# Patient Record
Sex: FEMALE | Race: WHITE | NOT HISPANIC OR LATINO | Employment: OTHER | URBAN - METROPOLITAN AREA
[De-identification: names, ages, dates, MRNs, and addresses within clinical notes are randomized per-mention and may not be internally consistent; named-entity substitution may affect disease eponyms.]

---

## 2017-09-02 ENCOUNTER — APPOINTMENT (EMERGENCY)
Dept: NUCLEAR MEDICINE | Facility: HOSPITAL | Age: 27
End: 2017-09-02
Payer: COMMERCIAL

## 2017-09-02 ENCOUNTER — APPOINTMENT (EMERGENCY)
Dept: RADIOLOGY | Facility: HOSPITAL | Age: 27
End: 2017-09-02
Payer: COMMERCIAL

## 2017-09-02 ENCOUNTER — HOSPITAL ENCOUNTER (EMERGENCY)
Facility: HOSPITAL | Age: 27
Discharge: LEFT BEFORE DISCHARGE | End: 2017-09-02
Attending: EMERGENCY MEDICINE
Payer: COMMERCIAL

## 2017-09-02 ENCOUNTER — APPOINTMENT (EMERGENCY)
Dept: CT IMAGING | Facility: HOSPITAL | Age: 27
End: 2017-09-02
Payer: COMMERCIAL

## 2017-09-02 VITALS
TEMPERATURE: 98.2 F | SYSTOLIC BLOOD PRESSURE: 136 MMHG | HEIGHT: 64 IN | BODY MASS INDEX: 50.02 KG/M2 | RESPIRATION RATE: 16 BRPM | HEART RATE: 87 BPM | WEIGHT: 293 LBS | OXYGEN SATURATION: 100 % | DIASTOLIC BLOOD PRESSURE: 71 MMHG

## 2017-09-02 DIAGNOSIS — N19 RENAL FAILURE: Primary | ICD-10-CM

## 2017-09-02 LAB
ALBUMIN SERPL BCP-MCNC: 3.4 G/DL (ref 3.5–5)
ALP SERPL-CCNC: 100 U/L (ref 46–116)
ALT SERPL W P-5'-P-CCNC: 28 U/L (ref 12–78)
ANION GAP BLD CALC-SCNC: 17 MMOL/L (ref 4–13)
ANION GAP SERPL CALCULATED.3IONS-SCNC: 12 MMOL/L (ref 4–13)
AST SERPL W P-5'-P-CCNC: 17 U/L (ref 5–45)
BASOPHILS # BLD AUTO: 0.07 THOUSANDS/ΜL (ref 0–0.1)
BASOPHILS NFR BLD AUTO: 1 % (ref 0–1)
BILIRUB SERPL-MCNC: 0.3 MG/DL (ref 0.2–1)
BUN BLD-MCNC: 34 MG/DL (ref 5–25)
BUN SERPL-MCNC: 33 MG/DL (ref 5–25)
CA-I BLD-SCNC: 1.08 MMOL/L (ref 1.12–1.32)
CALCIUM SERPL-MCNC: 9.4 MG/DL (ref 8.3–10.1)
CHLORIDE BLD-SCNC: 105 MMOL/L (ref 100–108)
CHLORIDE SERPL-SCNC: 102 MMOL/L (ref 100–108)
CO2 SERPL-SCNC: 23 MMOL/L (ref 21–32)
CREAT BLD-MCNC: 1.8 MG/DL (ref 0.6–1.3)
CREAT SERPL-MCNC: 1.9 MG/DL (ref 0.6–1.3)
DEPRECATED D DIMER PPP: 652 NG/ML (FEU) (ref 0–424)
EOSINOPHIL # BLD AUTO: 0.06 THOUSAND/ΜL (ref 0–0.61)
EOSINOPHIL NFR BLD AUTO: 1 % (ref 0–6)
ERYTHROCYTE [DISTWIDTH] IN BLOOD BY AUTOMATED COUNT: 15.5 % (ref 11.6–15.1)
GFR SERPL CREATININE-BSD FRML MDRD: 35 ML/MIN/1.73SQ M
GFR SERPL CREATININE-BSD FRML MDRD: 37 ML/MIN/1.73SQ M
GLUCOSE SERPL-MCNC: 108 MG/DL (ref 65–140)
GLUCOSE SERPL-MCNC: 94 MG/DL (ref 65–140)
HCT VFR BLD AUTO: 37.2 % (ref 34.8–46.1)
HCT VFR BLD CALC: 37 % (ref 34.8–46.1)
HGB BLD-MCNC: 11.7 G/DL (ref 11.5–15.4)
HGB BLDA-MCNC: 12.6 G/DL (ref 11.5–15.4)
LYMPHOCYTES # BLD AUTO: 2.53 THOUSANDS/ΜL (ref 0.6–4.47)
LYMPHOCYTES NFR BLD AUTO: 22 % (ref 14–44)
MCH RBC QN AUTO: 25.5 PG (ref 26.8–34.3)
MCHC RBC AUTO-ENTMCNC: 31.5 G/DL (ref 31.4–37.4)
MCV RBC AUTO: 81 FL (ref 82–98)
MONOCYTES # BLD AUTO: 0.7 THOUSAND/ΜL (ref 0.17–1.22)
MONOCYTES NFR BLD AUTO: 6 % (ref 4–12)
NEUTROPHILS # BLD AUTO: 8.29 THOUSANDS/ΜL (ref 1.85–7.62)
NEUTS SEG NFR BLD AUTO: 70 % (ref 43–75)
PCO2 BLD: 23 MMOL/L (ref 21–32)
PLATELET # BLD AUTO: 456 THOUSANDS/UL (ref 149–390)
PMV BLD AUTO: 9.9 FL (ref 8.9–12.7)
POTASSIUM BLD-SCNC: 4.2 MMOL/L (ref 3.5–5.3)
POTASSIUM SERPL-SCNC: 3.8 MMOL/L (ref 3.5–5.3)
PROT SERPL-MCNC: 8.8 G/DL (ref 6.4–8.2)
RBC # BLD AUTO: 4.58 MILLION/UL (ref 3.81–5.12)
SODIUM BLD-SCNC: 141 MMOL/L (ref 136–145)
SODIUM SERPL-SCNC: 137 MMOL/L (ref 136–145)
SPECIMEN SOURCE: ABNORMAL
WBC # BLD AUTO: 11.65 THOUSAND/UL (ref 4.31–10.16)

## 2017-09-02 PROCEDURE — A9558 XE133 XENON 10MCI: HCPCS

## 2017-09-02 PROCEDURE — 85014 HEMATOCRIT: CPT

## 2017-09-02 PROCEDURE — 99284 EMERGENCY DEPT VISIT MOD MDM: CPT

## 2017-09-02 PROCEDURE — 96361 HYDRATE IV INFUSION ADD-ON: CPT

## 2017-09-02 PROCEDURE — A9540 TC99M MAA: HCPCS

## 2017-09-02 PROCEDURE — 71020 HB CHEST X-RAY 2VW FRONTAL&LATL: CPT

## 2017-09-02 PROCEDURE — 78582 LUNG VENTILAT&PERFUS IMAGING: CPT

## 2017-09-02 PROCEDURE — 85379 FIBRIN DEGRADATION QUANT: CPT | Performed by: EMERGENCY MEDICINE

## 2017-09-02 PROCEDURE — 80047 BASIC METABLC PNL IONIZED CA: CPT

## 2017-09-02 PROCEDURE — 80053 COMPREHEN METABOLIC PANEL: CPT | Performed by: EMERGENCY MEDICINE

## 2017-09-02 PROCEDURE — 36415 COLL VENOUS BLD VENIPUNCTURE: CPT | Performed by: EMERGENCY MEDICINE

## 2017-09-02 PROCEDURE — 96360 HYDRATION IV INFUSION INIT: CPT

## 2017-09-02 PROCEDURE — 93005 ELECTROCARDIOGRAM TRACING: CPT | Performed by: EMERGENCY MEDICINE

## 2017-09-02 PROCEDURE — 85025 COMPLETE CBC W/AUTO DIFF WBC: CPT | Performed by: EMERGENCY MEDICINE

## 2017-09-02 RX ORDER — ACETAMINOPHEN 325 MG/1
650 TABLET ORAL EVERY 6 HOURS PRN
COMMUNITY
End: 2018-08-07

## 2017-09-02 RX ORDER — BUDESONIDE AND FORMOTEROL FUMARATE DIHYDRATE 160; 4.5 UG/1; UG/1
2 AEROSOL RESPIRATORY (INHALATION) 2 TIMES DAILY
COMMUNITY
End: 2018-08-07

## 2017-09-02 RX ORDER — TOPIRAMATE 50 MG/1
25 TABLET, FILM COATED ORAL 3 TIMES DAILY
COMMUNITY
End: 2018-08-07

## 2017-09-02 RX ORDER — AMLODIPINE BESYLATE 5 MG/1
5 TABLET ORAL DAILY
COMMUNITY
End: 2018-08-07

## 2017-09-02 RX ORDER — ALPRAZOLAM 1 MG/1
TABLET ORAL
COMMUNITY
End: 2018-08-07

## 2017-09-02 RX ORDER — BENAZEPRIL/HYDROCHLOROTHIAZIDE 20 MG-25MG
1 TABLET ORAL DAILY
COMMUNITY
End: 2018-08-07

## 2017-09-02 RX ORDER — DOXYCYCLINE HYCLATE 100 MG/1
100 CAPSULE ORAL DAILY
COMMUNITY
End: 2018-08-10 | Stop reason: HOSPADM

## 2017-09-02 RX ADMIN — SODIUM CHLORIDE 1000 ML: 0.9 INJECTION, SOLUTION INTRAVENOUS at 08:35

## 2017-09-02 RX ADMIN — SODIUM CHLORIDE 1000 ML: 0.9 INJECTION, SOLUTION INTRAVENOUS at 09:50

## 2017-09-03 LAB
ATRIAL RATE: 105 BPM
P AXIS: 29 DEGREES
PR INTERVAL: 144 MS
QRS AXIS: -6 DEGREES
QRSD INTERVAL: 94 MS
QT INTERVAL: 338 MS
QTC INTERVAL: 438 MS
T WAVE AXIS: 23 DEGREES
VENTRICULAR RATE: 101 BPM

## 2018-06-26 ENCOUNTER — HOSPITAL ENCOUNTER (EMERGENCY)
Facility: HOSPITAL | Age: 28
Discharge: HOME | End: 2018-06-26
Attending: EMERGENCY MEDICINE | Admitting: EMERGENCY MEDICINE
Payer: COMMERCIAL

## 2018-06-26 VITALS
BODY MASS INDEX: 59.91 KG/M2 | SYSTOLIC BLOOD PRESSURE: 150 MMHG | DIASTOLIC BLOOD PRESSURE: 85 MMHG | WEIGHT: 293 LBS | TEMPERATURE: 98.3 F | OXYGEN SATURATION: 100 % | HEART RATE: 104 BPM | RESPIRATION RATE: 24 BRPM

## 2018-06-26 DIAGNOSIS — F32.A DEPRESSION: ICD-10-CM

## 2018-06-26 DIAGNOSIS — F51.5 NIGHTMARES: Primary | ICD-10-CM

## 2018-06-26 LAB
ANION GAP BLD CALC-SCNC: 18 MMOL/L (ref 4–13)
BUN BLD-MCNC: 27 MG/DL (ref 5–25)
CA-I BLD-SCNC: 1.23 MMOL/L (ref 1.12–1.32)
CHLORIDE BLD-SCNC: 103 MMOL/L (ref 100–108)
CREAT BLD-MCNC: 1.7 MG/DL (ref 0.6–1.3)
ETHANOL EXG-MCNC: 0 MG/DL
ETHANOL EXG-MCNC: 0 MG/DL
GFR SERPL CREATININE-BSD FRML MDRD: 40 ML/MIN/1.73SQ M
GLUCOSE SERPL-MCNC: 101 MG/DL (ref 65–140)
HCT VFR BLD CALC: 37 % (ref 34.8–46.1)
HGB BLDA-MCNC: 12.6 G/DL (ref 11.5–15.4)
PCO2 BLD: 25 MMOL/L (ref 21–32)
POTASSIUM BLD-SCNC: 3.8 MMOL/L (ref 3.5–5.3)
SODIUM BLD-SCNC: 142 MMOL/L (ref 136–145)
SPECIMEN SOURCE: ABNORMAL

## 2018-06-26 PROCEDURE — 85014 HEMATOCRIT: CPT

## 2018-06-26 PROCEDURE — 99283 EMERGENCY DEPT VISIT LOW MDM: CPT

## 2018-06-26 PROCEDURE — 80047 BASIC METABLC PNL IONIZED CA: CPT

## 2018-06-26 PROCEDURE — 82075 ASSAY OF BREATH ETHANOL: CPT | Performed by: EMERGENCY MEDICINE

## 2018-06-26 NOTE — ED NOTES
Pt unable to provide urine sample at this time as she states she did not drink all day       Heather Suárez RN  06/26/18 1727

## 2018-06-26 NOTE — ED NOTES
CW did intake and SA  Pt denies SI and HI  Pt admits to occasional AH and VH when "really angry or stressed "  Pt admits to being angry at her computer earlier this am   Pt stated that she has not slept past few days since taking a new weight loss drug prescribed by her PCP 4 days ago  Pt admits to paranoia and stated "that people don's like me and cannot figure me out "  Pt stated that she has agorophobia and only left her house two times this past year  Pt stated that she does not like being yelled at by her parents  Pt stated that she feels dehydrated and that she lost 15 pounds in last two weeks

## 2018-06-26 NOTE — ED NOTES
Pt lying on the bed, parents at bedside, tv off, lights on, door open     Alysia Height  06/26/18 9966

## 2018-06-26 NOTE — ED PROVIDER NOTES
History  Chief Complaint   Patient presents with    Psychiatric Evaluation     c/o visual hallucinations (demonic man), uncontrolled crying, haven't slept in 4 days, manic, paranoid, fearful, delusional, "dreaming of death and suicide" since having medications "changed around about 1 week ago"  Pt denies SI/HI  26-year-old female presents for evaluation with chief complaint of visual hallucinations, depression, emotional lability and disturbing dreams about death and suicide  Patient states she had a recent medication change about 1 week ago and believes this may be related  History provided by:  Patient and medical records   used: No    Psychiatric Evaluation   Presenting symptoms: depression, hallucinations and suicidal thoughts    Presenting symptoms: no self-mutilation, no suicidal threats and no suicide attempt    Degree of incapacity (severity): Moderate  Onset quality:  Gradual  Duration:  1 week  Timing:  Constant  Progression:  Worsening  Chronicity:  New  Context: recent medication change    Relieved by:  Nothing  Worsened by:  Nothing  Ineffective treatments:  None tried  Associated symptoms: anxiety and insomnia    Associated symptoms: no chest pain        Prior to Admission Medications   Prescriptions Last Dose Informant Patient Reported? Taking?    ALPRAZolam (XANAX) 1 mg tablet   Yes No   Sig: Take by mouth daily at bedtime as needed for anxiety   ARIPiprazole (ABILIFY) 10 mg tablet   Yes No   Sig: Take 10 mg by mouth daily     Pseudoeph-Doxylamine-DM-APAP (NYQUIL PO)   Yes No   Sig: Take 1 capsule by mouth   acetaminophen (TYLENOL) 325 mg tablet   Yes No   Sig: Take 650 mg by mouth every 6 (six) hours as needed for mild pain   amLODIPine (NORVASC) 5 mg tablet   Yes No   Sig: Take 5 mg by mouth daily   benazepril-hydrochlorthiazide (LOTENSIN HCT) 20-25 MG per tablet   Yes No   Sig: Take 1 tablet by mouth daily   buPROPion (WELLBUTRIN XL) 150 mg 24 hr tablet   Yes No Sig: Take 150 mg by mouth daily   budesonide-formoterol (SYMBICORT) 160-4 5 mcg/act inhaler   Yes No   Sig: Inhale 2 puffs 2 (two) times a day   doxycycline hyclate (VIBRAMYCIN) 100 mg capsule   Yes No   Sig: Take 100 mg by mouth every 12 (twelve) hours   topiramate (TOPAMAX) 50 MG tablet   Yes No   Sig: Take 25 mg by mouth 3 (three) times a day      Facility-Administered Medications: None       Past Medical History:   Diagnosis Date    Acne     Anxiety     Depression     Hypertension     Obesity     Psychiatric disorder     depression    Psychosis        Past Surgical History:   Procedure Laterality Date    CYSTOSCOPY      WISDOM TOOTH EXTRACTION         History reviewed  No pertinent family history  I have reviewed and agree with the history as documented  Social History   Substance Use Topics    Smoking status: Never Smoker    Smokeless tobacco: Never Used    Alcohol use No        Review of Systems   Constitutional: Negative for chills, diaphoresis and fever  Respiratory: Negative for shortness of breath  Cardiovascular: Negative for chest pain and palpitations  Gastrointestinal: Negative for diarrhea, nausea and vomiting  Genitourinary: Negative for dysuria and frequency  Skin: Negative for rash  Psychiatric/Behavioral: Positive for hallucinations and suicidal ideas  Negative for self-injury  The patient is nervous/anxious and has insomnia  All other systems reviewed and are negative  Physical Exam  Physical Exam   Constitutional: She is oriented to person, place, and time  She appears well-developed and well-nourished  She appears distressed  HENT:   Head: Normocephalic and atraumatic  Eyes: EOM are normal  Pupils are equal, round, and reactive to light  Neck: Normal range of motion  No JVD present  Cardiovascular: Regular rhythm, normal heart sounds and intact distal pulses  Tachycardia present  Exam reveals no gallop and no friction rub      No murmur heard   Pulmonary/Chest: Effort normal and breath sounds normal  No respiratory distress  She has no wheezes  She has no rales  She exhibits no tenderness  Abdominal:   Morbidly obese     Musculoskeletal: Normal range of motion  She exhibits no tenderness  Neurological: She is alert and oriented to person, place, and time  Skin: Skin is warm and dry  White makeup on right cheek     Psychiatric: She has a normal mood and affect  Her behavior is normal  Judgment and thought content normal    Nursing note and vitals reviewed        Vital Signs  ED Triage Vitals [06/26/18 1450]   Temperature Pulse Respirations Blood Pressure SpO2   98 3 °F (36 8 °C) 104 (!) 24 150/85 100 %      Temp Source Heart Rate Source Patient Position - Orthostatic VS BP Location FiO2 (%)   Oral Monitor Sitting Left arm --      Pain Score       5           Vitals:    06/26/18 1450   BP: 150/85   Pulse: 104   Patient Position - Orthostatic VS: Sitting       Visual Acuity      ED Medications  Medications - No data to display    Diagnostic Studies  Results Reviewed     Procedure Component Value Units Date/Time    POCT Chem 8+ [[id number]]  (Abnormal) Collected:  06/26/18 1700    Lab Status:  Final result Updated:  06/26/18 1707     SODIUM, I-STAT 142 mmol/l      Potassium, i-STAT 3 8 mmol/L      Chloride, istat 103 mmol/L      CO2, i-STAT 25 mmol/L      Anion Gap, Istat 18 (H) mmol/L      Calcium, Ionized i-STAT 1 23 mmol/L      BUN, I-STAT 27 (H) mg/dl      Creatinine, i-STAT 1 7 (H) mg/dl      eGFR 40 ml/min/1 73sq m      Glucose, i-STAT 101 mg/dl      Hct, i-STAT 37 %      Hgb, i-STAT 12 6 g/dl      Specimen Type VENOUS    POCT alcohol breath test [[id number]]  (Normal) Resulted:  06/26/18 1606    Lab Status:  Final result Updated:  06/26/18 1606     EXTBreath Alcohol 0 000    POCT alcohol breath test [[id number]]  (Normal) Resulted:  06/26/18 1606    Lab Status:  Final result Updated:  06/26/18 1606     EXTBreath Alcohol 0 000    Rapid drug screen, urine [[id number]]     Lab Status:  No result Specimen:  Urine     POCT pregnancy, urine [[id number]]     Lab Status:  No result Specimen:  Urine                  No orders to display              Procedures  Procedures       Phone Contacts  ED Phone Contact    ED Course                               MDM  Number of Diagnoses or Management Options  Depression: new and requires workup  Nightmares: new and requires workup  Diagnosis management comments:  25 y o  female with depression, hallucinations, disturbing dreams  Will have crisis evaluate  Potential 201/302 admission vs  outpatient options  Amount and/or Complexity of Data Reviewed  Clinical lab tests: ordered and reviewed    Risk of Complications, Morbidity, and/or Mortality  Diagnostic procedures: high  Management options: high  General comments: Patient's renal function is at baseline and does not indicate significant dehydration  Patient is not anemic  Patient Progress  Patient progress: stable    CritCare Time    Disposition  Final diagnoses:   Nightmares   Depression     Time reflects when diagnosis was documented in both MDM as applicable and the Disposition within this note     Time User Action Codes Description Comment    6/26/2018  7:15 PM Antonio Ryan Add [F51 5] Nightmares     6/26/2018  7:15 PM Antonio Ryan Add [F32 9] Depression       ED Disposition     ED Disposition Condition Comment    Discharge  [address] [facility] discharge to home/self care  Condition at discharge: Good        MD Documentation      Most Recent Value   Sending [address]      Follow-up Information    None         Patient's Medications   Discharge Prescriptions    No medications on file     No discharge procedures on file      ED Provider  Electronically Signed by           Sharda Garcia MD  06/26/18 8740

## 2018-06-26 NOTE — DISCHARGE INSTRUCTIONS
Anxiety   WHAT YOU SHOULD KNOW:   Anxiety is a condition that causes you to feel excessive worry, uneasiness, or fear  Family or work stress, smoking, caffeine, and alcohol can increase your risk for anxiety  Certain medicines or health conditions can also increase your risk  Anxiety may begin gradually, and can become a long-term condition if it is not managed or treated  AFTER YOU LEAVE:   Medicines:   · Medicines  can help you feel more calm and relaxed, and decrease your symptoms  · Take your medicine as directed  Contact your healthcare provider if you think your medicine is not helping or if you have side effects  Tell him if you are allergic to any medicine  Keep a list of the medicines, vitamins, and herbs you take  Include the amounts, and when and why you take them  Bring the list or the pill bottles to follow-up visits  Carry your medicine list with you in case of an emergency  Follow up with your healthcare provider within 2 weeks or as directed:  Write down your questions so you remember to ask them during your visits  Manage anxiety:   · Go to counseling as directed  Cognitive behavioral therapy can help you understand and change how you react to events that trigger your symptoms  · Find ways to manage your symptoms  Activities such as exercise, meditation, or listening to music can help you relax  · Practice deep breathing  Breathing can change how your body reacts to stress  Focus on taking slow, deep breaths several times a day, or during an anxiety attack  Breathe in through your nose, and out through your mouth  · Avoid caffeine  Caffeine can make your symptoms worse  Avoid foods or drinks that are meant to increase your energy level  · Limit or avoid alcohol  Ask your healthcare provider if alcohol is safe for you  You may not be able to drink alcohol if you take certain anxiety or depression medicines  Limit alcohol to 1 drink per day if you are a woman   Limit alcohol to 2 drinks per day if you are a man  A drink of alcohol is 12 ounces of beer, 5 ounces of wine, or 1½ ounces of liquor  Contact your healthcare provider if:   · Your symptoms get worse or do not get better with treatment  · You think your medicine may be causing side effects  · Your anxiety keeps you from doing your regular daily activities  · You have new symptoms since your last visit  · You have questions or concerns about your condition or care  Seek care immediately or call 911 if:   · You have chest pain, tightness, or heaviness that may spread to your shoulders, arms, jaw, neck, or back  · You feel like hurting yourself or someone else  · You feel dizzy, lightheaded, or faint  © 2014 [address] is for End User's use only and may not be sold, redistributed or otherwise used for commercial purposes  All illustrations and images included in CareNotes® are the copyrighted property of [employer]  or Reza Ferreira  The above information is an  only  It is not intended as medical advice for individual conditions or treatments  Talk to your doctor, nurse or pharmacist before following any medical regimen to see if it is safe and effective for you  Depression, Ambulatory Care   GENERAL INFORMATION:   Depression  is a medical condition that causes feelings of sadness or hopelessness that do not go away  Depression may cause you to lose interest in things you used to enjoy  These feelings may interfere with your daily life    Common symptoms include the following:   · Appetite changes, or weight gain or loss    · Trouble going to sleep or staying asleep, or sleeping too much    · Fatigue or lack of energy    · Feeling restless, irritable, or withdrawn    · Feeling worthless, hopeless, discouraged, or very guilty    · Trouble concentrating, remembering things, doing daily tasks, or making decisions    · Thoughts about hurting or killing yourself  Seek immediate care for the following symptoms:   · You think about harming yourself or someone else  Treatment for depression  may include medicine to improve or balance your mood  Therapy may also be used to treat your depression  A therapist will help you learn to cope with your thoughts and feelings  Therapy can be done alone or in a group  It may also be done with family members or a significant other  Manage depression:   · Get regular physical activity  Try to exercise for 30 minutes, 3 to 5 days a week  Work with your healthcare provider to develop an exercise plan that you enjoy  · Get enough sleep  Create a routine to help you relax before bed  Try to go to bed and wake up at the same time every day  Sleep is important for emotional health  · Eat a variety of healthy foods  Healthy foods include fruits, vegetables, whole-grain breads, low-fat dairy products, beans, lean meats, and fish  A healthy meal plan is low in fat, salt, and added sugar  · Avoid or limit alcohol  Ask your healthcare provider how much alcohol is safe for you to drink  A drink of alcohol is 12 ounces of beer, 5 ounces of wine, or 1½ ounces of liquor  Follow up with your healthcare provider as directed: You will need to return so your healthcare provider can monitor your progress  Write down your questions so you remember to ask them during your visits  CARE AGREEMENT:   You have the right to help plan your care  Learn about your health condition and how it may be treated  Discuss treatment options with your caregivers to decide what care you want to receive  You always have the right to refuse treatment  The above information is an  only  It is not intended as medical advice for individual conditions or treatments  Talk to your doctor, nurse or pharmacist before following any medical regimen to see if it is safe and effective for you    © 2014 [address] is for End User's use only and may not be sold, redistributed or otherwise used for commercial purposes  All illustrations and images included in CareNotes® are the copyrighted property of Lamont POLLOCK  or [facility]

## 2018-06-26 NOTE — ED NOTES
Pt's family requesting update  Dr Anthony Bourgeois notified       Charlie Balderrama, RN  06/26/18 1279

## 2018-06-26 NOTE — ED NOTES
Pt talking to crisis, was unable to urinate  Pt is frustrated bc she thinks she is dehydrated but the dr has determined she is not dehydrated  tv off, lights on  Door open  Pt's parents are at bedside        [facility]  06/26/18 4772

## 2018-08-07 ENCOUNTER — APPOINTMENT (EMERGENCY)
Dept: CT IMAGING | Facility: HOSPITAL | Age: 28
End: 2018-08-07
Payer: COMMERCIAL

## 2018-08-07 ENCOUNTER — HOSPITAL ENCOUNTER (EMERGENCY)
Facility: HOSPITAL | Age: 28
End: 2018-08-07
Attending: EMERGENCY MEDICINE
Payer: COMMERCIAL

## 2018-08-07 ENCOUNTER — APPOINTMENT (INPATIENT)
Dept: NEUROLOGY | Facility: AMBULATORY SURGERY CENTER | Age: 28
DRG: 101 | End: 2018-08-07
Payer: COMMERCIAL

## 2018-08-07 ENCOUNTER — HOSPITAL ENCOUNTER (INPATIENT)
Facility: HOSPITAL | Age: 28
LOS: 3 days | Discharge: HOME | DRG: 101 | End: 2018-08-10
Attending: INTERNAL MEDICINE | Admitting: INTERNAL MEDICINE
Payer: COMMERCIAL

## 2018-08-07 VITALS
RESPIRATION RATE: 18 BRPM | HEIGHT: 66 IN | BODY MASS INDEX: 47.09 KG/M2 | SYSTOLIC BLOOD PRESSURE: 132 MMHG | OXYGEN SATURATION: 96 % | TEMPERATURE: 99.8 F | DIASTOLIC BLOOD PRESSURE: 65 MMHG | WEIGHT: 293 LBS | HEART RATE: 92 BPM

## 2018-08-07 DIAGNOSIS — R56.9 OBSERVED SEIZURE-LIKE ACTIVITY (HCC): Primary | ICD-10-CM

## 2018-08-07 DIAGNOSIS — D64.9 ANEMIA: ICD-10-CM

## 2018-08-07 DIAGNOSIS — R44.3 HALLUCINATIONS: ICD-10-CM

## 2018-08-07 DIAGNOSIS — R56.9 SEIZURE-LIKE ACTIVITY (HCC): Primary | ICD-10-CM

## 2018-08-07 DIAGNOSIS — F32.A DEPRESSION: ICD-10-CM

## 2018-08-07 DIAGNOSIS — R44.1 VISUAL HALLUCINATIONS: ICD-10-CM

## 2018-08-07 DIAGNOSIS — F20.9 SCHIZOPHRENIA (HCC): ICD-10-CM

## 2018-08-07 PROBLEM — R79.89 ELEVATED SERUM CREATININE: Status: ACTIVE | Noted: 2018-08-07

## 2018-08-07 PROBLEM — E87.6 HYPOKALEMIA: Status: ACTIVE | Noted: 2018-08-07

## 2018-08-07 PROBLEM — D72.829 LEUKOCYTOSIS: Status: ACTIVE | Noted: 2018-08-07

## 2018-08-07 LAB
ALBUMIN SERPL BCP-MCNC: 3.3 G/DL (ref 3.5–5)
ALP SERPL-CCNC: 85 U/L (ref 46–116)
ALT SERPL W P-5'-P-CCNC: 30 U/L (ref 12–78)
AMPHETAMINES SERPL QL SCN: NEGATIVE
ANION GAP SERPL CALCULATED.3IONS-SCNC: 12 MMOL/L (ref 4–13)
APAP SERPL-MCNC: <2 UG/ML (ref 10–30)
AST SERPL W P-5'-P-CCNC: 22 U/L (ref 5–45)
B-HCG SERPL-ACNC: <2 MIU/ML
BARBITURATES UR QL: NEGATIVE
BASOPHILS # BLD AUTO: 0.04 THOUSANDS/ΜL (ref 0–0.1)
BASOPHILS NFR BLD AUTO: 0 % (ref 0–1)
BENZODIAZ UR QL: NEGATIVE
BILIRUB SERPL-MCNC: 0.5 MG/DL (ref 0.2–1)
BUN SERPL-MCNC: 15 MG/DL (ref 5–25)
CALCIUM SERPL-MCNC: 9.3 MG/DL (ref 8.3–10.1)
CHLORIDE SERPL-SCNC: 105 MMOL/L (ref 100–108)
CK MB SERPL-MCNC: 1.3 % (ref 0–2.5)
CK MB SERPL-MCNC: 2.2 NG/ML (ref 0–5)
CK SERPL-CCNC: 169 U/L (ref 26–192)
CO2 SERPL-SCNC: 22 MMOL/L (ref 21–32)
COCAINE UR QL: NEGATIVE
CREAT SERPL-MCNC: 1.36 MG/DL (ref 0.6–1.3)
EOSINOPHIL # BLD AUTO: 0.05 THOUSAND/ΜL (ref 0–0.61)
EOSINOPHIL NFR BLD AUTO: 0 % (ref 0–6)
ERYTHROCYTE [DISTWIDTH] IN BLOOD BY AUTOMATED COUNT: 17 % (ref 11.6–15.1)
ETHANOL SERPL-MCNC: <3 MG/DL (ref 0–3)
GFR SERPL CREATININE-BSD FRML MDRD: 52 ML/MIN/1.73SQ M
GLUCOSE SERPL-MCNC: 113 MG/DL (ref 65–140)
GLUCOSE SERPL-MCNC: 99 MG/DL (ref 65–140)
HCT VFR BLD AUTO: 36.3 % (ref 34.8–46.1)
HGB BLD-MCNC: 10.8 G/DL (ref 11.5–15.4)
IMM GRANULOCYTES # BLD AUTO: 0.05 THOUSAND/UL (ref 0–0.2)
IMM GRANULOCYTES NFR BLD AUTO: 0 % (ref 0–2)
LYMPHOCYTES # BLD AUTO: 1.62 THOUSANDS/ΜL (ref 0.6–4.47)
LYMPHOCYTES NFR BLD AUTO: 13 % (ref 14–44)
MCH RBC QN AUTO: 24.1 PG (ref 26.8–34.3)
MCHC RBC AUTO-ENTMCNC: 29.8 G/DL (ref 31.4–37.4)
MCV RBC AUTO: 81 FL (ref 82–98)
METHADONE UR QL: NEGATIVE
MONOCYTES # BLD AUTO: 1.04 THOUSAND/ΜL (ref 0.17–1.22)
MONOCYTES NFR BLD AUTO: 8 % (ref 4–12)
NEUTROPHILS # BLD AUTO: 9.8 THOUSANDS/ΜL (ref 1.85–7.62)
NEUTS SEG NFR BLD AUTO: 79 % (ref 43–75)
NRBC BLD AUTO-RTO: 0 /100 WBCS
OPIATES UR QL SCN: NEGATIVE
PCP UR QL: NEGATIVE
PLATELET # BLD AUTO: 388 THOUSANDS/UL (ref 149–390)
PMV BLD AUTO: 10.4 FL (ref 8.9–12.7)
POTASSIUM SERPL-SCNC: 3.3 MMOL/L (ref 3.5–5.3)
PROT SERPL-MCNC: 8.4 G/DL (ref 6.4–8.2)
RBC # BLD AUTO: 4.49 MILLION/UL (ref 3.81–5.12)
SALICYLATES SERPL-MCNC: <3 MG/DL (ref 3–20)
SODIUM SERPL-SCNC: 139 MMOL/L (ref 136–145)
THC UR QL: NEGATIVE
WBC # BLD AUTO: 12.6 THOUSAND/UL (ref 4.31–10.16)

## 2018-08-07 PROCEDURE — 80320 DRUG SCREEN QUANTALCOHOLS: CPT | Performed by: EMERGENCY MEDICINE

## 2018-08-07 PROCEDURE — 95951 HB EEG MONITORING/VIDEORECORD: CPT

## 2018-08-07 PROCEDURE — 80329 ANALGESICS NON-OPIOID 1 OR 2: CPT | Performed by: EMERGENCY MEDICINE

## 2018-08-07 PROCEDURE — 80053 COMPREHEN METABOLIC PANEL: CPT | Performed by: EMERGENCY MEDICINE

## 2018-08-07 PROCEDURE — 80307 DRUG TEST PRSMV CHEM ANLYZR: CPT | Performed by: EMERGENCY MEDICINE

## 2018-08-07 PROCEDURE — 95951 PR EEG MONITORING/VIDEORECORD: CPT | Performed by: PSYCHIATRY & NEUROLOGY

## 2018-08-07 PROCEDURE — 85025 COMPLETE CBC W/AUTO DIFF WBC: CPT | Performed by: EMERGENCY MEDICINE

## 2018-08-07 PROCEDURE — 84702 CHORIONIC GONADOTROPIN TEST: CPT | Performed by: EMERGENCY MEDICINE

## 2018-08-07 PROCEDURE — 82948 REAGENT STRIP/BLOOD GLUCOSE: CPT

## 2018-08-07 PROCEDURE — 96376 TX/PRO/DX INJ SAME DRUG ADON: CPT

## 2018-08-07 PROCEDURE — 36415 COLL VENOUS BLD VENIPUNCTURE: CPT | Performed by: EMERGENCY MEDICINE

## 2018-08-07 PROCEDURE — 99222 1ST HOSP IP/OBS MODERATE 55: CPT | Performed by: PHYSICIAN ASSISTANT

## 2018-08-07 PROCEDURE — 99285 EMERGENCY DEPT VISIT HI MDM: CPT

## 2018-08-07 PROCEDURE — 96374 THER/PROPH/DIAG INJ IV PUSH: CPT

## 2018-08-07 PROCEDURE — 82553 CREATINE MB FRACTION: CPT | Performed by: EMERGENCY MEDICINE

## 2018-08-07 PROCEDURE — 70450 CT HEAD/BRAIN W/O DYE: CPT

## 2018-08-07 PROCEDURE — 82550 ASSAY OF CK (CPK): CPT | Performed by: EMERGENCY MEDICINE

## 2018-08-07 RX ORDER — DOXEPIN HYDROCHLORIDE 25 MG/1
25 CAPSULE ORAL
Status: ON HOLD | COMMUNITY
End: 2018-08-07 | Stop reason: ALTCHOICE

## 2018-08-07 RX ORDER — ALPRAZOLAM 2 MG/1
TABLET ORAL 3 TIMES DAILY PRN
Status: ON HOLD | COMMUNITY
End: 2019-02-01 | Stop reason: ALTCHOICE

## 2018-08-07 RX ORDER — LORAZEPAM 2 MG/ML
2 INJECTION INTRAMUSCULAR ONCE
Status: COMPLETED | OUTPATIENT
Start: 2018-08-07 | End: 2018-08-07

## 2018-08-07 RX ORDER — PHENTERMINE HYDROCHLORIDE 37.5 MG/1
37.5 TABLET ORAL DAILY
Status: DISCONTINUED | OUTPATIENT
Start: 2018-08-08 | End: 2018-08-07

## 2018-08-07 RX ORDER — ACETAMINOPHEN 325 MG/1
650 TABLET ORAL ONCE
Status: DISCONTINUED | OUTPATIENT
Start: 2018-08-07 | End: 2018-08-07 | Stop reason: HOSPADM

## 2018-08-07 RX ORDER — ESCITALOPRAM OXALATE 20 MG/1
20 TABLET ORAL DAILY
Status: ON HOLD | COMMUNITY
End: 2018-08-07 | Stop reason: ALTCHOICE

## 2018-08-07 RX ORDER — TOPIRAMATE 100 MG/1
100 TABLET, FILM COATED ORAL 2 TIMES DAILY
Status: ON HOLD | COMMUNITY
End: 2018-08-10

## 2018-08-07 RX ORDER — DOXYCYCLINE HYCLATE 100 MG/1
100 CAPSULE ORAL DAILY
Status: DISCONTINUED | OUTPATIENT
Start: 2018-08-08 | End: 2018-08-10

## 2018-08-07 RX ORDER — TOPIRAMATE 100 MG/1
100 TABLET, FILM COATED ORAL 2 TIMES DAILY
Status: DISCONTINUED | OUTPATIENT
Start: 2018-08-07 | End: 2018-08-10 | Stop reason: HOSPADM

## 2018-08-07 RX ORDER — NYSTATIN 100000 [USP'U]/G
POWDER TOPICAL 2 TIMES DAILY
Status: DISCONTINUED | OUTPATIENT
Start: 2018-08-07 | End: 2018-08-10 | Stop reason: HOSPADM

## 2018-08-07 RX ORDER — ALPRAZOLAM 1 MG/1
TABLET ORAL 4 TIMES DAILY PRN
Status: ON HOLD | COMMUNITY
End: 2018-08-07 | Stop reason: ALTCHOICE

## 2018-08-07 RX ORDER — HEPARIN SODIUM 5000 [USP'U]/ML
5000 INJECTION, SOLUTION INTRAVENOUS; SUBCUTANEOUS EVERY 8 HOURS SCHEDULED
Status: DISCONTINUED | OUTPATIENT
Start: 2018-08-07 | End: 2018-08-10 | Stop reason: HOSPADM

## 2018-08-07 RX ORDER — PHENTERMINE HYDROCHLORIDE 37.5 MG/1
37.5 TABLET ORAL
COMMUNITY
End: 2018-08-10 | Stop reason: HOSPADM

## 2018-08-07 RX ORDER — LORAZEPAM 2 MG/ML
1 INJECTION INTRAMUSCULAR ONCE
Status: COMPLETED | OUTPATIENT
Start: 2018-08-07 | End: 2018-08-07

## 2018-08-07 RX ORDER — ALPRAZOLAM 0.5 MG/1
2 TABLET ORAL 3 TIMES DAILY PRN
Status: DISCONTINUED | OUTPATIENT
Start: 2018-08-07 | End: 2018-08-10 | Stop reason: HOSPADM

## 2018-08-07 RX ORDER — LORAZEPAM 1 MG/1
1 TABLET ORAL ONCE
Status: COMPLETED | OUTPATIENT
Start: 2018-08-07 | End: 2018-08-07

## 2018-08-07 RX ORDER — POTASSIUM CHLORIDE 20 MEQ/1
40 TABLET, EXTENDED RELEASE ORAL ONCE
Status: COMPLETED | OUTPATIENT
Start: 2018-08-07 | End: 2018-08-07

## 2018-08-07 RX ORDER — LORAZEPAM 2 MG/ML
INJECTION INTRAMUSCULAR
Status: COMPLETED
Start: 2018-08-07 | End: 2018-08-07

## 2018-08-07 RX ADMIN — HEPARIN SODIUM 5000 UNITS: 5000 INJECTION, SOLUTION INTRAVENOUS; SUBCUTANEOUS at 22:51

## 2018-08-07 RX ADMIN — NYSTATIN: 100000 POWDER TOPICAL at 22:51

## 2018-08-07 RX ADMIN — LORAZEPAM 2 MG: 2 INJECTION INTRAMUSCULAR at 15:08

## 2018-08-07 RX ADMIN — LORAZEPAM 1 MG: 2 INJECTION INTRAMUSCULAR; INTRAVENOUS at 16:35

## 2018-08-07 RX ADMIN — POTASSIUM CHLORIDE 40 MEQ: 1500 TABLET, EXTENDED RELEASE ORAL at 14:13

## 2018-08-07 RX ADMIN — LORAZEPAM 2 MG: 2 INJECTION INTRAMUSCULAR; INTRAVENOUS at 15:08

## 2018-08-07 RX ADMIN — TOPIRAMATE 100 MG: 100 TABLET, FILM COATED ORAL at 22:51

## 2018-08-07 RX ADMIN — LORAZEPAM 1 MG: 1 TABLET ORAL at 11:22

## 2018-08-07 NOTE — ED NOTES
Patient's Mom comes to the door stating that patient is having an allergic reaction after taking potassium pill  Doctor now in room       Misha Ware  08/07/18 9144

## 2018-08-07 NOTE — EMTALA/ACUTE CARE TRANSFER
[address] [address] [address]  Dept: [phone]      EMTALA TRANSFER CONSENT    NAME Elvin Jefferson                                         1990                              MRN [id number]    I have been informed of my rights regarding examination, treatment, and transfer   by Dr Christy Arce: Specialized equipment and/or services available at the receiving facility (Include comment)________________________    Risks: Potential for delay in receiving treatment      Transfer Request   I acknowledge that my medical condition has been evaluated and explained to me by the emergency department physician or other qualified medical person and/or my attending physician who has recommended and offered to me further medical examination and treatment  I understand the Hospital's obligation with respect to the treatment and stabilization of my emergency medical condition  I nevertheless request to be transferred  I release the Hospital, the doctor, and any other persons caring for me from all responsibility or liability for any injury or ill effects that may result from my transfer and agree to accept all responsibility for the consequences of my choice to transfer, rather than receive stabilizing treatment at the Hospital  I understand that because the transfer is my request, my insurance may not provide reimbursement for the services  The Hospital will assist and direct me and my family in how to make arrangements for transfer, but the hospital is not liable for any fees charged by the transport service  In spite of this understanding, I refuse to consent to further medical examination and treatment which has been offered to me, and request transfer to  [facility]y [facility]ley Name, [address] [facility]   I authorize the performance of emergency medical procedures and treatments upon me in both transit and upon arrival at the receiving facility  Additionally, I authorize the release of any and all medical records to the receiving facility and request they be transported with me, if possible  I authorize the performance of emergency medical procedures and treatments upon me in both transit and upon arrival at the receiving facility  Additionally, I authorize the release of any and all medical records to the receiving facility and request they be transported with me, if possible  I understand that the safest mode of transportation during a medical emergency is an ambulance and that the Hospital advocates the use of this mode of transport  Risks of traveling to the receiving facility by car, including absence of medical control, life sustaining equipment, such as oxygen, and medical personnel has been explained to me and I fully understand them  (DAKOTA CORRECT BOX BELOW)  [  ]  I consent to the stated transfer and to be transported by ambulance/helicopter  [  ]  I consent to the stated transfer, but refuse transportation by ambulance and accept full responsibility for my transportation by car  I understand the risks of non-ambulance transfers and I exonerate the Hospital and its staff from any deterioration in my condition that results from this refusal     X___________________________________________    DATE  18  TIME________  Signature of patient or legally responsible individual signing on patient behalf           RELATIONSHIP TO PATIENT_________________________          Provider Certification    NAME Sagar Bang                                         1990                              MRN [id number]    A medical screening exam was performed on the above named patient  Based on the examination:    Condition Necessitating Transfer The primary encounter diagnosis was Observed seizure-like activity ([facility], [place] [address] )  A diagnosis of Hallucinations was also pertinent to this visit      Patient Condition: The patient has been stabilized such that within reasonable medical probability, no material deterioration of the patient condition or the condition of the unborn child(lilia) is likely to result from the transfer    Reason for Transfer: Level of Care needed not available at this facility    Transfer Requirements: Facility [facility]   · Space available and qualified personnel available for treatment as acknowledged by    · Agreed to accept transfer and to provide appropriate medical treatment as acknowledged by       Dr Mai Gandhi  · Appropriate medical records of the examination and treatment of the patient are provided at the time of transfer   [address] [facility], [address] _______  · Transfer will be performed by qualified personnel from    and appropriate transfer equipment as required, including the use of necessary and appropriate life support measures  Provider Certification: I have examined the patient and explained the following risks and benefits of being transferred/refusing transfer to the patient/family:  General risk, such as traffic hazards, adverse weather conditions, rough terrain or turbulence, possible failure of equipment (including vehicle or aircraft), or consequences of actions of persons outside the control of the transport personnel      Based on these reasonable risks and benefits to the patient and/or the unborn child(lilia), and based upon the information available at the time of the patients examination, I certify that the medical benefits reasonably to be expected from the provision of appropriate medical treatments at another medical facility outweigh the increasing risks, if any, to the individuals medical condition, and in the case of labor to the unborn child, from effecting the transfer      X____________________________________________ DATE 08/07/18        TIME_______      ORIGINAL - SEND TO MEDICAL RECORDS   COPY - SEND WITH PATIENT DURING TRANSFER

## 2018-08-07 NOTE — ED PROVIDER NOTES
History  Chief Complaint   Patient presents with    Psychiatric Evaluation     Pt  brought in by EMS, found on street walking, pt  vague and poor historian, staring at RN while asking questions, pt  urinated and defecated self  Per EMS pt  having auditory and visual hallucinations  Pt  paranoid about water per EMS     39-year-old female presents to the emergency department via EMS  She was picked up by them while walking in the street as per triage note  After introducing myself to the patient and asking her what brings her in and how we can help her she relates "it's hard to explain "  She relates "I am scared "  She tells me "I need a phone to access "  She is unable to explain who she wanted to call  She tells me she was talking to try & find a phone and that no phones in her home were charged  She does mention that it is hard to find pay phones today  She tells me "I'm functioning " She tells me "I know the date "  She looks around the room frequently and answers affirmatively to hearing voices  She does answer when asked what the voices sound like or what they tell her  She denies wanting to harm herself or others  She tells me that she has been inside her home the past 3 months  It is not clear to me if this is by choice  She tells me that she sees Dr Sunita Joshi psychiatrist   She is unable to tell me when she last saw him & whether this was less than or greater than 3m ago  She is unable to tell me if she has been taking medication recently  She denies having any pain or difficulty breathing  She does tell me that she needs water and food  She tells me that she has not had this in her home  I spoke with the patient's mother via phone who relates "it has been over a week  We tried to get her help about a month ago  She explains that she took her daughter to [facility] where blood work was done    She notes that admission to a distant hospital was discussed and the patient was very fearful of going the distance and ultimately discharged  Her mother notes that she does not take her medication right  She questions whether her daughter takes too much of this  She does not know what the patient has been taking over the past few days  This morning the patient seemed her usual self  Mother thought she might have heard knocking on the door & asked the patient if anyone had not  The patient denied this  Soon afterwards the patient walked out  Mother relates that she followed the patient for a couple of blocks and contacted EMS as this was unusual for her  She normally stays inside the home  Her mother notes that the patient thinks noone likes her  People have been encouraging her to get out  Patient has canceled her last few doctor's appointments preferring to stay in her home  Over the past 1 week the patient has had increased auditory and visual hallucinations  This includes having been seeing and hearing water dripping and complaining of things coming out of the sink  Similar episodes have occurred in the past though typically resolve within a few days  These have not been improving over the past week  The patient has not expressed SI or HI  Her father expresses concern that she "abuses her anxiety medicine "  He notes that she gets 105 of these per month and takes them within the 3 days  He notes that she has seemed worse since the doctor gave her diet pills approximately 2 to 3 weeks ago  Parents note that she has been eating and drinking and that she will probably say that she has not been  The patient seen in the ED in the end of June with increased anxiety/paranoia  She was given outpatient resources  Prior to Admission Medications   Prescriptions Last Dose Informant Patient Reported? Taking?    ALPRAZolam (XANAX) 2 MG tablet   Yes Yes   Sig: Take by mouth 3 (three) times a day as needed for anxiety   doxycycline hyclate (VIBRAMYCIN) 100 mg capsule   Yes Yes Sig: Take 100 mg by mouth daily     phentermine (ADIPEX-P) 37 5 MG tablet   Yes Yes   Sig: Take 37 5 mg by mouth every morning before breakfast   topiramate (TOPAMAX) 100 mg tablet   Yes Yes   Sig: Take 100 mg by mouth 2 (two) times a day      Facility-Administered Medications: None       Past Medical History:   Diagnosis Date    Acne     Anxiety     Depression     Hypertension     Obesity     Psychiatric disorder     depression, bipolar, schizophrenia    Psychosis     Schizophrenia ([facility] [address] )        Past Surgical History:   Procedure Laterality Date    CYSTOSCOPY      WISDOM TOOTH EXTRACTION         History reviewed  No pertinent family history  I have reviewed and agree with the history as documented  Social History   Substance Use Topics    Smoking status: Never Smoker    Smokeless tobacco: Never Used    Alcohol use No        Review of Systems   Constitutional: Negative for fever  Respiratory: Negative for shortness of breath  Cardiovascular: Negative for chest pain  Gastrointestinal: Negative for abdominal pain  Musculoskeletal: Negative for back pain  Neurological: Negative for headaches  All other systems reviewed and are negative  Physical Exam  Physical Exam   Constitutional: She is oriented to person, place, and time  She appears well-developed and well-nourished  Patient anxious appearing-frequently looking about the room  HENT:   Head: Normocephalic  Lips dry,  Remainder of oral mucosa moist   Eyes: Conjunctivae and EOM are normal    Cardiovascular: Normal rate and regular rhythm  Pulmonary/Chest: Effort normal and breath sounds normal    Musculoskeletal: Normal range of motion  Edema: mild b/l    Calves erythematous - R deeper in coloring than L without increased warmth or tenderness  Neurological: She is alert and oriented to person, place, and time  She has normal strength  No cranial nerve deficit  Coordination normal    Skin: Skin is warm and dry  Psychiatric: Her mood appears anxious  Thought content is paranoid  Pt  Slow to answer questions & does not answer all verbally  Her speech is clear when she does speak   Nursing note and vitals reviewed  Vital Signs  ED Triage Vitals [08/07/18 1011]   Temperature Pulse Respirations Blood Pressure SpO2   99 8 °F (37 7 °C) (!) 107 18 153/74 98 %      Temp Source Heart Rate Source Patient Position - Orthostatic VS BP Location FiO2 (%)   Oral Monitor Sitting Right arm --      Pain Score       No Pain           Vitals:    08/07/18 1606 08/07/18 1656 08/07/18 1914 08/07/18 1933   BP: 151/91   132/65   Pulse: 97 89 94 92   Patient Position - Orthostatic VS: Sitting   Lying       Visual Acuity  Visual Acuity      Most Recent Value   L Pupil Size (mm)  2   R Pupil Size (mm)  2          ED Medications  Medications   LORazepam (ATIVAN) tablet 1 mg (1 mg Oral Given 8/7/18 1122)   potassium chloride (K-DUR,KLOR-CON) CR tablet 40 mEq (40 mEq Oral Given 8/7/18 1413)   LORazepam (ATIVAN) 2 mg/mL injection 2 mg (2 mg Intravenous Given 8/7/18 1508)   LORazepam (ATIVAN) 2 mg/mL injection 1 mg (1 mg Intravenous Given 8/7/18 1635)       Diagnostic Studies  Results Reviewed     Procedure Component Value Units Date/Time    Rapid drug screen, urine [[id number]]  (Normal) Collected:  08/07/18 1911    Lab Status:  Final result Specimen:  Urine from Urine, Catheter Updated:  08/07/18 1957     Amph/Meth UR Negative     Barbiturate Ur Negative     Benzodiazepine Urine Negative     Cocaine Urine Negative     Methadone Urine Negative     Opiate Urine Negative     PCP Ur Negative     THC Urine Negative    Narrative:         FOR MEDICAL PURPOSES ONLY  IF CONFIRMATION NEEDED PLEASE CONTACT THE LAB WITHIN 5 DAYS      Drug Screen Cutoff Levels:  AMPHETAMINE/METHAMPHETAMINES  1000 ng/mL  BARBITURATES     200 ng/mL  BENZODIAZEPINES     200 ng/mL  COCAINE      300 ng/mL  METHADONE      300 ng/mL  OPIATES      300 ng/mL  PHENCYCLIDINE     25 ng/mL  THC       50 ng/mL    hCG, quantitative [[id number]]  (Normal) Collected:  08/07/18 1122    Lab Status:  Final result Specimen:  Blood from Arm, Right Updated:  08/07/18 1729     HCG, Quant <2 mIU/mL     Narrative:          Expected Ranges:     Approximate               Approximate HCG  Gestation age          Concentration ( mIU/mL)  _____________          ______________________   Ruthann Camejo                      HCG values  0 2-1                       5-50  1-2                           2-3                         100-5000  3-4                         500-62219  4-5                         1000-92021  5-6                         [id number]  6-8                         05630-367105  8-12                        [id number]    Fingerstick Glucose (POCT) [[id number]]  (Normal) Collected:  08/07/18 1509    Lab Status:  Final result Updated:  08/07/18 1510     POC Glucose 113 mg/dl     CKMB [[id number]]  (Normal) Collected:  08/07/18 1122    Lab Status:  Final result Specimen:  Blood from Arm, Right Updated:  08/07/18 1250     CK-MB Index 1 3 %      CK-MB FRACTION 2 2 ng/mL     CK (with reflex to MB) [[id number]]  (Normal) Collected:  08/07/18 1122    Lab Status:  Final result Specimen:  Blood from Arm, Right Updated:  08/07/18 1249     Total  U/L     Ethanol [[id number]]  (Normal) Collected:  08/07/18 1122    Lab Status:  Final result Specimen:  Blood from Arm, Right Updated:  08/07/18 1150     Ethanol Lvl <3 mg/dL     Comprehensive metabolic panel [[id number]]  (Abnormal) Collected:  08/07/18 1122    Lab Status:  Final result Specimen:  Blood from Arm, Right Updated:  08/07/18 1149     Sodium 139 mmol/L      Potassium 3 3 (L) mmol/L      Chloride 105 mmol/L      CO2 22 mmol/L      Anion Gap 12 mmol/L      BUN 15 mg/dL      Creatinine 1 36 (H) mg/dL      Glucose 99 mg/dL      Calcium 9 3 mg/dL      AST 22 U/L      ALT 30 U/L      Alkaline Phosphatase 85 U/L      Total Protein 8 4 (H) g/dL      Albumin 3 3 (L) g/dL Total Bilirubin 0 50 mg/dL      eGFR 52 ml/min/1 73sq m     Narrative:         National Kidney Disease Education Program recommendations are as follows:  GFR calculation is accurate only with a steady state creatinine  Chronic Kidney disease less than 60 ml/min/1 73 sq  meters  Kidney failure less than 15 ml/min/1 73 sq  meters  Salicylate level [[id number]]  (Abnormal) Collected:  08/07/18 1122    Lab Status:  Final result Specimen:  Blood from Arm, Right Updated:  45/53/54 0492     Salicylate Lvl <3 (L) mg/dL     Acetaminophen level [[id number]]  (Abnormal) Collected:  08/07/18 1122    Lab Status:  Final result Specimen:  Blood from Arm, Right Updated:  08/07/18 1146     Acetaminophen Level <2 (L) ug/mL     CBC and differential [[id number]]  (Abnormal) Collected:  08/07/18 1122    Lab Status:  Final result Specimen:  Blood from Arm, Right Updated:  08/07/18 1137     WBC 12 60 (H) Thousand/uL      RBC 4 49 Million/uL      Hemoglobin 10 8 (L) g/dL      Hematocrit 36 3 %      MCV 81 (L) fL      MCH 24 1 (L) pg      MCHC 29 8 (L) g/dL      RDW 17 0 (H) %      MPV 10 4 fL      Platelets 410 Thousands/uL      nRBC 0 /100 WBCs      Neutrophils Relative 79 (H) %      Immat GRANS % 0 %      Lymphocytes Relative 13 (L) %      Monocytes Relative 8 %      Eosinophils Relative 0 %      Basophils Relative 0 %      Neutrophils Absolute 9 80 (H) Thousands/µL      Immature Grans Absolute 0 05 Thousand/uL      Lymphocytes Absolute 1 62 Thousands/µL      Monocytes Absolute 1 04 Thousand/µL      Eosinophils Absolute 0 05 Thousand/µL      Basophils Absolute 0 04 Thousands/µL                  CT head without contrast   Final Result by Sp Rice MD (08/07 1650)      No acute intracranial abnormality                    Workstation performed: [id number]                    Procedures  Procedures       Phone Contacts  ED Phone Contact    ED Course     ED Course as of Aug 07 2348   Tue Aug 07, 2018   1036   I spoke with the patient, her mother and father by phone  Medical evaluation to follow  Based on history provided by parents as well as my interaction with the patient I do believe that she will require inpatient psychiatric care  Performing some basic studies to assess for superceding medical condition for which medical admission might be appropriate prior to psychiatric admission  Parents  will be coming to the ED within the next couple of hours  They are willing to petition a 302 if needed  Crisis staff aware  1215   CBC, CMP & coma panel have returned  Patient does have an abnormal creatinine of 1 36  This is lower than that from 11 months ago  CPK ordered in consideration of rhabdomyolysis  Potentially this does however represent patient's baseline  She is consuming fluids here  1510   Informed by patient's nurse that she is no longer responding verbally  Mother at the bedside  She relates that the patient had been talking with her until approximately 15 minutes ago -shortly after receiving potassium pill  Patient intermittently scratching herself  Upon my entrance patient is laying supine with sideward (Right) gaze  She does look further to the L when I approach her though does not speak to me  She ultimately looks towards me and does sit forward with encouragement  She then gazes at the TV  Upon my turning this off with resultant discontinuation of music coming from the device she looked around at multiple areas seemingly responding to internal stimuli  She dis assist in pulling her sock up when encouraged to though does not go for a walk as suggested  She does not respond verbally to me or to her mother  Comfortable appearing respirations  Glucose wnl  Mother notes that patient had similar behavior once previously at home  Mother notes that she had stopped responding and mother had "thought she was dead" prompting EMS call    Patient subsequently was hospitalized for mental health conditions after transport to the hospital   Will reassess  Do suspect this is related to her psychiatric disease  1523 Upon returning to room I found her intermittently scratching herself (torso)  Eyes closed, respirations appeared comfortable though these were noisy  Tongue between nonclenched teeth w/  excess secretions in mouth  Saliva noted having dripped down from L corner of mouth  Eyes closed  Upon my opening they were noted to be deviated to the L upper field  After having these open for a bit they shifted to midline  Not following commands  Opens eyes wider to painful stimuli though does not withdraw/ move extremities  Negative Babinski  DDx: seizure, pseudoseizure, manifestation of psychiatric illness  She is being moved to  26   For further medical evaluation and CT scan has been ordered  Anticipate medical observation  1539   IV has been placed  Patient resting still on the stretcher  Occasionally moving arms and scratching varied parts of body  No rash is present  Patient keeps eyes closed  She had does have right upper gaze deviation which was overcome with head movement  Pupils 3 mm and briskly reactive to light  Patient closed eyes forcefully as we were attempting to view pupils  After turning of head & checking pupils IV was placed in the R arm  Patient did turn her head towards the arm and began fluttering her eyelids  She did this for a few seconds on and off      1550   Called to room for patient flailing arms  Facial twitching appreciated & patient experienced pallor  Non-rebreather applied and verbal order given for lorazepam 2 mg  Activity had stopped by the time this was given (less than a minute later)  Oxygen saturation was 98%   with non-rebreather applied  We then switched her over to a nasal cannula with small amount of supplemental O2 although saturation was staying in the mid 90s on room air  No incontinence  For CT shortly    Anticipate transfer to [facility]/ [facility]     [phone] Informed patient flailing arms each time CT scan table moves  Patient not redirectable  Moving extremities on my entrance - scratching/ rubbing different areas  Keeps eyes closed  Eyes do slowly rove back and forth upon my opening of lids  Vitals unchanged  Additional 1 mg of lorazepam given  Study performed w/ pt  Still  1717 Pt  Resting still seated upright  She rouses to her name and mild touch -opening her eyes  She moves her arms, stretches & yawns  She is able to tell me that she is in the hospital   She follows commands to move extremities the- close and open her hands  She demonstrated understanding of the need to perform straight catheterization  [id number]   I have spoken with Dr Daryl Napoles  Patient appropriate for EMU  She does believe that some beds are available  Will initiate transfer through PACs  Will + lorazepam should pt  have return of symptoms  Just prior to this patient was able to tell me that she remembers coming to the hospital with her mother because "we kept seeing things and hearing things  "  she tells me that this is happening here too  She then denies hearing anything or seeing anything extra  She denies pain including w/ palpation of the calves  She verbalized that these are usually red  1836  Parents at bedside and updated on plan  All Qs answered  5160   Patient alert and quite conversant at this time  She relates that she does have a bit of a headache  She expresses concern over being left alone in the hospital   Reassured her that she would not be left alone in a hospital completely by herself and she expressed concern for  Await transfer time  She does have a headache at this time acetaminophen is being ordered                                MDM  The patient presented with a condition in which there was a high probability of imminent or life-threatening deterioration, and critical care services (excluding separately billable procedures) totalled 30-74 minutes (30)  Disposition  Final diagnoses:   Observed seizure-like activity ([facility] [address] )   Hallucinations     Time reflects when diagnosis was documented in both MDM as applicable and the Disposition within this note     Time User Action Codes Description Comment    8/7/2018  6:10 PM Melda Postal A Add [R56 9] Observed seizure-like activity ([facility] [address] )     8/7/2018  6:11 PM Melda Postal A Add [R44 3] Hallucinations       ED Disposition     ED Disposition Condition Comment    Transfer to Another [address] should be transferred out to [facility]        MD Documentation      Most Recent Value   Patient Condition  The patient has been stabilized such that within reasonable medical probability, no material deterioration of the patient condition or the condition of the unborn child(lilia) is likely to result from the transfer   Reason for Transfer  Level of Care needed not available at this facility   Benefits of Transfer  Specialized equipment and/or services available at the receiving facility (Include comment)________________________   Risks of Transfer  Potential for delay in receiving treatment   Accepting Physician  Dr Liz Patino Name, Nemesio Xavier   Sending MD Dr Rosalie Conroy   Provider Certification  General risk, such as traffic hazards, adverse weather conditions, rough terrain or turbulence, possible failure of equipment (including vehicle or aircraft), or consequences of actions of persons outside the control of the transport personnel      RN Documentation      [facility] [address] [facility] Name, [address]   [facility]      Follow-up Information    None         Discharge Medication List as of 8/7/2018  9:23 PM      CONTINUE these medications which have NOT CHANGED    Details   !! ALPRAZolam (XANAX) 2 MG tablet Take by mouth 3 (three) times a day as needed for anxiety, Historical Med      doxycycline hyclate (VIBRAMYCIN) 100 mg capsule Take 100 mg by mouth daily  , Historical Med      phentermine (ADIPEX-P) 37 5 MG tablet Take 37 5 mg by mouth every morning before breakfast, Historical Med      topiramate (TOPAMAX) 100 mg tablet Take 100 mg by mouth 2 (two) times a day, Historical Med      !! ALPRAZolam (XANAX) 1 mg tablet Take by mouth 4 (four) times a day as needed for anxiety, Historical Med      doxepin (SINEquan) 25 mg capsule Take 25 mg by mouth daily at bedtime, Historical Med      escitalopram (LEXAPRO) 20 mg tablet Take 20 mg by mouth daily, Historical Med       !! - Potential duplicate medications found  Please discuss with provider  No discharge procedures on file      ED Provider  Electronically Signed by           Kina Dodson MD  08/08/18 8158

## 2018-08-07 NOTE — ED NOTES
Pt transferred to regular exam room #26 via ER stretcher with no incident  Pt responds to painful stimuli  Pt's VSS  Bed is in low and locked position with rails up x2  Call bell within reach and mother at bedside  Will continue to monitor for pt safety and any status changes        Marcos Simon  08/07/18 0065

## 2018-08-07 NOTE — ED NOTES
Pt moved to room 26  Pt responding to painful stimuli  Pt not following comands         Marquis Victoria RN  97/91/69 1145

## 2018-08-07 NOTE — ED NOTES
Pt resting in bed with no signs of distress  Bed is in low an locked position with rails up x2 and call bell within reach       Lawerance Few  08/07/18 2914

## 2018-08-07 NOTE — ED NOTES
Pt placed on cardiac monitor and continuous pulse oximeter  Pt resting in bed with no signs of distress at the moment  Will continue to monitor for pt safety and any status updates  1:1 continued via visual/constant observation       Viri Hough  08/07/18 1538

## 2018-08-07 NOTE — ED NOTES
Pt was taken to bathroom where she was cleaned  Clothing was soiled and placed in belongings bag  Pt gown given due to paper scrubs not fitting properly  Pt is now in room without incident        Daphnie Mccormick  08/07/18 1017

## 2018-08-07 NOTE — ED NOTES
Patient was brought to the ED by EMS after running out of her house today  Patient states she felt weird waking up this morning because she didn't see anyone  Per patient she had to get help so she ran out the house  Patient states that she has been sleeping less  She has depression, SI thoughts with no plan, and OCD  Patient sees Dr Bobbi Blanchard every 3 months and does not see a therapist anymore  Patient has Agoraphobia and sometimes cancels her appointment  Patient also reports having SI and HI  Patient wants help and and is willing to sign herself in  Patient was offered the 201 by CM and signed it       Crisis will need to continue bed search

## 2018-08-08 ENCOUNTER — APPOINTMENT (INPATIENT)
Dept: NEUROLOGY | Facility: AMBULATORY SURGERY CENTER | Age: 28
DRG: 101 | End: 2018-08-08
Payer: COMMERCIAL

## 2018-08-08 PROBLEM — F20.9 SCHIZOPHRENIA (HCC): Status: RESOLVED | Noted: 2018-08-07 | Resolved: 2018-08-08

## 2018-08-08 PROBLEM — F41.9 ANXIETY DISORDER: Status: ACTIVE | Noted: 2018-08-08

## 2018-08-08 LAB
ANION GAP SERPL CALCULATED.3IONS-SCNC: 8 MMOL/L (ref 4–13)
BASOPHILS # BLD AUTO: 0.04 THOUSANDS/ΜL (ref 0–0.1)
BASOPHILS NFR BLD AUTO: 0 % (ref 0–1)
BUN SERPL-MCNC: 13 MG/DL (ref 5–25)
CALCIUM SERPL-MCNC: 8.8 MG/DL (ref 8.3–10.1)
CHLORIDE SERPL-SCNC: 107 MMOL/L (ref 100–108)
CO2 SERPL-SCNC: 23 MMOL/L (ref 21–32)
CREAT SERPL-MCNC: 1.2 MG/DL (ref 0.6–1.3)
EOSINOPHIL # BLD AUTO: 0.1 THOUSAND/ΜL (ref 0–0.61)
EOSINOPHIL NFR BLD AUTO: 1 % (ref 0–6)
ERYTHROCYTE [DISTWIDTH] IN BLOOD BY AUTOMATED COUNT: 17.2 % (ref 11.6–15.1)
FERRITIN SERPL-MCNC: 15 NG/ML (ref 8–388)
GFR SERPL CREATININE-BSD FRML MDRD: 61 ML/MIN/1.73SQ M
GLUCOSE SERPL-MCNC: 88 MG/DL (ref 65–140)
HCT VFR BLD AUTO: 34.6 % (ref 34.8–46.1)
HGB BLD-MCNC: 10.6 G/DL (ref 11.5–15.4)
IMM GRANULOCYTES # BLD AUTO: 0.03 THOUSAND/UL (ref 0–0.2)
IMM GRANULOCYTES NFR BLD AUTO: 0 % (ref 0–2)
IRON SATN MFR SERPL: 10 %
IRON SERPL-MCNC: 43 UG/DL (ref 50–170)
LYMPHOCYTES # BLD AUTO: 2.14 THOUSANDS/ΜL (ref 0.6–4.47)
LYMPHOCYTES NFR BLD AUTO: 21 % (ref 14–44)
MCH RBC QN AUTO: 24.4 PG (ref 26.8–34.3)
MCHC RBC AUTO-ENTMCNC: 30.6 G/DL (ref 31.4–37.4)
MCV RBC AUTO: 80 FL (ref 82–98)
MONOCYTES # BLD AUTO: 0.58 THOUSAND/ΜL (ref 0.17–1.22)
MONOCYTES NFR BLD AUTO: 6 % (ref 4–12)
NEUTROPHILS # BLD AUTO: 7.21 THOUSANDS/ΜL (ref 1.85–7.62)
NEUTS SEG NFR BLD AUTO: 72 % (ref 43–75)
NRBC BLD AUTO-RTO: 0 /100 WBCS
PLATELET # BLD AUTO: 383 THOUSANDS/UL (ref 149–390)
PMV BLD AUTO: 10.8 FL (ref 8.9–12.7)
POTASSIUM SERPL-SCNC: 4 MMOL/L (ref 3.5–5.3)
RBC # BLD AUTO: 4.34 MILLION/UL (ref 3.81–5.12)
SODIUM SERPL-SCNC: 138 MMOL/L (ref 136–145)
TIBC SERPL-MCNC: 451 UG/DL (ref 250–450)
TSH SERPL DL<=0.05 MIU/L-ACNC: 1.46 UIU/ML (ref 0.36–3.74)
WBC # BLD AUTO: 10.1 THOUSAND/UL (ref 4.31–10.16)

## 2018-08-08 PROCEDURE — 95951 PR EEG MONITORING/VIDEORECORD: CPT | Performed by: PSYCHIATRY & NEUROLOGY

## 2018-08-08 PROCEDURE — 95951 HB EEG MONITORING/VIDEORECORD: CPT

## 2018-08-08 PROCEDURE — 83540 ASSAY OF IRON: CPT | Performed by: PHYSICIAN ASSISTANT

## 2018-08-08 PROCEDURE — 83550 IRON BINDING TEST: CPT | Performed by: PHYSICIAN ASSISTANT

## 2018-08-08 PROCEDURE — 99232 SBSQ HOSP IP/OBS MODERATE 35: CPT | Performed by: PHYSICIAN ASSISTANT

## 2018-08-08 PROCEDURE — 80048 BASIC METABOLIC PNL TOTAL CA: CPT | Performed by: PHYSICIAN ASSISTANT

## 2018-08-08 PROCEDURE — 80201 ASSAY OF TOPIRAMATE: CPT | Performed by: PSYCHIATRY & NEUROLOGY

## 2018-08-08 PROCEDURE — 85025 COMPLETE CBC W/AUTO DIFF WBC: CPT | Performed by: PHYSICIAN ASSISTANT

## 2018-08-08 PROCEDURE — 85025 COMPLETE CBC W/AUTO DIFF WBC: CPT | Performed by: INTERNAL MEDICINE

## 2018-08-08 PROCEDURE — 99222 1ST HOSP IP/OBS MODERATE 55: CPT | Performed by: PSYCHIATRY & NEUROLOGY

## 2018-08-08 PROCEDURE — 84443 ASSAY THYROID STIM HORMONE: CPT | Performed by: PHYSICIAN ASSISTANT

## 2018-08-08 PROCEDURE — 82728 ASSAY OF FERRITIN: CPT | Performed by: PHYSICIAN ASSISTANT

## 2018-08-08 PROCEDURE — 99223 1ST HOSP IP/OBS HIGH 75: CPT | Performed by: PSYCHIATRY & NEUROLOGY

## 2018-08-08 RX ORDER — FLUOXETINE 10 MG/1
10 CAPSULE ORAL DAILY
Status: DISCONTINUED | OUTPATIENT
Start: 2018-08-09 | End: 2018-08-10 | Stop reason: HOSPADM

## 2018-08-08 RX ORDER — ESCITALOPRAM OXALATE 20 MG/1
20 TABLET ORAL DAILY
Status: DISCONTINUED | OUTPATIENT
Start: 2018-08-08 | End: 2018-08-08

## 2018-08-08 RX ORDER — DOXEPIN HYDROCHLORIDE 25 MG/1
25 CAPSULE ORAL
Status: DISCONTINUED | OUTPATIENT
Start: 2018-08-08 | End: 2018-08-10 | Stop reason: HOSPADM

## 2018-08-08 RX ADMIN — NYSTATIN 1 APPLICATION: 100000 POWDER TOPICAL at 08:20

## 2018-08-08 RX ADMIN — NYSTATIN 1 APPLICATION: 100000 POWDER TOPICAL at 17:54

## 2018-08-08 RX ADMIN — SODIUM CHLORIDE 400 MG: 9 INJECTION, SOLUTION INTRAVENOUS at 02:08

## 2018-08-08 RX ADMIN — TOPIRAMATE 100 MG: 100 TABLET, FILM COATED ORAL at 17:53

## 2018-08-08 RX ADMIN — TOPIRAMATE 100 MG: 100 TABLET, FILM COATED ORAL at 08:35

## 2018-08-08 RX ADMIN — DOXYCYCLINE 100 MG: 100 CAPSULE ORAL at 08:20

## 2018-08-08 RX ADMIN — HEPARIN SODIUM 5000 UNITS: 5000 INJECTION, SOLUTION INTRAVENOUS; SUBCUTANEOUS at 17:53

## 2018-08-08 RX ADMIN — SODIUM CHLORIDE 200 MG: 9 INJECTION, SOLUTION INTRAVENOUS at 22:54

## 2018-08-08 RX ADMIN — SODIUM CHLORIDE 200 MG: 9 INJECTION, SOLUTION INTRAVENOUS at 10:44

## 2018-08-08 RX ADMIN — HEPARIN SODIUM 5000 UNITS: 5000 INJECTION, SOLUTION INTRAVENOUS; SUBCUTANEOUS at 06:27

## 2018-08-08 RX ADMIN — DOXEPIN HYDROCHLORIDE 25 MG: 25 CAPSULE ORAL at 21:45

## 2018-08-08 RX ADMIN — HEPARIN SODIUM 5000 UNITS: 5000 INJECTION, SOLUTION INTRAVENOUS; SUBCUTANEOUS at 21:45

## 2018-08-08 NOTE — CONSULTS
Consultation - Susu Brown [address] [place] [address] 25 y o  female MRN: [id number]  Unit/Bed#: [facility] 716-01 Encounter: [id number]      Chief Complaint:  I want to get better    History of Present Illness   Physician Requesting Consult: Martin Henry MD  Reason for Consult / Principal Problem:  Visual hallucination,  schizophrenia, depression    Bobbi Ramirez is a 25 y o  female presents  with seizures  Patient have a history of major depression with psychotic symptoms, she had also have a poor compliance with medications, she abused her Xanax and phentermine the medication that she use for weight lost   She states that she takes her prescription in a week, she does not sleep for several days, does not eat and after that she crashed  She did have a hallucination for years, but does not want to take any antipsychotic medication because she had twiches  She accept  that she abused her prescription medications  She also does not go for therapy  She takes antidepressant for short time and she stopping her own  She had been in multiple antidepressant  At this moment she denies any suicidal thoughts plans or intent she is not psychotic  Her hallucinations are  Chronic and are not command type  Psychiatric Review Of Systems:  sleep: yes  appetite changes: no  weight changes: no  energy/anergy: no  interest/pleasure/anhedonia: no  somatic symptoms: no  anxiety/panic: no  casandra: no  guilty/hopeless: no  self injurious behavior/risky behavior: no    Historical Information   Past Psychiatric History:   She denies any inpatient psych admission  Currently in treatment with Dr Leah Pro for many years    Past Suicide attempts:  None  Past Violent behavior:  None  Past Psychiatric medication trial:  Lexapro, Prozac, Topamax, Xanax, Wellbutrin,  Latuda, Celexa, Pristiq, and others antidepressants    Substance Abuse History:  She denies any drugs use but she abused her prescribed medications, she used to use alcohol in the past    I have assessed this patient for substance use within the past 12 months     History of IP/OP rehabilitation program:  None  Smoking history:  None  Family Psychiatric History:   None     Social History  Education: high school diploma/GED  Learning Disabilities: None  Marital history: single  Living arrangement, social support: She live with her parents  Occupational History: on permanent disability  Functioning Relationships: good support system    Other Pertinent History: None    Traumatic History:   Abuse: None  Other Traumatic Events: None    Past Medical History:   Diagnosis Date    Acne     Agoraphobia     Anxiety     Depression     Hypertension     Obesity     Psychiatric disorder     depression, bipolar, schizophrenia    Psychosis     Schizophrenia ([facility] [address] )        Medical Review Of Systems:  Review of Systems - Negative except seizures, anxiety, severe obesity, all other systems reviewed were negative    Meds/Allergies   current meds:   Current Facility-Administered Medications   Medication Dose Route Frequency    ALPRAZolam (XANAX) tablet 2 mg  2 mg Oral TID PRN    doxepin (SINEquan) capsule 25 mg  25 mg Oral HS    doxycycline hyclate (VIBRAMYCIN) capsule 100 mg  100 mg Oral Daily    escitalopram (LEXAPRO) tablet 20 mg  20 mg Oral Daily    heparin (porcine) subcutaneous injection 5,000 Units  5,000 Units Subcutaneous Q8H [address]    lacosamide (VIMPAT) 200 mg in sodium chloride 0 9 % 100 mL IVPB  200 mg Intravenous Q12H    nystatin (MYCOSTATIN) powder   Topical BID    topiramate (TOPAMAX) tablet 100 mg  100 mg Oral BID     No Known Allergies    Objective   Vital signs in last 24 hours:  Temp:  [98 2 °F (36 8 °C)-99 °F (37 2 °C)] 98 8 °F (37 1 °C)  HR:  [83-97] 84  Resp:  [18-22] 20  BP: (132-151)/(63-92) 137/63      Intake/Output Summary (Last 24 hours) at 08/08/18 1155  Last data filed at 08/08/18 0501   Gross per 24 hour   Intake              220 ml   Output                0 ml   Net 220 ml       Mental Status Evaluation:  Appearance:  disheveled, older than stated age and overweight   Behavior:  evasive   Speech:  soft   Mood:  anxious   Affect:  mood-congruent   Language: naming objects and repeating phrases   Thought Process:  goal directed   Associations: intact associations   Thought Content:  normal   Perceptual Disturbances: She is here people talking for many years, she denies any commands   Risk Potential: She denies any suicidal or homicidal plan or intent   Sensorium:  person, place and time/date   Memory:  recent and remote memory grossly intact   Cognition:  grossly intact   Consciousness:  alert and awake    Attention: attention span and concentration were age appropriate   Intellect: within normal limits   Fund of Knowledge: awareness of current events: Fair, past history: Fair and vocabulary: Fair   Insight:  limited   Judgment: limited   Muscle Strength and Tone: Within normal limits   Gait/Station: Unable to assess patient is in the monitored   Motor Activity: no abnormal movements     Lab Results:   Lab Results   Component Value Date    WBC 10 10 08/08/2018    HGB 10 6 (L) 08/08/2018    HCT 34 6 (L) 08/08/2018    MCV 80 (L) 08/08/2018     08/08/2018     Lab Results   Component Value Date     08/08/2018    K 4 0 08/08/2018     08/08/2018    CO2 23 08/08/2018    ANIONGAP 8 08/08/2018    BUN 13 08/08/2018    CREATININE 1 20 08/08/2018    GLUCOSE 88 08/08/2018    CALCIUM 8 8 08/08/2018    AST 22 08/07/2018    ALT 30 08/07/2018    ALKPHOS 85 08/07/2018    PROT 8 4 (H) 08/07/2018    BILITOT 0 50 08/07/2018    EGFR 61 08/08/2018          Code Status: )Level 1 - Full Code    Assessment/Plan     Assessment:  Bobbi Ramirez is a 25 y o  female who was admitted to the hospital the seizures  She has history of major depressive disorder with psychotic features but she had poor compliance with her medications and she abuse her Xanax and phentermine    She denies any street drugs, she denies any alcohol at this time  She has good support system from the parents  She denies any suicidal thoughts plans or intent, she is not paranoid, she has auditory hallucinations people commenting things for many years, she does not want antipsychotic  Diagnosis:  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  Panic disorder without agoraphobia  Plan:   Continue medical management  Prozac 10 mg p o  Daily  Continue Topamax as ordered  Xanax 2 mg p o  3 times a day p r n  for anxiety  Patient agreed that her mother will control her medication at home, to prevent the patient abuse the medication  Discussed with primary team  At this moment patient does not need to go to inpatient psychiatric unit  She will follow-up with Dr Phu Solorzano in outpatient upon discharge  The patient and family agree with treatment plan  Risks, benefits and possible side effects of Medications:   Risks, benefits, and possible side effects of medications explained to patient and patient verbalizes understanding             Lattie Hatchet, MD

## 2018-08-08 NOTE — ASSESSMENT & PLAN NOTE
· Pt reportedly had episode of witnessed seizure like activity with blank stare and subsequent bladder incontinence at [facility] ED, transferred to [facility] for vEEG monitoring  · CTH nil acute  · EMU monitoring  · Neurology consult

## 2018-08-08 NOTE — H&P
H&P- Hung Méndez 1990, 25 y o  female MRN: [id number]    Unit/Bed#: [facility] 716-01 Encounter: [id number]    Primary Care Provider: Eelna Parikh MD   Date and time admitted to hospital: 8/7/2018  9:25 PM        Observed seizure-like activity ([address] )   Assessment & Plan    · Pt reportedly had episode of witnessed seizure like activity with blank stare and subsequent bladder incontinence at [facility] ED, transferred to [facility] for vEEG monitoring  · CTH nil acute  · EMU monitoring  · Neurology consult        Hallucinations   Assessment & Plan    · Questionable medication compliance; per family pt does not take meds every day and possibly abuses Xanax  · Has not had regular OP follow up with her psychiatrist   · Pt agreeable to inpatient psych admission  · Pt c/o increased auditory/visual hallucinations over the past week  · Denies SI/HI  · Consult psychiatry        Schizophrenia [facility])   Assessment & Plan    · See plan above  · Will need formal med rec in AM        Elevated serum creatinine   Assessment & Plan    · Cr 1 36 on admission however unclear of baseline, prior admissions show Cr elevated to 1 9 with lowest 1 7  · F/u AM BMP  · Avoid nephrotoxins         Hypokalemia   Assessment & Plan    · Repleted 40 po at [facility], f/u AM BMP        Leukocytosis   Assessment & Plan    · WBC 12  · Afebrile, appears nontoxic  · Trend WBC and temps, f/u AM CBCD                VTE Prophylaxis: Heparin  / sequential compression device   Code Status: Full Code  POLST: POLST form is not discussed and not completed at this time  Discussion with family: none    Anticipated Length of Stay:  Patient will be admitted on an Inpatient basis with an anticipated length of stay of  > 2 midnights  Justification for Hospital Stay: seizure like activity requiring EMU monitoring    Total Time for Visit, including Counseling / Coordination of Care: 30 minutes    Greater than 50% of this total time spent on direct patient counseling and coordination of care  Chief Complaint:   Auditory/visual hallucinations, seizure like activity     History of Present Illness:    Hui Renee is a 25 y o  female with PMHx schizophrenia, anxiety, depression, morbid obesity, HTN, who presents with observed seizure like activity in [facility] ED  Per chart review patient had episode of staring and subsequent bladder incontinence while in the ED  Pt originally presented with her mother due to worsening visual/auditory hallucinations  Per patient's mother, pt was hearing water dripping and seeing things coming out of the sink  Pt has hx of similar hallucinations in the past however they usually resolve after approx 1 week  Pt reportedly stays at home however today she walked out of her house which is unusual for her, so EMS was called  Patient's father also reports pt has poor medication compliance and thinks she might abuse her prescribed Xanax  Patient was not able to explain why she was in the [facility] ED upon presentation however did state she was agreeable to receive psychiatric help  She denied SI/HI at that time  On my exam pt is minimally responsive, however opens eyes to painful stimuli and selectively follows some commands  Review of Systems:    Review of Systems   Unable to perform ROS: Mental status change       Past Medical and Surgical History:     Past Medical History:   Diagnosis Date    Acne     Anxiety     Depression     Hypertension     Obesity     Psychiatric disorder     depression, bipolar, schizophrenia    Psychosis     Schizophrenia ([facility] [address] )        Past Surgical History:   Procedure Laterality Date    CYSTOSCOPY      WISDOM TOOTH EXTRACTION         Meds/Allergies:    Prior to Admission medications    Medication Sig Start Date End Date Taking?  Authorizing Provider   ALPRAZolam Silver Dome) 2 MG tablet Take by mouth 3 (three) times a day as needed for anxiety   Yes Historical Provider, MD   topiramate (TOPAMAX) 100 mg tablet Take 100 mg by mouth 2 (two) times a day   Yes Historical Provider, MD   doxycycline hyclate (VIBRAMYCIN) 100 mg capsule Take 100 mg by mouth daily      Historical Provider, MD   phentermine (ADIPEX-P) 37 5 MG tablet Take 37 5 mg by mouth every morning before breakfast    Historical Provider, MD   acetaminophen (TYLENOL) 325 mg tablet Take 650 mg by mouth every 6 (six) hours as needed for mild pain  8/7/18  Historical Provider, MD Deb Thomas) 1 mg tablet Take by mouth daily at bedtime as needed for anxiety  8/7/18  Historical Provider, MD   ALPPANKAJZodeepa Nichols Thomas) 1 mg tablet Take by mouth 4 (four) times a day as needed for anxiety  8/7/18  Historical Provider, MD   amLODIPine (NORVASC) 5 mg tablet Take 5 mg by mouth daily  8/7/18  Historical Provider, MD   ARIPiprazole (ABILIFY) 10 mg tablet Take 10 mg by mouth daily    8/7/18  Historical Provider, MD   benazepril-hydrochlorthiazide (LOTENSIN HCT) 20-25 MG per tablet Take 1 tablet by mouth daily  8/7/18  Historical Provider, MD   budesonide-formoterol (SYMBICORT) 160-4 5 mcg/act inhaler Inhale 2 puffs 2 (two) times a day  8/7/18  Historical Provider, MD   buPROPion (WELLBUTRIN XL) 150 mg 24 hr tablet Take 150 mg by mouth daily  8/7/18  Historical Provider, MD   doxepin (SINEquan) 25 mg capsule Take 25 mg by mouth daily at bedtime  8/7/18  Historical Provider, MD   escitalopram (LEXAPRO) 20 mg tablet Take 20 mg by mouth daily  8/7/18  Historical Provider, MD   Pseudoeph-Doxylamine-DM-APAP (NYQUIL PO) Take 1 capsule by mouth  8/7/18  Historical Provider, MD   topiramate (TOPAMAX) 50 MG tablet Take 25 mg by mouth 3 (three) times a day  8/7/18  Historical Provider, MD     I have been unable to obtain / verify an up to date medication list despite all reasonable attempts      Allergies: No Known Allergies    Social History:     Marital Status: Single   Occupation: unemployed  Patient Pre-hospital Living Situation: lives with family at home  Patient Pre-hospital Level of Mobility: no restrictions/assistive devices  Patient Pre-hospital Diet Restrictions: none  Substance Use History:   History   Alcohol Use No     History   Smoking Status    Never Smoker   Smokeless Tobacco    Never Used     History   Drug Use No       Family History:    History reviewed  No pertinent family history  Physical Exam:     Vitals:   Blood Pressure: 151/89 (08/07/18 2321)  Pulse: 85 (08/07/18 2321)  Temperature: 98 9 °F (37 2 °C) (08/07/18 2321)  Temp Source: Oral (08/07/18 2321)  Respirations: 20 (08/07/18 2321)  Height: 5' 4" (162 6 cm) (08/07/18 2123)  Weight - Scale: (!) 153 kg (337 lb 4 9 oz) (08/07/18 2123)  SpO2: 94 % (08/07/18 2321)    Physical Exam   Constitutional:   Obese, in NAD, appears to be resting comfortably in bed   HENT:   Head: Normocephalic and atraumatic  Mouth/Throat: Oropharynx is clear and moist    Eyes: Pupils are equal, round, and reactive to light  No scleral icterus  Neck: Neck supple  Cardiovascular: Normal rate, regular rhythm, normal heart sounds and intact distal pulses  B/L nonpitting LE edema up to level of mid calf   No calf ttp   Pulmonary/Chest: Effort normal and breath sounds normal  No respiratory distress  She has no wheezes  She has no rales  She exhibits no tenderness  Abdominal: Soft  Bowel sounds are normal  She exhibits no distension  There is no tenderness  There is no guarding  Neurological:   Unable to assess; pt nonverbal, follows some commands including squeezing fingers, wiggling toes however only sometimes opens eyes when prompted    Skin: Skin is warm  B/l LE rash/redness to level of knees, R>L    Small R forearm bruise         Additional Data:     Lab Results: I have personally reviewed pertinent reports          Results from last 7 days  Lab Units 08/07/18  1122   WBC Thousand/uL 12 60*   HEMOGLOBIN g/dL 10 8*   HEMATOCRIT % 36 3   PLATELETS Thousands/uL 388   NEUTROS PCT % 79*   LYMPHS PCT % 13*   MONOS PCT % 8   EOS PCT % 0 Results from last 7 days  Lab Units 08/07/18  1122   SODIUM mmol/L 139   POTASSIUM mmol/L 3 3*   CHLORIDE mmol/L 105   CO2 mmol/L 22   BUN mg/dL 15   CREATININE mg/dL 1 36*   CALCIUM mg/dL 9 3   TOTAL PROTEIN g/dL 8 4*   BILIRUBIN TOTAL mg/dL 0 50   ALK PHOS U/L 85   ALT U/L 30   AST U/L 22   GLUCOSE RANDOM mg/dL 99           Results from last 7 days  Lab Units 08/07/18  1509   POC GLUCOSE mg/dl 113           Imaging: I have personally reviewed pertinent reports  No orders to display       EKG, Pathology, and Other Studies Reviewed on Admission:   · EKG: none available    [facility] Yes     ** Please Note: This note has been constructed using a voice recognition system   **

## 2018-08-08 NOTE — CASE MANAGEMENT
Initial Clinical Review    Admission: Date/Time/Statement: 8/7/18 @ 2125     Orders Placed This Encounter   Procedures    Inpatient Admission     Standing Status:   Standing     Number of Occurrences:   1     Order Specific Question:   Admitting Physician     Answer:   Bharath Lopez     Order Specific Question:   Level of Care     Answer:   Med Surg [16]     Order Specific Question:   Estimated length of stay     Answer:   More than 2 Midnights     Order Specific Question:   Certification     Answer:   I certify that inpatient services are medically necessary for this patient for a duration of greater than two midnights  See H&P and MD Progress Notes for additional information about the patient's course of treatment  We have received your form with the following information:  Acute Inpatient Admission  Name:  Brice Lam   Member ID:  [id number]   Date Created:  8/8/2018 2:05:52 PM   Pending Auth Number:  [id number]         History of Illness:      Yg Stephens is a 25 y o  female with PMHx schizophrenia, anxiety, depression, morbid obesity, HTN, who presents with observed seizure like activity in [facility] ED  Per chart review patient had episode of staring and subsequent bladder incontinence while in the ED  Pt originally presented with her mother due to worsening visual/auditory hallucinations  Per patient's mother, pt was hearing water dripping and seeing things coming out of the sink  Pt has hx of similar hallucinations in the past however they usually resolve after approx 1 week  Pt reportedly stays at home however today she walked out of her house which is unusual for her, so EMS was called  Patient's father also reports pt has poor medication compliance and thinks she might abuse her prescribed Xanax   Patient was not able to explain why she was in the [facility] ED upon presentation however did state she was agreeable to receive psychiatric help    On my exam pt is minimally responsive, however opens eyes to painful stimuli and selectively follows some commands    Temperature Pulse Respirations Blood Pressure SpO2   08/07/18 2123 08/07/18 2123 08/07/18 2123 08/07/18 2123 08/07/18 2123   98 2 °F (36 8 °C) 90 18 145/73 98 %      No Pain       08/07/18 (!) 153 kg (337 lb 4 9 oz)     Date and Time Eye Opening Best Verbal Response Best Motor Response Dana Coma Scale Score   08/08/18 0800 4 5 6 15   08/08/18 0044 3 4 6 13   08/07/18 2136 4 5 6 15   08/07/18 1915 4 5 6 15   08/07/18 1815 4 5 6 15   08/07/18 1715 4 5 6 15   08/07/18 1615 4 5 6 15   08/07/18 1530 2 2 5 9           Abnormal Labs/Diagnostic Test Results:    B/L nonpitting LE edema up to level of mid calf    Pt nonverbal, follows some commands including squeezing fingers, wiggling toes however only sometimes opens eyes when prompted    B/l LE rash/redness to level of knees, R>L     Lab Units 08/07/18  1122   WBC Thousand/uL 12 60*   HEMOGLOBIN g/dL 10 8*   NEUTROS PCT % 79*   LYMPHS PCT % 13*     POTASSIUM mmol/L 3 3*     CREATININE mg/dL 1 36           Past Medical History:    Acne    Agoraphobia    Anxiety    Depression    Hypertension    Obesity    Psychiatric disorder    Psychosis    Schizophrenia ([facility] [address] )       Admitting Diagnosis: Altered mental status [R41 82]    Age/Sex: 25 y o  female    Assessment/Plan:    Observed seizure-like activity ([facility] [address] )   Assessment & Plan     · Pt reportedly had episode of witnessed seizure like activity with blank stare and subsequent bladder incontinence at [facility] ED, transferred to [facility] for vEEG monitoring  · CTH nil acute  · EMU monitoring  · Neurology consult          Hallucinations   Assessment & Plan     · Questionable medication compliance; per family pt does not take meds every day and possibly abuses Xanax  · Has not had regular OP follow up with her psychiatrist   · Pt agreeable to inpatient psych admission  · Pt c/o increased auditory/visual hallucinations over the past week  ? Denies SI/HI  · Consult psychiatry          Schizophrenia [facility])   Assessment & Plan     · See plan above  · Will need formal med rec in AM          Elevated serum creatinine   Assessment & Plan     · Cr 1 36 on admission however unclear of baseline, prior admissions show Cr elevated to 1 9 with lowest 1 7  · F/u AM BMP  · Avoid nephrotoxins           Hypokalemia   Assessment & Plan     · Repleted 40 po at [facility], f/u AM BMP          Leukocytosis   Assessment & Plan     · WBC 12  · Afebrile, appears nontoxic  · Trend WBC and temps, f/u AM CBCD       CONSULT NEUROLOGY    3 25year old female with PMH of schizoid personality, anxiety, depression admitted with abnormal behaviors and found to have multiple episodes of decreased responsiveness at [address] [place] [address]  She also had episodes associated with right gaze deviation and then left gaze deviation  She was transferred to [place] [address] for vEEG monitoring and found to have electrographic seizure originating from R temporal region     - Exam appears improved today but very difficult to obtain reliable history from patient  - Continue to monitor on vEEG until seizure-free for 24 hours  - Continue on Vimpat 200 mg Q12H and Topamax 100 mg BID  - Seizure precautions  - Will check MRI brain with and without contrast when able  - Consider LP if MRI brain shows focal abnormality but feel CNS infection less likely given no leukocytosis, afebrile  - [place] form to be filed  - Monitor exam and notify with changes          0042: EMU NURSING   Made aware by EMU tech that pt was having subclinical seizure  At 00:42, patient had an electrographic seizure that lasted approximately 2 minutes  Ictal testing completed, pt able to recall the word "Red Dog"  Confused but a little sleepy  Order for Vimpat given and nurse communication to hit event button and say that "Vimpat completed" when the infusion is done        EPILEPTOLOGIST  At 00:42, patient had an electrographic seizure that lasted approximately 2 minutes---started in the right temporal area and became diffuse  No movements noted other than subtle movement of lips  Clinical seizure testing not performed      Chart briefly reviewed  Patient is on topiramate 100 mg BID, presumably not for seizures since patient has no known seizure history  Since patient had seizures despite the topiramate and topiramate level is unknown (ergo, potential for toxicity if dose is increased is also unknown), patient was given loading dose of lacosamide 400 mg IV, and started on lacosamide 200 mg q 12  Topiramate level ordered to see if topiramate dose can be increased to provide additional seizure coverage if seizures continue to occur despite current regimen      Will continue videoEEG monitoring        Admission Orders:    [facility]    NEURO CHECKS  AS ABOVE       Scheduled Meds:     ALPRAZolam 2 mg Oral TID PRN   doxycycline hyclate 100 mg Oral Daily   heparin (porcine) 5,000 Units Subcutaneous Q8H [facility]   lacosamide (VIMPAT) IVPB 200 mg Intravenous Q12H   nystatin  Topical BID   topiramate 100 mg Oral BID

## 2018-08-08 NOTE — CONSULTS
Consultation - Neurology   [facility] 25 y o  female MRN: [id number]  [facility] Encounter: [id number]      Assessment/Plan   Assessment/Plan:  3 25year old female with PMH of schizoid personality, anxiety, depression admitted with abnormal behaviors and found to have multiple episodes of decreased responsiveness at [address] [place] [address]  She also had episodes associated with right gaze deviation and then left gaze deviation  She was transferred to [place] [address]  for vEEG monitoring and found to have electrographic seizure originating from R temporal region     - Exam appears improved today but very difficult to obtain reliable history from patient  - Continue to monitor on vEEG until seizure-free for 24 hours  - Continue on Vimpat 200 mg Q12H and Topamax 100 mg BID  - Seizure precautions  - Will check MRI brain with and without contrast when able  - Consider LP if MRI brain shows focal abnormality but feel CNS infection less likely given no leukocytosis, afebrile  - [place] form to be filed  - Monitor exam and notify with changes  2  Elevated serum creatinine   - Improved  History of Present Illness     Reason for Consult / Principal Problem: Seizure-like activity  Hx and PE limited by: Patient poor historian  HPI: [facility] is a 25 y o  right handed female with PMH depression, anxiety, HTN, agoraphobia and schizophrenia per chart review who presented to the hospital at [address] [place] [address] after she was noted to have strange behavior  The patient states she is in the hospital to be evaluate for seizures  Per chart review, according to family she has had strange behavior for over a week  Family noted that they had taken her to an outside hospital for help about 1 month ago and she had blood work done and transfer to a distant hospital was discussed but the patient was fearful of that and ultimately discharged   Family also expressed concern that she was not taking her medications correctly and unsure if she was possibly taking too much of her medication  Yesterday, the patient's mother asked her if she heard knocking and she did not and soon after she walked out the door  Her mother followed her for a couple of blocks and then called EMS as this was completely atypical for the patient as she generally will not leave the house  She has actually been canceling doctors appointments due to wanting to stay in the home  Family reported that she was having increased auditory and visual hallucinations, she had been seeing and hearing water dropping and complaining of things coming out of the sink  She was brought to the ED yesterday due to her strange behavior and was noted to have urinary and bowel incontinence  While in the ED, she was noted to become unresponsive  Per noted, she was noted to have a rightward gaze  Family noted that she had a similar response at home where she stopped responding and at that time was taken for mental health treatment after being treated at a hospital  She then had an event where she was intermittently scratching herself and had a leftward gaze, no withdrawal to painful stimuli  She had another event where she was noted to have flailing arms and facial twitching  She was given Ativan after this event stopped spontaneously  She was given an additional 1 mg Ativan while in [place] as she continued to have flailing movements of her arms  She was ultimately transferred to [place] for vEEG monitoring  After being placed on vEEG, she was noted to have continuous right temporal delta slowing and very frequent right temporal sharp waves and then around 0042 was noted to have an electrographic seizure starting in the right temporal area and then became more diffuse which lasted about 2 minutes  She was given loading dose of Lacosamide 400 mg IV and then started on Lacosamide 200 mg Q12H   The patient states that she does have a history of head injuries in the past related to falls but she does not think she ever lost consciousness  Her half sister reportedly has a history of seizures  She denies any PMH of meningitis or encephalitis  Additional HPI unavailable at this time secondary to patient poor historian/mental status and unable to reach her family via telephone today  Inpatient consult to Neurology  Consult performed by: Enrique Sky ordered by: Marce Acuna          Review of Systems   Constitutional: Negative for chills, fatigue and fever  HENT: Negative for trouble swallowing  Eyes: Negative for visual disturbance  Respiratory: Negative for shortness of breath  Cardiovascular: Negative for chest pain  Gastrointestinal: Negative for abdominal pain, nausea and vomiting  Genitourinary: Negative for difficulty urinating and dysuria  Musculoskeletal: Negative for back pain and neck pain  Neurological: Negative for dizziness, speech difficulty, weakness, light-headedness, numbness and headaches  Psychiatric/Behavioral: Positive for confusion  Historical Information   Past Medical History:   Diagnosis Date    Acne     Agoraphobia     Anxiety     Depression     Hypertension     Obesity     Psychiatric disorder     depression, bipolar, schizophrenia    Psychosis     Schizophrenia ([address] )      Past Surgical History:   Procedure Laterality Date    CYSTOSCOPY      WISDOM TOOTH EXTRACTION       Social History   History   Alcohol Use No     History   Drug Use No     History   Smoking Status    Never Smoker   Smokeless Tobacco    Never Used     Family History: History reviewed  No pertinent family history  Review of previous medical records was completed  Patient was seen in the ED at the end of June with increased anxiety/paranoia and was given outpatient resources  She followed with [facility] psychiatry in the past and carries a diagnosis of schizoid personality disorder       Meds/Allergies Scheduled Meds:  Current Facility-Administered Medications:  ALPRAZolam 2 mg Oral TID PRN Cathern Dress, PA-C   doxycycline hyclate 100 mg Oral Daily Cathern Dress, PA-HERNAN   heparin (porcine) 5,000 Units Subcutaneous Q8H [facility] Cathern Dress, MARY JO   lacosamide (VIMPAT) IVPB 200 mg Intravenous Q12H Jose Alfredo Kincaid MD   nystatin  Topical BID Cathern Dress, MARY JO   topiramate 100 mg Oral BID Cathern Dress, PA-HERNAN     Continuous Infusions:   PRN Meds:   ALPRAZolam      No Known Allergies    Objective   Vitals:Blood pressure 146/92, pulse 83, temperature 99 °F (37 2 °C), temperature source Axillary, resp  rate 20, height 5' 4" (1 626 m), weight (!) 153 kg (337 lb 4 9 oz), SpO2 97 %  ,Body mass index is 57 9 kg/m²  Intake/Output Summary (Last 24 hours) at 08/08/18 0743  Last data filed at 08/08/18 0501   Gross per 24 hour   Intake              220 ml   Output                0 ml   Net              220 ml       Invasive Devices: Invasive Devices     Peripheral Intravenous Line            Peripheral IV 08/07/18 Right Arm less than 1 day                Physical Exam   Constitutional: She is oriented to person, place, and time  She appears well-developed and well-nourished  No distress  HENT:   Head: Normocephalic and atraumatic  EEG leads in place   Eyes: Conjunctivae and EOM are normal  Pupils are equal, round, and reactive to light  No scleral icterus  Neck: Normal range of motion  Neck supple  Cardiovascular: Normal rate and regular rhythm  Pulmonary/Chest: Effort normal and breath sounds normal  No respiratory distress  Abdominal: Soft  Bowel sounds are normal  She exhibits no distension  There is no tenderness  Musculoskeletal: Normal range of motion  She exhibits edema  Neurological: She is alert and oriented to person, place, and time  She has a normal Finger-Nose-Finger Test  Heel-to-shin test: Unable to assess secondary to patient having difficulty following instruction  Reflex Scores:       Bicep reflexes are 2+ on the right side and 2+ on the left side  Brachioradialis reflexes are 2+ on the right side and 2+ on the left side  Patellar reflexes are 1+ on the right side and 1+ on the left side  Achilles reflexes are 0 on the right side and 0 on the left side  Skin: Skin is warm and dry  She is not diaphoretic  Ecchymosis over R forearm  Psychiatric: Her speech is normal and behavior is normal      Neurologic Exam     Mental Status   Oriented to person, place, and time  Oriented to person  Oriented to place  Oriented to time  Attention: decreased  Concentration: decreased  Speech: speech is normal   Level of consciousness: alert  Slowed responses noted, but AAO to person, place, time, president  Cranial Nerves     CN II   Right visual field deficit: none  Left visual field deficit: none     CN III, IV, VI   Pupils are equal, round, and reactive to light  Extraocular motions are normal    Right pupil: Size: 4 mm  Shape: regular  Reactivity: brisk  Consensual response: intact  Left pupil: Size: 4 mm  Shape: regular  Reactivity: brisk  Consensual response: intact  Nystagmus: none   Diplopia: none  Ophthalmoparesis: none  Upgaze: normal  Downgaze: normal  Conjugate gaze: present    CN V   Facial sensation intact  Right facial sensation deficit: none  Left facial sensation deficit: none    CN VII   Facial expression full, symmetric     Right facial weakness: none  Left facial weakness: none    CN VIII   Hearing: intact    CN IX, X   Palate: symmetric    CN XI   Right sternocleidomastoid strength: normal  Left sternocleidomastoid strength: normal    CN XII   Tongue: not atrophic  Fasciculations: absent  Tongue deviation: none    Motor Exam   Muscle bulk: normal  Overall muscle tone: normal  Right arm tone: normal  Left arm tone: normal  Right arm pronator drift: absent  Left arm pronator drift: absent  Right leg tone: normal  Left leg tone: normalMotor strength 5/5 B/L UE and LE       Sensory Exam   Light touch normal    Right arm light touch: normal  Left arm light touch: normal  Right leg light touch: normal  Left leg light touch: normal  Vibration normal    Right arm vibration: normal  Left arm vibration: normal  Right leg vibration: normal  Left leg vibration: normal  Pinprick normal    Right arm pinprick: normal  Left arm pinprick: normal  Right leg pinprick: normal  Left leg pinprick: normal    Gait, Coordination, and Reflexes     Coordination   Finger to nose coordination: normal  Heel-to-shin test: Unable to assess secondary to patient having difficulty following instruction  Tremor   Resting tremor: absent  Intention tremor: absent  Action tremor: absent    Reflexes   Right brachioradialis: 2+  Left brachioradialis: 2+  Right biceps: 2+  Left biceps: 2+  Right patellar: 1+  Left patellar: 1+  Right achilles: 0  Left achilles: 0  Right plantar: normal  Left plantar: normalGait deferred secondary to being monitored on vEEG          Lab Results:   Recent Results (from the past 24 hour(s))   CBC and differential    Collection Time: 08/07/18 11:22 AM   Result Value Ref Range    WBC 12 60 (H) 4 31 - 10 16 Thousand/uL    RBC 4 49 3 81 - 5 12 Million/uL    Hemoglobin 10 8 (L) 11 5 - 15 4 g/dL    Hematocrit 36 3 34 8 - 46 1 %    MCV 81 (L) 82 - 98 fL    MCH 24 1 (L) 26 8 - 34 3 pg    MCHC 29 8 (L) 31 4 - 37 4 g/dL    RDW 17 0 (H) 11 6 - 15 1 %    MPV 10 4 8 9 - 12 7 fL    Platelets 719 079 - 872 Thousands/uL    nRBC 0 /100 WBCs    Neutrophils Relative 79 (H) 43 - 75 %    Immat GRANS % 0 0 - 2 %    Lymphocytes Relative 13 (L) 14 - 44 %    Monocytes Relative 8 4 - 12 %    Eosinophils Relative 0 0 - 6 %    Basophils Relative 0 0 - 1 %    Neutrophils Absolute 9 80 (H) 1 85 - 7 62 Thousands/µL    Immature Grans Absolute 0 05 0 00 - 0 20 Thousand/uL    Lymphocytes Absolute 1 62 0 60 - 4 47 Thousands/µL    Monocytes Absolute 1 04 0 17 - 1 22 Thousand/µL Eosinophils Absolute 0 05 0 00 - 0 61 Thousand/µL    Basophils Absolute 0 04 0 00 - 0 10 Thousands/µL   Comprehensive metabolic panel    Collection Time: 08/07/18 11:22 AM   Result Value Ref Range    Sodium 139 136 - 145 mmol/L    Potassium 3 3 (L) 3 5 - 5 3 mmol/L    Chloride 105 100 - 108 mmol/L    CO2 22 21 - 32 mmol/L    Anion Gap 12 4 - 13 mmol/L    BUN 15 5 - 25 mg/dL    Creatinine 1 36 (H) 0 60 - 1 30 mg/dL    Glucose 99 65 - 140 mg/dL    Calcium 9 3 8 3 - 10 1 mg/dL    AST 22 5 - 45 U/L    ALT 30 12 - 78 U/L    Alkaline Phosphatase 85 46 - 116 U/L    Total Protein 8 4 (H) 6 4 - 8 2 g/dL    Albumin 3 3 (L) 3 5 - 5 0 g/dL    Total Bilirubin 0 50 0 20 - 1 00 mg/dL    eGFR 52 ml/min/1 73sq m   Ethanol    Collection Time: 08/07/18 11:22 AM   Result Value Ref Range    Ethanol Lvl <3 0 - 3 mg/dL   Salicylate level    Collection Time: 08/07/18 11:22 AM   Result Value Ref Range    Salicylate Lvl <3 (L) 3 - 20 mg/dL   Acetaminophen level    Collection Time: 08/07/18 11:22 AM   Result Value Ref Range    Acetaminophen Level <2 (L) 10 - 30 ug/mL   CK (with reflex to MB)    Collection Time: 08/07/18 11:22 AM   Result Value Ref Range    Total  26 - 192 U/L   CKMB    Collection Time: 08/07/18 11:22 AM   Result Value Ref Range    CK-MB Index 1 3 0 0 - 2 5 %    CK-MB FRACTION 2 2 0 0 - 5 0 ng/mL   hCG, quantitative    Collection Time: 08/07/18 11:22 AM   Result Value Ref Range    HCG, Quant <2 <=6 mIU/mL   Fingerstick Glucose (POCT)    Collection Time: 08/07/18  3:09 PM   Result Value Ref Range    POC Glucose 113 65 - 140 mg/dl   Rapid drug screen, urine    Collection Time: 08/07/18  7:11 PM   Result Value Ref Range    Amph/Meth UR Negative Negative    Barbiturate Ur Negative Negative    Benzodiazepine Urine Negative Negative    Cocaine Urine Negative Negative    Methadone Urine Negative Negative    Opiate Urine Negative Negative    PCP Ur Negative Negative    THC Urine Negative Negative   Basic metabolic panel Collection Time: 08/08/18  5:07 AM   Result Value Ref Range    Sodium 138 136 - 145 mmol/L    Potassium 4 0 3 5 - 5 3 mmol/L    Chloride 107 100 - 108 mmol/L    CO2 23 21 - 32 mmol/L    Anion Gap 8 4 - 13 mmol/L    BUN 13 5 - 25 mg/dL    Creatinine 1 20 0 60 - 1 30 mg/dL    Glucose 88 65 - 140 mg/dL    Calcium 8 8 8 3 - 10 1 mg/dL    eGFR 61 ml/min/1 73sq m   CBC and differential    Collection Time: 08/08/18  5:07 AM   Result Value Ref Range    WBC  4 31 - 10 16 Thousand/uL    RBC  3 81 - 5 12 Million/uL    Hemoglobin  11 5 - 15 4 g/dL    Hematocrit  34 8 - 46 1 %    MCV  82 - 98 fL    MCH  26 8 - 34 3 pg    MCHC  31 4 - 37 4 g/dL    RDW  11 6 - 15 1 %    MPV  8 9 - 12 7 fL    Platelets  359 - 516 Thousands/uL    nRBC  /100 WBCs    Neutrophils Relative  43 - 75 %    Immat GRANS %  0 - 2 %    Lymphocytes Relative  14 - 44 %    Monocytes Relative  4 - 12 %    Eosinophils Relative  0 - 6 %    Basophils Relative  0 - 1 %    Neutrophils Absolute  1 85 - 7 62 Thousands/µL    Immature Grans Absolute  0 00 - 0 20 Thousand/uL    Lymphocytes Absolute  0 60 - 4 47 Thousands/µL    Monocytes Absolute  0 17 - 1 22 Thousand/µL    Eosinophils Absolute  0 00 - 0 61 Thousand/µL    Basophils Absolute  0 00 - 0 10 Thousands/µL       Imaging Studies: I have personally reviewed pertinent reports  and I have personally reviewed pertinent films in PACS  CTH- No acute intracranial abnormality       VTE Prophylaxis: Heparin

## 2018-08-08 NOTE — MALNUTRITION/BMI
This medical record reflects one or more clinical indicators suggestive of  morbid obesity  BMI Findings:  BMI Classifications: Morbid Obesity 50-59 9     Body mass index is 57 9 kg/m²  See Nutrition note dated 08/08/2018   for additional details  Completed nutrition assessment is viewable in the nutrition documentation

## 2018-08-08 NOTE — SOCIAL WORK
CM met with pt at bedside and explained CM role  Pt lives with her mother and father in a 2 story home with 3 steps to enter  Pt's bedroom/bathroom are on the 2nd floor and require a full flight of steps to enter  PTA pt was independent with ADL's and did not require the use of DME for ambulation  Pt reports no hx of VNA/STR services  Pt is able to drive and will have assistance with transportation from her parents post d/c  Pt uses [facility] in [place]  Pt sees PCP Dr Alessandro Singh  Pt does not have a POA  Pt has a hx of depression which is managed through Psychiatrist Dr Delos Goodell at [facility]  Pt reports no hx of drug/alchol rehabilitation  Pt's primary contact is her father Tricia Pablo [phone]  CM reviewed d/c planning process including the following: identifying help at home, patient preference for d/c planning needs, Discharge Lounge, [facility] Meds to Bed program, availability of treatment team to discuss questions or concerns patient and/or family may have regarding understanding medications and recognizing signs and symptoms once discharged  CM also encouraged patient to follow up with all recommended appointments after discharge  Patient advised of importance for patient and family to participate in managing patients medical well being  Patient/caregiver received discharge checklist   Content reviewed  Patient/caregiver encouraged to participate in discharge plan of care prior to discharge home

## 2018-08-08 NOTE — ASSESSMENT & PLAN NOTE
· Questionable medication compliance; per family pt does not take meds every day and possibly abuses Xanax  · Has not had regular OP follow up with her psychiatrist Dr Ailyn Castellanos   · Pt agreeable to inpatient psych admission if warranted   · Pt c/o increased auditory/visual hallucinations over the past week since starting phentermine   · Denies SI/HI  · Psychiatry consulted

## 2018-08-08 NOTE — MEDICAL STUDENT
MEDICAL STUDENT  Inpatient Progress Note for TRAINING ONLY  Not Part of Legal Medical Record       Progress Note - Melissa Beckham 25 y o  female MRN: [id number]    Unit/Bed#: [facility] 716-01 Encounter: [id number]      Assessment & Plan:    Observed seizure-like activity:  · Had a witnessed seizure like activity with a blank stare and bladder incontinence at [facility] ED  · Transferred to [facility] for vEEG monitoring  · CT of head 8/7/18: No acute intracranial abnormality  · EMU monitoring  · 00:42 on 8/8/18 patient had an electrographic seizure that lasted approximately 2 minutes---started in the right temporal area and became diffuse  No movements noted other than subtle movement of lips  · Given loading dose of lacosamide 400 mg IV, and started on lacosamide 200 mg q 12  · Follow up with Topiramate level  · On topiramate 100 mg BID currently  · Monitor on vEEG until seizure free for 24hrs  · Check MRI brain with and without contrast   · Consider LP if MRI brain shows focal abnormality but feel CNS infection less likely given no leukocytosis, afebrile  · Neurology consult    Hallucinations:  · Patient states that she has not been taking any medication, and is unaware of the last time she may have taken any  Family states that she does not take meds every day  · Patient's father is concerned that she has been abusing her Xanax medication  Supposedly gets 105 of these per month and takes them within the 3 days  · Has had increased auditory and visual hallucinations for about a week     · Has been seeing and hearing water dripping and complaining of things coming out of the sink  · Has had similar episodes in the past typically lasting only a few days  · Denies SI/HI  · Does not follow up with her psychiatrist outpatient  · Consult psychiatry     Schizophrenia:  Elevated serum creatinine:   · Creatinine levels improved  · Creatinine: 1 36 POA 8/7/18  · Baseline unclear  · Creatinine levels 1 9 on previous admission  · Creatinine today: 1 2    Hypokalemia:  · Repleted 40 po at [facility]  · Potassium today: 4 0 (slightly hemolyzed)    Leukocytosis:  · WBC 12 6 on 8/7/18  · Afebrile  · WBC today: 10 10    Subjective:   Patient states that she is feeling fine, and feeling better than yesterday  She states that she doesn't feel confused  Objective:     Vitals: Blood pressure 137/63, pulse 84, temperature 98 8 °F (37 1 °C), temperature source Oral, resp  rate 20, height 5' 4" (1 626 m), weight (!) 153 kg (337 lb 4 9 oz), SpO2 98 %  ,Body mass index is 57 9 kg/m²  Intake/Output Summary (Last 24 hours) at 08/08/18 0906  Last data filed at 08/08/18 0501   Gross per 24 hour   Intake              220 ml   Output                0 ml   Net              220 ml       Physical Exam: General appearance: alert and oriented, in no acute distress  Head: Normocephalic, without obvious abnormality, atraumatic  Eyes: conjunctivae/corneas clear  PERRL, EOM's intact  Fundi benign  Neck: no JVD and supple, symmetrical, trachea midline  Lungs: clear to auscultation bilaterally  Heart: regular rate and rhythm, S1, S2 normal, no murmur, click, rub or gallop  Abdomen: soft, non-tender; bowel sounds normal; no masses,  no organomegaly  Skin: Skin color, texture, turgor normal  No rashes or lesions  Neurologic: Grossly normal     Invasive Devices     Peripheral Intravenous Line            Peripheral IV 08/07/18 Right Arm less than 1 day                Lab, Imaging and other studies: I have personally reviewed pertinent reports      VTE Pharmacologic Prophylaxis: Heparin  VTE Mechanical Prophylaxis: sequential compression device     Medications:    doxycycline hyclate (VIBRAMYCIN) capsule 100 mg   100 mg, PO, Daily      heparin (porcine) subcutaneous injection 5,000 Units   5,000 Units, SC, Q8H RYAN      lacosamide (VIMPAT) 200 mg in sodium chloride 0 9 % 100 mL IVPB   200 mg, IV, Q12H      nystatin (MYCOSTATIN) powder   1 application, TP, BID topiramate (TOPAMAX) tablet 100 mg   100 mg, PO, BID         PRN     Medication   ALPRAZolam (XANAX) tablet 2 mg   2 mg, PO, TID PRN

## 2018-08-08 NOTE — ASSESSMENT & PLAN NOTE
· Cr 1 36 on admission however unclear of baseline, prior admissions show Cr elevated to 1 9 with lowest 1 7  · Suspect this is patient's baseline  · Avoid nephrotoxins

## 2018-08-08 NOTE — ASSESSMENT & PLAN NOTE
· Worsening hallucinations in the setting of phentermine use   · Questionable medication compliance; per family pt does not take meds every day and possibly abuses Xanax  · Has not had regular OP follow up with her psychiatrist Dr Bobbi Blanchard   · Pt agreeable to inpatient psych admission if warranted   · Pt c/o increased auditory/visual hallucinations over the past week   · Denies SI/HI  · Psychiatry consulted  · Home meds confirmed with [facility] pharmacy- doxepin 25mg qHS, lexapro 20mg/d, Topamax 50mg in AM and 100mg in PM

## 2018-08-08 NOTE — PROGRESS NOTES
Progress Note - Sagar Bang 1990, 25 y o  female MRN: [id number]  Unit/Bed#: [facility] 716-01 Encounter: [id number] DOS: 8/8/18  Primary Care Provider: Alisa Cueto MD   Date and time admitted to hospital: 8/7/2018  9:25 PM    * Seizure-like activity [facility])   Assessment & Plan    · Pt reportedly had episode of witnessed seizure like activity with blank stare and subsequent bladder incontinence at [facility] ED, transferred to [facility] for vEEG monitoring  Again with electrographic seizure lasting 2 minutes overnight originating from temporal region    · CTH nil acute, will need MRI of the brain once off EMU  · Continue EMU monitoring until seizure free for 24 hrs   · Neurology consult appreciated, continue Vimpat 200 mg Q 12 and Topamax 100 mg b i d   · Neurology to complete [employer] form  · Consider lumbar puncture  · D/c phentermine and avoid benzo withdrawal         Schizophrenia ([facility] [address] )   Assessment & Plan    · Worsening hallucinations in the setting of phentermine use   · Questionable medication compliance; per family pt does not take meds every day and possibly abuses Xanax  · Has not had regular OP follow up with her psychiatrist Dr Kaylyn Grijalva   · Pt agreeable to inpatient psych admission if warranted   · Pt c/o increased auditory/visual hallucinations over the past week   · Denies SI/HI  · Psychiatry consulted  · Home meds confirmed with [facility] pharmacy-         Anxiety disorder   Assessment & Plan    · Patient admits to abusing benzos   · Last filled Rx on 7/19 for 105 2mg tabs of xanax which she would abuse over a 3 day period   · Agreeable to HOST referral, - has hx of etoh and opioid abuse         Anemia   Assessment & Plan    · Microcytic, hemoglobin stable  · Check iron panel        Leukocytosis   Assessment & Plan    · WBC 12 on admission now resolved  · Afebrile, appears nontoxic, trend        Elevated serum creatinine   Assessment & Plan    · Cr 1 36 on admission however unclear of baseline, prior admissions show Cr elevated to 1 9 with lowest 1 7  · Suspect this is patient's baseline  · Avoid nephrotoxins           VTE Pharmacologic Prophylaxis:   Pharmacologic: Heparin  Mechanical VTE Prophylaxis in Place: Yes    Patient Centered Rounds: I have performed bedside rounds with nursing staff today  Discussions with Specialists or Other Care Team Provider: nursing, cm     Education and Discussions with Family / Patient: patient, mom at bedside     Time Spent for Care: 30 minutes  More than 50% of total time spent on counseling and coordination of care as described above  Current Length of Stay: 1 day(s)    Current Patient Status: Inpatient   Certification Statement: The patient will continue to require additional inpatient hospital stay due to workup up for new onset seizures with abnormal emu monitoring, continuous vEEG    Discharge Plan / Estimated Discharge Date: not medically stable     Code Status: Level 1 - Full Code    Subjective:   Pt seen and examined  States that she has a problem with drug abuse and notes that her anxiety has been a lot worse lately which is why she is using her Xanax more frequently  Does not recall having a seizure  Does not know her med list     Objective:     Vitals:   Temp (24hrs), Av 7 °F (37 1 °C), Min:98 2 °F (36 8 °C), Max:99 °F (37 2 °C)    HR:  [83-97] 84  Resp:  [18-22] 20  BP: (132-151)/(63-92) 137/63  SpO2:  [93 %-100 %] 98 %  Body mass index is 57 9 kg/m²  Input and Output Summary (last 24 hours): Intake/Output Summary (Last 24 hours) at 18 1401  Last data filed at 18 1300   Gross per 24 hour   Intake              760 ml   Output                0 ml   Net              760 ml     Physical Exam:     Physical Exam   Constitutional: She is oriented to person, place, and time  She appears well-developed and well-nourished  On EMU  Obese  HENT:   Head: Normocephalic and atraumatic     Mouth/Throat: Oropharynx is clear and moist    Eyes: EOM are normal    Neck: Normal range of motion  Neck supple  Cardiovascular: Normal rate, regular rhythm and normal heart sounds  No murmur heard  Pulmonary/Chest: Effort normal and breath sounds normal    Abdominal: Soft  Bowel sounds are normal    Musculoskeletal: Normal range of motion  She exhibits no edema  Neurological: She is alert and oriented to person, place, and time  Skin: Skin is warm and dry  There is pallor  Psychiatric:   Flat affect, slow to respond to questions    Nursing note and vitals reviewed  Additional Data:     Labs:      Results from last 7 days  Lab Units 08/08/18  0815   WBC Thousand/uL 10 10   HEMOGLOBIN g/dL 10 6*   HEMATOCRIT % 34 6*   PLATELETS Thousands/uL 383   NEUTROS PCT % 72   LYMPHS PCT % 21   MONOS PCT % 6   EOS PCT % 1       Results from last 7 days  Lab Units 08/08/18  0507 08/07/18  1122   SODIUM mmol/L 138 139   POTASSIUM mmol/L 4 0 3 3*   CHLORIDE mmol/L 107 105   CO2 mmol/L 23 22   BUN mg/dL 13 15   CREATININE mg/dL 1 20 1 36*   CALCIUM mg/dL 8 8 9 3   TOTAL PROTEIN g/dL  --  8 4*   BILIRUBIN TOTAL mg/dL  --  0 50   ALK PHOS U/L  --  85   ALT U/L  --  30   AST U/L  --  22   GLUCOSE RANDOM mg/dL 88 99           * I Have Reviewed All Lab Data Listed Above  * Additional Pertinent Lab Tests Reviewed:  Leslie [facility] Admission Reviewed    Imaging:    Imaging Reports Reviewed Today Include: all  Imaging Personally Reviewed by Myself Includes:   none    Recent Cultures (last 7 days):           Last 24 Hours Medication List:     Current Facility-Administered Medications:  ALPRAZolam 2 mg Oral TID PRN AINSLEY Gallardo-HERNAN    doxycycline hyclate 100 mg Oral Daily AINSLEY Gallardo-HERNAN    heparin (porcine) 5,000 Units Subcutaneous Q8H [address] AINSLEY Gallardo-HERNAN    lacosamide (VIMPAT) IVPB 200 mg Intravenous Q12H Simon Haas MD Last Rate: 200 mg (08/08/18 1044)   nystatin  Topical BID Zachariah Vences PA-C    topiramate 100 mg Oral BID Zachariah Vences PA-C         Today, Patient Was Seen By: Kacie Davis PA-C    ** Please Note: This note has been constructed using a voice recognition system   **

## 2018-08-08 NOTE — ASSESSMENT & PLAN NOTE
· Pt reportedly had episode of witnessed seizure like activity with blank stare and subsequent bladder incontinence at [facility] ED, transferred to [facility] for vEEG monitoring  Again with electrographic seizure lasting 2 minutes overnight originating from temporal region    · CTH nil acute, will need MRI of the brain once off EMU  · Continue EMU monitoring until seizure free for 24 hrs   · Neurology consult appreciated, continue Vimpat 200 mg Q 12 and Topamax 100 mg b i d   · Neurology to complete [employer] form  · Consider lumbar puncture  · D/c phentermine and avoid benzo withdrawal

## 2018-08-08 NOTE — PROGRESS NOTES
Overnight Events:    0045: Made aware by EMU tech that pt was having subclinical seizure  At 00:42, patient had an electrographic seizure that lasted approximately 2 minutes  Ictal testing completed, pt able to recall the word "Red [place]"  Confused but a little sleepy  Order for Vimpat given and nurse communication to hit event button and say that "Vimpat completed" when the infusion is done  Pt currently resting comfortably  Will continue to monitor

## 2018-08-08 NOTE — ASSESSMENT & PLAN NOTE
· Patient admits to abusing benzos   · Last filled Rx on 7/19 for 105 2mg tabs of xanax which she would abuse over a 3 day period   · Agreeable to HOST referral, - has hx of etoh and opioid abuse

## 2018-08-08 NOTE — ASSESSMENT & PLAN NOTE
· Cr 1 36 on admission however unclear of baseline, prior admissions show Cr elevated to 1 9 with lowest 1 7  · F/u AM BMP  · Avoid nephrotoxins

## 2018-08-08 NOTE — ED NOTES
Pt removed form bedpan with no success  Pt asking for  "something to call you guys if I need help and something to turn on the TV " Pt made aware the call bell is in her hand  Pt also stated " I need some water and food " Provider will be made aware  No further questions or concerns  Bed is in low and locked position with rails up x2  Call bell within reach  Will continue to monitor pt for safety and any status changes        En Fink  08/07/18 2002       En Fink  08/07/18 2007

## 2018-08-08 NOTE — ASSESSMENT & PLAN NOTE
· Questionable medication compliance; per family pt does not take meds every day and possibly abuses Xanax  · Has not had regular OP follow up with her psychiatrist   · Pt agreeable to inpatient psych admission  · Pt c/o increased auditory/visual hallucinations over the past week  · Denies SI/HI  · Consult psychiatry

## 2018-08-08 NOTE — PROGRESS NOTES
[id number]    Thus far long term continuous videoEEG monitoring reveals that the patient is asleep with continuous right temporal delta slowing and very frequent right temporal sharp waves  No electrographic seizures so far, but abnormalities are consistent with the periods of unresponsiveness observed at [facility] ED having been focal seizures  Will not start anti-epilepsy drug treatment yet, as it would be helpful to record an actual clinical seizure to document semiology and ictal EEG pattern  Continue videoEEG monitoring  ADDENDUM 1258 A  M  At 00:42, patient had an electrographic seizure that lasted approximately 2 minutes---started in the right temporal area and became diffuse  No movements noted other than subtle movement of lips  Clinical seizure testing not performed  Chart briefly reviewed  Patient is on topiramate 100 mg BID, presumably not for seizures since patient has no known seizure history  Since patient had seizures despite the topiramate and topiramate level is unknown (ergo, potential for toxicity if dose is increased is also unknown), patient was given loading dose of lacosamide 400 mg IV, and started on lacosamide 200 mg q 12  Topiramate level ordered to see if topiramate dose can be increased to provide additional seizure coverage if seizures continue to occur despite current regimen  Will continue videoEEG monitoring        Lacie Garces, [address] [facility]  [place], [place]  Pager # [phone]

## 2018-08-09 ENCOUNTER — APPOINTMENT (INPATIENT)
Dept: RADIOLOGY | Facility: HOSPITAL | Age: 28
DRG: 101 | End: 2018-08-09
Payer: COMMERCIAL

## 2018-08-09 PROBLEM — E87.6 HYPOKALEMIA: Status: RESOLVED | Noted: 2018-08-07 | Resolved: 2018-08-09

## 2018-08-09 PROBLEM — F32.3 MAJOR DEPRESSION WITH PSYCHOTIC FEATURES (HCC): Status: ACTIVE | Noted: 2018-08-09

## 2018-08-09 PROBLEM — D72.829 LEUKOCYTOSIS: Status: RESOLVED | Noted: 2018-08-07 | Resolved: 2018-08-09

## 2018-08-09 LAB
ALBUMIN SERPL BCP-MCNC: 2.8 G/DL (ref 3.5–5)
ALP SERPL-CCNC: 69 U/L (ref 46–116)
ALT SERPL W P-5'-P-CCNC: 22 U/L (ref 12–78)
ANION GAP SERPL CALCULATED.3IONS-SCNC: 8 MMOL/L (ref 4–13)
AST SERPL W P-5'-P-CCNC: 24 U/L (ref 5–45)
BILIRUB SERPL-MCNC: 0.36 MG/DL (ref 0.2–1)
BUN SERPL-MCNC: 14 MG/DL (ref 5–25)
CALCIUM SERPL-MCNC: 8.7 MG/DL (ref 8.3–10.1)
CHLORIDE SERPL-SCNC: 110 MMOL/L (ref 100–108)
CO2 SERPL-SCNC: 22 MMOL/L (ref 21–32)
CREAT SERPL-MCNC: 1.18 MG/DL (ref 0.6–1.3)
ERYTHROCYTE [DISTWIDTH] IN BLOOD BY AUTOMATED COUNT: 17.4 % (ref 11.6–15.1)
GFR SERPL CREATININE-BSD FRML MDRD: 62 ML/MIN/1.73SQ M
GLUCOSE SERPL-MCNC: 89 MG/DL (ref 65–140)
HCT VFR BLD AUTO: 34.9 % (ref 34.8–46.1)
HGB BLD-MCNC: 10.7 G/DL (ref 11.5–15.4)
MCH RBC QN AUTO: 24.8 PG (ref 26.8–34.3)
MCHC RBC AUTO-ENTMCNC: 30.7 G/DL (ref 31.4–37.4)
MCV RBC AUTO: 81 FL (ref 82–98)
PLATELET # BLD AUTO: 365 THOUSANDS/UL (ref 149–390)
PMV BLD AUTO: 10.8 FL (ref 8.9–12.7)
POTASSIUM SERPL-SCNC: 3.8 MMOL/L (ref 3.5–5.3)
PROT SERPL-MCNC: 7.5 G/DL (ref 6.4–8.2)
RBC # BLD AUTO: 4.31 MILLION/UL (ref 3.81–5.12)
SODIUM SERPL-SCNC: 140 MMOL/L (ref 136–145)
TOPIRAMATE SERPL-MCNC: 2.7 UG/ML (ref 2–25)
WBC # BLD AUTO: 8.46 THOUSAND/UL (ref 4.31–10.16)

## 2018-08-09 PROCEDURE — 70553 MRI BRAIN STEM W/O & W/DYE: CPT

## 2018-08-09 PROCEDURE — 80053 COMPREHEN METABOLIC PANEL: CPT | Performed by: PHYSICIAN ASSISTANT

## 2018-08-09 PROCEDURE — 99232 SBSQ HOSP IP/OBS MODERATE 35: CPT | Performed by: PSYCHIATRY & NEUROLOGY

## 2018-08-09 PROCEDURE — 99232 SBSQ HOSP IP/OBS MODERATE 35: CPT | Performed by: PHYSICIAN ASSISTANT

## 2018-08-09 PROCEDURE — 85027 COMPLETE CBC AUTOMATED: CPT | Performed by: PHYSICIAN ASSISTANT

## 2018-08-09 PROCEDURE — A9585 GADOBUTROL INJECTION: HCPCS | Performed by: INTERNAL MEDICINE

## 2018-08-09 RX ORDER — FERROUS SULFATE 325(65) MG
325 TABLET ORAL
Status: DISCONTINUED | OUTPATIENT
Start: 2018-08-10 | End: 2018-08-10 | Stop reason: HOSPADM

## 2018-08-09 RX ORDER — LORAZEPAM 2 MG/ML
0.5 INJECTION INTRAMUSCULAR ONCE
Status: COMPLETED | OUTPATIENT
Start: 2018-08-09 | End: 2018-08-09

## 2018-08-09 RX ADMIN — SODIUM CHLORIDE 200 MG: 9 INJECTION, SOLUTION INTRAVENOUS at 21:41

## 2018-08-09 RX ADMIN — GADOBUTROL 15 ML: 604.72 INJECTION INTRAVENOUS at 22:45

## 2018-08-09 RX ADMIN — NYSTATIN: 100000 POWDER TOPICAL at 17:20

## 2018-08-09 RX ADMIN — HEPARIN SODIUM 5000 UNITS: 5000 INJECTION, SOLUTION INTRAVENOUS; SUBCUTANEOUS at 15:19

## 2018-08-09 RX ADMIN — LORAZEPAM 0.5 MG: 2 INJECTION INTRAMUSCULAR; INTRAVENOUS at 21:50

## 2018-08-09 RX ADMIN — DOXEPIN HYDROCHLORIDE 25 MG: 25 CAPSULE ORAL at 21:41

## 2018-08-09 RX ADMIN — HEPARIN SODIUM 5000 UNITS: 5000 INJECTION, SOLUTION INTRAVENOUS; SUBCUTANEOUS at 21:41

## 2018-08-09 RX ADMIN — TOPIRAMATE 100 MG: 100 TABLET, FILM COATED ORAL at 08:17

## 2018-08-09 RX ADMIN — HEPARIN SODIUM 5000 UNITS: 5000 INJECTION, SOLUTION INTRAVENOUS; SUBCUTANEOUS at 05:14

## 2018-08-09 RX ADMIN — FLUOXETINE 10 MG: 10 CAPSULE ORAL at 08:17

## 2018-08-09 RX ADMIN — DOXYCYCLINE 100 MG: 100 CAPSULE ORAL at 08:16

## 2018-08-09 RX ADMIN — NYSTATIN 1 APPLICATION: 100000 POWDER TOPICAL at 08:16

## 2018-08-09 RX ADMIN — TOPIRAMATE 100 MG: 100 TABLET, FILM COATED ORAL at 17:19

## 2018-08-09 RX ADMIN — SODIUM CHLORIDE 200 MG: 9 INJECTION, SOLUTION INTRAVENOUS at 15:18

## 2018-08-09 NOTE — PROGRESS NOTES
Rounded with Margarito Ramirez PA-C with [facility]  See Progress notes and orders for updates  Will continue to monitor

## 2018-08-09 NOTE — ASSESSMENT & PLAN NOTE
· Patient reportedly had episode of witnessed seizure like activity with blank stare and subsequent bladder incontinence at [facility] ED, transferred to [facility] for vEEG monitoring per chart review  · Per epileptology record, at 00:42 on 8/8/18, patient had electrographic seizure that lasted approximately 2 min, started in the right temporal area and became diffuse  · Neurology following - I discussed with them today  As of now, patient continues on vEEG   Continue Vimpat and Topamax per Neurology  · CT head with no acute intracranial abnormality  · She will need MRI of the brain once off EMU  · Neurology completed [employer] form  · Consider lumbar puncture - defer to neurology

## 2018-08-09 NOTE — ASSESSMENT & PLAN NOTE
· Patient admited to abusing benzos   · Last filled Rx on 7/18 for 105 2mg tabs of alprazolam - per my discussion with her nurse she took these over 3 days  · Discussed with CM - HOST referral  · Psych following

## 2018-08-09 NOTE — ASSESSMENT & PLAN NOTE
· Cr 1 36 on admission  · Per review of our records along with labs in Care Everywhere, creatinine was as high as 1 9 in 2017 and was 1 26 on more recent labs  · Improving

## 2018-08-09 NOTE — SOCIAL WORK
CM received a consult for a HOST referral  CM spoke with pt and offered to place a referral to HOST  Pt is a agreeable  CM placed referral to HOST  CM received a call from Chester Moran with HOST who reports that she will be seen tomorrow AM 8/10/18

## 2018-08-09 NOTE — PROGRESS NOTES
Progress Note - Marybeth Mariee 1990, 25 y o  female MRN: [id number]    Unit/Bed#: [facility] 716-01 Encounter: [id number]    Primary Care Provider: Carrington Avila MD   Date and time admitted to hospital: 8/7/2018  9:25 PM        * Seizure-like activity [facility])   Assessment & Plan    · Patient reportedly had episode of witnessed seizure like activity with blank stare and subsequent bladder incontinence at [facility] ED, transferred to [facility] for vEEG monitoring per chart review  · Per epileptology record, at 00:42 on 8/8/18, patient had electrographic seizure that lasted approximately 2 min, started in the right temporal area and became diffuse  · Neurology following - I discussed with them today  As of now, patient continues on vEEG   Continue Vimpat and Topamax per Neurology  · CT head with no acute intracranial abnormality  · She will need MRI of the brain once off EMU  · Neurology completed [employer] form  · Consider lumbar puncture - defer to neurology        Major depression with psychotic features [facility])   Assessment & Plan    · Psychiatry following  · Per Psychiatry, Prozac (10 mg daily) and Topamax (100mg BID)        Anxiety disorder   Assessment & Plan    · Patient admited to abusing benzos   · Last filled Rx on 7/18 for 105 2mg tabs of alprazolam - per my discussion with her nurse she took these over 3 days  · Discussed with CM - HOST referral  · Psych following        Anemia   Assessment & Plan    · Microcytic, hemoglobin stable  · Iron low at 43, and TIBC high at 451 - will start iron supplement  · CBC in AM        Elevated serum creatinine   Assessment & Plan    · Cr 1 36 on admission  · Per review of our records along with labs in [facility], creatinine was as high as 1 9 in 2017 and was 1 26 on more recent labs  · Improving          VTE Pharmacologic Prophylaxis:   Pharmacologic: Heparin  Mechanical VTE Prophylaxis in Place: Yes    Patient Centered Rounds: I have performed bedside rounds with nursing staff today  Kylee Shaw    Discussions with Specialists or Other Care Team Provider: nurse, CM, neurology     Education and Discussions with Family / Patient: patient and her parents at bedside    Time Spent for Care: 30 minutes  More than 50% of total time spent on counseling and coordination of care as described above  Current Length of Stay: 2 day(s)    Current Patient Status: Inpatient   Certification Statement: The patient will continue to require additional inpatient hospital stay due to on vEEG, once off will need MRI    Discharge Plan: HOST referral    Code Status: Level 1 - Full Code      Subjective:   Ms Lisandra Morfin denies complaint  She questions why she is getting "shots" in her abdomen (heparin)  Objective:     Vitals:   Temp (24hrs), Av 2 °F (36 8 °C), Min:98 1 °F (36 7 °C), Max:98 4 °F (36 9 °C)    HR:  [62-95] 82  Resp:  [18-20] 20  BP: (120-144)/(74-85) 144/74  SpO2:  [97 %-100 %] 100 %  Body mass index is 57 9 kg/m²  Input and Output Summary (last 24 hours): Intake/Output Summary (Last 24 hours) at 18 1211  Last data filed at 18 1155   Gross per 24 hour   Intake             1580 ml   Output              500 ml   Net             1080 ml       Physical Exam:     Physical Exam   Constitutional: She is oriented to person, place, and time  No distress  Patient seen sitting upright in bed with nurse present  She is fixated on eating her lunch and reluctant to answer questions and cooperate with exam   Cardiovascular: Normal rate and regular rhythm  Decreased heart sounds secondary to body habitus   Pulmonary/Chest: Effort normal and breath sounds normal    Decreased breath sounds secondary to body habitus   Abdominal: Soft  There is no tenderness  Musculoskeletal: She exhibits no edema  Neurological: She is alert and oriented to person, place, and time  Skin: Skin is warm  She is not diaphoretic  Psychiatric:   Flat affect   Vitals reviewed      Additional Data: Labs:      Results from last 7 days  Lab Units 08/09/18  0558 08/08/18  0815   WBC Thousand/uL 8 46 10 10   HEMOGLOBIN g/dL 10 7* 10 6*   HEMATOCRIT % 34 9 34 6*   PLATELETS Thousands/uL 365 383   NEUTROS PCT %  --  72   LYMPHS PCT %  --  21   MONOS PCT %  --  6   EOS PCT %  --  1       Results from last 7 days  Lab Units 08/09/18  0558   SODIUM mmol/L 140   POTASSIUM mmol/L 3 8   CHLORIDE mmol/L 110*   CO2 mmol/L 22   BUN mg/dL 14   CREATININE mg/dL 1 18   CALCIUM mg/dL 8 7   TOTAL PROTEIN g/dL 7 5   BILIRUBIN TOTAL mg/dL 0 36   ALK PHOS U/L 69   ALT U/L 22   AST U/L 24   GLUCOSE RANDOM mg/dL 89           Results from last 7 days  Lab Units 08/07/18  1509   POC GLUCOSE mg/dl 113             * I Have Reviewed All Lab Data Listed Above  * Additional Pertinent Lab Tests Reviewed: All Labs Within Last 24 Hours Reviewed    Imaging:    Imaging Reports Reviewed Today Include: CT head  Imaging Personally Reviewed by Myself Includes:  None    Recent Cultures (last 7 days):           Last 24 Hours Medication List:     Current Facility-Administered Medications:  ALPRAZolam 2 mg Oral TID PRN Santhosh Ferrara PA-C    doxepin 25 mg Oral HS Brett Coulter PA-C    doxycycline hyclate 100 mg Oral Daily Santhosh Ferrara PA-C    [START ON 8/10/2018] ferrous sulfate 325 mg Oral Daily With Breakfast Hollis Reece PA-C    FLUoxetine 10 mg Oral Daily Donna Lozoya MD    heparin (porcine) 5,000 Units Subcutaneous Q8H [facility] Santhosh Ferrara PA-C    lacosamide (VIMPAT) IVPB 200 mg Intravenous Q12H Yari Silva MD Last Rate: Stopped (08/08/18 0503)   nystatin  Topical BID Santhosh Ferrara PA-C    topiramate 100 mg Oral BID Santhosh Ferrara PA-C         Today, Patient Was Seen By: Hollis Reece PA-C    ** Please Note: Dictation voice to text software may have been used in the creation of this document   **

## 2018-08-09 NOTE — PROGRESS NOTES
Progress Note - Neurology   Michelle Whitfield 25 y o  female MRN: [id number]  Unit/Bed#: [facility] 716-01 Encounter: [id number]    Assessment/Plan:   3 25year old female with PMH of depression with psychotic features admitted with abnormal behaviors and found to have multiple episodes of decreased responsiveness at [address] [place] [address]  She also had episodes associated with right gaze deviation and then left gaze deviation  She was transferred to [place] [address] for vEEG monitoring and found to have electrographic seizure originating from R temporal region     - Will discuss results of vEEG overnight with epileptology  - Continue on Vimpat 200 mg Q12H and Topamax 100 mg BID  - Seizure precautions  - If EEG overnight with no seizures, will d/c vEEG and plan to check MRI brain with and without contrast     - Consider LP If MRI brain shows focal abnormality but feel CNS infection less likely given no leukocytosis, afebrile  - [place] [place] and discussed driving restriction with patient at bedside  She notes that she does not currently have 's license and no plans to get license  - Monitored exam and notify with changes  2  Elevated serum creatinine   - Continues to improve  Subjective:   Michelle Whitfield is a 25year old female with PMH of depression with psychotic features admitted with abnormal behaviors and found to have multiple episodes of decreased responsiveness at [address] [place] [address]  She was transferred to  [facility] for vEEG and electrographic seizure originating from R temporal region  She notes that she is feeling well and denies any symptoms or events overnight       ROS:  History obtained from the patient  General ROS: negative for - chills, fatigue or fever  Ophthalmic ROS: negative for - blurry vision, decreased vision or double vision  Respiratory ROS: negative for - shortness of breath  Cardiovascular ROS: negative for - chest pain  Gastrointestinal ROS: negative for - abdominal pain or nausea/vomiting  Musculoskeletal ROS: negative for - muscular weakness  Neurological ROS: negative for - dizziness, headaches, numbness/tingling, speech problems, visual changes or weakness    Medications  Scheduled Meds:  Current Facility-Administered Medications:  ALPRAZolam 2 mg Oral TID PRN Kemi Leggett PA-C    doxepin 25 mg Oral HS Brett Coulter PA-C    doxycycline hyclate 100 mg Oral Daily Kemi Leggett PA-C    FLUoxetine 10 mg Oral Daily Garret Raya MD    heparin (porcine) 5,000 Units Subcutaneous Q8H [facility] Kemi Leggett PA-C    lacosamide (VIMPAT) IVPB 200 mg Intravenous Q12H Martha Iverson MD Last Rate: Stopped (08/08/18 2324)   nystatin  Topical BID Kemi Leggett PA-C    topiramate 100 mg Oral BID Kmei Leggett PA-C      Continuous Infusions:   PRN Meds:   ALPRAZolam    Vitals: Blood pressure 144/74, pulse 82, temperature 98 2 °F (36 8 °C), temperature source Oral, resp  rate 20, height 5' 4" (1 626 m), weight (!) 153 kg (337 lb 4 9 oz), SpO2 100 %  ,Body mass index is 57 9 kg/m²      Physical Exam: /74 (BP Location: Left arm)   Pulse 82   Temp 98 2 °F (36 8 °C) (Oral)   Resp 20   Ht 5' 4" (1 626 m)   Wt (!) 153 kg (337 lb 4 9 oz)   LMP  (LMP Unknown)   SpO2 100%   BMI 57 90 kg/m²   General appearance: alert and oriented, in no acute distress  Head: Normocephalic, without obvious abnormality, atraumatic  Eyes: negative findings: lids and lashes normal, conjunctivae and sclerae normal and pupils equal, round, reactive to light and accomodation  Lungs: clear to auscultation bilaterally  Heart: regular rate and rhythm  Abdomen: soft, non-tender; bowel sounds normal; no masses,  no organomegaly  Extremities: extremities normal, warm and well-perfused; no cyanosis, clubbing, or edema  Skin: Skin color, texture, turgor normal  No rashes or lesions  Neurologic: Mental status: Alert, oriented, thought content appropriate, able to name president and  with cue  Cranial nerves: II: pupils equal, round, reactive to light and accommodation, III,VII: ptosis no ptosis noted, III,IV,VI: extraocular muscles extra-ocular motions intact, VII: upper facial muscle function normal bilaterally, VII: lower facial muscle function normal bilaterally, XII: tongue strength normal  Sensory: normal, Grossly intact to crude touch  Motor: Motor strength 5/5 B/L UE and LE  Labs  Recent Results (from the past 24 hour(s))   Comprehensive metabolic panel    Collection Time: 08/09/18  5:58 AM   Result Value Ref Range    Sodium 140 136 - 145 mmol/L    Potassium 3 8 3 5 - 5 3 mmol/L    Chloride 110 (H) 100 - 108 mmol/L    CO2 22 21 - 32 mmol/L    Anion Gap 8 4 - 13 mmol/L    BUN 14 5 - 25 mg/dL    Creatinine 1 18 0 60 - 1 30 mg/dL    Glucose 89 65 - 140 mg/dL    Calcium 8 7 8 3 - 10 1 mg/dL    AST 24 5 - 45 U/L    ALT 22 12 - 78 U/L    Alkaline Phosphatase 69 46 - 116 U/L    Total Protein 7 5 6 4 - 8 2 g/dL    Albumin 2 8 (L) 3 5 - 5 0 g/dL    Total Bilirubin 0 36 0 20 - 1 00 mg/dL    eGFR 62 ml/min/1 73sq m   CBC and Platelet    Collection Time: 08/09/18  5:58 AM   Result Value Ref Range    WBC 8 46 4 31 - 10 16 Thousand/uL    RBC 4 31 3 81 - 5 12 Million/uL    Hemoglobin 10 7 (L) 11 5 - 15 4 g/dL    Hematocrit 34 9 34 8 - 46 1 %    MCV 81 (L) 82 - 98 fL    MCH 24 8 (L) 26 8 - 34 3 pg    MCHC 30 7 (L) 31 4 - 37 4 g/dL    RDW 17 4 (H) 11 6 - 15 1 %    Platelets 365 144 - 254 Thousands/uL    MPV 10 8 8 9 - 12 7 fL     Imaging   No new neuro imaging available for review  VTE Prophylaxis: Heparin    Counseling / Coordination of Care  Total time spent today 25 minutes  Greater than 50% of total time was spent with the patient and / or family counseling and / or coordination of care  A description of the counseling / coordination of care: Discussed plan of care with patient at bedside   Will recommend that MRI brain with and without contrast be completed after EEG is discontinued  Discussed importance of medication compliance, particularly with anti-epileptic medications  She expressed understanding

## 2018-08-09 NOTE — PROGRESS NOTES
Pt family asking about black nightgown with white cats on it and burgundy slippers  Pt was sent over here from [facility] ED  I called them and they are looking for patient belongings now  Awaiting a call back

## 2018-08-09 NOTE — ASSESSMENT & PLAN NOTE
· Microcytic, hemoglobin stable  · Iron low at 43, and TIBC high at 451 - will start iron supplement  · CBC in AM

## 2018-08-09 NOTE — PROGRESS NOTES
Phone call back from Lakshmi Birch (charge nurse at [facility]) found pt belongings  Pt belongings will be sent by  over here later today  Pt and family informed

## 2018-08-10 VITALS
OXYGEN SATURATION: 100 % | HEART RATE: 63 BPM | BODY MASS INDEX: 50.02 KG/M2 | RESPIRATION RATE: 20 BRPM | SYSTOLIC BLOOD PRESSURE: 153 MMHG | HEIGHT: 64 IN | WEIGHT: 293 LBS | DIASTOLIC BLOOD PRESSURE: 88 MMHG | TEMPERATURE: 98.2 F

## 2018-08-10 LAB
ERYTHROCYTE [DISTWIDTH] IN BLOOD BY AUTOMATED COUNT: 17.5 % (ref 11.6–15.1)
HCT VFR BLD AUTO: 36.2 % (ref 34.8–46.1)
HGB BLD-MCNC: 10.8 G/DL (ref 11.5–15.4)
MCH RBC QN AUTO: 24.3 PG (ref 26.8–34.3)
MCHC RBC AUTO-ENTMCNC: 29.8 G/DL (ref 31.4–37.4)
MCV RBC AUTO: 82 FL (ref 82–98)
PLATELET # BLD AUTO: 376 THOUSANDS/UL (ref 149–390)
PMV BLD AUTO: 10.9 FL (ref 8.9–12.7)
RBC # BLD AUTO: 4.44 MILLION/UL (ref 3.81–5.12)
WBC # BLD AUTO: 8.53 THOUSAND/UL (ref 4.31–10.16)

## 2018-08-10 PROCEDURE — 99239 HOSP IP/OBS DSCHRG MGMT >30: CPT | Performed by: HOSPITALIST

## 2018-08-10 PROCEDURE — 85027 COMPLETE CBC AUTOMATED: CPT | Performed by: PHYSICIAN ASSISTANT

## 2018-08-10 PROCEDURE — 99232 SBSQ HOSP IP/OBS MODERATE 35: CPT | Performed by: PHYSICIAN ASSISTANT

## 2018-08-10 RX ORDER — LACOSAMIDE 200 MG/1
200 TABLET ORAL EVERY 12 HOURS SCHEDULED
Status: DISCONTINUED | OUTPATIENT
Start: 2018-08-10 | End: 2018-08-10 | Stop reason: HOSPADM

## 2018-08-10 RX ORDER — LACOSAMIDE 200 MG/1
200 TABLET ORAL EVERY 12 HOURS SCHEDULED
Status: DISCONTINUED | OUTPATIENT
Start: 2018-08-10 | End: 2018-08-10

## 2018-08-10 RX ORDER — LORAZEPAM 2 MG/ML
2 INJECTION INTRAMUSCULAR ONCE
Status: DISCONTINUED | OUTPATIENT
Start: 2018-08-10 | End: 2018-08-10

## 2018-08-10 RX ORDER — TOPIRAMATE 100 MG/1
100 TABLET, FILM COATED ORAL 2 TIMES DAILY
Qty: 30 TABLET | Refills: 0 | Status: ON HOLD | OUTPATIENT
Start: 2018-08-10 | End: 2019-02-01 | Stop reason: ALTCHOICE

## 2018-08-10 RX ORDER — FERROUS SULFATE 325(65) MG
325 TABLET ORAL
Refills: 0
Start: 2018-08-11

## 2018-08-10 RX ORDER — FLUOXETINE 10 MG/1
10 CAPSULE ORAL DAILY
Qty: 30 CAPSULE | Refills: 0 | Status: ON HOLD | OUTPATIENT
Start: 2018-08-11 | End: 2019-02-01 | Stop reason: ALTCHOICE

## 2018-08-10 RX ORDER — LACOSAMIDE 200 MG/1
200 TABLET ORAL EVERY 12 HOURS SCHEDULED
Qty: 60 TABLET | Refills: 0 | Status: ON HOLD | OUTPATIENT
Start: 2018-08-10 | End: 2019-02-01 | Stop reason: ALTCHOICE

## 2018-08-10 RX ADMIN — FLUOXETINE 10 MG: 10 CAPSULE ORAL at 09:39

## 2018-08-10 RX ADMIN — HEPARIN SODIUM 5000 UNITS: 5000 INJECTION, SOLUTION INTRAVENOUS; SUBCUTANEOUS at 06:46

## 2018-08-10 RX ADMIN — SODIUM CHLORIDE 200 MG: 9 INJECTION, SOLUTION INTRAVENOUS at 09:39

## 2018-08-10 RX ADMIN — FERROUS SULFATE TAB 325 MG (65 MG ELEMENTAL FE) 325 MG: 325 (65 FE) TAB at 07:10

## 2018-08-10 RX ADMIN — TOPIRAMATE 100 MG: 100 TABLET, FILM COATED ORAL at 09:39

## 2018-08-10 RX ADMIN — NYSTATIN: 100000 POWDER TOPICAL at 09:39

## 2018-08-10 NOTE — DISCHARGE SUMMARY
Discharge- Chema Zeng 1990, 25 y o  female MRN: [id number]    [facility] Encounter: [id number]    Primary Care Provider: Rob Rivera MD   Date and time admitted to hospital: 8/7/2018  9:25 PM    Hospital Course:     Chema Zeng is a 25 y o  female patient who originally presented to the hospital on 8/7/2018 due to seizure-like activity  Patient withPMHx schizophrenia, anxiety, depression, morbid obesity, HTN, who presents with observed seizure like activity in [facility] ED  Per chart review patient had episode of staring and subsequent bladder incontinence while in the ED  Pt originally presented with her mother due to worsening visual/auditory hallucinations  Per patient's mother, pt was hearing water dripping and seeing things coming out of the sink  Pt has hx of similar hallucinations in the past however they usually resolve after approx 1 week  Pt reportedly stays at home however today she walked out of her house which is unusual for her, so EMS was called  Patient's father also reports pt has poor medication compliance and thinks she might abuse her prescribed Xanax  Patient was not able to explain why she was in the [facility] ED upon presentation however did state she was agreeable to receive psychiatric help  She denied SI/HI at that time      * Seizure-like activity [facility])   Assessment & Plan    · Patient reportedly had episode of witnessed seizure like activity with blank stare and subsequent bladder incontinence at [facility] ED, transferred to [facility] for vEEG monitoring  He also had episodes of her left staring gaze as well as episodes of right-sided gaze  While on video EEG Per epileptology record, at 00:42 on 8/8/18, patient had electrographic seizure that lasted approximately 2 min, started in the right temporal area and became diffuse  Neurology recommend that patient should not miss her home Topamax which she used to be on before and continue that at 100 mg b i d  Also the added Vimpat 200 mg b i d , subsequently she remained seizure free and was taken off the video EEG  Underwent MRI brain due to new diagnosis of seizure which was unremarkable  · Neurology completed [employer] form        Major depression with psychotic features [facility])   Assessment & Plan    · Evaluated by Psychiatry  Recommend continuing Prozac 10 mg daily, Topamax 100 mg b i d  Xanax 2 mg 3 times a day as needed  An all her medications to be managed by her mother/parents  · As per Psychiatry patient has chronic hallucinations         Anxiety disorder   Assessment & Plan    · Patient admited to abusing benzos at home  Last filled Rx on 7/18 for 105 2mg tabs of alprazolam she took these over 3 days  He was evaluated by Psychiatry, patient does admit that she was abusing her Xanax, Psychiatry recommended that she should continue 2 mg 3 times a day as needed  Psychiatry also discussed with patient's family specially mother and father that the should be handing patient's medications and dispensing to her  · [facility] program referral was made, and the time to talk to the patient after discussing with her when they asked her to sign some papers, patient got confused and she did not want to sign something which she did not understand the hence the left hip  The patient has been provided information to contact them if needed          Anemia   Assessment & Plan    · Microcytic, hemoglobin stable  · Iron low at 43, and TIBC high at 451 - will start iron supplement        Elevated serum creatinine   Assessment & Plan    · Cr 1 36 on admission    Improved to 1 1            Discharge Summary - [facility] Internal Medicine    Patient Information: Geneva Cyr 25 y o  female MRN: [id number]  Unit/Bed#: [facility] 708-01 Encounter: [id number]    Discharging Physician / Practitioner: Vianca Warren MD  PCP: Yasmine Galindo MD  Admission Date: 8/7/2018  Discharge Date: 08/10/18    Disposition:     Home    Reason for Admission:  Seizure-like activity or auditory visual hallucinations    Discharge Diagnoses:     Principal Problem:    Seizure-like activity ([facility] )  Active Problems:    Elevated serum creatinine    Anemia    Anxiety disorder    Major depression with psychotic features ([facility] )  Resolved Problems:    [facility]    Hypokalemia    Leukocytosis      Consultations During Hospital Stay:  · Neurology  · Psychiatry    Procedures Performed:     · Video EEG    Significant Findings / Test Results:     · MRI brain: No acute intracranial abnormality  The study was moderately degraded by patient motion  No abnormal enhancement within the brain parenchyma  Minimal scattered periventricular foci of white matter T2 hyperintensity which is nonspecific and may be secondary to precocious chronic small vessel ischemic changes  Other less likely etiologies include demyelinating disease, vasculitis, Lyme and migraines  Symmetric bilateral hippocampi    Incidental Findings:   · none     Test Results Pending at Discharge (will require follow up):   · none     Outpatient Tests Requested:  · none    Complications:  none      Condition at Discharge: stable     Discharge Day Visit / Exam:     Subjective:  Feels okay  Denies any complaints  Vitals: Blood Pressure: 153/88 (08/10/18 1544)  Pulse: 63 (08/10/18 1544)  Temperature: 98 2 °F (36 8 °C) (08/10/18 1544)  Temp Source: Oral (08/10/18 1544)  Respirations: 20 (08/10/18 1544)  Height: 5' 4" (162 6 cm) (08/07/18 2123)  Weight - Scale: (!) 153 kg (337 lb 4 9 oz) (08/07/18 2123)  SpO2: 100 % (08/10/18 1544)  Exam:   Physical Exam   Constitutional: She is oriented to person, place, and time  She appears well-developed and well-nourished  HENT:   Head: Normocephalic  Eyes: Conjunctivae are normal  No scleral icterus  Cardiovascular: Normal rate, regular rhythm and normal heart sounds  Exam reveals no gallop and no friction rub  No murmur heard    Pulmonary/Chest: Effort normal and breath sounds normal  No respiratory distress  She has no wheezes  She has no rales  Abdominal: Soft  She exhibits no distension  There is no tenderness  Musculoskeletal: She exhibits no edema  Neurological: She is alert and oriented to person, place, and time  Vitals reviewed  Discussion with Family:  Mother    Discharge instructions/Information to patient and family:   See after visit summary for information provided to patient and family  Provisions for Follow-Up Care:  See after visit summary for information related to follow-up care and any pertinent home health orders  Planned Readmission: no     Discharge Statement:  I spent 30 minutes discharging the patient  This time was spent on the day of discharge  I had direct contact with the patient on the day of discharge  Greater than 50% of the total time was spent examining patient, answering all patient questions, arranging and discussing plan of care with patient as well as directly providing post-discharge instructions  Additional time then spent on discharge activities  Discharge Medications:  See after visit summary for reconciled discharge medications provided to patient and family        ** Please Note: This note has been constructed using a voice recognition system **

## 2018-08-10 NOTE — ASSESSMENT & PLAN NOTE
· Patient admited to abusing benzos at home  Last filled Rx on 7/18 for 105 2mg tabs of alprazolam she took these over 3 days  He was evaluated by Psychiatry, patient does admit that she was abusing her Xanax, Psychiatry recommended that she should continue 2 mg 3 times a day as needed  Psychiatry also discussed with patient's family specially mother and father that the should be handing patient's medications and dispensing to her  · HOST program referral was made, and the time to talk to the patient after discussing with her when they asked her to sign some papers, patient got confused and she did not want to sign something which she did not understand the hence the left hip    The patient has been provided information to contact them if needed

## 2018-08-10 NOTE — ASSESSMENT & PLAN NOTE
· Microcytic, hemoglobin stable  · Iron low at 43, and TIBC high at 451 - will start iron supplement

## 2018-08-10 NOTE — RESTORATIVE TECHNICIAN NOTE
Restorative Specialist Mobility Note       Activity: Ambulate in room, Ambulate in cardona, Bathroom privileges, Dangle, Stand at bedside (Educated/encouraged pt to ambulate with assistance 3-4 x's/day  Bed alarm on   Pt callbell, phone/tray within reach )     Assistive Device: None       Wisam SALEEM, Restorative Technician, [employer]

## 2018-08-10 NOTE — ASSESSMENT & PLAN NOTE
· Evaluated by Psychiatry  Recommend continuing Prozac 10 mg daily, Topamax 100 mg b i d  Xanax 2 mg 3 times a day as needed    An all her medications to be managed by her mother/parents  · As per Psychiatry patient has chronic hallucinations

## 2018-08-10 NOTE — PROGRESS NOTES
Progress Note - Neurology   Angel Harris 25 y o  female MRN: [id number]  Unit/Bed#: [facility] 708-01 Encounter: [id number]    Assessment:  59-year-old female with past medical history depression with psychotic features noted with abnormal behaviors in found to have multiple episodes decreased responses, she was found to have a right gaze deviation and then a left gaze deviation, transferred to [facility] and found to have electrographic seizures originating from the right temporal region  MRI of brain showed no acute intracranial abnormality  Plan:  -  Continue Vimpat and Topamax  -  MRI of the brain was unremarkable   -  Follow-up with epilepsy as an outpatient    Subjective: Follow up seizure  No events overnight, she reports she feels fine, tolerating medication well  ROS:  No ha, cp, sob, palp, nausea or vomiting, one sided weakness or paraesthesia, lapse of time, tremor  Vitals: Blood pressure 110/58, pulse 82, temperature 98 6 °F (37 °C), temperature source Oral, resp  rate 18, height 5' 4" (1 626 m), weight (!) 153 kg (337 lb 4 9 oz), SpO2 100 %  ,Body mass index is 57 9 kg/m²  Current Facility-Administered Medications:  ALPRAZolam 2 mg Oral TID PRN Sinai Dinh PA-C   doxepin 25 mg Oral HS Brett Coulter PA-C   ferrous sulfate 325 mg Oral Daily With Breakfast Margarito Ramirez PA-C   FLUoxetine 10 mg Oral Daily Lattie Hatchet, MD   heparin (porcine) 5,000 Units Subcutaneous Q8H [facility] AINSLEY Zamora-HERNAN   lacosamide 200 mg Oral Q12H [address], MD   nystatin  Topical BID Sinai Leaf, PA-HERNAN   topiramate 100 mg Oral BID Sinai Dinh PA-C       Physical Exam:     Constitutional - in NAD  HEENT - NC/AT, no icterus noted, conjuctiva clear, oral mucosa free of exudate  Lungs - No obvious respiratory distress from observation    Abdomen - + obese  Extremities - + bilateral lower extremity  edema noted    Neurological    Mental status - the patient is awake alert oriented x 3, with no evidence of aphasia or dysarthria, patient is able to follow simple commands  Cranial nerves 2 through 12 are intact    Motor - 5/5 upper extremities and lower extremities, without drift, normal tone and bulk    No tremor or fixed deficit noted    Rapid movements are equal bilaterally    Sensation - nonfocal to touch    Coordination -  no ataxia noted     Toes are downgoing bilaterally    Lab, Imaging and other studies:     Recent Results (from the past 24 hour(s))   CBC    Collection Time: 08/10/18  4:52 AM   Result Value Ref Range    WBC 8 53 4 31 - 10 16 Thousand/uL    RBC 4 44 3 81 - 5 12 Million/uL    Hemoglobin 10 8 (L) 11 5 - 15 4 g/dL    Hematocrit 36 2 34 8 - 46 1 %    MCV 82 82 - 98 fL    MCH 24 3 (L) 26 8 - 34 3 pg    MCHC 29 8 (L) 31 4 - 37 4 g/dL    RDW 17 5 (H) 11 6 - 15 1 %    Platelets 569 401 - 353 Thousands/uL    MPV 10 9 8 9 - 12 7 fL     Per radiology interpretation -    "  Ct Head Without Contrast    Result Date: 8/7/2018  Narrative: CT BRAIN - WITHOUT CONTRAST INDICATION:   change in mentation - stopped speaking, intermittent staring/ gaze deviation/ rhythmic scratching   ams  found walking around  hallucinations COMPARISON:  None  TECHNIQUE:  CT examination of the brain was performed  In addition to axial images, coronal 2D reformatted images were created and submitted for interpretation  Radiation dose length product (DLP) for this visit:  1089 mGy-cm   This examination, like all CT scans performed in the [facility], was performed utilizing techniques to minimize radiation dose exposure, including the use of iterative reconstruction and automated exposure control  IMAGE QUALITY:  Diagnostic  FINDINGS: PARENCHYMA:  No intracranial mass, mass effect or midline shift  No CT signs of acute infarction  No acute parenchymal hemorrhage  VENTRICLES AND EXTRA-AXIAL SPACES:  Normal for the patient's age   VISUALIZED ORBITS AND PARANASAL SINUSES: Unremarkable  CALVARIUM AND EXTRACRANIAL SOFT TISSUES:  Normal      Impression: No acute intracranial abnormality  Workstation performed: [id number]     Mri Brain Seizure Wo And W Contrast    Result Date: 8/9/2018  Narrative: MRI  BRAIN  - WITH AND WITHOUT CONTRAST INDICATION: seizures  Seizure disorder  COMPARISON: None  TECHNIQUE:  Sagittal 3D SPGR, axial T2, axial FLAIR, axial T1, axial Craig, coronal T2 and FLAIR  Post-contrast axial T1 , Sagittal 3D SPGR with coronal recons  IV Contrast:  15 mL of gadobutrol injection (MULTI-DOSE) IMAGE QUALITY:   Mild to moderately degraded by patient motion  FINDINGS: BRAIN PARENCHYMA:  There is no discrete mass, mass effect or midline shift  There are mild periventricular and subcortical foci of white matter T2 hyperintensity which is nonspecific and most likely related to chronic small vessel ischemic changes  Brainstem and cerebellum demonstrate normal signal   Diffusion imaging is unremarkable  Symmetric hippocampal formations with regard to size and signal  Postcontrast imaging of the brain demonstrates no abnormal enhancement  VENTRICLES:  Normal  SELLA AND PITUITARY GLAND:  Normal  ORBITS:  Normal  PARANASAL SINUSES:  Normal  VASCULATURE:  Evaluation of the major intracranial vasculature demonstrates appropriate flow voids  CALVARIUM AND SKULL BASE:  Normal  EXTRACRANIAL SOFT TISSUES:  Normal      Impression: No acute intracranial abnormality  The study was moderately degraded by patient motion  No abnormal enhancement within the brain parenchyma  Minimal scattered periventricular foci of white matter T2 hyperintensity which is nonspecific and may be secondary to precocious chronic small vessel ischemic changes  Other less likely etiologies include demyelinating disease, vasculitis, Lyme and migraines  Symmetric bilateral hippocampi   Workstation performed: [id number]   "  VTE Prophylaxis: Heparin    Counseling / Coordination of Care  Total time spent today 20 minutes  Greater than 50% of total time was spent with the patient and / or family counseling and / or coordination of care   A description of the counseling / coordination of care: floor time, reviewing records

## 2018-08-10 NOTE — ASSESSMENT & PLAN NOTE
· Patient reportedly had episode of witnessed seizure like activity with blank stare and subsequent bladder incontinence at [facility] ED, transferred to [facility]G monitoring  He also had episodes of her left staring gaze as well as episodes of right-sided gaze  While on video EEG Per epileptology record, at 00:42 on 8/8/18, patient had electrographic seizure that lasted approximately 2 min, started in the right temporal area and became diffuse  Neurology recommend that patient should not miss her home Topamax which she used to be on before and continue that at 100 mg b i d   Also the added Vimpat 200 mg b i d , subsequently she remained seizure free and was taken off the video EEG    Underwent MRI brain due to new diagnosis of seizure which was unremarkable  · Neurology completed [employer] form

## 2018-08-10 NOTE — PROGRESS NOTES
Patient used the call bell  Patient requested for something to calm her down  Notified primary RN  Primary RN stated she has a xanax 2mg she can take for ativan  Walked into patient room  Introduced oneself  Explained to patient the primary RN gave permission to give xanax  Patient started yelling stating I don't understand  1 person says I need to stop taking the medications but you are giving it to me " Explained to patient physician has xanax order 3 times a day as needed for anxiety  Explained to patient primary rn has stated it is ok to give  Patient stated "i have rights"  Agreed with patient, explained to physician again about xanax prescription and how it is ordered  Explained to patient she requested something to help calm her down  Explained the xanax is ordered to help with anxiety  Asked to patient again if she wanted it, if not it will be discarded  Patient stated no  Explained to patient will update primary rn and when she has a moment she will come speak with her as soon as she can  Primary rn notified  Received an incoming fax from [employer] requesting a refill for Terbinafine.  Last Refill on 5/4/17  #42  Last Office Visit with PCP on 8/17/17  Last Office Visit with Ying on 1/24/17  Cannot refill per protocol   Please Advise

## 2018-08-13 ENCOUNTER — TELEPHONE (OUTPATIENT)
Dept: NEUROLOGY | Facility: CLINIC | Age: 28
End: 2018-08-13

## 2018-12-31 ENCOUNTER — HOSPITAL ENCOUNTER (OUTPATIENT)
Facility: HOSPITAL | Age: 28
Setting detail: OBSERVATION
End: 2019-01-02
Attending: SURGERY | Admitting: SURGERY
Payer: COMMERCIAL

## 2018-12-31 ENCOUNTER — APPOINTMENT (EMERGENCY)
Dept: RADIOLOGY | Facility: HOSPITAL | Age: 28
End: 2018-12-31
Payer: COMMERCIAL

## 2018-12-31 DIAGNOSIS — S31.119A STAB WOUND OF ABDOMEN, INITIAL ENCOUNTER: Primary | ICD-10-CM

## 2018-12-31 DIAGNOSIS — T14.91XA SUICIDE ATTEMPT (HCC): ICD-10-CM

## 2018-12-31 LAB
ANION GAP SERPL CALCULATED.3IONS-SCNC: 7 MMOL/L (ref 4–13)
ANION GAP SERPL CALCULATED.3IONS-SCNC: 8 MMOL/L (ref 4–13)
APAP SERPL-MCNC: <2 UG/ML (ref 10–30)
BASE EXCESS BLDA CALC-SCNC: -7 MMOL/L (ref -2–3)
BASOPHILS # BLD AUTO: 0.05 THOUSANDS/ΜL (ref 0–0.1)
BASOPHILS # BLD AUTO: 0.06 THOUSANDS/ΜL (ref 0–0.1)
BASOPHILS NFR BLD AUTO: 1 % (ref 0–1)
BASOPHILS NFR BLD AUTO: 1 % (ref 0–1)
BUN SERPL-MCNC: 21 MG/DL (ref 5–25)
BUN SERPL-MCNC: 23 MG/DL (ref 5–25)
CA-I BLD-SCNC: 1.07 MMOL/L (ref 1.12–1.32)
CALCIUM SERPL-MCNC: 8.5 MG/DL (ref 8.3–10.1)
CALCIUM SERPL-MCNC: 8.8 MG/DL (ref 8.3–10.1)
CHLORIDE SERPL-SCNC: 112 MMOL/L (ref 100–108)
CHLORIDE SERPL-SCNC: 114 MMOL/L (ref 100–108)
CO2 SERPL-SCNC: 18 MMOL/L (ref 21–32)
CO2 SERPL-SCNC: 20 MMOL/L (ref 21–32)
CREAT SERPL-MCNC: 1.12 MG/DL (ref 0.6–1.3)
CREAT SERPL-MCNC: 1.29 MG/DL (ref 0.6–1.3)
EOSINOPHIL # BLD AUTO: 0.03 THOUSAND/ΜL (ref 0–0.61)
EOSINOPHIL # BLD AUTO: 0.12 THOUSAND/ΜL (ref 0–0.61)
EOSINOPHIL NFR BLD AUTO: 0 % (ref 0–6)
EOSINOPHIL NFR BLD AUTO: 1 % (ref 0–6)
ERYTHROCYTE [DISTWIDTH] IN BLOOD BY AUTOMATED COUNT: 16.1 % (ref 11.6–15.1)
ERYTHROCYTE [DISTWIDTH] IN BLOOD BY AUTOMATED COUNT: 16.1 % (ref 11.6–15.1)
ETHANOL SERPL-MCNC: <3 MG/DL (ref 0–3)
GFR SERPL CREATININE-BSD FRML MDRD: 56 ML/MIN/1.73SQ M
GFR SERPL CREATININE-BSD FRML MDRD: 66 ML/MIN/1.73SQ M
GLUCOSE SERPL-MCNC: 100 MG/DL (ref 65–140)
GLUCOSE SERPL-MCNC: 104 MG/DL (ref 65–140)
GLUCOSE SERPL-MCNC: 95 MG/DL (ref 65–140)
HCO3 BLDA-SCNC: 17.6 MMOL/L (ref 24–30)
HCT VFR BLD AUTO: 33.8 % (ref 34.8–46.1)
HCT VFR BLD AUTO: 35.7 % (ref 34.8–46.1)
HCT VFR BLD AUTO: 38.3 % (ref 34.8–46.1)
HCT VFR BLD CALC: 35 % (ref 34.8–46.1)
HGB BLD-MCNC: 10.3 G/DL (ref 11.5–15.4)
HGB BLD-MCNC: 10.9 G/DL (ref 11.5–15.4)
HGB BLD-MCNC: 11.7 G/DL (ref 11.5–15.4)
HGB BLDA-MCNC: 11.9 G/DL (ref 11.5–15.4)
IMM GRANULOCYTES # BLD AUTO: 0.02 THOUSAND/UL (ref 0–0.2)
IMM GRANULOCYTES # BLD AUTO: 0.03 THOUSAND/UL (ref 0–0.2)
IMM GRANULOCYTES NFR BLD AUTO: 0 % (ref 0–2)
IMM GRANULOCYTES NFR BLD AUTO: 0 % (ref 0–2)
LYMPHOCYTES # BLD AUTO: 2.12 THOUSANDS/ΜL (ref 0.6–4.47)
LYMPHOCYTES # BLD AUTO: 2.59 THOUSANDS/ΜL (ref 0.6–4.47)
LYMPHOCYTES NFR BLD AUTO: 19 % (ref 14–44)
LYMPHOCYTES NFR BLD AUTO: 30 % (ref 14–44)
MCH RBC QN AUTO: 25.3 PG (ref 26.8–34.3)
MCH RBC QN AUTO: 25.5 PG (ref 26.8–34.3)
MCHC RBC AUTO-ENTMCNC: 30.5 G/DL (ref 31.4–37.4)
MCHC RBC AUTO-ENTMCNC: 30.5 G/DL (ref 31.4–37.4)
MCV RBC AUTO: 83 FL (ref 82–98)
MCV RBC AUTO: 84 FL (ref 82–98)
MONOCYTES # BLD AUTO: 0.63 THOUSAND/ΜL (ref 0.17–1.22)
MONOCYTES # BLD AUTO: 0.78 THOUSAND/ΜL (ref 0.17–1.22)
MONOCYTES NFR BLD AUTO: 6 % (ref 4–12)
MONOCYTES NFR BLD AUTO: 9 % (ref 4–12)
NEUTROPHILS # BLD AUTO: 5.2 THOUSANDS/ΜL (ref 1.85–7.62)
NEUTROPHILS # BLD AUTO: 8.12 THOUSANDS/ΜL (ref 1.85–7.62)
NEUTS SEG NFR BLD AUTO: 59 % (ref 43–75)
NEUTS SEG NFR BLD AUTO: 74 % (ref 43–75)
NRBC BLD AUTO-RTO: 0 /100 WBCS
NRBC BLD AUTO-RTO: 0 /100 WBCS
PCO2 BLD: 19 MMOL/L (ref 21–32)
PCO2 BLD: 32 MM HG (ref 42–50)
PH BLD: 7.35 [PH] (ref 7.3–7.4)
PLATELET # BLD AUTO: 399 THOUSANDS/UL (ref 149–390)
PLATELET # BLD AUTO: 401 THOUSANDS/UL (ref 149–390)
PMV BLD AUTO: 10.6 FL (ref 8.9–12.7)
PMV BLD AUTO: 11.1 FL (ref 8.9–12.7)
PO2 BLD: 32 MM HG (ref 35–45)
POTASSIUM BLD-SCNC: 5.3 MMOL/L (ref 3.5–5.3)
POTASSIUM SERPL-SCNC: 3.9 MMOL/L (ref 3.5–5.3)
POTASSIUM SERPL-SCNC: 4.5 MMOL/L (ref 3.5–5.3)
RBC # BLD AUTO: 4.3 MILLION/UL (ref 3.81–5.12)
RBC # BLD AUTO: 4.58 MILLION/UL (ref 3.81–5.12)
SALICYLATES SERPL-MCNC: <3 MG/DL (ref 3–20)
SAO2 % BLD FROM PO2: 60 % (ref 95–98)
SODIUM BLD-SCNC: 141 MMOL/L (ref 136–145)
SODIUM SERPL-SCNC: 139 MMOL/L (ref 136–145)
SODIUM SERPL-SCNC: 140 MMOL/L (ref 136–145)
SPECIMEN SOURCE: ABNORMAL
WBC # BLD AUTO: 10.98 THOUSAND/UL (ref 4.31–10.16)
WBC # BLD AUTO: 8.77 THOUSAND/UL (ref 4.31–10.16)

## 2018-12-31 PROCEDURE — 90715 TDAP VACCINE 7 YRS/> IM: CPT | Performed by: EMERGENCY MEDICINE

## 2018-12-31 PROCEDURE — 85014 HEMATOCRIT: CPT

## 2018-12-31 PROCEDURE — 80048 BASIC METABOLIC PNL TOTAL CA: CPT | Performed by: EMERGENCY MEDICINE

## 2018-12-31 PROCEDURE — 71260 CT THORAX DX C+: CPT

## 2018-12-31 PROCEDURE — 84132 ASSAY OF SERUM POTASSIUM: CPT

## 2018-12-31 PROCEDURE — 99285 EMERGENCY DEPT VISIT HI MDM: CPT

## 2018-12-31 PROCEDURE — 84295 ASSAY OF SERUM SODIUM: CPT

## 2018-12-31 PROCEDURE — 82803 BLOOD GASES ANY COMBINATION: CPT

## 2018-12-31 PROCEDURE — 85018 HEMOGLOBIN: CPT | Performed by: NURSE PRACTITIONER

## 2018-12-31 PROCEDURE — 82947 ASSAY GLUCOSE BLOOD QUANT: CPT

## 2018-12-31 PROCEDURE — 36415 COLL VENOUS BLD VENIPUNCTURE: CPT

## 2018-12-31 PROCEDURE — 74177 CT ABD & PELVIS W/CONTRAST: CPT

## 2018-12-31 PROCEDURE — 80329 ANALGESICS NON-OPIOID 1 OR 2: CPT | Performed by: EMERGENCY MEDICINE

## 2018-12-31 PROCEDURE — 85025 COMPLETE CBC W/AUTO DIFF WBC: CPT | Performed by: EMERGENCY MEDICINE

## 2018-12-31 PROCEDURE — 12002 RPR S/N/AX/GEN/TRNK2.6-7.5CM: CPT | Performed by: SURGERY

## 2018-12-31 PROCEDURE — 82330 ASSAY OF CALCIUM: CPT

## 2018-12-31 PROCEDURE — 85014 HEMATOCRIT: CPT | Performed by: NURSE PRACTITIONER

## 2018-12-31 PROCEDURE — 80048 BASIC METABOLIC PNL TOTAL CA: CPT | Performed by: SURGERY

## 2018-12-31 PROCEDURE — 99255 IP/OBS CONSLTJ NEW/EST HI 80: CPT | Performed by: PSYCHIATRY & NEUROLOGY

## 2018-12-31 PROCEDURE — 80320 DRUG SCREEN QUANTALCOHOLS: CPT | Performed by: EMERGENCY MEDICINE

## 2018-12-31 PROCEDURE — 90471 IMMUNIZATION ADMIN: CPT

## 2018-12-31 PROCEDURE — 99291 CRITICAL CARE FIRST HOUR: CPT | Performed by: SURGERY

## 2018-12-31 PROCEDURE — 85025 COMPLETE CBC W/AUTO DIFF WBC: CPT | Performed by: SURGERY

## 2018-12-31 RX ORDER — LACOSAMIDE 100 MG/1
200 TABLET ORAL EVERY 12 HOURS SCHEDULED
Status: DISCONTINUED | OUTPATIENT
Start: 2018-12-31 | End: 2019-01-02 | Stop reason: HOSPADM

## 2018-12-31 RX ORDER — ALPRAZOLAM 0.5 MG/1
1 TABLET ORAL 3 TIMES DAILY PRN
Status: DISCONTINUED | OUTPATIENT
Start: 2018-12-31 | End: 2019-01-02 | Stop reason: HOSPADM

## 2018-12-31 RX ORDER — ZOLPIDEM TARTRATE 5 MG/1
10 TABLET ORAL
Status: DISCONTINUED | OUTPATIENT
Start: 2018-12-31 | End: 2019-01-02

## 2018-12-31 RX ORDER — FLUOXETINE HYDROCHLORIDE 20 MG/1
20 CAPSULE ORAL DAILY
Status: DISCONTINUED | OUTPATIENT
Start: 2018-12-31 | End: 2019-01-02 | Stop reason: HOSPADM

## 2018-12-31 RX ORDER — TOPIRAMATE 100 MG/1
100 TABLET, FILM COATED ORAL 2 TIMES DAILY
Status: DISCONTINUED | OUTPATIENT
Start: 2018-12-31 | End: 2019-01-02 | Stop reason: HOSPADM

## 2018-12-31 RX ORDER — IBUPROFEN 400 MG/1
400 TABLET ORAL EVERY 6 HOURS PRN
Status: DISCONTINUED | OUTPATIENT
Start: 2018-12-31 | End: 2019-01-02

## 2018-12-31 RX ORDER — ACETAMINOPHEN 325 MG/1
975 TABLET ORAL EVERY 8 HOURS SCHEDULED
Status: DISCONTINUED | OUTPATIENT
Start: 2018-12-31 | End: 2019-01-02 | Stop reason: HOSPADM

## 2018-12-31 RX ORDER — LIDOCAINE HYDROCHLORIDE AND EPINEPHRINE 10; 10 MG/ML; UG/ML
20 INJECTION, SOLUTION INFILTRATION; PERINEURAL ONCE
Status: COMPLETED | OUTPATIENT
Start: 2018-12-31 | End: 2018-12-31

## 2018-12-31 RX ORDER — GINSENG 100 MG
1 CAPSULE ORAL 2 TIMES DAILY
Status: DISCONTINUED | OUTPATIENT
Start: 2018-12-31 | End: 2019-01-02 | Stop reason: HOSPADM

## 2018-12-31 RX ADMIN — LACOSAMIDE 200 MG: 100 TABLET, FILM COATED ORAL at 03:58

## 2018-12-31 RX ADMIN — TETANUS TOXOID, REDUCED DIPHTHERIA TOXOID AND ACELLULAR PERTUSSIS VACCINE, ADSORBED 0.5 ML: 5; 2.5; 8; 8; 2.5 SUSPENSION INTRAMUSCULAR at 01:45

## 2018-12-31 RX ADMIN — LACOSAMIDE 200 MG: 100 TABLET, FILM COATED ORAL at 09:30

## 2018-12-31 RX ADMIN — ENOXAPARIN SODIUM 40 MG: 40 INJECTION SUBCUTANEOUS at 09:31

## 2018-12-31 RX ADMIN — LACOSAMIDE 200 MG: 100 TABLET, FILM COATED ORAL at 21:57

## 2018-12-31 RX ADMIN — ACETAMINOPHEN 975 MG: 325 TABLET, FILM COATED ORAL at 21:56

## 2018-12-31 RX ADMIN — FLUOXETINE 20 MG: 20 CAPSULE ORAL at 09:30

## 2018-12-31 RX ADMIN — IBUPROFEN 400 MG: 400 TABLET ORAL at 04:49

## 2018-12-31 RX ADMIN — TOPIRAMATE 100 MG: 100 TABLET, FILM COATED ORAL at 17:02

## 2018-12-31 RX ADMIN — ALPRAZOLAM 1 MG: 0.5 TABLET ORAL at 17:02

## 2018-12-31 RX ADMIN — LIDOCAINE HYDROCHLORIDE AND EPINEPHRINE 20 ML: 10; 10 INJECTION, SOLUTION INFILTRATION; PERINEURAL at 01:45

## 2018-12-31 RX ADMIN — ZOLPIDEM TARTRATE 10 MG: 5 TABLET, COATED ORAL at 21:56

## 2018-12-31 RX ADMIN — IOHEXOL 100 ML: 350 INJECTION, SOLUTION INTRAVENOUS at 01:18

## 2018-12-31 RX ADMIN — ALPRAZOLAM 1 MG: 0.5 TABLET ORAL at 04:48

## 2018-12-31 RX ADMIN — BACITRACIN ZINC 1 LARGE APPLICATION: 500 OINTMENT TOPICAL at 17:29

## 2018-12-31 RX ADMIN — ACETAMINOPHEN 975 MG: 325 TABLET, FILM COATED ORAL at 03:58

## 2018-12-31 NOTE — H&P
H&P Exam - Trauma   Na Lilly 25 y o  female MRN: [id number]  Unit/Bed#: ED 13 Encounter: [id number]    Assessment/Plan   Trauma Alert: Level A  Model of Arrival: Ambulance  Trauma Team: Attending Roger Montaño and Residents Chloe  Consultants:   Psychiatry/case management    Trauma Active Problems:   Stab wound abdomen midline above the umbilicus  Suicidal ideation/suicide attempt/self harm  Base deficit -7    Trauma Plan:   Stab wound abdomen midline above the umbilicus  - CT C/A/P does not appear wound penetrates peritoneum  - Currently bleeding tamponaded/resolved  - AM CBC  - Tetanus Updated  - Lidocaine with epi/washout/closure    Suicidal ideation/suicide attempt/self harm  - Self inflicted stab wound  - 1:1 continual observation  - Coma panel  - Psychiatry consult    Base deficit -7  - No active bleeding  - Trend hemoglobin  - Serial abdominal exam    Chief Complaint:  "I stabbed myself"    History of Present Illness   HPI:  Na Lilly is a 25 y o  female with history of schizophrenia/anxiety/depression/hallucinations/seizure disorder comes in after self-inflicted stab wound to the middle of the abdomen  She states apparently she tried to cut her throat and then cut her wrists and stabbed herself in the abdomen which she says w/ a 8-10 inch knife  She denies any overdose or drugs she denies any chest pain or shortness of breath; states mild abdominal discomfort she does have significant amount of blood on her clothing but no active bleeding over wound  Unknown last tetanus shot  She denies that explicitly that this was a suicide attempt she states she is unsure why she did it but she does admit to stabbing herself because no one would help her  Review of Systems   Constitutional: Negative  HENT: Negative  Eyes: Negative  Respiratory: Negative  Cardiovascular: Negative  Gastrointestinal: Positive for abdominal pain  Negative for nausea and vomiting  Musculoskeletal: Negative  Skin: Positive for wound  Neurological: Negative  Psychiatric/Behavioral: Positive for behavioral problems, self-injury and suicidal ideas  Historical Information   History is unobtainable from the patient due to None  Efforts to obtain history included the following sources: other medical personnel    History reviewed  No pertinent past medical history  History reviewed  No pertinent surgical history  Social History   History   Alcohol Use No     History   Drug Use No     History   Smoking Status    Never Smoker   Smokeless Tobacco    Never Used     Immunization History   Administered Date(s) Administered    Tdap 12/31/2018     Last Tetanus: Unknown  Family History: Non-contributory  Unable to obtain/limited by None      Meds/Allergies   all current active meds have been reviewed    No Known Allergies      PHYSICAL EXAM    PE limited by: None    Objective   Vitals:   First set: Temperature: 98 °F (36 7 °C) (12/31/18 0106)  Pulse: 91 (12/31/18 0106)  Respirations: 18 (12/31/18 0106)  Blood Pressure: 134/94 (12/31/18 0106)    Primary Survey:   (A) Airway: Intact  (B) Breathing: B/L breath sounds present  (C) Circulation: Pulses:   carotid  2/4, pedal  1/4, radial  2/4 and femoral  2/4  (D) Disabliity:  GCS Total:  15  (E) Expose:  Completed    Secondary Survey: (Click on Physical Exam tab above)  Physical Exam   Constitutional: She is oriented to person, place, and time  No distress  Obese  Appears uncomfortable  HENT:   Head: Normocephalic and atraumatic  Mouth/Throat: No oropharyngeal exudate  Eyes: Pupils are equal, round, and reactive to light  Conjunctivae are normal  Right eye exhibits no discharge  Left eye exhibits no discharge  Neck: No thyromegaly present  No midline pain   Cardiovascular: Intact distal pulses  No murmur heard  Pulmonary/Chest: Effort normal  No respiratory distress  She has no wheezes  Abdominal: Soft  She exhibits no distension  There is tenderness  Not peritoneal but mild tenderness throughout midline wound no active bleeding   Musculoskeletal: Normal range of motion  She exhibits no edema or tenderness  Superficial cut wounds to the neck   Neurological: She is alert and oriented to person, place, and time  No cranial nerve deficit  Skin: Skin is warm  No rash noted  No erythema  Invasive Devices     Peripheral Intravenous Line            Peripheral IV 12/31/18 Left less than 1 day                Lab Results:   Results Reviewed     Procedure Component Value Units Date/Time    Ethanol [[id number]]  (Normal) Collected:  12/31/18 0145    Lab Status:  Final result Specimen:  Blood from Arm, Left Updated:  12/31/18 0208     Ethanol Lvl <3 mg/dL     Salicylate level [[id number]] Collected:  12/31/18 0145    Lab Status: In process Specimen:  Blood from Arm, Left Updated:  12/31/18 0149    Acetaminophen level [[id number]] Collected:  12/31/18 0145    Lab Status: In process Specimen:  Blood from Arm, Left Updated:  12/31/18 5131    Basic metabolic panel [[id number]]  (Abnormal) Collected:  12/31/18 0109    Lab Status:  Final result Specimen:  Blood from Arm, Right Updated:  12/31/18 0140     Sodium 140 mmol/L      Potassium 4 5 mmol/L      Chloride 114 (H) mmol/L      CO2 18 (L) mmol/L      ANION GAP 8 mmol/L      BUN 23 mg/dL      Creatinine 1 29 mg/dL      Glucose 100 mg/dL      Calcium 8 8 mg/dL      eGFR 56 ml/min/1 73sq m     Narrative:         National Kidney Disease Education Program recommendations are as follows:  GFR calculation is accurate only with a steady state creatinine  Chronic Kidney disease less than 60 ml/min/1 73 sq  meters  Kidney failure less than 15 ml/min/1 73 sq  meters      CBC and differential [[id number]]  (Abnormal) Collected:  12/31/18 0109    Lab Status:  Final result Specimen:  Blood from Arm, Right Updated:  12/31/18 0122     WBC 8 77 Thousand/uL      RBC 4 58 Million/uL      Hemoglobin 11 7 g/dL      Hematocrit 38 3 %      MCV 84 fL      MCH 25 5 (L) pg      MCHC 30 5 (L) g/dL      RDW 16 1 (H) %      MPV 11 1 fL      Platelets 983 (H) Thousands/uL      nRBC 0 /100 WBCs      Neutrophils Relative 59 %      Immat GRANS % 0 %      Lymphocytes Relative 30 %      Monocytes Relative 9 %      Eosinophils Relative 1 %      Basophils Relative 1 %      Neutrophils Absolute 5 20 Thousands/µL      Immature Grans Absolute 0 02 Thousand/uL      Lymphocytes Absolute 2 59 Thousands/µL      Monocytes Absolute 0 78 Thousand/µL      Eosinophils Absolute 0 12 Thousand/µL      Basophils Absolute 0 06 Thousands/µL     POCT Blood Gas (CG8+) [[id number]]  (Abnormal) Collected:  12/31/18 0117    Lab Status:  Final result Updated:  12/31/18 0122     ph, Eliezer ISTAT 7 349     pCO2, Eliezer i-STAT 32 0 (L) mm HG      pO2, Eliezer i-STAT 32 0 (L) mm HG      BE, i-STAT -7 (L) mmol/L      HCO3, Eliezer i-STAT 17 6 (L) mmol/L      CO2, i-STAT 19 (L) mmol/L      O2 Sat, i-STAT 60 (L) %      SODIUM, I-STAT 141 mmol/l      Potassium, i-STAT 5 3 mmol/L      Calcium, Ionized i-STAT 1 07 (L) mmol/L      Hct, i-STAT 35 %      Hgb, i-STAT 11 9 g/dl      Glucose, i-STAT 104 mg/dl      Specimen Type VENOUS        Imaging/EKG Studies:   CT scans reviewed  Other Studies:  FAST negative    Code Status: Level 1 - Full Code  Advance Directive and Living Will:      Power of :    POLST:

## 2018-12-31 NOTE — PROGRESS NOTES
Called into patients room by 1:1, stated patient is bleeding a lot from her wound  Patient dressing was saturated and there was blood all over  State she was washing up and it just started  Trauma on the floor to assess, he will talk with Dr Joselyn Lennox and they will reassess later  No new orders, continue to monitor

## 2018-12-31 NOTE — UTILIZATION REVIEW
[address]  Utilization Review Department  Phone: [phone]; Fax [phone]  [email or web address]  ATTENTION: Please call with any questions or concerns to [phone]  and carefully listen to the prompts so that you are directed to the right person  Send all requests for admission clinical reviews, approved or denied determinations and any other requests to fax [phone]  All voicemails are confidential   Initial Clinical Review    Admission: Date/Time/Statement: N/A @ N/A     Orders Placed This Encounter   Procedures    Place in Observation     Standing Status:   Standing     Number of Occurrences:   1     Order Specific Question:   Admitting Physician     Answer:   Brit Colon     Order Specific Question:   Level of Care     Answer:   Med Surg [16]         ED: Date/Time/Mode of Arrival:   ED Arrival Information     Expected Arrival Acuity Means of Arrival Escorted By Service Admission Type    - 12/31/2018 01:01 Immediate Ambulance [facility] [address]     Arrival Complaint    -          Chief Complaint:   Chief Complaint   Patient presents with    Stab Wound     pt brought in by ems pt has a self inflicted stab wound       History of Illness: 25 yr old female with schizophrenia, depression /hallucination comes to ed after self inflected stab wound -- tried to cut her throat and then cut her wrists and then stabbed herself --unsure why she did it   Does says she stabbed herself because no one would help her      ED Vital Signs:   ED Triage Vitals   Temperature Pulse Respirations Blood Pressure SpO2   12/31/18 0106 12/31/18 0106 12/31/18 0106 12/31/18 0106 12/31/18 0106   98 °F (36 7 °C) 91 18 134/94 98 %      Temp Source Heart Rate Source Patient Position - Orthostatic VS BP Location FiO2 (%)   12/31/18 0106 12/31/18 0106 12/31/18 0739 12/31/18 0739 --   Oral Monitor Lying Left arm       Pain Score       12/31/18 0316       5 Wt Readings from Last 1 Encounters:   12/31/18 (!) 150 kg (330 lb 11 oz)       Vital Signs (abnormal): Abnormal Labs/Diagnostic Test Results: cl 114--c02 18  platlets 399  Venous bg-- pc02 32 0--p02 32 0--be -7----hc03 17 6--c032 19-- 032 sat 60%---ca ionized 1 07  Ct of abd--      Soft tissue wound along the midline supra umbilical region coursing inferiorly and diagonally to the right involving the right rectus abdominis muscle   Stab wound likely involves one of the numerous prominent collateral vessels in the subcutaneous fat   overlying the anterior thoracic and abdominal wall secondary to aberrant drainage of the bilateral lower extremities in the absence of normal IVC anatomy  ED Treatment:   Medication Administration from 12/31/2018 0053 to 12/31/2018 0255       Date/Time Order Dose Route Action Action by Comments     12/31/2018 0118 iohexol (OMNIPAQUE) 350 MG/ML injection (MULTI-DOSE) 100 mL 100 mL Intravenous Given Henry Lei      12/31/2018 0145 lidocaine-epinephrine (XYLOCAINE/EPINEPHRINE) 1 %-1:100,000 injection 20 mL 20 mL Infiltration Given Julia Gamez RN      12/31/2018 0145 tetanus-diphtheria-acellular pertussis (BOOSTRIX) IM injection 0 5 mL 0 5 mL Intramuscular Given Julia Gamez RN           Past Medical/Surgical History:    Active Ambulatory Problems     Diagnosis Date Noted    No Active Ambulatory Problems     Resolved Ambulatory Problems     Diagnosis Date Noted    No Resolved Ambulatory Problems     No Additional Past Medical History       Admitting Diagnosis: Suicide attempt ([facility] [address] ) Sebastian Canary  Stab wound of abdomen [S31 119A]  Stab wound of abdomen, initial encounter [S31 119A]    Age/Sex: 25 y o  female    Assessment/Plan: stab wound of abd midline above umbilicus-- suicidal ideation  Ct of abd  Cbc  1:1 observation psych consult  Lac repair done  Admission Orders:  Scheduled Meds:   Current Facility-Administered Medications:  acetaminophen 975 mg Oral Q8H [address] [facility]   ALPRAZolam 1 mg Oral TID PRN [facility] Octave, DO   bacitracin 1 large application Topical BID [facility] Octave, DO   enoxaparin 40 mg Subcutaneous Daily Sudhir R Chloe, DO   FLUoxetine 20 mg Oral Daily Sudhir R Chloe, DO   ibuprofen 400 mg Oral Q6H PRN [facility] Octave, DO   lacosamide 200 mg Oral Q12H [address] [facility], DO   topiramate 100 mg Oral BID [facility] Octave, DO   zolpidem 10 mg Oral HS PRN [facility] Octave, DO     Continuous Infusions:    PRN Meds: ALPRAZolamx1    ibuprofenx1    Zolpidem    Cl 112--c02 20--wbc 10 98--hgb 10 9--platlets 401

## 2018-12-31 NOTE — CONSULTS
Consultation - Susu Brown [address] [facility] 25 y o  female MRN: [id number]  [facility] Encounter: [id number]      Chief Complaint: "I do not want to be committed for life"    History of Present Illness   Physician Requesting Consult: Lamar Ruth DO  Reason for Consult / Principal Problem: self inflicted wound    Rawland Nissen is a 25 y o  female presents with self inflicted stab wound to abdomen  Per ED note "    pt used 6 inch steak knife and "put it all the way in"  Also wanted to cut neck and wrists"  Wound was repaired and patient told trauma service she did not know why she did it other than no one would help her  On interview she is talking on the phone with her parents and reports that for the past 2 weeks she has been more depressed and has been unable to consistently make her follow-up appointments and that her medications have been mismanaged  She says last night she became so frustrated that she attempted to cut her neck, wrists and then stabbed herself  She denies feeling suicidal now but says that she was feeling suicidal last night I try to do myself in  She signed a 201 for inpatient treatment  Psychiatric Review Of Systems:  sleep: yes  appetite changes: yes  weight changes: no  energy/anergy: yes  interest/pleasure/anhedonia: yes  somatic symptoms: no  anxiety/panic: yes  casandra: no  guilty/hopeless: yes  self injurious behavior/risky behavior: yes    Historical Information   Past Psychiatric History:   Began receiving psychiatric treatment at the age of 13  In Patient briefly once  Currently in treatment with Dr Georgette Murphy    Past Suicide attempts:  Denies  Past Violent behavior:  Denies  Past Psychiatric medication trial:  Latuda, Prozac, Klonopin, Topamax per records    Substance Abuse History:  "Had a problem with substance use in the past"   I have assessed this patient for substance use within the past 12 months  History of IP/OP rehabilitation program: No  Smoking history:  None current  Family Psychiatric History:   Father with depression; questionable suicide of uncle    Social History  Living arrangement, social support: The patient lives in home with Parents  Functioning Relationships: good support system  Other Pertinent History: None    Traumatic History:   Abuse: Denies  Other Traumatic Events: Reports multiple head traumas    History reviewed  No pertinent past medical history      Medical Review Of Systems:  Review of Systems - Negative except HPI    Meds/Allergies   all current active meds have been reviewed and current meds:   Current Facility-Administered Medications   Medication Dose Route Frequency    acetaminophen (TYLENOL) tablet 975 mg  975 mg Oral Q8H [address]    ALPRAZolam (XANAX) tablet 1 mg  1 mg Oral TID PRN    bacitracin topical ointment 1 large application  1 large application Topical BID    enoxaparin (LOVENOX) subcutaneous injection 40 mg  40 mg Subcutaneous Daily    FLUoxetine (PROzac) capsule 20 mg  20 mg Oral Daily    ibuprofen (MOTRIN) tablet 400 mg  400 mg Oral Q6H PRN    lacosamide (VIMPAT) tablet 200 mg  200 mg Oral Q12H [address]    topiramate (TOPAMAX) tablet 100 mg  100 mg Oral BID    zolpidem (AMBIEN) tablet 10 mg  10 mg Oral HS PRN     No Known Allergies    Objective   Vital signs in last 24 hours:  Temp:  [98 °F (36 7 °C)-98 6 °F (37 °C)] 98 2 °F (36 8 °C)  HR:  [68-91] 77  Resp:  [18-20] 18  BP: (134-166)/(60-94) 138/85      Intake/Output Summary (Last 24 hours) at 12/31/18 1019  Last data filed at 12/31/18 1012   Gross per 24 hour   Intake              840 ml   Output                0 ml   Net              840 ml       Mental Status Evaluation:  Appearance:  Disheveled, obese, poor dentition   Behavior:  normal   Speech:  loud, odd prosody   Mood:  depressed   Affect:  flat   Language: Fluent   Thought Process:  circumstantial   Associations: circumstantial associations   Thought Content:  obsessions   Perceptual Disturbances: "I'm having half hallucinations"   Risk Potential: Denies current suicidal ideation but status post serious attempt   Sensorium:  person, place and situation   Memory:  recent and remote memory grossly intact   Cognition:  grossly intact   Consciousness:  alert and awake    Attention: attention span and concentration were age appropriate   Intellect: not examined   Fund of Knowledge: awareness of current events: Intact   Insight:  fair   Judgment: limited   Muscle Strength and Tone: In bed   Gait/Station: Not observed in bed   Motor Activity: no abnormal movements     Lab Results:    Lab Results   Component Value Date    WBC 10 98 (H) 12/31/2018    HGB 10 9 (L) 12/31/2018    HCT 35 7 12/31/2018    MCV 83 12/31/2018     (H) 12/31/2018     Lab Results   Component Value Date    GLUCOSE 104 12/31/2018    CALCIUM 8 5 12/31/2018    K 3 9 12/31/2018    CO2 20 (L) 12/31/2018     (H) 12/31/2018    BUN 21 12/31/2018    CREATININE 1 12 12/31/2018     No results found for: ALT, AST, GGT, ALKPHOS, BILITOT      Code Status: )Level 1 - Full Code    Assessment/Plan     Assessment:  Eduin Gallardo is a 25 y o  female   Diagnosis:  Major depressive disorder with psychotic features  Schizoid personality disorder (by history)  Plan:   1  Patient signed 12 for voluntary treatment  2  Continue current medications  3  Continue one-to-one given seriousness of attempt and impulsivity  Risks, benefits and possible side effects of Medications:   Risks, benefits, and possible side effects of medications explained to patient and patient verbalizes understanding       No change current medications      Chano Rene MD

## 2018-12-31 NOTE — SOCIAL WORK
Pt signed 334 with Psychiatry  CM left voicemail with Crisis   Pt's 201 put in the 201 folder by charge desk

## 2018-12-31 NOTE — PROGRESS NOTES
Called by staff nurse to the room  Patient up and ambulated to the bathroom and start bleeding from distal end of her abdominal stab wound  Moderate size puddle of dark red blood on floor and around the toilet  No pulsating bleeding, dark red blood steadily flowing from distal part of incision  Patient calm and cooperative  Stat H/H obtained and a compressive dressing with abdominal binder applied to abdomen  Will monitor for now  She is sitting in a recliner and bleeding has greatly diminshed  Dr Suman Tsai aware of situation and plan of care

## 2018-12-31 NOTE — ED NOTES
Pt  Georgia Blanco in with a  Cut up shirt pants and underwear   All double bagged in locker [address] [place] [address]  12/31/18 8772

## 2018-12-31 NOTE — ED PROVIDER NOTES
Emergency Department Airway Evaluation and Management Form    History  Obtained from: ems  Patient has no known allergies  Chief Complaint   Patient presents with    Stab Wound     pt brought in by ems pt has a self inflicted stab wound     HPI  Self inflicted stab wound to abdomen  Per ems pt used 6 inch steak knife and "put it all the way in"  Also wanted to cut neck and wrists  Past Medical History:   Diagnosis Date    Anxiety     Depression     Eating disorder     Heart disease     Impulse control disorder     Panic attack     Panic disorder     Psychiatric illness     [facility]ures ([facility] [address] )     Self-injurious behavior     Sleep difficulties     Suicide attempt ([facility] [address] )      History reviewed  No pertinent surgical history  Family History   Problem Relation Age of Onset    No Known Problems Mother     No Known Problems Father      Social History   Substance Use Topics    Smoking status: Never Smoker    Smokeless tobacco: Never Used    Alcohol use No     I have reviewed and agree with the history as documented      Review of Systems    Physical Exam  /100   Pulse 101   Temp 98 78 °F (37 1 °C)   Resp 18   Ht 5' 10" (1 778 m) Comment: report from ed   Wt (!) 150 kg (330 lb 11 oz)   LMP 01/02/2019 (Exact Date)   SpO2 96%   BMI 47 45 kg/m²     Physical Exam   Airway intact, breath sounds equal, heart reg, GCS 15    ED Medications  Medications   iohexol (OMNIPAQUE) 350 MG/ML injection (MULTI-DOSE) 100 mL (100 mL Intravenous Given 12/31/18 0118)   lidocaine-epinephrine (XYLOCAINE/EPINEPHRINE) 1 %-1:100,000 injection 20 mL (20 mL Infiltration Given 12/31/18 0145)   tetanus-diphtheria-acellular pertussis (BOOSTRIX) IM injection 0 5 mL (0 5 mL Intramuscular Given 12/31/18 0145)       Intubation  Procedures    Notes      CritCare Time    Final Diagnosis  Final diagnoses:   Stab wound of abdomen, initial encounter   Suicide attempt [facility])       ED Provider  Electronically Signed by     Eide Martel DO Justino  01/07/19 172

## 2018-12-31 NOTE — PROGRESS NOTES
Laceration repair  Date/Time: 12/31/2018 2:39 AM  Performed by: Diya Joshi  Authorized by: Sanam STODDARD   Consent: Verbal consent obtained  Consent given by: patient  Patient understanding: patient states understanding of the procedure being performed  Patient identity confirmed: verbally with patient  Time out: Immediately prior to procedure a "time out" was called to verify the correct patient, procedure, equipment, support staff and site/side marked as required  Body area: trunk  Location details: abdomen  Laceration length: 3 cm  Foreign bodies: no foreign bodies  Tendon involvement: none  Nerve involvement: none  Anesthesia: local infiltration    Anesthesia:  Local Anesthetic: lidocaine 1% with epinephrine  Anesthetic total: 12 mL    Wound Dehiscence:  Superficial Wound Dehiscence: simple closure      Procedure Details:  Irrigation solution: saline  Irrigation method: jet lavage  Amount of cleaning: extensive (1000 mL)  Debridement: none  Degree of undermining: none  Wound skin closure material used: 2-0 nylon  Number of sutures: 3  Technique: simple  Approximation: close  Dressing: antibiotic ointment  Comments: Lidocaine w/ epi; irrigated 1 L sterile saline under pressure    Sutures placed, no active bleeding

## 2018-12-31 NOTE — SOCIAL WORK
Crisis no longer handles /302  CM was notified to contact intake @ [phone]  CM spoke to worker and faxed [phone] over the information   Cm was told it will be a while, but that intake will get to the referral

## 2019-01-01 LAB
ANION GAP SERPL CALCULATED.3IONS-SCNC: 7 MMOL/L (ref 4–13)
BUN SERPL-MCNC: 26 MG/DL (ref 5–25)
CALCIUM SERPL-MCNC: 8.5 MG/DL (ref 8.3–10.1)
CHLORIDE SERPL-SCNC: 110 MMOL/L (ref 100–108)
CO2 SERPL-SCNC: 22 MMOL/L (ref 21–32)
CREAT SERPL-MCNC: 1.27 MG/DL (ref 0.6–1.3)
GFR SERPL CREATININE-BSD FRML MDRD: 57 ML/MIN/1.73SQ M
GLUCOSE SERPL-MCNC: 78 MG/DL (ref 65–140)
POTASSIUM SERPL-SCNC: 4 MMOL/L (ref 3.5–5.3)
SODIUM SERPL-SCNC: 139 MMOL/L (ref 136–145)

## 2019-01-01 PROCEDURE — 80048 BASIC METABOLIC PNL TOTAL CA: CPT | Performed by: SURGERY

## 2019-01-01 PROCEDURE — 99231 SBSQ HOSP IP/OBS SF/LOW 25: CPT | Performed by: SURGERY

## 2019-01-01 RX ADMIN — ENOXAPARIN SODIUM 40 MG: 40 INJECTION SUBCUTANEOUS at 09:30

## 2019-01-01 RX ADMIN — TOPIRAMATE 100 MG: 100 TABLET, FILM COATED ORAL at 09:30

## 2019-01-01 RX ADMIN — LACOSAMIDE 200 MG: 100 TABLET, FILM COATED ORAL at 09:30

## 2019-01-01 RX ADMIN — BACITRACIN ZINC 1 LARGE APPLICATION: 500 OINTMENT TOPICAL at 09:32

## 2019-01-01 RX ADMIN — TOPIRAMATE 100 MG: 100 TABLET, FILM COATED ORAL at 17:44

## 2019-01-01 RX ADMIN — ALPRAZOLAM 1 MG: 0.5 TABLET ORAL at 14:11

## 2019-01-01 RX ADMIN — ACETAMINOPHEN 975 MG: 325 TABLET, FILM COATED ORAL at 14:11

## 2019-01-01 RX ADMIN — ACETAMINOPHEN 975 MG: 325 TABLET, FILM COATED ORAL at 05:23

## 2019-01-01 RX ADMIN — FLUOXETINE 20 MG: 20 CAPSULE ORAL at 09:30

## 2019-01-01 RX ADMIN — ACETAMINOPHEN 975 MG: 325 TABLET, FILM COATED ORAL at 22:05

## 2019-01-01 RX ADMIN — BACITRACIN ZINC 1 LARGE APPLICATION: 500 OINTMENT TOPICAL at 17:44

## 2019-01-01 RX ADMIN — LACOSAMIDE 200 MG: 100 TABLET, FILM COATED ORAL at 22:05

## 2019-01-01 RX ADMIN — ALPRAZOLAM 1 MG: 0.5 TABLET ORAL at 01:15

## 2019-01-01 NOTE — ASSESSMENT & PLAN NOTE
Psychiatry consult appreciated  Currently on 1:1 observation  Patient has signed 201 form  Anticipating discharge to inpatient psych

## 2019-01-01 NOTE — ASSESSMENT & PLAN NOTE
No evidence of fascial violation  Status post repair, loosely approximated with 3 nonabsorbable sutures  Continue local wound care - there is no evidence of continued or active bleeding today  Monitoring abdominal exam - remain stable   Stable for discharge today   Hemoglobin stable

## 2019-01-01 NOTE — UTILIZATION REVIEW
Continued Stay Review    Date: 1-1-19      Vital Signs: /72 (BP Location: Left arm)   Pulse 84   Temp 98 °F (36 7 °C) (Oral)   Resp 18   Ht 5' 10" (1 778 m) Comment: report from ed   Wt (!) 150 kg (330 lb 11 oz)   SpO2 98%   BMI 47 45 kg/m²     Medications:     acetaminophen 975 mg Oral Q8H [address]   ALPRAZolam 1 mg Oral TID PRN   bacitracin 1 large application Topical BID   enoxaparin 40 mg Subcutaneous Daily   FLUoxetine 20 mg Oral Daily   ibuprofen 400 mg Oral Q6H PRN   lacosamide 200 mg Oral Q12H RYAN   topiramate 100 mg Oral BID   zolpidem 10 mg Oral HS PRN       Age/Sex: 25 y o  female     Assessment/Plan:     Suicide attempt [facility])   Assessment & Plan     Psychiatry consult appreciated  Currently on 1:1 observation  Patient has signed 12 form  Anticipating discharge to inpatient psych      * Stab wound of abdomen   Assessment & Plan     No evidence of fascial violation  Status post repair, loosely approximated with 3 nonabsorbable sutures  Continue local wound care  Monitoring abdominal exam  Hemoglobin stable              Discharge Plan:  Inpatient psych when bed available
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
## 2019-01-01 NOTE — SOCIAL WORK
CM spoke with Elsa Long at [address] with no bed available today  CM called [address] and as per Miri no bed available today  CM attempted to speak with pt regarding additional options for placement  Pt reported confusion about dcp process  Violet Whiting requested CM speak to her dad, Sunshine Morrison, regarding this  Lali at bedside  Sunshine Morrison requested pt does not go outside of the [facility] at treatment  His preference is [facility]  CM also left  requesting call back for crisis inquiring about beds at [facility]  CM will f/u tomorrow  Sunshine Morrison confirmed the best phone number to reach him at is [phone]

## 2019-01-02 ENCOUNTER — HOSPITAL ENCOUNTER (INPATIENT)
Facility: HOSPITAL | Age: 29
LOS: 9 days | Discharge: HOME | DRG: 885 | End: 2019-01-11
Attending: PSYCHIATRY & NEUROLOGY | Admitting: PSYCHIATRY & NEUROLOGY
Payer: COMMERCIAL

## 2019-01-02 VITALS
HEIGHT: 70 IN | SYSTOLIC BLOOD PRESSURE: 149 MMHG | HEART RATE: 101 BPM | OXYGEN SATURATION: 96 % | BODY MASS INDEX: 41.95 KG/M2 | TEMPERATURE: 98.78 F | WEIGHT: 293 LBS | RESPIRATION RATE: 18 BRPM | DIASTOLIC BLOOD PRESSURE: 100 MMHG

## 2019-01-02 DIAGNOSIS — S31.119A STAB WOUND OF ABDOMEN, INITIAL ENCOUNTER: ICD-10-CM

## 2019-01-02 DIAGNOSIS — F33.2 SEVERE EPISODE OF RECURRENT MAJOR DEPRESSIVE DISORDER, WITHOUT PSYCHOTIC FEATURES (HCC): Primary | ICD-10-CM

## 2019-01-02 DIAGNOSIS — S31.119S: ICD-10-CM

## 2019-01-02 DIAGNOSIS — S30.1XXA ABDOMINAL WALL HEMATOMA, INITIAL ENCOUNTER: ICD-10-CM

## 2019-01-02 DIAGNOSIS — D50.0 IRON DEFICIENCY ANEMIA DUE TO CHRONIC BLOOD LOSS: ICD-10-CM

## 2019-01-02 PROCEDURE — 99238 HOSP IP/OBS DSCHRG MGMT 30/<: CPT | Performed by: SURGERY

## 2019-01-02 PROCEDURE — 93005 ELECTROCARDIOGRAM TRACING: CPT

## 2019-01-02 RX ORDER — DOCUSATE SODIUM 100 MG/1
100 CAPSULE, LIQUID FILLED ORAL 2 TIMES DAILY
Status: DISCONTINUED | OUTPATIENT
Start: 2019-01-02 | End: 2019-01-02 | Stop reason: HOSPADM

## 2019-01-02 RX ORDER — ACETAMINOPHEN 325 MG/1
650 TABLET ORAL EVERY 6 HOURS PRN
Status: DISCONTINUED | OUTPATIENT
Start: 2019-01-02 | End: 2019-01-11 | Stop reason: HOSPADM

## 2019-01-02 RX ORDER — FLUOXETINE HYDROCHLORIDE 20 MG/1
20 CAPSULE ORAL DAILY
Status: CANCELLED | OUTPATIENT
Start: 2019-01-03

## 2019-01-02 RX ORDER — DIPHENHYDRAMINE HYDROCHLORIDE 50 MG/ML
50 INJECTION INTRAMUSCULAR; INTRAVENOUS EVERY 4 HOURS PRN
Status: DISCONTINUED | OUTPATIENT
Start: 2019-01-02 | End: 2019-01-11 | Stop reason: HOSPADM

## 2019-01-02 RX ORDER — POLYETHYLENE GLYCOL 3350 17 G/17G
17 POWDER, FOR SOLUTION ORAL DAILY PRN
Status: DISCONTINUED | OUTPATIENT
Start: 2019-01-02 | End: 2019-01-02 | Stop reason: HOSPADM

## 2019-01-02 RX ORDER — BENZTROPINE MESYLATE 1 MG/1
1 TABLET ORAL EVERY 4 HOURS PRN
Status: DISCONTINUED | OUTPATIENT
Start: 2019-01-02 | End: 2019-01-11 | Stop reason: HOSPADM

## 2019-01-02 RX ORDER — DOXYCYCLINE HYCLATE 100 MG/1
100 CAPSULE ORAL DAILY
COMMUNITY
End: 2019-01-11 | Stop reason: HOSPADM

## 2019-01-02 RX ORDER — OLANZAPINE 10 MG/1
5 INJECTION, POWDER, LYOPHILIZED, FOR SOLUTION INTRAMUSCULAR EVERY 4 HOURS PRN
Status: CANCELLED | OUTPATIENT
Start: 2019-01-02

## 2019-01-02 RX ORDER — LACOSAMIDE 200 MG/1
200 TABLET ORAL EVERY 12 HOURS SCHEDULED
Qty: 10 TABLET | Refills: 0 | OUTPATIENT
Start: 2019-01-02 | End: 2019-01-12

## 2019-01-02 RX ORDER — TIZANIDINE 2 MG/1
2 TABLET ORAL
COMMUNITY
End: 2019-01-11 | Stop reason: HOSPADM

## 2019-01-02 RX ORDER — FLUOXETINE HYDROCHLORIDE 20 MG/1
40 CAPSULE ORAL DAILY
Status: DISCONTINUED | OUTPATIENT
Start: 2019-01-03 | End: 2019-01-11 | Stop reason: HOSPADM

## 2019-01-02 RX ORDER — ACETAMINOPHEN 325 MG/1
650 TABLET ORAL EVERY 4 HOURS PRN
Status: DISCONTINUED | OUTPATIENT
Start: 2019-01-02 | End: 2019-01-11 | Stop reason: HOSPADM

## 2019-01-02 RX ORDER — TOPIRAMATE 100 MG/1
100 TABLET, FILM COATED ORAL EVERY 12 HOURS SCHEDULED
Status: DISCONTINUED | OUTPATIENT
Start: 2019-01-02 | End: 2019-01-09

## 2019-01-02 RX ORDER — ALPRAZOLAM 0.5 MG/1
1 TABLET ORAL 3 TIMES DAILY PRN
Status: CANCELLED | OUTPATIENT
Start: 2019-01-02

## 2019-01-02 RX ORDER — SENNOSIDES 8.6 MG
1 TABLET ORAL
Status: CANCELLED | OUTPATIENT
Start: 2019-01-02

## 2019-01-02 RX ORDER — ALPRAZOLAM 1 MG/1
1 TABLET ORAL 3 TIMES DAILY PRN
Qty: 10 TABLET | Refills: 0 | OUTPATIENT
Start: 2019-01-02 | End: 2019-01-12

## 2019-01-02 RX ORDER — ACETAMINOPHEN 325 MG/1
650 TABLET ORAL EVERY 6 HOURS PRN
Status: CANCELLED | OUTPATIENT
Start: 2019-01-02

## 2019-01-02 RX ORDER — TOPIRAMATE 25 MG/1
100 TABLET ORAL 2 TIMES DAILY
Status: CANCELLED | OUTPATIENT
Start: 2019-01-02

## 2019-01-02 RX ORDER — LACOSAMIDE 200 MG/1
200 TABLET ORAL EVERY 12 HOURS SCHEDULED
Status: CANCELLED | OUTPATIENT
Start: 2019-01-02

## 2019-01-02 RX ORDER — ACETAMINOPHEN 325 MG/1
650 TABLET ORAL EVERY 6 HOURS PRN
Qty: 30 TABLET | Refills: 0 | OUTPATIENT
Start: 2019-01-02

## 2019-01-02 RX ORDER — LANOLIN ALCOHOL/MO/W.PET/CERES
6 CREAM (GRAM) TOPICAL
Status: CANCELLED | OUTPATIENT
Start: 2019-01-02

## 2019-01-02 RX ORDER — ACETAMINOPHEN 325 MG/1
975 TABLET ORAL EVERY 6 HOURS PRN
Status: DISCONTINUED | OUTPATIENT
Start: 2019-01-02 | End: 2019-01-11 | Stop reason: HOSPADM

## 2019-01-02 RX ORDER — FLUOXETINE HYDROCHLORIDE 20 MG/1
20 CAPSULE ORAL DAILY
Qty: 7 CAPSULE | Refills: 0 | OUTPATIENT
Start: 2019-01-03

## 2019-01-02 RX ORDER — OLANZAPINE 5 MG/1
5 TABLET ORAL EVERY 4 HOURS PRN
Status: DISCONTINUED | OUTPATIENT
Start: 2019-01-02 | End: 2019-01-11 | Stop reason: HOSPADM

## 2019-01-02 RX ORDER — LANOLIN ALCOHOL/MO/W.PET/CERES
6 CREAM (GRAM) TOPICAL
Status: DISCONTINUED | OUTPATIENT
Start: 2019-01-02 | End: 2019-01-02 | Stop reason: HOSPADM

## 2019-01-02 RX ORDER — ACETAMINOPHEN 325 MG/1
650 TABLET ORAL EVERY 4 HOURS PRN
Status: CANCELLED | OUTPATIENT
Start: 2019-01-02

## 2019-01-02 RX ORDER — BENZTROPINE MESYLATE 1 MG/1
1 TABLET ORAL EVERY 4 HOURS PRN
Status: CANCELLED | OUTPATIENT
Start: 2019-01-02

## 2019-01-02 RX ORDER — HYDROXYZINE 50 MG/1
50 TABLET, FILM COATED ORAL EVERY 4 HOURS PRN
Status: DISCONTINUED | OUTPATIENT
Start: 2019-01-02 | End: 2019-01-11 | Stop reason: HOSPADM

## 2019-01-02 RX ORDER — OLANZAPINE 10 MG/1
5 INJECTION, POWDER, LYOPHILIZED, FOR SOLUTION INTRAMUSCULAR EVERY 4 HOURS PRN
Status: DISCONTINUED | OUTPATIENT
Start: 2019-01-02 | End: 2019-01-09

## 2019-01-02 RX ORDER — ACETAMINOPHEN 325 MG/1
975 TABLET ORAL EVERY 6 HOURS PRN
Status: CANCELLED | OUTPATIENT
Start: 2019-01-02

## 2019-01-02 RX ORDER — TRAZODONE HYDROCHLORIDE 100 MG/1
100 TABLET ORAL
Status: CANCELLED | OUTPATIENT
Start: 2019-01-02

## 2019-01-02 RX ORDER — LACOSAMIDE 50 MG/1
200 TABLET ORAL EVERY 12 HOURS SCHEDULED
Status: DISCONTINUED | OUTPATIENT
Start: 2019-01-02 | End: 2019-01-11 | Stop reason: HOSPADM

## 2019-01-02 RX ORDER — POLYETHYLENE GLYCOL 3350 17 G/17G
17 POWDER, FOR SOLUTION ORAL DAILY PRN
Status: CANCELLED | OUTPATIENT
Start: 2019-01-02

## 2019-01-02 RX ORDER — OLANZAPINE 2.5 MG/1
5 TABLET ORAL EVERY 4 HOURS PRN
Status: CANCELLED | OUTPATIENT
Start: 2019-01-02

## 2019-01-02 RX ORDER — DIPHENHYDRAMINE HYDROCHLORIDE 50 MG/ML
50 INJECTION INTRAMUSCULAR; INTRAVENOUS EVERY 4 HOURS PRN
Status: CANCELLED | OUTPATIENT
Start: 2019-01-02

## 2019-01-02 RX ORDER — TOPIRAMATE 100 MG/1
100 TABLET, FILM COATED ORAL 2 TIMES DAILY
Qty: 10 TABLET | Refills: 0 | OUTPATIENT
Start: 2019-01-02

## 2019-01-02 RX ORDER — TRAZODONE HYDROCHLORIDE 100 MG/1
100 TABLET ORAL
Status: DISCONTINUED | OUTPATIENT
Start: 2019-01-02 | End: 2019-01-05

## 2019-01-02 RX ORDER — TOPIRAMATE 25 MG/1
100 TABLET ORAL EVERY 12 HOURS SCHEDULED
Status: CANCELLED | OUTPATIENT
Start: 2019-01-02

## 2019-01-02 RX ORDER — SENNOSIDES 8.6 MG
1 TABLET ORAL
Status: DISCONTINUED | OUTPATIENT
Start: 2019-01-02 | End: 2019-01-02 | Stop reason: HOSPADM

## 2019-01-02 RX ORDER — ACETAMINOPHEN 325 MG/1
975 TABLET ORAL EVERY 8 HOURS SCHEDULED
Status: CANCELLED | OUTPATIENT
Start: 2019-01-02

## 2019-01-02 RX ORDER — HYDROXYZINE 50 MG/1
50 TABLET, FILM COATED ORAL EVERY 4 HOURS PRN
Status: CANCELLED | OUTPATIENT
Start: 2019-01-02

## 2019-01-02 RX ORDER — GINSENG 100 MG
1 CAPSULE ORAL 2 TIMES DAILY
Status: CANCELLED | OUTPATIENT
Start: 2019-01-02

## 2019-01-02 RX ORDER — DOCUSATE SODIUM 100 MG/1
100 CAPSULE, LIQUID FILLED ORAL 2 TIMES DAILY
Status: CANCELLED | OUTPATIENT
Start: 2019-01-02

## 2019-01-02 RX ORDER — FLUOXETINE HYDROCHLORIDE 20 MG/1
40 CAPSULE ORAL DAILY
Status: CANCELLED | OUTPATIENT
Start: 2019-01-02

## 2019-01-02 RX ADMIN — TRAZODONE HYDROCHLORIDE 100 MG: 100 TABLET ORAL at 22:12

## 2019-01-02 RX ADMIN — ALPRAZOLAM 1 MG: 0.5 TABLET ORAL at 00:15

## 2019-01-02 RX ADMIN — ALPRAZOLAM 1 MG: 0.5 TABLET ORAL at 12:36

## 2019-01-02 RX ADMIN — LACOSAMIDE 200 MG: 100 TABLET, FILM COATED ORAL at 08:20

## 2019-01-02 RX ADMIN — DOCUSATE SODIUM 100 MG: 100 CAPSULE, LIQUID FILLED ORAL at 10:45

## 2019-01-02 RX ADMIN — LACOSAMIDE 200 MG: 50 TABLET, FILM COATED ORAL at 22:09

## 2019-01-02 RX ADMIN — TOPIRAMATE 100 MG: 100 TABLET, FILM COATED ORAL at 22:09

## 2019-01-02 RX ADMIN — ENOXAPARIN SODIUM 40 MG: 40 INJECTION SUBCUTANEOUS at 08:20

## 2019-01-02 RX ADMIN — BACITRACIN ZINC 1 LARGE APPLICATION: 500 OINTMENT TOPICAL at 08:23

## 2019-01-02 RX ADMIN — ALPRAZOLAM 1 MG: 0.5 TABLET ORAL at 08:20

## 2019-01-02 RX ADMIN — TOPIRAMATE 100 MG: 100 TABLET, FILM COATED ORAL at 08:20

## 2019-01-02 RX ADMIN — POLYETHYLENE GLYCOL 3350 17 G: 17 POWDER, FOR SOLUTION ORAL at 10:45

## 2019-01-02 RX ADMIN — FLUOXETINE 20 MG: 20 CAPSULE ORAL at 08:20

## 2019-01-02 RX ADMIN — ZOLPIDEM TARTRATE 10 MG: 5 TABLET, COATED ORAL at 00:15

## 2019-01-02 NOTE — SOCIAL WORK
Pt accepted to [facility] for Psych  CM did the [id number]  with [facility] [phone]  [facility] is requesting [facility] contact them upon arrival  Mani Mcmullen will transport @ 2000  CM informed Siobhan of [facility] Crisis

## 2019-01-02 NOTE — PLAN OF CARE
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
## 2019-01-02 NOTE — DISCHARGE INSTRUCTIONS
Traumatic Laceration and Wound Care Instructions:     Wound Care:  - Wash laceration/wound daily, gently in the shower, do not scrub  Pat dry with clean towel  Do NOT immerse completely in water (i e  tub or swimming pool) until cleared by trauma  - Follow-up with the Trauma Service as directed for suture/staple removal   - Call office if you develop fever/chills, redness/swelling/drainage from the site  Additional Instructions:  - If you have any questions or concerns after discharge please call the office   - Call office or return to ER if fever greater than 101, chills, increasing redness/swelling at site of laceration/wound, purulent or foul smelling drainage from laceration/wound, and/or worsening/uncontrollable pain  Suicide Prevention for Adults   WHAT YOU NEED TO KNOW:   A person may see suicide as the only way to escape emotional or physical pain and suffering  You can help provide emotional support for him or her and get the help he or she needs  Learn to recognize warning signs that the person may be considering suicide  Find resources to help prevent him or her from attempting to take his or her life  DISCHARGE INSTRUCTIONS:   Call 911 if:   · The person has done something on purpose to hurt himself or herself  · The person tries to commit suicide  Return to the emergency department if:   · The person tells you he or she made a plan to commit suicide  · The person acts out in anger, is reckless, or is abusing alcohol or drugs  · The person has serious thoughts of suicide, even with treatment  Contact the person's healthcare provider or therapist if:   · You begin to see warning signs that the person may be considering suicide  · The person has intense feelings of sadness, anger, revenge, or despair, or he cannot make decisions easily  · The person tells you he or she has more thoughts of suicide when they are alone  · The person withdraws from others      · The person stops eating, or begins to smoke or drink heavily  · The person feels he or she is a burden because of a disability or disease  · The person has trouble dealing with stress, such as a breakup or a job loss  · You have questions or concerns about the person's condition or care  What to do if the person is having thoughts of suicide:  Call 911 if you feel the person is at immediate risk of suicide, or if he or she talks about an active suicide plan  Assume that the person intends to carry out his or her plan  Resources are available to help you and the person  The following are some things you can do:  · Call the [address] [place] [address] at 6-546-605-TALK ([phone])   · Call the Suicide Hotline at 5-347-RFJAWHW (3-601.715.1461)   · Contact the person's therapist  His or her healthcare provider can give you a list of therapists if he or she does not have one  · Keep medicines, weapons, and alcohol out of the person's reach  Do not leave the person alone if he or she says they want to commit suicide  Do not leave the person alone if you think he or she may try it  Make sure you do not put yourself at risk if the person has a weapon  · Do not be afraid to ask if the person is thinking of ending his or her life  Ask if the person has a plan for hurting or killing himself or herself    Warning signs to watch for:   · Talking about a plan for committing suicide, or suddenly deciding to make a will    · Cutting himself or herself, burning the skin with cigarettes, or driving recklessly    · Drug or alcohol use, not taking prescribed medicine, or taking too much prescribed medicine    · Sudden anger, lashing out at others, or seeming hopeless, anxious, or angry and then suddenly becoming happy or peaceful    · Not wanting to spend time with others or doing things he or she usually enjoys    · Trouble at work, or not showing up for work    · A change in the way he or she eats, sleeps, or dresses    · Weight gain or loss or having less energy than usual    · Trouble sleeping or spending a lot of time sleeping    · Giving away or throwing away his or her belongings  Follow up with the person's healthcare provider and therapist as directed:  Write down your questions so you remember to ask them during your visits  Treatments the person may need:   · Medicines  may be given to prevent mood swings, or to decrease anxiety or depression  The person will need to take all medicines as directed  A sudden stop can be harmful  It may take 4 to 6 weeks for the medicine to help him or her feel better  · A therapist  can help the person identify and change negative feelings or beliefs about himself or herself  This may also help change the way the he or she feels and acts  A therapist can also help the person find ways to cope with things that cannot be changed  What you can do to help the person:   · Encourage the person to seek help for drug or alcohol abuse  Drugs and alcohol can increase suicidal thoughts and make the person more likely to act on them  · Help the person connect with others  Encourage him or her to become involved in the community  Some examples include tutoring a young student, volunteering at a [employer], or joining a group exercise program  The person may need help setting up a computer or creating an e-mail account to help him or her remain connected to others  · Exercise with the person  Exercise can lift his or her mood, increase energy, and make it easier to sleep at night  · Encourage the person to try new things  Adults who are open to new experiences handle stress and change better than those who are not  · Call, visit, or send postcards to the person often  Check on him or her after the loss of a pet, longtime friend, or child  Holidays, birthdays, and anniversaries can be difficult for a person after a loss   The loss of a spouse can be especially painful and lonely  · Help the person schedule a visit with his or her Anglican or spiritual leader  A Anglican or spiritual leader may be able to offer additional support and resources to the person  · Help the person get equipment that will increase his or her comfort and mobility  Examples are hearing aids, glasses, large print books, and walkers  These can help him or her enjoy activities and feel more independent  · Encourage the person to continue taking medicine and going to therapy  Medicine and therapy can help improve his or her mental health  For support and more information:   · [facility] , [address] [place] [address]  Phone: 1- 823 - 231-UBUH (01 33 43 04 02)  Web Address: [place]  · Suicide Awareness [employer] of Education  [address] [place] [address] [address] DING , [address] , [address]  Phone: 5- 115 - 600-6572  Web Address: [employer]  org  © 2017 [address] Information is for End User's use only and may not be sold, redistributed or otherwise used for commercial purposes  All illustrations and images included in CareNotes® are the copyrighted property of A D A M , Inc  or Reza Ferreira  The above information is an  only  It is not intended as medical advice for individual conditions or treatments  Talk to your doctor, nurse or pharmacist before following any medical regimen to see if it is safe and effective for you

## 2019-01-02 NOTE — SOCIAL WORK
CM spoke to Crisis at [address] [place] [address]  CM spoke to worker and faxed pt's [place] again with more information   [phone]

## 2019-01-02 NOTE — MALNUTRITION/BMI
This medical record reflects one or more clinical indicators suggestive of malnutrition and/or morbid obesity  Malnutrition Findings:              BMI Findings:  BMI Classifications: Morbid Obesity 45-49 9     Body mass index is 47 45 kg/m²  See Nutrition note dated 1/2/18 for additional details  Completed nutrition assessment is viewable in the nutrition documentation

## 2019-01-02 NOTE — SOCIAL WORK
Transport changed to 1830 as per [facility] request

## 2019-01-02 NOTE — PROGRESS NOTES
Spoke with Dr Stalin Edwards [facility]) about patient requesting to talk again  Patient wants to leave now, says she will follow up with her appts  Dr Stalin Edwards said she will be up later this afternoon to talk to patient

## 2019-01-03 PROBLEM — F33.2 SEVERE EPISODE OF RECURRENT MAJOR DEPRESSIVE DISORDER, WITHOUT PSYCHOTIC FEATURES (HCC): Status: ACTIVE | Noted: 2019-01-03

## 2019-01-03 LAB
ALBUMIN SERPL BCP-MCNC: 3.8 G/DL (ref 3–5.2)
ALP SERPL-CCNC: 82 U/L (ref 43–122)
ALT SERPL W P-5'-P-CCNC: 20 U/L (ref 9–52)
ANION GAP SERPL CALCULATED.3IONS-SCNC: 7 MMOL/L (ref 5–14)
AST SERPL W P-5'-P-CCNC: 14 U/L (ref 14–36)
ATRIAL RATE: 87 BPM
BASOPHILS # BLD AUTO: 0.1 THOUSANDS/ΜL (ref 0–0.1)
BASOPHILS NFR BLD AUTO: 1 % (ref 0–1)
BILIRUB SERPL-MCNC: 0.2 MG/DL
BUN SERPL-MCNC: 23 MG/DL (ref 5–25)
CALCIUM SERPL-MCNC: 9.1 MG/DL (ref 8.4–10.2)
CHLORIDE SERPL-SCNC: 108 MMOL/L (ref 97–108)
CO2 SERPL-SCNC: 24 MMOL/L (ref 22–30)
CREAT SERPL-MCNC: 1.18 MG/DL (ref 0.6–1.2)
EOSINOPHIL # BLD AUTO: 0.1 THOUSAND/ΜL (ref 0–0.4)
EOSINOPHIL NFR BLD AUTO: 1 % (ref 0–6)
ERYTHROCYTE [DISTWIDTH] IN BLOOD BY AUTOMATED COUNT: 17.1 %
GFR SERPL CREATININE-BSD FRML MDRD: 62 ML/MIN/1.73SQ M
GLUCOSE P FAST SERPL-MCNC: 94 MG/DL (ref 70–99)
GLUCOSE SERPL-MCNC: 94 MG/DL (ref 70–99)
HCT VFR BLD AUTO: 30.5 % (ref 36–46)
HGB BLD-MCNC: 9.4 G/DL (ref 12–16)
LYMPHOCYTES # BLD AUTO: 2.5 THOUSANDS/ΜL (ref 0.5–4)
LYMPHOCYTES NFR BLD AUTO: 22 % (ref 25–45)
MCH RBC QN AUTO: 25.3 PG (ref 26–34)
MCHC RBC AUTO-ENTMCNC: 30.8 G/DL (ref 31–36)
MCV RBC AUTO: 82 FL (ref 80–100)
MONOCYTES # BLD AUTO: 0.9 THOUSAND/ΜL (ref 0.2–0.9)
MONOCYTES NFR BLD AUTO: 8 % (ref 1–10)
NEUTROPHILS # BLD AUTO: 7.8 THOUSANDS/ΜL (ref 1.8–7.8)
NEUTS SEG NFR BLD AUTO: 68 % (ref 45–65)
P AXIS: 46 DEGREES
PLATELET # BLD AUTO: 360 THOUSANDS/UL (ref 150–450)
PMV BLD AUTO: 9 FL (ref 8.9–12.7)
POTASSIUM SERPL-SCNC: 4.1 MMOL/L (ref 3.6–5)
PR INTERVAL: 156 MS
PROT SERPL-MCNC: 7.4 G/DL (ref 5.9–8.4)
QRS AXIS: 16 DEGREES
QRSD INTERVAL: 92 MS
QT INTERVAL: 382 MS
QTC INTERVAL: 459 MS
RBC # BLD AUTO: 3.71 MILLION/UL (ref 4–5.2)
SODIUM SERPL-SCNC: 139 MMOL/L (ref 137–147)
T WAVE AXIS: -3 DEGREES
TSH SERPL DL<=0.05 MIU/L-ACNC: 3.64 UIU/ML (ref 0.47–4.68)
VENTRICULAR RATE: 87 BPM
WBC # BLD AUTO: 11.3 THOUSAND/UL (ref 4.5–11)

## 2019-01-03 PROCEDURE — 85025 COMPLETE CBC W/AUTO DIFF WBC: CPT | Performed by: PHYSICIAN ASSISTANT

## 2019-01-03 PROCEDURE — 80053 COMPREHEN METABOLIC PANEL: CPT | Performed by: PHYSICIAN ASSISTANT

## 2019-01-03 PROCEDURE — 99253 IP/OBS CNSLTJ NEW/EST LOW 45: CPT | Performed by: PHYSICIAN ASSISTANT

## 2019-01-03 PROCEDURE — 93010 ELECTROCARDIOGRAM REPORT: CPT | Performed by: INTERNAL MEDICINE

## 2019-01-03 PROCEDURE — 84443 ASSAY THYROID STIM HORMONE: CPT | Performed by: PHYSICIAN ASSISTANT

## 2019-01-03 RX ADMIN — LACOSAMIDE 200 MG: 50 TABLET, FILM COATED ORAL at 08:50

## 2019-01-03 RX ADMIN — TOPIRAMATE 100 MG: 100 TABLET, FILM COATED ORAL at 21:19

## 2019-01-03 RX ADMIN — TOPIRAMATE 100 MG: 100 TABLET, FILM COATED ORAL at 08:50

## 2019-01-03 RX ADMIN — FLUOXETINE 40 MG: 20 CAPSULE ORAL at 08:50

## 2019-01-03 RX ADMIN — LACOSAMIDE 200 MG: 50 TABLET, FILM COATED ORAL at 21:19

## 2019-01-03 RX ADMIN — TRAZODONE HYDROCHLORIDE 100 MG: 100 TABLET ORAL at 21:20
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
## 2019-01-03 NOTE — DISCHARGE INSTR - OTHER ORDERS
If you are experiencing a mental health emergency, you may call the [address] [facility] 24 hours a day, 7 days per week at [phone]  In [facility], call [phone]  When you need someone to listen, the [facility] is available for 16 hours a day, 7 days a week, from the time of 7-10am and 2pm-2am   It is not available from the hours of 2am-6am and 10am-2pm  A representative can be reached at [phone]

## 2019-01-03 NOTE — PROGRESS NOTES
Pt appears to be sleeping, chest dropping and rising  No signs of discomfort or distress  Will continue to monitor

## 2019-01-03 NOTE — PLAN OF CARE
Risk for Self Injury/Neglect     Recognize maladaptive responses and adopt new coping mechanisms Not Progressing          Risk for Self Injury/Neglect     Treatment Goal: Remain safe during length of stay, learn and adopt new coping skills, and be free of self-injurious ideation, impulses and acts at the time of discharge Progressing     Verbalize thoughts and feelings Progressing     Refrain from harming self Progressing     Attend and participate in unit activities, including therapeutic, recreational, and educational groups Progressing     Complete daily ADLs, including personal hygiene independently, as able Progressing        Patient continues on 1:1 observation due to the severity of her injury  She has been visible to some periods of time this morning  She ambulated the hallways, made a couple of phone calls (content was telling people the visiting hours and where she is), she attended groups this morning  She was med compliant after playing with the pills for an extended amount of time  She provided her own ADL care  Staff will continue to monitor for safety and responses to treatment plan

## 2019-01-03 NOTE — PLAN OF CARE
Problem: DISCHARGE PLANNING  Goal: Discharge to home or other facility with appropriate resources  INTERVENTIONS:  - Identify barriers to discharge w/patient and caregiver  - Deny SI and depression  Symptoms will resolve or diminish upon discharge  - Comply with meds, attend 75% of group therapy, identify positive coping skills  - Arrange for needed discharge resources and transportation as appropriate  - Identify discharge learning needs (meds, wound care, outpatient mental health services)  - Arrange for interpretive services to assist at discharge as needed  - Refer to Case Management Department for coordinating discharge planning if the patient needs post-hospital services based on physician/advanced practitioner order or complex needs related to functional status, cognitive ability, or social support system   Outcome: Progressing  Worker contacted Isaías Rosado as worker had not yet received auth info  [place] never received admission review  Worker completed and faxed [facility] Assured Admission form
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
## 2019-01-03 NOTE — PROGRESS NOTES
Patient has remained visible this afternoon with her sitter    Patient was med/meal compliant  No prn's were requested  Patient presents in pleasant and cooperative mood  She is currently having visitors  Staff will continue to monitor for safety and well being

## 2019-01-03 NOTE — PROGRESS NOTES
Pt attended recovery principles group and was late for stress management group  Pt engaged in group dialogue  Pt mentioned goal setting and f/u care after d/c with Dr Hammonds Shafter  Pt expressed further communications with family and support are on her list of priorities for mh needs  Pt hopeful and optimistic thinking patterns  Continue to provide therapeutic group support  Pt  self expressed an understanding of needing positive self talk and therepeutic service for recovery process and not dwelling on past mistakes  Continue to provide therepeutic group support
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
## 2019-01-03 NOTE — SOCIAL WORK
Worker spoke with Kerry Moses from [address] [place] [address] as worker had not yet received [employer]  Kerry Moses reports they have not received an admission review  Worker completed and faxed [address] [place] [address] admission form

## 2019-01-03 NOTE — PROGRESS NOTES
Patient Medication Education Group     Patient came to group with 1:1 and appeared anxious with elevated affect  The patient was somewhat agitated and continued to perseverate how her PCP was Dr Sanjuanita Sanders and how she was not being seen by him  Additionally the patient kept worrying about her medications  Patient was agitated and nervous and left the group and then returned several times  Patient was upset that she was being seen by Devon Vivar and that "I want to see the doctor"  Reassured pt  that Devon Vivar was a full provider with full prescribing privileges and that she would be closely monitoring the medications and working with the patient to make her feel better  Patient seemed relieved by this  There were brief moments during group where the patient was able to focus on the medication discussion but then began monopolizing the conversation with how she knew about medications because she read package inserts and her sister was a nurse  Pt  Had to be redirected multiple times so that others could speak        Latricia Maciel, EnmaD

## 2019-01-03 NOTE — PLAN OF CARE
Problem: DISCHARGE PLANNING  Goal: Discharge to home or other facility with appropriate resources  INTERVENTIONS:  - Identify barriers to discharge w/patient and caregiver  - Deny SI and depression  Symptoms will resolve or diminish upon discharge  - Comply with meds, attend 75% of group therapy, identify positive coping skills  - Arrange for needed discharge resources and transportation as appropriate  - Identify discharge learning needs (meds, wound care, outpatient mental health services)  - Arrange for interpretive services to assist at discharge as needed  - Refer to Case Management Department for coordinating discharge planning if the patient needs post-hospital services based on physician/advanced practitioner order or complex needs related to functional status, cognitive ability, or social support system   Outcome: Progressing  Biopsychosocial completed

## 2019-01-03 NOTE — CMS CERTIFICATION NOTE
Certification: Based upon physical, mental and social evaluations, I certify that inpatient psychiatric services are medically necessary for this patient for a duration of 10 midnights for the treatment of depression  Available alternative community resources do not meet the patient's mental health care needs  I further attest that an established written individualized plan of care has been implemented and is outlined in the patient's medical records
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
## 2019-01-04 PROBLEM — L30.9 ECZEMA OF LOWER LEG: Status: ACTIVE | Noted: 2019-01-04

## 2019-01-04 PROCEDURE — 99231 SBSQ HOSP IP/OBS SF/LOW 25: CPT | Performed by: FAMILY MEDICINE

## 2019-01-04 RX ADMIN — TOPIRAMATE 100 MG: 100 TABLET, FILM COATED ORAL at 08:22

## 2019-01-04 RX ADMIN — TRAZODONE HYDROCHLORIDE 100 MG: 100 TABLET ORAL at 21:22

## 2019-01-04 RX ADMIN — NYSTATIN AND TRIAMCINOLONE ACETONIDE: 100000; 1 CREAM TOPICAL at 18:56

## 2019-01-04 RX ADMIN — TOPIRAMATE 100 MG: 100 TABLET, FILM COATED ORAL at 21:25

## 2019-01-04 RX ADMIN — OLANZAPINE 5 MG: 5 TABLET, FILM COATED ORAL at 23:49

## 2019-01-04 RX ADMIN — LACOSAMIDE 200 MG: 50 TABLET, FILM COATED ORAL at 21:25

## 2019-01-04 RX ADMIN — HYDROXYZINE HYDROCHLORIDE 50 MG: 50 TABLET, FILM COATED ORAL at 21:22

## 2019-01-04 RX ADMIN — FLUOXETINE 40 MG: 20 CAPSULE ORAL at 08:22

## 2019-01-04 NOTE — NURSING NOTE
Patient in room resting in bed at start of shift  1:1 close proximity maintained for patient safety  Patient's affect and mood WNL  Thinking clear, no delusions noted  Affect and mood WNL  Good eye contact  Engages easily  Denies any current suicidal thoughts  Patient requested to take a shower  Supplies given  Patient instructed on how to care for sutures while bathing  Patient requested to sign a 72 hour notice  Given and completed, expires 1/6/19  Patient requested trazodone at time of sleep stating she had a lot of difficulty last night resting  Support given   Patient

## 2019-01-04 NOTE — PROGRESS NOTES
Pt attended positive coping skills group  Pt was alert and engaged in group dialogue  Pt was able to provide examples of positive coping skills she utilized  Pt optimistic and hopeful in presentation  Pt provided positive feedback to other peers  Continue to provide therapeutic group support

## 2019-01-04 NOTE — PLAN OF CARE
Problem: DISCHARGE PLANNING  Goal: Discharge to home or other facility with appropriate resources  INTERVENTIONS:  - Identify barriers to discharge w/patient and caregiver  - Deny SI and depression  Symptoms will resolve or diminish upon discharge  - Comply with meds, attend 75% of group therapy, identify positive coping skills  - Arrange for needed discharge resources and transportation as appropriate  - Identify discharge learning needs (meds, wound care, outpatient mental health services)  - Arrange for interpretive services to assist at discharge as needed  - Refer to Case Management Department for coordinating discharge planning if the patient needs post-hospital services based on physician/advanced practitioner order or complex needs related to functional status, cognitive ability, or social support system   Outcome: Progressing  Pt rescinded 72 hour notice today  Worker will notify pt's father
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
## 2019-01-04 NOTE — PROGRESS NOTES
Progress Note - Fani Iqbal 1990, 25 y o  female MRN: [id number]    Unit/Bed#: [facility] 381-02 Encounter: [id number]    Primary Care Provider: Zarina Dye MD   Date and time admitted to hospital: 2019  7:51 PM        Eczema of lower leg   Assessment & Plan    Patient reported to have dryness of the left thigh and itching added and a new rash was noticed today  Patient appears to have an exam more it eruption on the left thigh with some superimposed fungal component to it  Will place on triamcinolone nystatin cream   Apply topically for 5-7 days  Also noticed to have and a similar area started on the suprapubic region  Also apply the same cream there as well  VTE Pharmacologic Prophylaxis:   Pharmacologic: Pharmacologic VTE Prophylaxis contraindicated due to Low risk  Mechanical VTE Prophylaxis in Place: No    Patient Centered Rounds: I have performed bedside rounds with nursing staff today  Discussions with Specialists or Other Care Team Provider:  None    Education and Discussions with Family / Patient:  Discussed with the patient at bedside about her rash    Time Spent for Care: 15 minutes  More than 50% of total time spent on counseling and coordination of care as described above  Current Length of Stay: 2 day(s)    Current Patient Status: Inpatient Psych   Certification Statement: The patient will continue to require additional inpatient hospital stay due to Psych reasons    Discharge Plan:  As per psych    Code Status: Level 1 - Full Code      Subjective:   Patient complains of dryness and itching of her left thigh and also of her lower abdomen which started 2 days ago and is getting worse  She noticed a rash there and showed it to nursing staff    Denies any fever or chills or pain    Objective:     Vitals:   Temp (24hrs), Av 9 °F (36 6 °C), Min:97 8 °F (36 6 °C), Max:98 °F (36 7 °C)    Temp:  [97 8 °F (36 6 °C)-98 °F (36 7 °C)] 98 °F (36 7 °C)  HR:  [] 86  Resp: [16-20] 20  BP: (119-150)/(76-82) 126/82  Body mass index is 55 75 kg/m²  Input and Output Summary (last 24 hours):     No intake or output data in the 24 hours ending 01/04/19 1633    Physical Exam:     Physical Exam   Constitutional: She is oriented to person, place, and time  She appears well-developed  HENT:   Head: Normocephalic and atraumatic  Eyes: Pupils are equal, round, and reactive to light  Neck: Normal range of motion  Neck supple  Cardiovascular: Normal rate and regular rhythm  Pulmonary/Chest: Effort normal and breath sounds normal  No respiratory distress  She has no wheezes  Abdominal: Soft  Bowel sounds are normal  She exhibits no distension  There is no tenderness  Musculoskeletal: Normal range of motion  Neurological: She is alert and oriented to person, place, and time  Skin:   Dry scaly skin on the left thigh with mild erythema and flakiness noted  This will improved relief leaky scaly skin with slight erythema noted on the lower abdomen as well         Additional Data:     Labs:      Results from last 7 days  Lab Units 01/03/19  0500   WBC Thousand/uL 11 30*   HEMOGLOBIN g/dL 9 4*   HEMATOCRIT % 30 5*   PLATELETS Thousands/uL 360   NEUTROS PCT % 68*   LYMPHS PCT % 22*   MONOS PCT % 8   EOS PCT % 1       Results from last 7 days  Lab Units 01/03/19  0500   SODIUM mmol/L 139   POTASSIUM mmol/L 4 1   CHLORIDE mmol/L 108   CO2 mmol/L 24   BUN mg/dL 23   CREATININE mg/dL 1 18   ANION GAP mmol/L 7   CALCIUM mg/dL 9 1   ALBUMIN g/dL 3 8   TOTAL BILIRUBIN mg/dL 0 20   ALK PHOS U/L 82   ALT U/L 20   AST U/L 14   GLUCOSE RANDOM mg/dL 94                           * I Have Reviewed All Lab Data Listed Above    * Additional Pertinent Lab Tests Reviewed: No New Labs Available For Today    Imaging:    Imaging Reports Reviewed Today Include: none  Imaging Personally Reviewed by Myself Includes:  none    Recent Cultures (last 7 days):           Last 24 Hours Medication List:     Current Facility-Administered Medications:  acetaminophen 650 mg Oral Q6H PRN Blanca Mcmanus, PA-HERNAN   acetaminophen 650 mg Oral Q4H PRN Blanca Mcmanus, PA-HERNAN   acetaminophen 975 mg Oral Q6H PRN Blanca Mcmanus, PA-HERNAN   benztropine 1 mg Oral Q4H PRN Blanca Mcmanus, PA-HERNAN   diphenhydrAMINE 50 mg Intramuscular Q4H PRN Blanca Mcmanus, MARY JO   FLUoxetine 40 mg Oral Daily Blanca Mcmanus, MARY JO   hydrOXYzine HCL 50 mg Oral Q4H PRN Blanca Mcmanus, MARY JO   lacosamide 200 mg Oral Q12H [facility] HOME Blanca Mcmanus, MARY JO   nystatin-triamcinolone  Topical BID Chantale Manzo MD   OLANZapine 5 mg Intramuscular Q4H PRN Blanca Mcmanus, MARY JO   OLANZapine 5 mg Oral Q4H PRN Blanca Mcmanus, MARY JO   topiramate 100 mg Oral Q12H [facility] HOME Blanca Mcmanus, MARY JO   traZODone 100 mg Oral HS PRN Blanca Mcmanus, MARY JO        Today, Patient Was Seen By: Chantale Manzo MD    ** Please Note: Dictation voice to text software may have been used in the creation of this document   **
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
## 2019-01-04 NOTE — ASSESSMENT & PLAN NOTE
Patient reported to have dryness of the left thigh and itching added and a new rash was noticed today  Patient appears to have an exam more it eruption on the left thigh with some superimposed fungal component to it  Will place on triamcinolone nystatin cream   Apply topically for 5-7 days  Also noticed to have and a similar area started on the suprapubic region  Also apply the same cream there as well

## 2019-01-04 NOTE — PLAN OF CARE
Problem: DISCHARGE PLANNING  Goal: Discharge to home or other facility with appropriate resources  INTERVENTIONS:  - Identify barriers to discharge w/patient and caregiver  - Deny SI and depression  Symptoms will resolve or diminish upon discharge  - Comply with meds, attend 75% of group therapy, identify positive coping skills  - Arrange for needed discharge resources and transportation as appropriate  - Identify discharge learning needs (meds, wound care, outpatient mental health services)  - Arrange for interpretive services to assist at discharge as needed  - Refer to Case Management Department for coordinating discharge planning if the patient needs post-hospital services based on physician/advanced practitioner order or complex needs related to functional status, cognitive ability, or social support system   Outcome: Progressing  Pt requested to speak with worker due to confusion about 201 status and believed she was only signing herself in for 72 hours  Worker educated pt on both a 12 and 67 hour notice  Pt verbalized understanding

## 2019-01-04 NOTE — SOCIAL WORK
Pt requested to speak with worker regarding confusion about her 201 status and believed she was signing herself in for 72 hours  Worker educated pt on both 12 and 72 hour notice and discussed the process of rescinding which pt chose to do this morning  Pt verbalized understanding

## 2019-01-04 NOTE — PLAN OF CARE
Problem: Risk for Self Injury/Neglect  Goal: Treatment Goal: Remain safe during length of stay, learn and adopt new coping skills, and be free of self-injurious ideation, impulses and acts at the time of discharge  Outcome: Progressing    Goal: Verbalize thoughts and feelings  Interventions:  - Assess and re-assess patient's lethality and potential for self-injury  - Engage patient in 1:1 interactions, daily, for a minimum of 15 minutes  - Encourage patient to express feelings, fears, frustrations, hopes  - Establish rapport/trust with patient    Outcome: Progressing    Goal: Refrain from harming self  Interventions:  - Monitor patient closely, per order  - Develop a trusting relationship  - Supervise medication ingestion, monitor effects and side effects    Outcome: Progressing    Goal: Attend and participate in unit activities, including therapeutic, recreational, and educational groups  Interventions:  - Provide therapeutic and educational activities daily, encourage attendance and participation, and document same in the medical record  - Obtain collateral information, encourage visitation and family involvement in care    Outcome: Progressing    Goal: Recognize maladaptive responses and adopt new coping mechanisms  Outcome: Progressing    Goal: Complete daily ADLs, including personal hygiene independently, as able  Interventions:  - Observe, teach, and assist patient with ADLS  - Monitor and promote a balance of rest/activity, with adequate nutrition and elimination   Outcome: Progressing

## 2019-01-04 NOTE — PROGRESS NOTES
Pt remains on 1:1 supervision for pt safety  Pt denies current thoughts to self-harm  Pt refused Vimpat and was hyperfocused on having been seen in the past by Dr Lulu Almanzar, and how she should not have been prescribed the Vimpat  Pt with pressured, tangential speech and speaking about how she never leaves her home, although plans to be social on the unit as a "way of meeting new people"  Pt reported, "But I don;t want to stay here forever"  Pt with body odor, although has been showering  Pt denies current SI's, HI's, AH's, VH's  Will continue to monitor and provide therapeutic support
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
## 2019-01-04 NOTE — NURSING NOTE
1:1 close proximity maintained  Patient requested to sleep in the observation room due to her roommate being up  Patient was moved without issue for the night  Patient slept throughout the night with one period of wakefulness noted, which she presented anxious  Patient needed to see her 72 hour notice and requested the copy be in her possession at all times  It was given to her and she retreated back to bed for continued sleep  No other issues noted  Will monitor

## 2019-01-05 PROCEDURE — 99232 SBSQ HOSP IP/OBS MODERATE 35: CPT | Performed by: PHYSICIAN ASSISTANT

## 2019-01-05 RX ORDER — GABAPENTIN 100 MG/1
100 CAPSULE ORAL 2 TIMES DAILY
Status: DISCONTINUED | OUTPATIENT
Start: 2019-01-05 | End: 2019-01-11 | Stop reason: HOSPADM

## 2019-01-05 RX ADMIN — TOPIRAMATE 100 MG: 100 TABLET, FILM COATED ORAL at 08:52

## 2019-01-05 RX ADMIN — TOPIRAMATE 100 MG: 100 TABLET, FILM COATED ORAL at 20:50

## 2019-01-05 RX ADMIN — OLANZAPINE 5 MG: 5 TABLET, FILM COATED ORAL at 13:47

## 2019-01-05 RX ADMIN — LACOSAMIDE 200 MG: 50 TABLET, FILM COATED ORAL at 08:52

## 2019-01-05 RX ADMIN — NYSTATIN AND TRIAMCINOLONE ACETONIDE: 100000; 1 CREAM TOPICAL at 08:52

## 2019-01-05 RX ADMIN — FLUOXETINE 40 MG: 20 CAPSULE ORAL at 08:52

## 2019-01-05 RX ADMIN — TRAZODONE HYDROCHLORIDE 150 MG: 100 TABLET ORAL at 20:49

## 2019-01-05 RX ADMIN — HYDROXYZINE HYDROCHLORIDE 50 MG: 50 TABLET, FILM COATED ORAL at 19:01

## 2019-01-05 RX ADMIN — NYSTATIN AND TRIAMCINOLONE ACETONIDE: 100000; 1 CREAM TOPICAL at 19:00

## 2019-01-05 RX ADMIN — OLANZAPINE 5 MG: 5 TABLET, FILM COATED ORAL at 20:49

## 2019-01-05 RX ADMIN — LACOSAMIDE 200 MG: 50 TABLET, FILM COATED ORAL at 20:49

## 2019-01-05 RX ADMIN — GABAPENTIN 100 MG: 100 CAPSULE ORAL at 20:48

## 2019-01-05 NOTE — PLAN OF CARE
Problem: Risk for Self Injury/Neglect  Goal: Treatment Goal: Remain safe during length of stay, learn and adopt new coping skills, and be free of self-injurious ideation, impulses and acts at the time of discharge  Outcome: Progressing    Goal: Verbalize thoughts and feelings  Interventions:  - Assess and re-assess patient's lethality and potential for self-injury  - Engage patient in 1:1 interactions, daily, for a minimum of 15 minutes  - Encourage patient to express feelings, fears, frustrations, hopes  - Establish rapport/trust with patient    Outcome: Progressing    Goal: Refrain from harming self  Interventions:  - Monitor patient closely, per order  - Develop a trusting relationship  - Supervise medication ingestion, monitor effects and side effects    Outcome: Progressing    Goal: Attend and participate in unit activities, including therapeutic, recreational, and educational groups  Interventions:  - Provide therapeutic and educational activities daily, encourage attendance and participation, and document same in the medical record  - Obtain collateral information, encourage visitation and family involvement in care    Outcome: Not Progressing    Goal: Recognize maladaptive responses and adopt new coping mechanisms  Outcome: Not Progressing    Goal: Complete daily ADLs, including personal hygiene independently, as able  Interventions:  - Observe, teach, and assist patient with ADLS  - Monitor and promote a balance of rest/activity, with adequate nutrition and elimination   Outcome: Progressing      Comments: Pt continuing to refuse abdominal binder  Still presents as tearful at times  States she is expecting her family to visit this afternoon and is happy about that  Will monitor

## 2019-01-05 NOTE — PROGRESS NOTES
Pt appears confused and slightly disorganized  Approached to change dressing on abdominal wound and straighten abdominal binder  Dressing was found on the floor and the binder was not covering her wound  Pt stated, "I don't need to stay here  It's ok  I got my period the other day and it was squirting out, but it's fine now  I don't need to stay here for this " Pt encouraged to allow nurse to fix the placement of the binder and reapply the dressing  Pt's wound visualized, covered with dressing and abdominal binder reapplied  Wound appears to be healing well  Well approaximated  Some scant, dried, sanguinous drainage noted on binder and bandage  Pt began talking about discharge and making an appt with Dr Sally Arriola on Tuesday  Pt appears to have no insight into rationale for admission  She states she is here for the healing of the wound and states, "It's fine  I should go now so I can see doctor Sally Arriola on Tuesday " Pt continued to be fixated on discharge  Refused teaching on process of discharge and stated, "I need to make a phone call " She walked off with her 1:1  Will monitor

## 2019-01-05 NOTE — PROGRESS NOTES
Pt heard on the phone rambling to what appears to be her father  She is speaking rapidly and making negative comments about her care  She appears delusional in her thought process or like she may be manipulating the person on the other end of the line  Pt stating, "I need to sign myself out  There is something up here " "They are forcing [employer]ism on us " Pt tore off name band earlier in the shift and was given new name/fall bands  Pt stated to the person on the line, "You will never believe this! They took the name band I threw in the toilet and put it back on my arm!" "They are giving me cups of pills!" Pt continued to get upset with the person on the other end of the line  She became tearful and started yelling  She then slammed the phone down and yelled, "I can't take it!" Pt deescalated quickly and called the person back to apologize   She is currently calm and walking around the hallway with her 1:1

## 2019-01-05 NOTE — PROGRESS NOTES
Pt appeared agitated, crying and talking to 1:1  She is still fixated on discharge  She continues to ramble about   [facility] and needing to leave with her family tonight so she can make an appt on Tuesday  Encouraged to speak with social work on Monday to see if they can help schedule an appt  Pt continued to be upset and lacks insight into why she is here  Pt must be retold same information multiple times, but states, "I don't get it " Pt advised that she will need to wait to meet with provider and social work on Monday  Encouraged to utilize coping techniques and medications as necessary as pt appears tearful and anxious multiple times today  She states, "I'm not supposed to take drugs " No PRN provided  Will monitor

## 2019-01-05 NOTE — PROGRESS NOTES
Pt appears flat and depressed  Brighten upon approach  Compliant with meals and medications  Currently denying AH,VH,HI,SI, depression and anxiety  Pt states, "I am feeling better " Cream applied to L leg  Denies pain  Area red and dry  No drainage noted  Will monitor
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
## 2019-01-05 NOTE — PROGRESS NOTES
Pt removed abdominal binder  States, "I don't need it " Encouraged to allow staff to put it back on  Educated on purpose of binder  Pt refused  Will monitor

## 2019-01-05 NOTE — NURSING NOTE
Patient having a very hard time falling asleep  Awake in the observation room attempting to listen to staff and pacing the room with her lights on  Patient  Tossing and turning  Patient believes she hears staff talking about her and sounds a little confused when she tries to explain herself  She started to ramble off birthdays of her family members and started calling off the phone number to her pharnacy inquiring that staff call before midnight  Patient more concerned about what is happening at the nurses station rather than falling asleep  Patient fighting herself not to fall asleep

## 2019-01-06 LAB
BACTERIA UR QL AUTO: ABNORMAL /HPF
BASOPHILS # BLD AUTO: 0.1 THOUSANDS/ΜL (ref 0–0.1)
BASOPHILS NFR BLD AUTO: 1 % (ref 0–1)
BILIRUB UR QL STRIP: NEGATIVE
CLARITY UR: ABNORMAL
COLOR UR: ABNORMAL
EOSINOPHIL # BLD AUTO: 0.2 THOUSAND/ΜL (ref 0–0.4)
EOSINOPHIL NFR BLD AUTO: 2 % (ref 0–6)
ERYTHROCYTE [DISTWIDTH] IN BLOOD BY AUTOMATED COUNT: 16.4 %
GLUCOSE SERPL-MCNC: 86 MG/DL (ref 70–99)
GLUCOSE UR STRIP-MCNC: NEGATIVE MG/DL
HCT VFR BLD AUTO: 25.9 % (ref 36–46)
HGB BLD-MCNC: 8.3 G/DL (ref 12–16)
HGB UR QL STRIP.AUTO: 10
KETONES UR STRIP-MCNC: NEGATIVE MG/DL
LEUKOCYTE ESTERASE UR QL STRIP: 25
LYMPHOCYTES # BLD AUTO: 1.9 THOUSANDS/ΜL (ref 0.5–4)
LYMPHOCYTES NFR BLD AUTO: 22 % (ref 25–45)
MCH RBC QN AUTO: 25.8 PG (ref 26–34)
MCHC RBC AUTO-ENTMCNC: 32.1 G/DL (ref 31–36)
MCV RBC AUTO: 80 FL (ref 80–100)
MONOCYTES # BLD AUTO: 0.7 THOUSAND/ΜL (ref 0.2–0.9)
MONOCYTES NFR BLD AUTO: 8 % (ref 1–10)
NEUTROPHILS # BLD AUTO: 5.6 THOUSANDS/ΜL (ref 1.8–7.8)
NEUTS SEG NFR BLD AUTO: 66 % (ref 45–65)
NITRITE UR QL STRIP: NEGATIVE
NON-SQ EPI CELLS URNS QL MICRO: ABNORMAL /HPF
PH UR STRIP.AUTO: 8 [PH] (ref 4.5–8)
PLATELET # BLD AUTO: 382 THOUSANDS/UL (ref 150–450)
PMV BLD AUTO: 8.3 FL (ref 8.9–12.7)
PROT UR STRIP-MCNC: ABNORMAL MG/DL
RBC # BLD AUTO: 3.23 MILLION/UL (ref 4–5.2)
RBC #/AREA URNS AUTO: ABNORMAL /HPF
SP GR UR STRIP.AUTO: 1.01 (ref 1–1.04)
UROBILINOGEN UA: NEGATIVE MG/DL
WBC # BLD AUTO: 8.4 THOUSAND/UL (ref 4.5–11)
WBC #/AREA URNS AUTO: ABNORMAL /HPF

## 2019-01-06 PROCEDURE — 86592 SYPHILIS TEST NON-TREP QUAL: CPT | Performed by: PHYSICIAN ASSISTANT

## 2019-01-06 PROCEDURE — 85025 COMPLETE CBC W/AUTO DIFF WBC: CPT | Performed by: PHYSICIAN ASSISTANT

## 2019-01-06 PROCEDURE — 99232 SBSQ HOSP IP/OBS MODERATE 35: CPT | Performed by: PHYSICIAN ASSISTANT

## 2019-01-06 PROCEDURE — 82948 REAGENT STRIP/BLOOD GLUCOSE: CPT

## 2019-01-06 PROCEDURE — 81001 URINALYSIS AUTO W/SCOPE: CPT | Performed by: PHYSICIAN ASSISTANT

## 2019-01-06 RX ORDER — ARIPIPRAZOLE 2 MG/1
2 TABLET ORAL
Status: DISCONTINUED | OUTPATIENT
Start: 2019-01-06 | End: 2019-01-07

## 2019-01-06 RX ADMIN — TOPIRAMATE 100 MG: 100 TABLET, FILM COATED ORAL at 08:43

## 2019-01-06 RX ADMIN — TRAZODONE HYDROCHLORIDE 150 MG: 100 TABLET ORAL at 21:04

## 2019-01-06 RX ADMIN — NYSTATIN AND TRIAMCINOLONE ACETONIDE: 100000; 1 CREAM TOPICAL at 08:44

## 2019-01-06 RX ADMIN — GABAPENTIN 100 MG: 100 CAPSULE ORAL at 08:44

## 2019-01-06 RX ADMIN — GABAPENTIN 100 MG: 100 CAPSULE ORAL at 17:12

## 2019-01-06 RX ADMIN — LACOSAMIDE 200 MG: 50 TABLET, FILM COATED ORAL at 21:04

## 2019-01-06 RX ADMIN — TOPIRAMATE 100 MG: 100 TABLET, FILM COATED ORAL at 21:04

## 2019-01-06 RX ADMIN — OLANZAPINE 5 MG: 5 TABLET, FILM COATED ORAL at 21:05

## 2019-01-06 RX ADMIN — ACETAMINOPHEN 650 MG: 325 TABLET ORAL at 11:59

## 2019-01-06 RX ADMIN — LACOSAMIDE 200 MG: 50 TABLET, FILM COATED ORAL at 08:43

## 2019-01-06 RX ADMIN — FLUOXETINE 40 MG: 20 CAPSULE ORAL at 08:43

## 2019-01-06 RX ADMIN — ARIPIPRAZOLE 2 MG: 2 TABLET ORAL at 21:03

## 2019-01-06 RX ADMIN — NYSTATIN AND TRIAMCINOLONE ACETONIDE: 100000; 1 CREAM TOPICAL at 17:12

## 2019-01-06 NOTE — PLAN OF CARE
Problem: Risk for Self Injury/Neglect  Goal: Treatment Goal: Remain safe during length of stay, learn and adopt new coping skills, and be free of self-injurious ideation, impulses and acts at the time of discharge  Outcome: Progressing    Goal: Verbalize thoughts and feelings  Interventions:  - Assess and re-assess patient's lethality and potential for self-injury  - Engage patient in 1:1 interactions, daily, for a minimum of 15 minutes  - Encourage patient to express feelings, fears, frustrations, hopes  - Establish rapport/trust with patient    Outcome: Progressing    Goal: Refrain from harming self  Interventions:  - Monitor patient closely, per order  - Develop a trusting relationship  - Supervise medication ingestion, monitor effects and side effects    Outcome: Progressing    Goal: Attend and participate in unit activities, including therapeutic, recreational, and educational groups  Interventions:  - Provide therapeutic and educational activities daily, encourage attendance and participation, and document same in the medical record  - Obtain collateral information, encourage visitation and family involvement in care    Outcome: Progressing    Goal: Recognize maladaptive responses and adopt new coping mechanisms  Outcome: Progressing    Goal: Complete daily ADLs, including personal hygiene independently, as able  Interventions:  - Observe, teach, and assist patient with ADLS  - Monitor and promote a balance of rest/activity, with adequate nutrition and elimination   Outcome: Progressing      Comments: Continues on 1:1  Pt appears brighter today  No tearfulness so far this shift  She is more active and social on the unit  Attended group today  Had an episode of irritability when she became upset about the rules on the unit  She was able to be redirected and stated, "I understand, it's just stupid " Will monitor
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
## 2019-01-06 NOTE — PROGRESS NOTES
Pt presents with blunted affect  States, "I'm feeling better today " Denies HI,SI,AH,VH, depression and anxiety  She is currently on a 1:1  Medication and meal compliant  Will monitor

## 2019-01-06 NOTE — PROGRESS NOTES
Spoke to Dr Sahara Jesus, regarding pt's current CBC  She will add a CBC for tomorrow and requests follow up with tomorrow's results

## 2019-01-06 NOTE — PROGRESS NOTES
Pt's suture site appears to have scant sanguinous drainage on the bandage  Bandage had fallen off  Are cleaned and bandage reapplied  Abdominal wrap reapplied  She denies pain at the site  No redness, pus or other signs of infection

## 2019-01-06 NOTE — NURSING NOTE
New order of Gabapentin 100 mg as well  Trazadone 150 mg and Zyprexa 5 mg was given at 2049 was effective  No other outbursts and no other crying episodes noted after taking her medications  Slept well all night  Medication complaint  Reports no issues related to sleep  Patient in bed at this time and appears to be sleeping  Eyes closed and chest movements noted  Offered no complaints  Patient did not wake up to request any PRN medications during the night

## 2019-01-07 ENCOUNTER — APPOINTMENT (INPATIENT)
Dept: CT IMAGING | Facility: HOSPITAL | Age: 29
DRG: 885 | End: 2019-01-07
Payer: COMMERCIAL

## 2019-01-07 PROBLEM — K59.09 OTHER CONSTIPATION: Status: ACTIVE | Noted: 2019-01-07

## 2019-01-07 LAB
BASOPHILS # BLD AUTO: 0.1 THOUSANDS/ΜL (ref 0–0.1)
BASOPHILS NFR BLD AUTO: 1 % (ref 0–1)
EOSINOPHIL # BLD AUTO: 0.2 THOUSAND/ΜL (ref 0–0.4)
EOSINOPHIL NFR BLD AUTO: 2 % (ref 0–6)
ERYTHROCYTE [DISTWIDTH] IN BLOOD BY AUTOMATED COUNT: 16.2 %
ERYTHROCYTE [DISTWIDTH] IN BLOOD BY AUTOMATED COUNT: 16.4 %
FERRITIN SERPL-MCNC: 22 NG/ML (ref 8–388)
HCT VFR BLD AUTO: 28.7 % (ref 36–46)
HCT VFR BLD AUTO: 29.4 % (ref 36–46)
HGB BLD-MCNC: 9 G/DL (ref 12–16)
HGB BLD-MCNC: 9.2 G/DL (ref 12–16)
IRON SATN MFR SERPL: 5 %
IRON SERPL-MCNC: 23 UG/DL (ref 50–170)
LYMPHOCYTES # BLD AUTO: 2.3 THOUSANDS/ΜL (ref 0.5–4)
LYMPHOCYTES NFR BLD AUTO: 20 % (ref 25–45)
MCH RBC QN AUTO: 25.2 PG (ref 26–34)
MCH RBC QN AUTO: 25.2 PG (ref 26–34)
MCHC RBC AUTO-ENTMCNC: 31.2 G/DL (ref 31–36)
MCHC RBC AUTO-ENTMCNC: 31.3 G/DL (ref 31–36)
MCV RBC AUTO: 80 FL (ref 80–100)
MCV RBC AUTO: 81 FL (ref 80–100)
MONOCYTES # BLD AUTO: 0.8 THOUSAND/ΜL (ref 0.2–0.9)
MONOCYTES NFR BLD AUTO: 7 % (ref 1–10)
NEUTROPHILS # BLD AUTO: 7.7 THOUSANDS/ΜL (ref 1.8–7.8)
NEUTS SEG NFR BLD AUTO: 70 % (ref 45–65)
PLATELET # BLD AUTO: 466 THOUSANDS/UL (ref 150–450)
PLATELET # BLD AUTO: 481 THOUSANDS/UL (ref 150–450)
PMV BLD AUTO: 8.6 FL (ref 8.9–12.7)
PMV BLD AUTO: 8.6 FL (ref 8.9–12.7)
RBC # BLD AUTO: 3.57 MILLION/UL (ref 4–5.2)
RBC # BLD AUTO: 3.64 MILLION/UL (ref 4–5.2)
RPR SER QL: NORMAL
TIBC SERPL-MCNC: 442 UG/DL (ref 250–450)
WBC # BLD AUTO: 11.1 THOUSAND/UL (ref 4.5–11)
WBC # BLD AUTO: 9 THOUSAND/UL (ref 4.5–11)

## 2019-01-07 PROCEDURE — 99232 SBSQ HOSP IP/OBS MODERATE 35: CPT | Performed by: NURSE PRACTITIONER

## 2019-01-07 PROCEDURE — 83540 ASSAY OF IRON: CPT | Performed by: NURSE PRACTITIONER

## 2019-01-07 PROCEDURE — 82728 ASSAY OF FERRITIN: CPT | Performed by: NURSE PRACTITIONER

## 2019-01-07 PROCEDURE — 85027 COMPLETE CBC AUTOMATED: CPT | Performed by: FAMILY MEDICINE

## 2019-01-07 PROCEDURE — 85025 COMPLETE CBC W/AUTO DIFF WBC: CPT | Performed by: NURSE PRACTITIONER

## 2019-01-07 PROCEDURE — 83550 IRON BINDING TEST: CPT | Performed by: NURSE PRACTITIONER

## 2019-01-07 PROCEDURE — 74177 CT ABD & PELVIS W/CONTRAST: CPT

## 2019-01-07 RX ORDER — ARIPIPRAZOLE 5 MG/1
5 TABLET ORAL
Status: DISCONTINUED | OUTPATIENT
Start: 2019-01-07 | End: 2019-01-08

## 2019-01-07 RX ORDER — AMOXICILLIN 250 MG
1 CAPSULE ORAL 2 TIMES DAILY
Status: DISCONTINUED | OUTPATIENT
Start: 2019-01-07 | End: 2019-01-11 | Stop reason: HOSPADM

## 2019-01-07 RX ORDER — TRAMADOL HYDROCHLORIDE 50 MG/1
50 TABLET ORAL EVERY 6 HOURS PRN
Status: DISCONTINUED | OUTPATIENT
Start: 2019-01-07 | End: 2019-01-11 | Stop reason: HOSPADM

## 2019-01-07 RX ADMIN — GABAPENTIN 100 MG: 100 CAPSULE ORAL at 08:00

## 2019-01-07 RX ADMIN — FLUOXETINE 40 MG: 20 CAPSULE ORAL at 08:00

## 2019-01-07 RX ADMIN — HYDROXYZINE HYDROCHLORIDE 50 MG: 50 TABLET, FILM COATED ORAL at 17:00

## 2019-01-07 RX ADMIN — LACOSAMIDE 200 MG: 50 TABLET, FILM COATED ORAL at 08:00

## 2019-01-07 RX ADMIN — NYSTATIN AND TRIAMCINOLONE ACETONIDE: 100000; 1 CREAM TOPICAL at 17:00

## 2019-01-07 RX ADMIN — NYSTATIN AND TRIAMCINOLONE ACETONIDE: 100000; 1 CREAM TOPICAL at 08:01

## 2019-01-07 RX ADMIN — SENNOSIDES AND DOCUSATE SODIUM 1 TABLET: 8.6; 5 TABLET ORAL at 17:00

## 2019-01-07 RX ADMIN — TOPIRAMATE 100 MG: 100 TABLET, FILM COATED ORAL at 21:04

## 2019-01-07 RX ADMIN — OLANZAPINE 5 MG: 5 TABLET, FILM COATED ORAL at 15:03

## 2019-01-07 RX ADMIN — IOHEXOL 100 ML: 350 INJECTION, SOLUTION INTRAVENOUS at 11:38

## 2019-01-07 RX ADMIN — ARIPIPRAZOLE 5 MG: 5 TABLET ORAL at 21:04

## 2019-01-07 RX ADMIN — HYDROXYZINE HYDROCHLORIDE 50 MG: 50 TABLET, FILM COATED ORAL at 21:05

## 2019-01-07 RX ADMIN — LACOSAMIDE 200 MG: 50 TABLET, FILM COATED ORAL at 21:04

## 2019-01-07 RX ADMIN — TOPIRAMATE 100 MG: 100 TABLET, FILM COATED ORAL at 08:00

## 2019-01-07 RX ADMIN — GABAPENTIN 100 MG: 100 CAPSULE ORAL at 17:00

## 2019-01-07 NOTE — NURSING NOTE
Behaviors controlled and appropriate all evening  No issues with behaviors and no crying episodes all shift  Patient will have her bloodwork done on next shift due to the fact she had new blood work added by [facility]  Patient on a 1:1 all shift and behaved appropriately  Dressing changed and dry and intact  Minimal serous sanguinous drainage noted  Medication complaint  Reports no issues related to sleep  Patient in bed at this time and appears to be sleeping  Eyes closed and chest movements noted  Offered no complaints  Patient did not wake up to request any PRN medications during the night  Trazadone 150 mg as well as Zyprexa 5 mg together were effective

## 2019-01-07 NOTE — ASSESSMENT & PLAN NOTE
Patient reported to have dryness of the left thigh and itching  She has circular rash on left thigh, appears like eczema with possible superimposed fungal infections  Continue triamcinolone nystatin cream for 5-7 days    Also noted to have and a similar area started on the suprapubic region, where she is applying the same cream

## 2019-01-07 NOTE — PLAN OF CARE
Risk for Self Injury/Neglect     Treatment Goal: Remain safe during length of stay, learn and adopt new coping skills, and be free of self-injurious ideation, impulses and acts at the time of discharge Progressing     Verbalize thoughts and feelings Progressing     Refrain from harming self Progressing     Attend and participate in unit activities, including therapeutic, recreational, and educational groups Progressing     Recognize maladaptive responses and adopt new coping mechanisms Progressing     Complete daily ADLs, including personal hygiene independently, as able Progressing        Patient continues being visible on the unit  Patient continues on 1:1 observation for suicide precautions  She has been compliant with meds  She ambulates in the hallway  She interacts with peers but minimal interactions  She is waiting on CT scan due to dropping hemoglobin  Staff will continue to monitor

## 2019-01-07 NOTE — PROGRESS NOTES
Patient has remained visible this afternoon  She does not actively interact with her peers  She remains on 1:1 observation for her safety  Wound care provided by changing drsing on abdominal laceration  Min serosang  Draining noted  Patient was med/meal compliant  Requesting prn for anxiety, she was crying/sobbing in her room just prior  Sobbing stopped as soon as prn provided  Patient presents in cooperative mood  Patient is fixated on Dr Dejuan Sidhu, despite not seeing him for over a year, and not taking any of her meds for over a year  She is questioning discharge date so she can go see him outpatient  Staff will continue to monitor for safety and well being
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
## 2019-01-08 LAB
BASOPHILS # BLD AUTO: 0.1 THOUSANDS/ΜL (ref 0–0.1)
BASOPHILS NFR BLD AUTO: 1 % (ref 0–1)
EOSINOPHIL # BLD AUTO: 0.2 THOUSAND/ΜL (ref 0–0.4)
EOSINOPHIL NFR BLD AUTO: 2 % (ref 0–6)
ERYTHROCYTE [DISTWIDTH] IN BLOOD BY AUTOMATED COUNT: 16 %
HCT VFR BLD AUTO: 27.9 % (ref 36–46)
HEMOCCULT STL QL: NEGATIVE
HGB BLD-MCNC: 8.7 G/DL (ref 12–16)
LYMPHOCYTES # BLD AUTO: 2 THOUSANDS/ΜL (ref 0.5–4)
LYMPHOCYTES NFR BLD AUTO: 23 % (ref 25–45)
MCH RBC QN AUTO: 25.1 PG (ref 26–34)
MCHC RBC AUTO-ENTMCNC: 31 G/DL (ref 31–36)
MCV RBC AUTO: 81 FL (ref 80–100)
MONOCYTES # BLD AUTO: 0.7 THOUSAND/ΜL (ref 0.2–0.9)
MONOCYTES NFR BLD AUTO: 8 % (ref 1–10)
NEUTROPHILS # BLD AUTO: 5.6 THOUSANDS/ΜL (ref 1.8–7.8)
NEUTS SEG NFR BLD AUTO: 65 % (ref 45–65)
PLATELET # BLD AUTO: 450 THOUSANDS/UL (ref 150–450)
PMV BLD AUTO: 8.6 FL (ref 8.9–12.7)
RBC # BLD AUTO: 3.45 MILLION/UL (ref 4–5.2)
WBC # BLD AUTO: 8.6 THOUSAND/UL (ref 4.5–11)

## 2019-01-08 PROCEDURE — 82272 OCCULT BLD FECES 1-3 TESTS: CPT | Performed by: NURSE PRACTITIONER

## 2019-01-08 PROCEDURE — 85025 COMPLETE CBC W/AUTO DIFF WBC: CPT | Performed by: NURSE PRACTITIONER

## 2019-01-08 RX ORDER — ARIPIPRAZOLE 10 MG/1
10 TABLET ORAL
Status: DISCONTINUED | OUTPATIENT
Start: 2019-01-08 | End: 2019-01-11 | Stop reason: HOSPADM

## 2019-01-08 RX ADMIN — ACETAMINOPHEN 650 MG: 325 TABLET ORAL at 17:57

## 2019-01-08 RX ADMIN — TOPIRAMATE 100 MG: 100 TABLET, FILM COATED ORAL at 08:15

## 2019-01-08 RX ADMIN — SENNOSIDES AND DOCUSATE SODIUM 1 TABLET: 8.6; 5 TABLET ORAL at 17:56

## 2019-01-08 RX ADMIN — NYSTATIN AND TRIAMCINOLONE ACETONIDE 1 APPLICATION: 100000; 1 CREAM TOPICAL at 17:58

## 2019-01-08 RX ADMIN — GABAPENTIN 100 MG: 100 CAPSULE ORAL at 08:15

## 2019-01-08 RX ADMIN — TRAZODONE HYDROCHLORIDE 150 MG: 100 TABLET ORAL at 21:09

## 2019-01-08 RX ADMIN — GABAPENTIN 100 MG: 100 CAPSULE ORAL at 17:56

## 2019-01-08 RX ADMIN — ACETAMINOPHEN 650 MG: 325 TABLET ORAL at 16:17

## 2019-01-08 RX ADMIN — SENNOSIDES AND DOCUSATE SODIUM 1 TABLET: 8.6; 5 TABLET ORAL at 08:15

## 2019-01-08 RX ADMIN — HYDROXYZINE HYDROCHLORIDE 50 MG: 50 TABLET, FILM COATED ORAL at 21:09

## 2019-01-08 RX ADMIN — NYSTATIN AND TRIAMCINOLONE ACETONIDE: 100000; 1 CREAM TOPICAL at 08:15

## 2019-01-08 RX ADMIN — FLUOXETINE 40 MG: 20 CAPSULE ORAL at 08:15

## 2019-01-08 RX ADMIN — LACOSAMIDE 200 MG: 50 TABLET, FILM COATED ORAL at 08:14

## 2019-01-08 RX ADMIN — TOPIRAMATE 100 MG: 100 TABLET, FILM COATED ORAL at 21:09

## 2019-01-08 RX ADMIN — ARIPIPRAZOLE 10 MG: 10 TABLET ORAL at 21:09

## 2019-01-08 RX ADMIN — LACOSAMIDE 200 MG: 50 TABLET, FILM COATED ORAL at 21:09

## 2019-01-08 NOTE — PLAN OF CARE
Risk for Self Injury/Neglect     Treatment Goal: Remain safe during length of stay, learn and adopt new coping skills, and be free of self-injurious ideation, impulses and acts at the time of discharge Progressing     Verbalize thoughts and feelings Progressing     Refrain from harming self Progressing     Attend and participate in unit activities, including therapeutic, recreational, and educational groups Progressing     Recognize maladaptive responses and adopt new coping mechanisms Progressing     Complete daily ADLs, including personal hygiene independently, as able Progressing          Pt is bright and pleasant on the unit  Social with staff and peers  Slight sangiunous drainage noted on ABD pad since last checked  Pt is attending groups  Has been moved back to her room  Appears to be tolerating the move and being taken off 1:1 well  Will monitor

## 2019-01-08 NOTE — PROGRESS NOTES
Patient received on seizure and suicide precautions, and 1:1 continual observation close proximity at 1900, and was pleasant upon approach  Patient was cooperative with care, and medication compliant  AAOx4  Patient was visible in milieu, as she was observed ambulating with her 1:1 around milieu, but socialization with peers was minimal, if any at all  Interactions with other patients and staff were appropriate  Patient asked if her dressing could be changed as it looked like some fluids were seeping into it  Small amount of serosanguinous fluid was present on dressing, and wound looked intact with no redness, and dressing was changed  Patient denied SI, HI, A/V hallucinations, and pain  Patient reported 4/4 anxiety and 5/10 depression  No negative behaviors or distress noted/observed, and patient was encouraged to attend groups tomorrow  No PRN's were requested  Seizure and suicide precautions, and q15 minute rounding, were continued for patient safety  Will continue to monitor and offer therapeutic support

## 2019-01-08 NOTE — PROGRESS NOTES
Sanguineous drainage noted at abdominal site  Dr Jada Espinosa assessed pt and removed stitches this morning  He packed wound and covered with bandages  Stated to change bandage with drainage  ABD pad applied  Will monitor
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
## 2019-01-08 NOTE — PROGRESS NOTES
01/08/19 1345   Activity/Group Checklist   Group Other (Comment)  (Art Therapy Process Group/Visual Introduction, Discussion)   Attendance Attended   Attendance Duration (min) Greater than 60   Interactions Interacted appropriately   Affect/Mood Appropriate   Goals Achieved Able to listen to others; Able to engage in interactions; Able to recieve feedback; Able to give feedback to another  (Able to engage art materials)     Patient process lacked full focus and personal investment  Artwork reflected avoidance of self and internal connections; questionable grounding with some disorganization  Patient able to share image and fully participate in discussion  Offered little to no insight, besides sharing how she injured herself

## 2019-01-08 NOTE — PROGRESS NOTES
Patient continued on seizure and suicide precautions, and on q15 rounding for patient safety  Patient slept through the night and did not awaken to request any PRN's, or report any thoughts of suicide or self harm  Patient was observed sleeping, with eyes closed, chest movements noted, and breathing heard during sleep period  Will continue to monitor and provide therapeutic support

## 2019-01-08 NOTE — PROGRESS NOTES
Pt appears bright upon approach  4/10 depression, 3/4 anxiety, but states anxiety is "off and on " She is medication and meal compliant  Removed from 1:1 this morning  Currently in a group  Will monitor

## 2019-01-08 NOTE — PROGRESS NOTES
Pt attended problem solving group  Pt cooperative and engaged in group dialogue  Pt was excused during group for medical needs  Group focused on problem solving strategies and decision making process  Continue to provide therapeutic group support

## 2019-01-08 NOTE — PLAN OF CARE
Problem: SELF CARE DEFICIT  Goal: Return ADL status to a safe level of function  INTERVENTIONS:  Brandy Rivera MPS, ATR-BC  - Encourage to attend and participate in art therapy once 1:1 is lifted  - Provide means incorporate new coping strategies and self care  - Explore concepts of alternative perspective, awareness  and healthy expression through discussion and exercising properties of art materials and process       Outcome: Progressing  Patient began attending art therapy with full participation

## 2019-01-09 PROBLEM — S30.1XXA ABDOMINAL WALL HEMATOMA: Status: ACTIVE | Noted: 2019-01-02

## 2019-01-09 LAB
ANISOCYTOSIS BLD QL SMEAR: PRESENT
BASOPHILS # BLD AUTO: 0.1 THOUSANDS/ΜL (ref 0–0.1)
BASOPHILS NFR BLD AUTO: 1 % (ref 0–1)
EOSINOPHIL # BLD AUTO: 0.2 THOUSAND/ΜL (ref 0–0.4)
EOSINOPHIL NFR BLD AUTO: 3 % (ref 0–6)
ERYTHROCYTE [DISTWIDTH] IN BLOOD BY AUTOMATED COUNT: 16.7 %
HCT VFR BLD AUTO: 24.7 % (ref 36–46)
HEMOCCULT STL QL: NEGATIVE
HGB BLD-MCNC: 7.8 G/DL (ref 12–16)
HYPERCHROMIA BLD QL SMEAR: PRESENT
LYMPHOCYTES # BLD AUTO: 2.1 THOUSANDS/ΜL (ref 0.5–4)
LYMPHOCYTES NFR BLD AUTO: 27 % (ref 25–45)
MCH RBC QN AUTO: 25.2 PG (ref 26–34)
MCHC RBC AUTO-ENTMCNC: 31.6 G/DL (ref 31–36)
MCV RBC AUTO: 80 FL (ref 80–100)
MICROCYTES BLD QL AUTO: PRESENT
MONOCYTES # BLD AUTO: 0.7 THOUSAND/ΜL (ref 0.2–0.9)
MONOCYTES NFR BLD AUTO: 9 % (ref 1–10)
NEUTROPHILS # BLD AUTO: 4.7 THOUSANDS/ΜL (ref 1.8–7.8)
NEUTS SEG NFR BLD AUTO: 61 % (ref 45–65)
PLATELET # BLD AUTO: 378 THOUSANDS/UL (ref 150–450)
PLATELET BLD QL SMEAR: ADEQUATE
PMV BLD AUTO: 8.4 FL (ref 8.9–12.7)
RBC # BLD AUTO: 3.09 MILLION/UL (ref 4–5.2)
RBC MORPH BLD: NORMAL
WBC # BLD AUTO: 7.7 THOUSAND/UL (ref 4.5–11)

## 2019-01-09 PROCEDURE — 82272 OCCULT BLD FECES 1-3 TESTS: CPT | Performed by: FAMILY MEDICINE

## 2019-01-09 PROCEDURE — 85025 COMPLETE CBC W/AUTO DIFF WBC: CPT | Performed by: NURSE PRACTITIONER

## 2019-01-09 RX ORDER — TRAZODONE HYDROCHLORIDE 100 MG/1
200 TABLET ORAL
Status: DISCONTINUED | OUTPATIENT
Start: 2019-01-09 | End: 2019-01-11 | Stop reason: HOSPADM

## 2019-01-09 RX ORDER — TOPIRAMATE 25 MG/1
50 TABLET ORAL EVERY 12 HOURS SCHEDULED
Status: DISCONTINUED | OUTPATIENT
Start: 2019-01-09 | End: 2019-01-11 | Stop reason: HOSPADM

## 2019-01-09 RX ORDER — OLANZAPINE 10 MG/1
5 INJECTION, POWDER, LYOPHILIZED, FOR SOLUTION INTRAMUSCULAR EVERY 4 HOURS PRN
Status: DISCONTINUED | OUTPATIENT
Start: 2019-01-09 | End: 2019-01-11 | Stop reason: HOSPADM

## 2019-01-09 RX ADMIN — FLUOXETINE 40 MG: 20 CAPSULE ORAL at 08:17

## 2019-01-09 RX ADMIN — LACOSAMIDE 200 MG: 50 TABLET, FILM COATED ORAL at 08:16

## 2019-01-09 RX ADMIN — NYSTATIN AND TRIAMCINOLONE ACETONIDE 1 APPLICATION: 100000; 1 CREAM TOPICAL at 08:19

## 2019-01-09 RX ADMIN — SENNOSIDES AND DOCUSATE SODIUM 1 TABLET: 8.6; 5 TABLET ORAL at 17:31

## 2019-01-09 RX ADMIN — TOPIRAMATE 50 MG: 25 TABLET, FILM COATED ORAL at 08:23

## 2019-01-09 RX ADMIN — GABAPENTIN 100 MG: 100 CAPSULE ORAL at 17:31

## 2019-01-09 RX ADMIN — GABAPENTIN 100 MG: 100 CAPSULE ORAL at 08:17

## 2019-01-09 RX ADMIN — TRAZODONE HYDROCHLORIDE 200 MG: 100 TABLET ORAL at 21:17

## 2019-01-09 RX ADMIN — SENNOSIDES AND DOCUSATE SODIUM 1 TABLET: 8.6; 5 TABLET ORAL at 08:17

## 2019-01-09 RX ADMIN — ARIPIPRAZOLE 10 MG: 10 TABLET ORAL at 21:17

## 2019-01-09 RX ADMIN — LACOSAMIDE 200 MG: 50 TABLET, FILM COATED ORAL at 21:17

## 2019-01-09 RX ADMIN — NYSTATIN AND TRIAMCINOLONE ACETONIDE: 100000; 1 CREAM TOPICAL at 17:29

## 2019-01-09 RX ADMIN — TOPIRAMATE 50 MG: 25 TABLET, FILM COATED ORAL at 21:17

## 2019-01-09 NOTE — PROGRESS NOTES
Abdominal wound continues to drain  Wound is repacked with a 2 x 2  We will explore wound and evacuate hematoma in the operating room this Friday

## 2019-01-09 NOTE — PLAN OF CARE
Problem: DISCHARGE PLANNING  Goal: Discharge to home or other facility with appropriate resources  INTERVENTIONS:  - Identify barriers to discharge w/patient and caregiver  - Deny SI and depression  Symptoms will resolve or diminish upon discharge  - Comply with meds, attend 75% of group therapy, identify positive coping skills  - Arrange for needed discharge resources and transportation as appropriate  - Identify discharge learning needs (meds, wound care, outpatient mental health services)  - Arrange for interpretive services to assist at discharge as needed  - Refer to Case Management Department for coordinating discharge planning if the patient needs post-hospital services based on physician/advanced practitioner order or complex needs related to functional status, cognitive ability, or social support system   Outcome: Progressing  Pt requested to speak with worker  Pt reports she is unsure why she is not being discharged if she was taken off of "suicide watch" (1:1)  Worker explained the doctor would like further stabilization, but be removed from a 1:1 shows positive progress  Pt verbalized understanding  Pt would like a copy of her tx plan upon discharge  Worker will provide

## 2019-01-09 NOTE — PROGRESS NOTES
01/09/19 1000   Activity/Group Checklist   Group Other (Comment)  (Surviving Trauma/Education, Open Discussion)   Attendance Attended   Attendance Duration (min) Greater than 60   Interactions Interacted appropriately   Affect/Mood Appropriate; Constricted   Goals Achieved Identified feelings; Discussed coping strategies; Identified distorted thoughts/beliefs; Able to listen to others; Able to engage in interactions; Able to reflect/comment on own behavior;Able to recieve feedback; Able to give feedback to another

## 2019-01-09 NOTE — SOCIAL WORK
Pt requesting to speak with worker  Pt reports confusion about current tx  Pt reports she does not understand why she is not being discharged if she was taken off of "suicide watch" (1:1)  Worker explained doctor would like to see further stabilization, however being taken off of 1:1 shows positive improvement and progression toward discharge  Pt verbalized understanding  Pt would like copy of tx plan upon discharge  Worker will provide
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
## 2019-01-09 NOTE — NURSING NOTE
Patient visible on unit at start of shift finishing up visit with family  Upon assessment, patient's abdominal wound was draining  ABD dressing changed  Denied any pain  Patient was with brighter affect and mood WNL  Makes good eye contact  Thinking clear and organized  Patient denied any issues from the day  Patient stayed visible on the unit until bedtime  Minimal engagement with peers noted  HS medication compliant and PRN atarax and trazodone given as requested  Will continue to monitor

## 2019-01-09 NOTE — NURSING NOTE
Patient woke at 0300 walking halls and again at 0500 to walk halls  Patient requested her bandage be changed  Completed  No changes in appearance from yesterday @ 2000  Patient stated her sleep medications are not correct, since she had restlessness throughout the night  Behavior remained calm and cooperative with unit routine  No issues noted  Patient currently resting back in bed  Will continue to monitor

## 2019-01-09 NOTE — PLAN OF CARE
DISCHARGE PLANNING     Discharge to home or other facility with appropriate resources Progressing        Risk for Self Injury/Neglect     Treatment Goal: Remain safe during length of stay, learn and adopt new coping skills, and be free of self-injurious ideation, impulses and acts at the time of discharge Progressing     Verbalize thoughts and feelings Progressing     Refrain from harming self Progressing     Attend and participate in unit activities, including therapeutic, recreational, and educational groups Progressing     Recognize maladaptive responses and adopt new coping mechanisms Progressing     Complete daily ADLs, including personal hygiene independently, as able Progressing        SELF CARE DEFICIT     Return ADL status to a safe level of function Progressing
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
## 2019-01-10 ENCOUNTER — ANESTHESIA EVENT (OUTPATIENT)
Dept: PERIOP | Facility: HOSPITAL | Age: 29
End: 2019-01-10

## 2019-01-10 LAB
BASOPHILS # BLD AUTO: 0.1 THOUSANDS/ΜL (ref 0–0.1)
BASOPHILS NFR BLD AUTO: 1 % (ref 0–1)
EOSINOPHIL # BLD AUTO: 0.2 THOUSAND/ΜL (ref 0–0.4)
EOSINOPHIL NFR BLD AUTO: 2 % (ref 0–6)
ERYTHROCYTE [DISTWIDTH] IN BLOOD BY AUTOMATED COUNT: 16.6 %
HCG SERPL QL: NEGATIVE
HCT VFR BLD AUTO: 28.2 % (ref 36–46)
HEMOCCULT STL QL: NEGATIVE
HGB BLD-MCNC: 8.8 G/DL (ref 12–16)
LYMPHOCYTES # BLD AUTO: 2.8 THOUSANDS/ΜL (ref 0.5–4)
LYMPHOCYTES NFR BLD AUTO: 28 % (ref 25–45)
MCH RBC QN AUTO: 25.3 PG (ref 26–34)
MCHC RBC AUTO-ENTMCNC: 31.3 G/DL (ref 31–36)
MCV RBC AUTO: 81 FL (ref 80–100)
MONOCYTES # BLD AUTO: 0.8 THOUSAND/ΜL (ref 0.2–0.9)
MONOCYTES NFR BLD AUTO: 8 % (ref 1–10)
NEUTROPHILS # BLD AUTO: 6 THOUSANDS/ΜL (ref 1.8–7.8)
NEUTS SEG NFR BLD AUTO: 60 % (ref 45–65)
PLATELET # BLD AUTO: 488 THOUSANDS/UL (ref 150–450)
PMV BLD AUTO: 8.6 FL (ref 8.9–12.7)
RBC # BLD AUTO: 3.48 MILLION/UL (ref 4–5.2)
WBC # BLD AUTO: 9.9 THOUSAND/UL (ref 4.5–11)

## 2019-01-10 PROCEDURE — 85025 COMPLETE CBC W/AUTO DIFF WBC: CPT | Performed by: NURSE PRACTITIONER

## 2019-01-10 PROCEDURE — 82272 OCCULT BLD FECES 1-3 TESTS: CPT | Performed by: FAMILY MEDICINE

## 2019-01-10 PROCEDURE — 84703 CHORIONIC GONADOTROPIN ASSAY: CPT | Performed by: PSYCHIATRY & NEUROLOGY

## 2019-01-10 RX ORDER — TRAZODONE HYDROCHLORIDE 100 MG/1
200 TABLET ORAL
Qty: 30 TABLET | Refills: 1 | Status: ON HOLD | OUTPATIENT
Start: 2019-01-10 | End: 2019-02-01 | Stop reason: ALTCHOICE

## 2019-01-10 RX ORDER — GABAPENTIN 100 MG/1
100 CAPSULE ORAL 2 TIMES DAILY
Qty: 30 CAPSULE | Refills: 1 | Status: SHIPPED | OUTPATIENT
Start: 2019-01-10

## 2019-01-10 RX ORDER — FLUOXETINE HYDROCHLORIDE 40 MG/1
40 CAPSULE ORAL DAILY
Qty: 15 CAPSULE | Refills: 1 | Status: SHIPPED | OUTPATIENT
Start: 2019-01-11

## 2019-01-10 RX ORDER — LACOSAMIDE 200 MG/1
200 TABLET ORAL EVERY 12 HOURS SCHEDULED
Qty: 30 TABLET | Refills: 1 | Status: ON HOLD | OUTPATIENT
Start: 2019-01-10 | End: 2019-02-01 | Stop reason: ALTCHOICE

## 2019-01-10 RX ORDER — ARIPIPRAZOLE 10 MG/1
10 TABLET ORAL
Qty: 15 TABLET | Refills: 1 | Status: SHIPPED | OUTPATIENT
Start: 2019-01-10

## 2019-01-10 RX ORDER — SODIUM CHLORIDE, SODIUM LACTATE, POTASSIUM CHLORIDE, CALCIUM CHLORIDE 600; 310; 30; 20 MG/100ML; MG/100ML; MG/100ML; MG/100ML
75 INJECTION, SOLUTION INTRAVENOUS CONTINUOUS
Status: CANCELLED | OUTPATIENT
Start: 2019-01-10

## 2019-01-10 RX ADMIN — TOPIRAMATE 50 MG: 25 TABLET, FILM COATED ORAL at 08:02

## 2019-01-10 RX ADMIN — SENNOSIDES AND DOCUSATE SODIUM 1 TABLET: 8.6; 5 TABLET ORAL at 08:02

## 2019-01-10 RX ADMIN — TOPIRAMATE 50 MG: 25 TABLET, FILM COATED ORAL at 21:10

## 2019-01-10 RX ADMIN — TRAZODONE HYDROCHLORIDE 200 MG: 100 TABLET ORAL at 21:10

## 2019-01-10 RX ADMIN — FLUOXETINE 40 MG: 20 CAPSULE ORAL at 08:01

## 2019-01-10 RX ADMIN — ARIPIPRAZOLE 10 MG: 10 TABLET ORAL at 21:10

## 2019-01-10 RX ADMIN — NYSTATIN AND TRIAMCINOLONE ACETONIDE: 100000; 1 CREAM TOPICAL at 17:35

## 2019-01-10 RX ADMIN — LACOSAMIDE 200 MG: 50 TABLET, FILM COATED ORAL at 08:01

## 2019-01-10 RX ADMIN — LACOSAMIDE 200 MG: 50 TABLET, FILM COATED ORAL at 21:09

## 2019-01-10 RX ADMIN — GABAPENTIN 100 MG: 100 CAPSULE ORAL at 08:02

## 2019-01-10 RX ADMIN — OLANZAPINE 5 MG: 5 TABLET, FILM COATED ORAL at 13:55

## 2019-01-10 RX ADMIN — GABAPENTIN 100 MG: 100 CAPSULE ORAL at 17:35

## 2019-01-10 NOTE — PLAN OF CARE
Problem: DISCHARGE PLANNING  Goal: Discharge to home or other facility with appropriate resources  INTERVENTIONS:  - Identify barriers to discharge w/patient and caregiver  - Deny SI and depression  Symptoms will resolve or diminish upon discharge  - Comply with meds, attend 75% of group therapy, identify positive coping skills  - Arrange for needed discharge resources and transportation as appropriate  - Identify discharge learning needs (meds, wound care, outpatient mental health services)  - Arrange for interpretive services to assist at discharge as needed  - Refer to Case Management Department for coordinating discharge planning if the patient needs post-hospital services based on physician/advanced practitioner order or complex needs related to functional status, cognitive ability, or social support system   Outcome: Progressing  Worker left voicemail for pt's father informing him of discharge planned for tomorrow 1/11
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
## 2019-01-10 NOTE — ANESTHESIA PREPROCEDURE EVALUATION
Review of Systems/Medical History  Patient summary reviewed  Chart reviewed  No history of anesthetic complications     Cardiovascular  EKG reviewed, Exercise tolerance (METS): >4,  No CHF ,    Pulmonary  Not a smoker , No asthma , Sleep apnea ,        GI/Hepatic  Negative GI/hepatic ROS          Negative  ROS        Endo/Other  No diabetes ,   Obesity  super morbid obesity   GYN  Not currently pregnant , Prior pregnancy/OB history ,          Hematology  Anemia (7 8) iron deficiency anemia,  No thrombocytopenia,    Musculoskeletal  Negative musculoskeletal ROS        Neurology  Seizures well controlled,     Psychology   Anxiety, Depression , being treated for depression,              Physical Exam    Airway       Dental       Cardiovascular  Cardiovascular exam normal    Pulmonary  Pulmonary exam normal     Other Findings        Anesthesia Plan  ASA Score- 3     Anesthesia Type- general and IV sedation with anesthesia with ASA Monitors  Additional Monitors:   Airway Plan: ETT  Comment: Discuss planned procedure with surgeon on his needs  Plan Factors-Patient not instructed to abstain from smoking on day of procedure  Patient did not smoke on day of surgery  Induction- intravenous  Postoperative Plan- Plan for postoperative opioid use  Informed Consent- Anesthetic plan and risks discussed with patient  I personally reviewed this patient with the CRNA  Discussed and agreed on the Anesthesia Plan with the CRNA  Dara Soulier

## 2019-01-10 NOTE — SOCIAL WORK
Worker contacted Toma Chase at [facility] to confirm and schedule outpatient appointments  Toma Chase confirmed pt is still a client at the clinic, although appointment compliance is difficult with pt   Toma Chase provided worker with two follow up appointments: 1/15 at 11:30AM and 1/22 at 12:45PM

## 2019-01-10 NOTE — PROGRESS NOTES
Patient visible on unit at start of shift  Patient stayed visible on the unit until bedtime  Patient was with brighter affect and mood WNL  Makes good eye contact  Thinking clear and organized  Patient report 3/4 anxiety, 5/10 depression   Minimal engagement with peers noted  HS medication compliant    Will continue to monitor

## 2019-01-10 NOTE — PROGRESS NOTES
Pt attended stress management group  Pt cooperative but anxious over wound care needs  Pt mentioned she was unsure if the surgeon would be able to find her and speak with her if she was in group  Preoccupied with communication  Pt hopeful about future needs but limited discussion about severity of admission  Continue to provide therapeutic group support

## 2019-01-10 NOTE — PLAN OF CARE
Problem: DISCHARGE PLANNING  Goal: Discharge to home or other facility with appropriate resources  INTERVENTIONS:  - Identify barriers to discharge w/patient and caregiver  - Deny SI and depression  Symptoms will resolve or diminish upon discharge  - Comply with meds, attend 75% of group therapy, identify positive coping skills  - Arrange for needed discharge resources and transportation as appropriate  - Identify discharge learning needs (meds, wound care, outpatient mental health services)  - Arrange for interpretive services to assist at discharge as needed  - Refer to Case Management Department for coordinating discharge planning if the patient needs post-hospital services based on physician/advanced practitioner order or complex needs related to functional status, cognitive ability, or social support system   Outcome: Progressing  Worker called [facility]  and spoke with Belia Barroso confirmed pt is still a client at the Clinic although appointment compliance is difficult with pt   Belia Barroso provided worker with two follow up appointments for pt: 1/15 at 11:30AM and another on 1/22 at 12:45PM

## 2019-01-10 NOTE — PROGRESS NOTES
Patient woke at 0430 walking halls  Patient requested her bandage be changed for itching from tap   ABD pad with small amount of blood  Change completed  With paper tap  And  education  reinforce for  her sleep medications   Patient  Behavior remained calm and cooperative with unit routine  No issues noted  Patient currently resting back in bed   Will continue to monitor

## 2019-01-11 ENCOUNTER — ANESTHESIA (OUTPATIENT)
Dept: PERIOP | Facility: HOSPITAL | Age: 29
End: 2019-01-11

## 2019-01-11 VITALS
DIASTOLIC BLOOD PRESSURE: 71 MMHG | OXYGEN SATURATION: 100 % | HEIGHT: 65 IN | RESPIRATION RATE: 16 BRPM | SYSTOLIC BLOOD PRESSURE: 114 MMHG | WEIGHT: 293 LBS | HEART RATE: 95 BPM | BODY MASS INDEX: 48.82 KG/M2 | TEMPERATURE: 97.6 F

## 2019-01-11 NOTE — PLAN OF CARE
Problem: DISCHARGE PLANNING  Goal: Discharge to home or other facility with appropriate resources  INTERVENTIONS:  - Identify barriers to discharge w/patient and caregiver  - Deny SI and depression  Symptoms will resolve or diminish upon discharge  - Comply with meds, attend 75% of group therapy, identify positive coping skills  - Arrange for needed discharge resources and transportation as appropriate  - Identify discharge learning needs (meds, wound care, outpatient mental health services)  - Arrange for interpretive services to assist at discharge as needed  - Refer to Case Management Department for coordinating discharge planning if the patient needs post-hospital services based on physician/advanced practitioner order or complex needs related to functional status, cognitive ability, or social support system   Outcome: Completed Date Met: 01/11/19  Pt to be discharged today  Pt denies SI/HI, AH/VH  Pt oriented x3  Pt to return home  Pt has personal transportation  Pt to follow up with [facility] on 1/15 at 11:30AM  Pt's meds sent to [facility]

## 2019-01-11 NOTE — PROGRESS NOTES
Patient visible on unit  Can be intrusive, lingering around nurses station  Patient is medication compliant  Denies depression, anxiety, SI/HI, and AH/VH  No further issues throughout shift  Will continue to monitor

## 2019-01-11 NOTE — DISCHARGE INSTRUCTIONS
Depression   WHAT YOU NEED TO KNOW:   What is depression? Depression is a medical condition that causes feelings of sadness or hopelessness that do not go away  Depression may cause you to lose interest in things you used to enjoy  These feelings may interfere with your daily life  What causes or increases my risk for depression? Depression may be caused by changes in brain chemicals that affect your mood  Your risk for depression may be higher if you have any of the following:  · Stressful events such as the death of a loved one, unemployment, childhood trauma, divorce, or domestic abuse    · A chronic medical condition such as diabetes, heart disease, or cancer    · Parents, siblings, or other family members with a history of depression    · Drug or alcohol abuse  What are the signs and symptoms of depression? · Appetite changes, or weight gain or loss    · Trouble going to sleep or staying asleep, or sleeping too much    · Fatigue or lack of energy    · Feeling restless, irritable, or withdrawn    · Feeling worthless, hopeless, discouraged, or guilty    · Trouble concentrating, remembering things, doing daily tasks, or making decisions    · Thoughts about hurting or killing yourself  How is depression diagnosed? Your healthcare provider will ask about your symptoms and how long you have had them  He or she will ask if you have any family members with depression  Tell your healthcare provider about any stressful events in your life  He or she may ask about any other health conditions or medicines you take  How is depression treated? · Therapy  may be used to treat your depression  A therapist will help you learn to cope with your thoughts and feelings  This can be done alone or in a group  It may also be done with family members or a significant other  · Antidepressant medicine  may be given to improve or balance your mood   You may need to take this medicine for several weeks before you begin to feel better  Tell your healthcare provider about any side effects or problems you have with your medicine  The type or amount of medicine may need to be changed  How can I manage depression? · Get regular physical activity  Try to exercise for 30 minutes, 3 to 5 days a week  Work with your healthcare provider to develop an exercise plan that you enjoy  Physical activity may improve your symptoms  · Get enough sleep  Create a routine to help you relax before bed  You can listen to music, read, or do yoga  Try to go to bed and wake up at the same time every day  Sleep is important for emotional health  · Eat a variety of healthy foods from all of the food groups  A healthy meal plan is low in fat, salt, and added sugar  Ask your healthcare provider for more information about a meal plan that is right for you  · Do not drink alcohol or use drugs  Alcohol and drugs can make your symptoms worse  Call 911 for any of the following:   · You think about harming yourself or someone else  When should I contact my healthcare provider? · Your symptoms do not improve  · You cannot make it to your next appointment  · You have new symptoms  · You have questions or concerns about your condition or care  CARE AGREEMENT:   You have the right to help plan your care  Learn about your health condition and how it may be treated  Discuss treatment options with your caregivers to decide what care you want to receive  You always have the right to refuse treatment  The above information is an  only  It is not intended as medical advice for individual conditions or treatments  Talk to your doctor, nurse or pharmacist before following any medical regimen to see if it is safe and effective for you  © 2017 [employer] Information is for End User's use only and may not be sold, redistributed or otherwise used for commercial purposes   All illustrations and images included in CareNotes® are the copyrighted property of A [employer]  or [employer] Ferreira
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
## 2019-01-11 NOTE — PROGRESS NOTES
Patient was discharged to home  Initially she was being DC to [facility] but procedure was cancelled  She was transported home by her father  Patient was able to verbalize understanding of DC instructions including upcoming appts  She denies SI/HI  She was escorted off the unit by Nurse Manager and MHT  She left the unit at 1000

## 2019-01-22 ENCOUNTER — ANESTHESIA EVENT (OUTPATIENT)
Dept: PERIOP | Facility: HOSPITAL | Age: 29
End: 2019-01-22

## 2019-01-22 ENCOUNTER — ANESTHESIA (OUTPATIENT)
Dept: PERIOP | Facility: HOSPITAL | Age: 29
End: 2019-01-22

## 2019-01-22 PROBLEM — S30.1XXA ABDOMINAL WALL HEMATOMA: Status: ACTIVE | Noted: 2019-01-22

## 2019-02-01 ENCOUNTER — ANESTHESIA (OUTPATIENT)
Dept: PERIOP | Facility: HOSPITAL | Age: 29
End: 2019-02-01
Payer: COMMERCIAL

## 2019-02-01 ENCOUNTER — ANESTHESIA EVENT (OUTPATIENT)
Dept: PERIOP | Facility: HOSPITAL | Age: 29
End: 2019-02-01
Payer: COMMERCIAL

## 2019-02-01 ENCOUNTER — HOSPITAL ENCOUNTER (OUTPATIENT)
Facility: HOSPITAL | Age: 29
Setting detail: OUTPATIENT SURGERY
End: 2019-02-01
Attending: SURGERY | Admitting: SURGERY
Payer: COMMERCIAL

## 2019-02-01 ENCOUNTER — HOSPITAL ENCOUNTER (OUTPATIENT)
Facility: HOSPITAL | Age: 29
Setting detail: OUTPATIENT SURGERY
Discharge: HOME | End: 2019-02-01
Attending: SURGERY | Admitting: SURGERY
Payer: COMMERCIAL

## 2019-02-01 VITALS
DIASTOLIC BLOOD PRESSURE: 77 MMHG | TEMPERATURE: 97.9 F | HEIGHT: 64 IN | BODY MASS INDEX: 50.02 KG/M2 | HEART RATE: 83 BPM | RESPIRATION RATE: 16 BRPM | WEIGHT: 293 LBS | OXYGEN SATURATION: 96 % | SYSTOLIC BLOOD PRESSURE: 144 MMHG

## 2019-02-01 LAB — EXT PREGNANCY TEST URINE: NEGATIVE

## 2019-02-01 PROCEDURE — 87070 CULTURE OTHR SPECIMN AEROBIC: CPT | Performed by: SURGERY

## 2019-02-01 PROCEDURE — 88304 TISSUE EXAM BY PATHOLOGIST: CPT | Performed by: PATHOLOGY

## 2019-02-01 PROCEDURE — 81025 URINE PREGNANCY TEST: CPT | Performed by: ANESTHESIOLOGY

## 2019-02-01 PROCEDURE — 87176 TISSUE HOMOGENIZATION CULTR: CPT | Performed by: SURGERY

## 2019-02-01 PROCEDURE — 87205 SMEAR GRAM STAIN: CPT | Performed by: SURGERY

## 2019-02-01 RX ORDER — HYDROMORPHONE HCL/PF 1 MG/ML
0.5 SYRINGE (ML) INJECTION
Status: DISCONTINUED | OUTPATIENT
Start: 2019-02-01 | End: 2019-02-01 | Stop reason: HOSPADM

## 2019-02-01 RX ORDER — MEPERIDINE HYDROCHLORIDE 25 MG/ML
12.5 INJECTION INTRAMUSCULAR; INTRAVENOUS; SUBCUTANEOUS
Status: DISCONTINUED | OUTPATIENT
Start: 2019-02-01 | End: 2019-02-01 | Stop reason: HOSPADM

## 2019-02-01 RX ORDER — PROMETHAZINE HYDROCHLORIDE 25 MG/ML
12.5 INJECTION, SOLUTION INTRAMUSCULAR; INTRAVENOUS ONCE AS NEEDED
Status: DISCONTINUED | OUTPATIENT
Start: 2019-02-01 | End: 2019-02-01 | Stop reason: HOSPADM

## 2019-02-01 RX ORDER — CEFAZOLIN SODIUM 2 G/50ML
2000 SOLUTION INTRAVENOUS ONCE
Status: COMPLETED | OUTPATIENT
Start: 2019-02-01 | End: 2019-02-01

## 2019-02-01 RX ORDER — ONDANSETRON 2 MG/ML
INJECTION INTRAMUSCULAR; INTRAVENOUS AS NEEDED
Status: DISCONTINUED | OUTPATIENT
Start: 2019-02-01 | End: 2019-02-01 | Stop reason: SURG

## 2019-02-01 RX ORDER — PROPOFOL 10 MG/ML
INJECTION, EMULSION INTRAVENOUS CONTINUOUS PRN
Status: DISCONTINUED | OUTPATIENT
Start: 2019-02-01 | End: 2019-02-01 | Stop reason: SURG

## 2019-02-01 RX ORDER — CEFAZOLIN SODIUM 1 G/50ML
SOLUTION INTRAVENOUS AS NEEDED
Status: DISCONTINUED | OUTPATIENT
Start: 2019-02-01 | End: 2019-02-01 | Stop reason: SURG

## 2019-02-01 RX ORDER — PROPOFOL 10 MG/ML
INJECTION, EMULSION INTRAVENOUS AS NEEDED
Status: DISCONTINUED | OUTPATIENT
Start: 2019-02-01 | End: 2019-02-01 | Stop reason: SURG

## 2019-02-01 RX ORDER — FENTANYL CITRATE 50 UG/ML
INJECTION, SOLUTION INTRAMUSCULAR; INTRAVENOUS AS NEEDED
Status: DISCONTINUED | OUTPATIENT
Start: 2019-02-01 | End: 2019-02-01 | Stop reason: SURG

## 2019-02-01 RX ORDER — MAGNESIUM HYDROXIDE 1200 MG/15ML
LIQUID ORAL AS NEEDED
Status: DISCONTINUED | OUTPATIENT
Start: 2019-02-01 | End: 2019-02-01 | Stop reason: HOSPADM

## 2019-02-01 RX ORDER — LIDOCAINE HYDROCHLORIDE AND EPINEPHRINE 10; 10 MG/ML; UG/ML
INJECTION, SOLUTION INFILTRATION; PERINEURAL AS NEEDED
Status: DISCONTINUED | OUTPATIENT
Start: 2019-02-01 | End: 2019-02-01 | Stop reason: HOSPADM

## 2019-02-01 RX ORDER — ONDANSETRON 2 MG/ML
4 INJECTION INTRAMUSCULAR; INTRAVENOUS ONCE AS NEEDED
Status: DISCONTINUED | OUTPATIENT
Start: 2019-02-01 | End: 2019-02-01 | Stop reason: HOSPADM

## 2019-02-01 RX ORDER — MIDAZOLAM HYDROCHLORIDE 1 MG/ML
INJECTION INTRAMUSCULAR; INTRAVENOUS AS NEEDED
Status: DISCONTINUED | OUTPATIENT
Start: 2019-02-01 | End: 2019-02-01 | Stop reason: SURG

## 2019-02-01 RX ORDER — SODIUM CHLORIDE, SODIUM LACTATE, POTASSIUM CHLORIDE, CALCIUM CHLORIDE 600; 310; 30; 20 MG/100ML; MG/100ML; MG/100ML; MG/100ML
75 INJECTION, SOLUTION INTRAVENOUS CONTINUOUS
Status: DISCONTINUED | OUTPATIENT
Start: 2019-02-01 | End: 2019-02-01 | Stop reason: HOSPADM

## 2019-02-01 RX ORDER — LIDOCAINE HYDROCHLORIDE 10 MG/ML
INJECTION, SOLUTION INFILTRATION; PERINEURAL AS NEEDED
Status: DISCONTINUED | OUTPATIENT
Start: 2019-02-01 | End: 2019-02-01 | Stop reason: SURG

## 2019-02-01 RX ADMIN — ONDANSETRON HYDROCHLORIDE 4 MG: 2 INJECTION, SOLUTION INTRAMUSCULAR; INTRAVENOUS at 08:25

## 2019-02-01 RX ADMIN — PROPOFOL 100 MCG/KG/MIN: 10 INJECTION, EMULSION INTRAVENOUS at 08:19

## 2019-02-01 RX ADMIN — SODIUM CHLORIDE, POTASSIUM CHLORIDE, SODIUM LACTATE AND CALCIUM CHLORIDE 75 ML/HR: 600; 310; 30; 20 INJECTION, SOLUTION INTRAVENOUS at 08:01

## 2019-02-01 RX ADMIN — PROPOFOL 50 MG: 10 INJECTION, EMULSION INTRAVENOUS at 08:19

## 2019-02-01 RX ADMIN — HYDROMORPHONE HYDROCHLORIDE 0.5 MG: 1 INJECTION, SOLUTION INTRAMUSCULAR; INTRAVENOUS; SUBCUTANEOUS at 09:05

## 2019-02-01 RX ADMIN — HYDROMORPHONE HYDROCHLORIDE 0.5 MG: 1 INJECTION, SOLUTION INTRAMUSCULAR; INTRAVENOUS; SUBCUTANEOUS at 09:18

## 2019-02-01 RX ADMIN — LIDOCAINE HYDROCHLORIDE 50 MG: 10 INJECTION, SOLUTION INFILTRATION; PERINEURAL at 08:19

## 2019-02-01 RX ADMIN — MIDAZOLAM HYDROCHLORIDE 2 MG: 1 INJECTION, SOLUTION INTRAMUSCULAR; INTRAVENOUS at 08:15

## 2019-02-01 RX ADMIN — CEFAZOLIN SODIUM 1000 MG: 1 SOLUTION INTRAVENOUS at 08:20

## 2019-02-01 RX ADMIN — SODIUM CHLORIDE, POTASSIUM CHLORIDE, SODIUM LACTATE AND CALCIUM CHLORIDE: 600; 310; 30; 20 INJECTION, SOLUTION INTRAVENOUS at 08:05

## 2019-02-01 RX ADMIN — FENTANYL CITRATE 100 MCG: 50 INJECTION INTRAMUSCULAR; INTRAVENOUS at 08:15

## 2019-02-01 RX ADMIN — CEFAZOLIN SODIUM 2000 MG: 2 SOLUTION INTRAVENOUS at 08:15

## 2019-02-01 NOTE — OP NOTE
OPERATIVE REPORT  PATIENT NAME: Lainey Sears    :  1990  MRN: [id number]  Pt Location: [facility] OR ROOM 12    SURGERY DATE: 2019    Surgeon(s) and Role:     DO Eliezer Dumont Primary    Preop Diagnosis:  * No pre-op diagnosis entered *    * No Diagnosis Codes entered *    Procedure(s) (LRB):  INCISION AND DRAINAGE (I&D) TRUNK (N/A)    Specimen(s):  ID Type Source Tests Collected by Time Destination   1 : abdominal wall wound Tissue Wound TISSUE EXAM Cadence Jain DO 2019 0841    A : abdominal wall wound Tissue Wound CULTURE, TISSUE AND GRAM STAIN Cadence Jain DO 2019 0844        Estimated Blood Loss:   Minimal    Drains:       Anesthesia Type:   * No anesthesia type entered *    Operative Indications:  * No pre-op diagnosis entered *  Draining abdominal wall wound    Operative Findings:  Mature hematoma  No purulent drainage  Necrotic skin    Complications:   None    Procedure and Technique: With the patient in the supine position prepped and draped usual manner the wound located superior to the umbilicus in the midline was prepped and draped in usual manner  The area was anesthetized with 1% xylocaine and epinephrine  A transverse elliptical incision was made around the wound  The wound was excised saved and sent to pathology lab for tissue diagnosis  There is no purulent material   There is no evidence of foreign body or packing  The necrotic skin and subcutaneous tissue was debrided  This tissue was sent for culture and sensitivity  The wound was irrigated  Since there is no purulent drainage was decided to close the wound transversely  The skin was closed with interrupted vertical mattress sutures of 3 0 nylon and skin staples  The patient tolerated the procedure well all sponge instrument counts were correct hemostasis was adequate and she was taken to PACU in stable condition     I was present for the entire procedure    Patient Disposition:  PACU     SIGNATURE: Cadence Jain DO  DATE: February 1, 2019  TIME: 8:47 AM

## 2019-02-01 NOTE — PERIOPERATIVE NURSING NOTE
VSS, pt denies nausea, reports improvement in pain, assessment unchanged, report called, no questions, pt transferred to [facility]

## 2019-02-01 NOTE — DISCHARGE INSTRUCTIONS
Keep incision clean and dry  Apply ice to incision  Call Dr Alejandra Rogers at [phone] if drainage is noticed or if wound turns red  See Dr Alejandra Rogers in the office 1 week or sooner if needed  Call [phone] for a time

## 2019-02-01 NOTE — ANESTHESIA PREPROCEDURE EVALUATION
Review of Systems/Medical History  Patient summary reviewed  Chart reviewed  No history of anesthetic complications     Cardiovascular  EKG reviewed, Exercise tolerance (METS): >4,  Hypertension controlled, No CHF ,    Pulmonary  Not a smoker , No asthma , Sleep apnea (snoring ) ,        GI/Hepatic  Negative GI/hepatic ROS          Negative  ROS        Endo/Other  No diabetes ,   Obesity  super morbid obesity   GYN  Not currently pregnant , Prior pregnancy/OB history ,          Hematology  Anemia (7 8) iron deficiency anemia,  No thrombocytopenia,    Musculoskeletal  Negative musculoskeletal ROS        Neurology  Seizures well controlled,     Psychology   Anxiety, Depression , being treated for depression,              Physical Exam    Airway    Mallampati score: III  TM Distance: >3 FB  Neck ROM: full     Dental   Comment: Many missing and loose teeth  Poor dentation as per pt ,     Cardiovascular  Cardiovascular exam normal    Pulmonary  Pulmonary exam normal     Other Findings        Anesthesia Plan  ASA Score- 3     Anesthesia Type- general and IV sedation with anesthesia with ASA Monitors  Additional Monitors:   Airway Plan: ETT  Comment: Discuss planned procedure with surgeon on his needs  Plan Factors-Patient not instructed to abstain from smoking on day of procedure  Patient did not smoke on day of surgery  Induction- intravenous  Postoperative Plan- Plan for postoperative opioid use  Informed Consent- Anesthetic plan and risks discussed with patient  I personally reviewed this patient with the CRNA  Discussed and agreed on the Anesthesia Plan with the CRNA  Bob Payne             No results found for: HGBA1C    Lab Results   Component Value Date    K 4 1 01/03/2019     01/03/2019    CO2 24 01/03/2019    BUN 23 01/03/2019    CREATININE 1 18 01/03/2019    GLUCOSE 104 12/31/2018    GLUF 94 01/03/2019    CALCIUM 9 1 01/03/2019    AST 14 01/03/2019    ALT 20 01/03/2019    ALKPHOS 82 01/03/2019    EGFR 62 01/03/2019       Lab Results   Component Value Date    WBC 9 90 01/10/2019    HGB 8 8 (L) 01/10/2019    HCT 28 2 (L) 01/10/2019    MCV 81 01/10/2019     (H) 01/10/2019

## 2019-02-01 NOTE — PERIOPERATIVE NURSING NOTE
ivs out  Wants to go home  Dressing dry and intact  Discharged ambulatory after discharge instructions given and verbalized an understanding of same

## 2019-02-04 LAB
BACTERIA TISS AEROBE CULT: NO GROWTH
GRAM STN SPEC: NORMAL
GRAM STN SPEC: NORMAL

## 2020-05-18 RX ORDER — TIZANIDINE 2 MG/1
TABLET ORAL
Qty: 90 TABLET | Refills: 1 | OUTPATIENT
Start: 2020-05-18

## 2020-06-03 DIAGNOSIS — M54.9 BACK PAIN, UNSPECIFIED BACK LOCATION, UNSPECIFIED BACK PAIN LATERALITY, UNSPECIFIED CHRONICITY: Primary | ICD-10-CM

## 2020-06-03 RX ORDER — TIZANIDINE 2 MG/1
TABLET ORAL
Qty: 30 TABLET | Refills: 5 | Status: SHIPPED | OUTPATIENT
Start: 2020-06-03

## 2021-02-11 DIAGNOSIS — M54.9 BACK PAIN, UNSPECIFIED BACK LOCATION, UNSPECIFIED BACK PAIN LATERALITY, UNSPECIFIED CHRONICITY: ICD-10-CM

## 2021-02-11 RX ORDER — TIZANIDINE 2 MG/1
TABLET ORAL
Qty: 30 TABLET | Refills: 5 | OUTPATIENT
Start: 2021-02-11

## 2021-04-10 DIAGNOSIS — M54.9 BACK PAIN, UNSPECIFIED BACK LOCATION, UNSPECIFIED BACK PAIN LATERALITY, UNSPECIFIED CHRONICITY: ICD-10-CM

## 2021-04-11 RX ORDER — TIZANIDINE 2 MG/1
TABLET ORAL
Qty: 30 TABLET | Refills: 5 | OUTPATIENT
Start: 2021-04-11

## 2022-10-17 ENCOUNTER — HOSPITAL ENCOUNTER (EMERGENCY)
Facility: HOSPITAL | Age: 32
End: 2022-10-19
Attending: EMERGENCY MEDICINE
Payer: MEDICARE

## 2022-10-17 DIAGNOSIS — T50.904A OVERDOSE OF UNDETERMINED INTENT, INITIAL ENCOUNTER: Primary | ICD-10-CM

## 2022-10-17 LAB
ALBUMIN SERPL BCP-MCNC: 4.1 G/DL (ref 3.5–5)
ALP SERPL-CCNC: 84 U/L (ref 34–104)
ALT SERPL W P-5'-P-CCNC: 11 U/L (ref 7–52)
AMMONIA PLAS-SCNC: 32 UMOL/L (ref 18–72)
ANION GAP SERPL CALCULATED.3IONS-SCNC: 10 MMOL/L (ref 4–13)
APAP SERPL-MCNC: <10 UG/ML (ref 10–20)
AST SERPL W P-5'-P-CCNC: 14 U/L (ref 13–39)
BASOPHILS # BLD AUTO: 0.08 THOUSANDS/ÂΜL (ref 0–0.1)
BASOPHILS NFR BLD AUTO: 1 % (ref 0–1)
BILIRUB SERPL-MCNC: 0.24 MG/DL (ref 0.2–1)
BUN SERPL-MCNC: 20 MG/DL (ref 5–25)
CALCIUM SERPL-MCNC: 9.3 MG/DL (ref 8.4–10.2)
CHLORIDE SERPL-SCNC: 104 MMOL/L (ref 96–108)
CO2 SERPL-SCNC: 21 MMOL/L (ref 21–32)
CREAT SERPL-MCNC: 1.71 MG/DL (ref 0.6–1.3)
EOSINOPHIL # BLD AUTO: 0.51 THOUSAND/ÂΜL (ref 0–0.61)
EOSINOPHIL NFR BLD AUTO: 5 % (ref 0–6)
ERYTHROCYTE [DISTWIDTH] IN BLOOD BY AUTOMATED COUNT: 14.4 % (ref 11.6–15.1)
ETHANOL SERPL-MCNC: <10 MG/DL
FLUAV RNA RESP QL NAA+PROBE: NEGATIVE
FLUBV RNA RESP QL NAA+PROBE: NEGATIVE
GFR SERPL CREATININE-BSD FRML MDRD: 38 ML/MIN/1.73SQ M
GLUCOSE SERPL-MCNC: 110 MG/DL (ref 65–140)
GLUCOSE SERPL-MCNC: 113 MG/DL (ref 65–140)
HCT VFR BLD AUTO: 40.5 % (ref 34.8–46.1)
HGB BLD-MCNC: 13.1 G/DL (ref 11.5–15.4)
IMM GRANULOCYTES # BLD AUTO: 0.03 THOUSAND/UL (ref 0–0.2)
IMM GRANULOCYTES NFR BLD AUTO: 0 % (ref 0–2)
LYMPHOCYTES # BLD AUTO: 1.96 THOUSANDS/ÂΜL (ref 0.6–4.47)
LYMPHOCYTES NFR BLD AUTO: 19 % (ref 14–44)
MCH RBC QN AUTO: 27.8 PG (ref 26.8–34.3)
MCHC RBC AUTO-ENTMCNC: 32.3 G/DL (ref 31.4–37.4)
MCV RBC AUTO: 86 FL (ref 82–98)
MONOCYTES # BLD AUTO: 0.57 THOUSAND/ÂΜL (ref 0.17–1.22)
MONOCYTES NFR BLD AUTO: 5 % (ref 4–12)
NEUTROPHILS # BLD AUTO: 7.43 THOUSANDS/ÂΜL (ref 1.85–7.62)
NEUTS SEG NFR BLD AUTO: 70 % (ref 43–75)
NRBC BLD AUTO-RTO: 0 /100 WBCS
PLATELET # BLD AUTO: 409 THOUSANDS/UL (ref 149–390)
PMV BLD AUTO: 10.2 FL (ref 8.9–12.7)
POTASSIUM SERPL-SCNC: 3.8 MMOL/L (ref 3.5–5.3)
PROT SERPL-MCNC: 8 G/DL (ref 6.4–8.4)
RBC # BLD AUTO: 4.72 MILLION/UL (ref 3.81–5.12)
RSV RNA RESP QL NAA+PROBE: NEGATIVE
SALICYLATES SERPL-MCNC: <5 MG/DL (ref 3–20)
SARS-COV-2 RNA RESP QL NAA+PROBE: NEGATIVE
SODIUM SERPL-SCNC: 135 MMOL/L (ref 135–147)
TSH SERPL DL<=0.05 MIU/L-ACNC: 3.04 UIU/ML (ref 0.45–4.5)
WBC # BLD AUTO: 10.58 THOUSAND/UL (ref 4.31–10.16)

## 2022-10-17 PROCEDURE — 36415 COLL VENOUS BLD VENIPUNCTURE: CPT | Performed by: EMERGENCY MEDICINE

## 2022-10-17 PROCEDURE — 85025 COMPLETE CBC W/AUTO DIFF WBC: CPT | Performed by: EMERGENCY MEDICINE

## 2022-10-17 PROCEDURE — 81025 URINE PREGNANCY TEST: CPT | Performed by: EMERGENCY MEDICINE

## 2022-10-17 PROCEDURE — 93005 ELECTROCARDIOGRAM TRACING: CPT

## 2022-10-17 PROCEDURE — 80179 DRUG ASSAY SALICYLATE: CPT | Performed by: EMERGENCY MEDICINE

## 2022-10-17 PROCEDURE — 84443 ASSAY THYROID STIM HORMONE: CPT | Performed by: EMERGENCY MEDICINE

## 2022-10-17 PROCEDURE — 80143 DRUG ASSAY ACETAMINOPHEN: CPT | Performed by: EMERGENCY MEDICINE

## 2022-10-17 PROCEDURE — 0241U HB NFCT DS VIR RESP RNA 4 TRGT: CPT | Performed by: EMERGENCY MEDICINE

## 2022-10-17 PROCEDURE — 96361 HYDRATE IV INFUSION ADD-ON: CPT

## 2022-10-17 PROCEDURE — 82948 REAGENT STRIP/BLOOD GLUCOSE: CPT

## 2022-10-17 PROCEDURE — 82077 ASSAY SPEC XCP UR&BREATH IA: CPT | Performed by: EMERGENCY MEDICINE

## 2022-10-17 PROCEDURE — 80053 COMPREHEN METABOLIC PANEL: CPT | Performed by: EMERGENCY MEDICINE

## 2022-10-17 PROCEDURE — 96360 HYDRATION IV INFUSION INIT: CPT

## 2022-10-17 PROCEDURE — 99285 EMERGENCY DEPT VISIT HI MDM: CPT | Performed by: EMERGENCY MEDICINE

## 2022-10-17 PROCEDURE — 82140 ASSAY OF AMMONIA: CPT | Performed by: EMERGENCY MEDICINE

## 2022-10-17 PROCEDURE — 99285 EMERGENCY DEPT VISIT HI MDM: CPT

## 2022-10-17 RX ADMIN — SODIUM CHLORIDE 1000 ML: 0.9 INJECTION, SOLUTION INTRAVENOUS at 22:30

## 2022-10-18 LAB
AMPHETAMINES SERPL QL SCN: NEGATIVE
ANION GAP SERPL CALCULATED.3IONS-SCNC: 8 MMOL/L (ref 4–13)
ATRIAL RATE: 105 BPM
ATRIAL RATE: 105 BPM
BARBITURATES UR QL: NEGATIVE
BENZODIAZ UR QL: NEGATIVE
BUN SERPL-MCNC: 17 MG/DL (ref 5–25)
CALCIUM SERPL-MCNC: 8.4 MG/DL (ref 8.4–10.2)
CHLORIDE SERPL-SCNC: 107 MMOL/L (ref 96–108)
CO2 SERPL-SCNC: 20 MMOL/L (ref 21–32)
COCAINE UR QL: NEGATIVE
CREAT SERPL-MCNC: 1.51 MG/DL (ref 0.6–1.3)
EXT PREG TEST URINE: NEGATIVE
EXT. CONTROL ED NAV: NORMAL
GFR SERPL CREATININE-BSD FRML MDRD: 44 ML/MIN/1.73SQ M
GLUCOSE SERPL-MCNC: 109 MG/DL (ref 65–140)
METHADONE UR QL: NEGATIVE
OPIATES UR QL SCN: NEGATIVE
OXYCODONE+OXYMORPHONE UR QL SCN: NEGATIVE
P AXIS: 32 DEGREES
P AXIS: 32 DEGREES
PCP UR QL: NEGATIVE
POTASSIUM SERPL-SCNC: 4.4 MMOL/L (ref 3.5–5.3)
PR INTERVAL: 168 MS
PR INTERVAL: 168 MS
QRS AXIS: -1 DEGREES
QRS AXIS: -1 DEGREES
QRSD INTERVAL: 98 MS
QRSD INTERVAL: 98 MS
QT INTERVAL: 344 MS
QT INTERVAL: 344 MS
QTC INTERVAL: 453 MS
QTC INTERVAL: 453 MS
SODIUM SERPL-SCNC: 135 MMOL/L (ref 135–147)
T WAVE AXIS: -8 DEGREES
T WAVE AXIS: -8 DEGREES
THC UR QL: NEGATIVE
VENTRICULAR RATE: 104 BPM
VENTRICULAR RATE: 104 BPM

## 2022-10-18 PROCEDURE — 96361 HYDRATE IV INFUSION ADD-ON: CPT

## 2022-10-18 PROCEDURE — 80048 BASIC METABOLIC PNL TOTAL CA: CPT | Performed by: EMERGENCY MEDICINE

## 2022-10-18 PROCEDURE — 93010 ELECTROCARDIOGRAM REPORT: CPT | Performed by: INTERNAL MEDICINE

## 2022-10-18 PROCEDURE — 36415 COLL VENOUS BLD VENIPUNCTURE: CPT | Performed by: EMERGENCY MEDICINE

## 2022-10-18 PROCEDURE — 80307 DRUG TEST PRSMV CHEM ANLYZR: CPT | Performed by: EMERGENCY MEDICINE

## 2022-10-18 RX ORDER — BUPROPION HYDROCHLORIDE 150 MG/1
150 TABLET ORAL EVERY MORNING
Status: DISCONTINUED | OUTPATIENT
Start: 2022-10-18 | End: 2022-10-19 | Stop reason: HOSPADM

## 2022-10-18 RX ORDER — LORAZEPAM 0.5 MG/1
0.5 TABLET ORAL ONCE
Status: COMPLETED | OUTPATIENT
Start: 2022-10-18 | End: 2022-10-18

## 2022-10-18 RX ORDER — BUSPIRONE HYDROCHLORIDE 10 MG/1
10 TABLET ORAL 2 TIMES DAILY
COMMUNITY

## 2022-10-18 RX ORDER — TRAZODONE HYDROCHLORIDE 50 MG/1
50 TABLET ORAL
COMMUNITY

## 2022-10-18 RX ORDER — TOPIRAMATE 50 MG/1
50 TABLET, FILM COATED ORAL 2 TIMES DAILY
COMMUNITY

## 2022-10-18 RX ORDER — TRAZODONE HYDROCHLORIDE 50 MG/1
50 TABLET ORAL
Status: DISCONTINUED | OUTPATIENT
Start: 2022-10-18 | End: 2022-10-19 | Stop reason: HOSPADM

## 2022-10-18 RX ORDER — BUSPIRONE HYDROCHLORIDE 5 MG/1
10 TABLET ORAL 2 TIMES DAILY
Status: DISCONTINUED | OUTPATIENT
Start: 2022-10-18 | End: 2022-10-19 | Stop reason: HOSPADM

## 2022-10-18 RX ORDER — BUPROPION HYDROCHLORIDE 150 MG/1
150 TABLET ORAL EVERY MORNING
COMMUNITY

## 2022-10-18 RX ORDER — BUPROPION HYDROCHLORIDE 75 MG/1
75 TABLET ORAL EVERY MORNING
Status: DISCONTINUED | OUTPATIENT
Start: 2022-10-18 | End: 2022-10-19 | Stop reason: HOSPADM

## 2022-10-18 RX ORDER — FERROUS SULFATE 325(65) MG
325 TABLET ORAL
Status: DISCONTINUED | OUTPATIENT
Start: 2022-10-18 | End: 2022-10-19 | Stop reason: HOSPADM

## 2022-10-18 RX ORDER — ZOLPIDEM TARTRATE 10 MG/1
10 TABLET ORAL
COMMUNITY

## 2022-10-18 RX ORDER — ARIPIPRAZOLE 5 MG/1
10 TABLET ORAL
Status: DISCONTINUED | OUTPATIENT
Start: 2022-10-18 | End: 2022-10-19 | Stop reason: HOSPADM

## 2022-10-18 RX ORDER — BUPROPION HYDROCHLORIDE 75 MG/1
75 TABLET ORAL EVERY MORNING
COMMUNITY

## 2022-10-18 RX ORDER — TOPIRAMATE 25 MG/1
50 TABLET ORAL 2 TIMES DAILY
Status: DISCONTINUED | OUTPATIENT
Start: 2022-10-18 | End: 2022-10-19 | Stop reason: HOSPADM

## 2022-10-18 RX ADMIN — FERROUS SULFATE TAB 325 MG (65 MG ELEMENTAL FE) 325 MG: 325 (65 FE) TAB at 08:45

## 2022-10-18 RX ADMIN — BUPROPION HYDROCHLORIDE 150 MG: 150 TABLET, FILM COATED, EXTENDED RELEASE ORAL at 08:50

## 2022-10-18 RX ADMIN — LORAZEPAM 0.5 MG: 0.5 TABLET ORAL at 02:54

## 2022-10-18 RX ADMIN — BUPROPION HYDROCHLORIDE 75 MG: 75 TABLET, FILM COATED ORAL at 11:10

## 2022-10-18 RX ADMIN — TOPIRAMATE 50 MG: 25 TABLET, FILM COATED ORAL at 08:45

## 2022-10-18 RX ADMIN — TRAZODONE HYDROCHLORIDE 50 MG: 50 TABLET ORAL at 22:14

## 2022-10-18 RX ADMIN — BUSPIRONE HYDROCHLORIDE 10 MG: 5 TABLET ORAL at 18:16

## 2022-10-18 RX ADMIN — ARIPIPRAZOLE 10 MG: 5 TABLET ORAL at 22:14

## 2022-10-18 RX ADMIN — BUSPIRONE HYDROCHLORIDE 10 MG: 5 TABLET ORAL at 08:45

## 2022-10-18 RX ADMIN — TOPIRAMATE 50 MG: 25 TABLET, FILM COATED ORAL at 18:15

## 2022-10-18 NOTE — ED NOTES
Pt resting comfortably with no needs at this time; virtual sitter remains; will continue to monitor     Emmanuel Morris RN  10/18/22 1924

## 2022-10-18 NOTE — ED NOTES
Patient expressed interest previously in something to read, I offered patient available magazines and provided at her request      Iverson Spatz, RN  10/18/22 7014
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
## 2022-10-18 NOTE — ED NOTES
Pt sleeping at this time; visual observation by nurse for rounding     Perfecto Fleischer, RN  10/18/22 [id number]

## 2022-10-18 NOTE — ED NOTES
Call from Janusz at [facility] admissions reporting they no longer have any beds

## 2022-10-18 NOTE — ED NOTES
201 sent to Intake  Mckenzie Romano with intake reports no beds currently  Forrest Davis in admissions at [facility] amounts stated they can review; referral faxed       Crisis to follow-up

## 2022-10-18 NOTE — ED NOTES
Pt and med sitter moved to [address] [place] [address] [address], RN  10/18/22 101 [address]  10/18/22 0295
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
## 2022-10-18 NOTE — ED NOTES
Joellen in admissions at [facility] called to request Medicare A/B information/number, so they can verify in-patient days  Faxed copy of  Medicare A/B card to [facility]  Patient remains under review      Crisis to follow-up

## 2022-10-18 NOTE — ED NOTES
Patient sitting on end of bed with gown falling off, tearful  Went to bedside with Dr Ramirez Shown, pt continues to cry and expresses desire to sign self out and leave  Physician explained to patient why this is not an option tonight and that she will need to stay until the morning to speak with crisis  Patient also requesting additional medicine for sleep and physician further explained that she cannot take any additional sedative or sleep medications at this time due to recent intentional OD on her sleep medications  I offered additional food and drink to patient -- turkey sandwich and soda provided       Anshul Cota RN  10/18/22 0382

## 2022-10-19 VITALS
DIASTOLIC BLOOD PRESSURE: 64 MMHG | BODY MASS INDEX: 47.09 KG/M2 | TEMPERATURE: 98.4 F | HEIGHT: 66 IN | OXYGEN SATURATION: 98 % | SYSTOLIC BLOOD PRESSURE: 123 MMHG | RESPIRATION RATE: 16 BRPM | HEART RATE: 79 BPM | WEIGHT: 293 LBS

## 2022-10-19 NOTE — ED NOTES
Chart reviewed  CTS at 09:30 to [facility]  Met with patient to notify her of the same  She voiced support  JACKELYN signed  Patient given phone to call family to notify them of transfer to [facility]  Envelope prepped  Ailin Medina of ETA/Insurance info  Notified Intake of external transfer via [employer]

## 2022-10-19 NOTE — ED CARE HANDOFF
Emergency Department Sign Out Note        Sign out and transfer of care from my colleague, Dr Shira Novak  See Separate Emergency Department note  The patient, Vidya Jain, was evaluated for drug overdose  Labs Reviewed   CBC AND DIFFERENTIAL - Abnormal       Result Value Ref Range Status    WBC 10 58 (*) 4 31 - 10 16 Thousand/uL Final    RBC 4 72  3 81 - 5 12 Million/uL Final    Hemoglobin 13 1  11 5 - 15 4 g/dL Final    Hematocrit 40 5  34 8 - 46 1 % Final    MCV 86  82 - 98 fL Final    MCH 27 8  26 8 - 34 3 pg Final    MCHC 32 3  31 4 - 37 4 g/dL Final    RDW 14 4  11 6 - 15 1 % Final    MPV 10 2  8 9 - 12 7 fL Final    Platelets 444 (*) 188 - 390 Thousands/uL Final    nRBC 0  /100 WBCs Final    Neutrophils Relative 70  43 - 75 % Final    Immat GRANS % 0  0 - 2 % Final    Lymphocytes Relative 19  14 - 44 % Final    Monocytes Relative 5  4 - 12 % Final    Eosinophils Relative 5  0 - 6 % Final    Basophils Relative 1  0 - 1 % Final    Neutrophils Absolute 7 43  1 85 - 7 62 Thousands/µL Final    Immature Grans Absolute 0 03  0 00 - 0 20 Thousand/uL Final    Lymphocytes Absolute 1 96  0 60 - 4 47 Thousands/µL Final    Monocytes Absolute 0 57  0 17 - 1 22 Thousand/µL Final    Eosinophils Absolute 0 51  0 00 - 0 61 Thousand/µL Final    Basophils Absolute 0 08  0 00 - 0 10 Thousands/µL Final   COMPREHENSIVE METABOLIC PANEL - Abnormal    Sodium 135  135 - 147 mmol/L Final    Potassium 3 8  3 5 - 5 3 mmol/L Final    Chloride 104  96 - 108 mmol/L Final    CO2 21  21 - 32 mmol/L Final    ANION GAP 10  4 - 13 mmol/L Final    BUN 20  5 - 25 mg/dL Final    Creatinine 1 71 (*) 0 60 - 1 30 mg/dL Final    Comment: Standardized to IDMS reference method    Glucose 113  65 - 140 mg/dL Final    Comment: If the patient is fasting, the ADA then defines impaired fasting glucose as > 100 mg/dL and diabetes as > or equal to 123 mg/dL    Specimen collection should occur prior to Sulfasalazine administration due to the potential for falsely depressed results  Specimen collection should occur prior to Sulfapyridine administration due to the potential for falsely elevated results  Calcium 9 3  8 4 - 10 2 mg/dL Final    AST 14  13 - 39 U/L Final    Comment: Specimen collection should occur prior to Sulfasalazine administration due to the potential for falsely depressed results  ALT 11  7 - 52 U/L Final    Comment: Specimen collection should occur prior to Sulfasalazine administration due to the potential for falsely depressed results  Alkaline Phosphatase 84  34 - 104 U/L Final    Total Protein 8 0  6 4 - 8 4 g/dL Final    Albumin 4 1  3 5 - 5 0 g/dL Final    Total Bilirubin 0 24  0 20 - 1 00 mg/dL Final    eGFR 38  ml/min/1 73sq m Final    Narrative:     [place] guidelines for Chronic Kidney Disease (CKD):   •  Stage 1 with normal or high GFR (GFR > 90 mL/min/1 73 square meters)  •  Stage 2 Mild CKD (GFR = 60-89 mL/min/1 73 square meters)  •  Stage 3A Moderate CKD (GFR = 45-59 mL/min/1 73 square meters)  •  Stage 3B Moderate CKD (GFR = 30-44 mL/min/1 73 square meters)  •  Stage 4 Severe CKD (GFR = 15-29 mL/min/1 73 square meters)  •  Stage 5 End Stage CKD (GFR <15 mL/min/1 73 square meters)  Note: GFR calculation is accurate only with a steady state creatinine   ACETAMINOPHEN LEVEL - Abnormal    Acetaminophen Level <10 (*) 10 - 20 ug/mL Final   BASIC METABOLIC PANEL - Abnormal    Sodium 135  135 - 147 mmol/L Final    Potassium 4 4  3 5 - 5 3 mmol/L Final    Comment: Slightly Hemolyzed:Results may be affected      Chloride 107  96 - 108 mmol/L Final    CO2 20 (*) 21 - 32 mmol/L Final    ANION GAP 8  4 - 13 mmol/L Final    BUN 17  5 - 25 mg/dL Final    Creatinine 1 51 (*) 0 60 - 1 30 mg/dL Final    Comment: Standardized to IDMS reference method    Glucose 109  65 - 140 mg/dL Final    Comment: If the patient is fasting, the ADA then defines impaired fasting glucose as > 100 mg/dL and diabetes as > or equal to 123 mg/dL  Specimen collection should occur prior to Sulfasalazine administration due to the potential for falsely depressed results  Specimen collection should occur prior to Sulfapyridine administration due to the potential for falsely elevated results  Calcium 8 4  8 4 - 10 2 mg/dL Final    eGFR 44  ml/min/1 73sq m Final    Narrative:     [place] guidelines for Chronic Kidney Disease (CKD):   •  Stage 1 with normal or high GFR (GFR > 90 mL/min/1 73 square meters)  •  Stage 2 Mild CKD (GFR = 60-89 mL/min/1 73 square meters)  •  Stage 3A Moderate CKD (GFR = 45-59 mL/min/1 73 square meters)  •  Stage 3B Moderate CKD (GFR = 30-44 mL/min/1 73 square meters)  •  Stage 4 Severe CKD (GFR = 15-29 mL/min/1 73 square meters)  •  Stage 5 End Stage CKD (GFR <15 mL/min/1 73 square meters)  Note: GFR calculation is accurate only with a steady state creatinine   COVID19, INFLUENZA A/B, RSV PCR, [facility] - Normal    SARS-CoV-2 Negative  Negative Final    INFLUENZA A PCR Negative  Negative Final    INFLUENZA B PCR Negative  Negative Final    RSV PCR Negative  Negative Final    Narrative:     FOR PEDIATRIC PATIENTS - copy/paste COVID Guidelines URL to browser: https://[employer] org/  ashx    SARS-CoV-2 assay is a Nucleic Acid Amplification assay intended for the  qualitative detection of nucleic acid from SARS-CoV-2 in nasopharyngeal  swabs  Results are for the presumptive identification of SARS-CoV-2 RNA  Positive results are indicative of infection with SARS-CoV-2, the virus  causing COVID-19, but do not rule out bacterial infection or co-infection  with other viruses  Laboratories within the [place] and its  territories are required to report all positive results to the appropriate  public health authorities   Negative results do not preclude SARS-CoV-2  infection and should not be used as the sole basis for treatment or other  patient management decisions  Negative results must be combined with  clinical observations, patient history, and epidemiological information  This test has not been FDA cleared or approved  This test has been authorized by FDA under an Emergency Use Authorization  (EUA)  This test is only authorized for the duration of time the  declaration that circumstances exist justifying the authorization of the  emergency use of an in vitro diagnostic tests for detection of SARS-CoV-2  virus and/or diagnosis of COVID-19 infection under section 564(b)(1) of  the Act, 21 U  S C  515QIH-3(L)(7), unless the authorization is terminated  or revoked sooner  The test has been validated but independent review by FDA  and CLIA is pending  Test performed using [employer] Gene[employer]pert: This RT-PCR assay targets N2,  a region unique to SARS-CoV-2  A conserved region in the E-gene was chosen  for pan-Sarbecovirus detection which includes SARS-CoV-2  According to CMS-2020-01-R, this platform meets the definition of high-[employer] technology  AMMONIA - Normal    Ammonia 32  18 - 72 umol/L Final    Comment: Specimen collection should occur prior to Sulfapyridine administration due to the potential for falsely depressed results  TSH, 3RD GENERATION - Normal    TSH 3RD GENERATON 3 038  0 450 - 4 500 uIU/mL Final    Comment: The recommended reference ranges for TSH during pregnancy are as follows:   First trimester 0 1 to 2 5 uIU/mL   Second trimester  0 2 to 3 0 uIU/mL   Third trimester 0 3 to 3 0 uIU/m    Note: Normal ranges may not apply to patients who are transgender, non-binary, or whose legal sex, sex at birth, and gender identity differ  Adult TSH (3rd generation) reference range follows the recommended guidelines of the American Thyroid Association, January, 2020  Narrative:     Patients undergoing fluorescein dye angiography may retain small amounts of fluorescein in the body for 48-72 hours post procedure   Samples containing fluorescein can produce falsely depressed TSH values  If the patient had this procedure,a specimen should be resubmitted post fluorescein clearance  RAPID DRUG SCREEN, URINE - Normal    Amph/Meth UR Negative  Negative Final    Barbiturate Ur Negative  Negative Final    Benzodiazepine Urine Negative  Negative Final    Cocaine Urine Negative  Negative Final    Methadone Urine Negative  Negative Final    Opiate Urine Negative  Negative Final    PCP Ur Negative  Negative Final    THC Urine Negative  Negative Final    Oxycodone Urine Negative  Negative Final    Narrative:     FOR MEDICAL PURPOSES ONLY  IF CONFIRMATION NEEDED PLEASE CONTACT THE LAB WITHIN 5 DAYS  Drug Screen Cutoff Levels:  AMPHETAMINE/METHAMPHETAMINES  1000 ng/mL  BARBITURATES     200 ng/mL  BENZODIAZEPINES     200 ng/mL  COCAINE      300 ng/mL  METHADONE      300 ng/mL  OPIATES      300 ng/mL  PHENCYCLIDINE     25 ng/mL  THC       50 ng/mL  OXYCODONE      100 ng/mL   MEDICAL ALCOHOL - Normal    Ethanol Lvl <10  <10 mg/dL Final   SALICYLATE LEVEL - Normal    Salicylate Lvl <5  3 - 20 mg/dL Final   POCT PREGNANCY, URINE - Normal    EXT PREG TEST UR (Ref: Negative) Negative   Final    Control Valid   Final   POCT GLUCOSE - Normal    POC Glucose 110  65 - 140 mg/dl Final   COMA PANEL    Narrative: The following orders were created for panel order Coma panel  Procedure                               Abnormality         Status                     ---------                               -----------         ------                     Ethanol[[id number]]                      Normal              Final result               Salicylate CMATS[[id number]]             Normal              Final result               Acetaminophen level-If c  Corona Dickerson [[id number]]  Abnormal            Final result                 Please view results for these tests on the individual orders         Patient is medically cleared, with plan for transfer to [place](Dr Celso Flores) for further evaluation and treatment  No acute events during shift  Patient is to be signed out to my colleague at change of shift, with plan for transfer  Procedures  MDM        Disposition  Final diagnoses:   Overdose of undetermined intent, initial encounter     Time reflects when diagnosis was documented in both MDM as applicable and the Disposition within this note     Time User Action Codes Description Comment    10/17/2022 11:00 PM Khadar Gr Overdose of undetermined intent, initial encounter       ED Disposition     ED Disposition   Transfer to [address] [place] [address]   --    Date/Time   Wed Oct 19, 2022  1:27 AM    Comment   Patient is medically cleared, to be transferred to [facility](Dr Isael Melgoza) for further evaluation and treatment             MD Documentation    John Macias Most Recent Value   Patient Condition The patient has been stabilized such that within reasonable medical probability, no material deterioration of the patient condition or the condition of the unborn child(lilia) is likely to result from the transfer   Reason for Transfer Level of Care needed not available at this facility   Benefits of Transfer Specialized equipment and/or services available at the receiving facility (Include comment)________________________   Risks of Transfer Potential for delay in receiving treatment   Accepting Physician Dr Gaurang Lopez Name, [place] [address] [address]    (Name & Tel number) Estrellita Hutson [phone]   Transported by (Company and Unit #) [facility]   Sending MD Dr Kenisha Song   Provider Certification The patient is stable for psychiatric transfer because they are medically stable, and is protected from harming him/herself or others during transport      RN Documentation    Flowsheet Row Most [address] [facility] Name, [place] [address] [address]    (Name & Tel number) Panchito Kramer [phone]   Transported by (Company and Unit #) [facility]      Follow-up Information    None       Patient's Medications   Discharge Prescriptions    No medications on file     No discharge procedures on file         ED Provider  Electronically Signed by     Katie Quiroga MD  10/19/22 0126       Katie Quiroga MD  10/19/22 6845

## 2022-10-19 NOTE — ED NOTES
Pt ambulated to the bathroom without assistance; pt ambulated well     David Hernandez RN  10/18/22 1289

## 2022-10-19 NOTE — EMTALA/ACUTE CARE TRANSFER
[facility] EMERGENCY DEPARTMENT  [address] [address]  Dept: [phone]      EMTALA TRANSFER CONSENT    NAME Bran Mares                                         1990                              MRN [id number]    I have been informed of my rights regarding examination, treatment, and transfer   by Dr Sun Avila DO    Benefits:      Risks: Potential for delay in receiving treatment, Potential deterioration of medical condition, Possible worsening of condition or death during transfer      Consent for Transfer:  I acknowledge that my medical condition has been evaluated and explained to me by the emergency department physician or other qualified medical person and/or my attending physician, who has recommended that I be transferred to the service of  Accepting Physician: Dr Timur Sanchez at [address] Name, [facility] [address]  The above potential benefits of such transfer, the potential risks associated with such transfer, and the probable risks of not being transferred have been explained to me, and I fully understand them  The doctor has explained that, in my case, the benefits of transfer outweigh the risks  I agree to be transferred  I authorize the performance of emergency medical procedures and treatments upon me in both transit and upon arrival at the receiving facility  Additionally, I authorize the release of any and all medical records to the receiving facility and request they be transported with me, if possible  I understand that the safest mode of transportation during a medical emergency is an ambulance and that the Hospital advocates the use of this mode of transport  Risks of traveling to the receiving facility by car, including absence of medical control, life sustaining equipment, such as oxygen, and medical personnel has been explained to me and I fully understand them      ([address] [place])  [  ] I consent to the stated transfer and to be transported by ambulance/helicopter  [  ]  I consent to the stated transfer, but refuse transportation by ambulance and accept full responsibility for my transportation by car  I understand the risks of non-ambulance transfers and I exonerate the Hospital and its staff from any deterioration in my condition that results from this refusal     X___________________________________________    DATE  10/18/22  TIME________  Signature of patient or legally responsible individual signing on patient behalf           RELATIONSHIP TO PATIENT_________________________          Provider Certification    NAME Alban Mares                                        [facility] 1990                              MRN [id number]    A medical screening exam was performed on the above named patient  Based on the examination:    Condition Necessitating Transfer The encounter diagnosis was Overdose of undetermined intent, initial encounter      Patient Condition: The patient has been stabilized such that within reasonable medical probability, no material deterioration of the patient condition or the condition of the unborn child(lilia) is likely to result from the transfer    Reason for Transfer: Level of Care needed not available at this facility, No bed available at level of patient's needs (Psych)    Transfer Requirements: [address]   · Space available and qualified personnel available for treatment as acknowledged by    · Agreed to accept transfer and to provide appropriate medical treatment as acknowledged by       Dr Jasmin Aquino  · Appropriate medical records of the examination and treatment of the patient are provided at the time of transfer   [address] [facility], [address] _______  · Transfer will be performed by qualified personnel from    and appropriate transfer equipment as required, including the use of necessary and appropriate life support measures  Provider Certification: I have examined the patient and explained the following risks and benefits of being transferred/refusing transfer to the patient/family:  General risk, such as traffic hazards, adverse weather conditions, rough terrain or turbulence, possible failure of equipment (including vehicle or aircraft), or consequences of actions of persons outside the control of the transport personnel, Unanticipated needs of medical equipment and personnel during transport, Risk of worsening condition, The possibility of a transport vehicle being unavailable      Based on these reasonable risks and benefits to the patient and/or the unborn child(lilia), and based upon the information available at the time of the patient’s examination, I certify that the medical benefits reasonably to be expected from the provision of appropriate medical treatments at another medical facility outweigh the increasing risks, if any, to the individual’s medical condition, and in the case of labor to the unborn child, from effecting the transfer      X____________________________________________ DATE 10/18/22        TIME_______      ORIGINAL - SEND TO MEDICAL RECORDS   COPY - SEND WITH PATIENT DURING TRANSFER

## 2022-10-19 NOTE — ED CARE HANDOFF
Emergency Department Sign Out Note        Sign out and transfer of care from my colleague  See Separate Emergency Department note  The patient, Regino Munguia, was evaluated by the previous provider for overdose  Workup Completed:  Blood work    ED Course / Workup Pending (followup): Patient received a medical screening examination is medical cleared for psychiatric hospitalization  Patient is accepted to [facility] pending transport                                    ED Course as of 10/18/22 2348   Tue Oct 18, 2022   1634 Pt signed 201 - overdose   2328 Accepted to [facility]     Procedures  MDM        Disposition  Final diagnoses:   Overdose of undetermined intent, initial encounter     Time reflects when diagnosis was documented in both MDM as applicable and the Disposition within this note     Time User Action Codes Description Comment    10/17/2022 11:00 PM Lacy  Add [[id number]] Overdose of undetermined intent, initial encounter       ED Disposition     ED Disposition   Transfer to [address] [place] [address]   --    Date/Time   Tue Oct 18, 2022  1:40 AM    Comment              MD Documentation    Flowsheet Row Most Recent Value   Patient Condition The patient has been stabilized such that within reasonable medical probability, no material deterioration of the patient condition or the condition of the unborn child(lilia) is likely to result from the transfer   Reason for Transfer Level of Care needed not available at this facility   Benefits of Transfer Specialized equipment and/or services available at the receiving facility (Include comment)________________________   Risks of Transfer Potential for delay in receiving treatment   Accepting Physician Dr Kelly Klein Name, [place] [facility] , [address] [place] [address]    (Name & Tel number) Dani Hubbard [phone]   Transported by (Company and Unit #) [employer] [facility]   Sending MD Dr Mariluz Tsai   Provider Certification The patient is stable for psychiatric transfer because they are medically stable, and is protected from harming him/herself or others during transport      RN Documentation    [address] Name, [facility]    (Name & Tel number) Merry Hickey [phone]   Transported by (Company and Unit #) [facility]      Follow-up Information    None       Patient's Medications   Discharge Prescriptions    No medications on file     No discharge procedures on file         ED Provider  Electronically Signed by     Sweta Lara DO  10/18/22 7430

## 2022-10-19 NOTE — ED NOTES
Patient is accepted at [facility]  Patient is accepted by Dr Delilah Umaña per 2026 [facility] is arranged with [facility]  Transportation is scheduled for 0930 10/19/22  Patient may go to the floor at [address] [place] [address] [phone]  Fax: [phone]

## 2022-10-19 NOTE — ED NOTES
Q8 vitals due at this time however pt is sleeping; vitals will be obtained when pt is awake     Frank Leon RN  10/19/22 7725

## 2022-10-19 NOTE — ED NOTES
Crisis completed [facility] form and faxed to [phone]  Crisis also faxed copy to [facility] Authorization for admission: cob  Phone call placed to [facility] number [id number]  [facility] [place] with days approved by [facility]     Level of care inpatient    [facility] to provide  Review on TBD  Authorization # [id number]

## 2022-10-19 NOTE — ED NOTES
[address] [facility] Assessment - Simple,  Assessment - Complex & Suicide Assessment due at this time; however pt is sleeping; these assessments will be completed when pt wakes     Chay Self RN  10/19/22 5526

## 2022-10-19 NOTE — ED NOTES
Pt ambulated to the bathroom without assistance; ambulated well; continual virtual sitter remains in place; pt with no further needs at this time; will continue to monitor     Kat Vega RN  10/19/22 2316

## 2024-04-11 NOTE — ED NOTES
Provider at bedside       Aletha Boothe  08/07/18 1534 [FreeTextEntry1] : f/u granuloma annulare [de-identified] : 57F here for granuloma annulare.   Reports it started about 4 years ago and was being treated with topical steroids as well as ILK with some improvement. However, it then began becoming more widespread at which point she was started on NBUVB with minimal improvement. Also took trental x months without improvement. Had bx in 8/2020 that confirmed diagnosis of GA. Most recently was started on HCQ in 9/2020 and had initially noted some improvement in her lesions. Doxy was added however over 3 months, she noted worsening disease. Has lesions predominantly on the LEs. Of note, all age-appropriate cancer screening is negative and up-to-date per patient.   Since last visit, started on Humira x 2 months. Feels like her lesions may be lightening. Here today primarily to discuss flushing of the face and ears that occurs hours after administering Humira.

## 2024-06-02 NOTE — PLAN OF CARE
"Patient has history of non-adherence to medications  Frequent hospitalizations for DKA, most recently earlier this month  While in ED she reportedly self administered unknown amount of insulin to "get care she needed"  BGL initially >400, dropped to 30s    -Endocrine consulted, appreciate recommendations  -continue insulin drip, continue diet, initiating subq insulin  -POCT glucose with meals and at night.   -resume subQ insulin when clinically indicated  " DISCHARGE PLANNING     Discharge to home or other facility with appropriate resources Progressing        DISCHARGE PLANNING - CARE MANAGEMENT     Discharge to post-acute care or home with appropriate resources Progressing        INFECTION - ADULT     Absence or prevention of progression during hospitalization Progressing     Absence of fever/infection during neutropenic period Progressing        Knowledge Deficit     Patient/family/caregiver demonstrates understanding of disease process, treatment plan, medications, and discharge instructions Progressing        Nutrition/Hydration-ADULT     Nutrient/Hydration intake appropriate for improving, restoring or maintaining nutritional needs Progressing        PAIN - ADULT     Verbalizes/displays adequate comfort level or baseline comfort level Progressing        Potential for Falls     Patient will remain free of falls Progressing        Prexisting or High Potential for Compromised Skin Integrity     Skin integrity is maintained or improved Progressing        SAFETY ADULT     Maintain or return to baseline ADL function Progressing     Maintain or return mobility status to optimal level Progressing

## 2024-11-13 ENCOUNTER — HOSPITAL ENCOUNTER (INPATIENT)
Facility: HOSPITAL | Age: 34
LOS: 16 days | Discharge: HOME | DRG: 751 | End: 2024-11-29
Attending: STUDENT IN AN ORGANIZED HEALTH CARE EDUCATION/TRAINING PROGRAM | Admitting: PSYCHIATRY & NEUROLOGY
Payer: COMMERCIAL

## 2024-11-13 ENCOUNTER — HOSPITAL ENCOUNTER (EMERGENCY)
Facility: HOSPITAL | Age: 34
DRG: 885 | End: 2024-11-13
Attending: EMERGENCY MEDICINE
Payer: MEDICARE

## 2024-11-13 VITALS
SYSTOLIC BLOOD PRESSURE: 133 MMHG | OXYGEN SATURATION: 97 % | HEART RATE: 87 BPM | RESPIRATION RATE: 20 BRPM | HEIGHT: 66 IN | WEIGHT: 293 LBS | BODY MASS INDEX: 47.09 KG/M2 | DIASTOLIC BLOOD PRESSURE: 87 MMHG | TEMPERATURE: 97.9 F

## 2024-11-13 DIAGNOSIS — F32.3 CURRENT SEVERE EPISODE OF MAJOR DEPRESSIVE DISORDER WITH PSYCHOTIC FEATURES, UNSPECIFIED WHETHER RECURRENT (HCC): ICD-10-CM

## 2024-11-13 DIAGNOSIS — Z00.8 MEDICAL CLEARANCE FOR PSYCHIATRIC ADMISSION: ICD-10-CM

## 2024-11-13 DIAGNOSIS — F32.A DEPRESSION: Primary | ICD-10-CM

## 2024-11-13 LAB
AMPHETAMINES SERPL QL SCN: NEGATIVE
BARBITURATES UR QL: NEGATIVE
BENZODIAZ UR QL: NEGATIVE
COCAINE UR QL: NEGATIVE
ETHANOL EXG-MCNC: 0 MG/DL
EXT PREGNANCY TEST URINE: NEGATIVE
EXT. CONTROL: NORMAL
FENTANYL UR QL SCN: NEGATIVE
HYDROCODONE UR QL SCN: NEGATIVE
METHADONE UR QL: NEGATIVE
OPIATES UR QL SCN: NEGATIVE
OXYCODONE+OXYMORPHONE UR QL SCN: NEGATIVE
PCP UR QL: NEGATIVE
THC UR QL: POSITIVE

## 2024-11-13 PROCEDURE — 99284 EMERGENCY DEPT VISIT MOD MDM: CPT

## 2024-11-13 PROCEDURE — 81025 URINE PREGNANCY TEST: CPT | Performed by: EMERGENCY MEDICINE

## 2024-11-13 PROCEDURE — 99284 EMERGENCY DEPT VISIT MOD MDM: CPT | Performed by: EMERGENCY MEDICINE

## 2024-11-13 PROCEDURE — 82075 ASSAY OF BREATH ETHANOL: CPT | Performed by: EMERGENCY MEDICINE

## 2024-11-13 PROCEDURE — 80307 DRUG TEST PRSMV CHEM ANLYZR: CPT | Performed by: EMERGENCY MEDICINE

## 2024-11-13 RX ORDER — HALOPERIDOL 5 MG/1
2.5 TABLET ORAL
Status: DISCONTINUED | OUTPATIENT
Start: 2024-11-13 | End: 2024-11-29 | Stop reason: HOSPADM

## 2024-11-13 RX ORDER — HALOPERIDOL 2 MG/1
5 TABLET ORAL
Status: CANCELLED | OUTPATIENT
Start: 2024-11-13

## 2024-11-13 RX ORDER — HYDROXYZINE HYDROCHLORIDE 25 MG/1
50 TABLET, FILM COATED ORAL
Status: CANCELLED | OUTPATIENT
Start: 2024-11-13

## 2024-11-13 RX ORDER — LORAZEPAM 2 MG/ML
1 INJECTION INTRAMUSCULAR
Status: CANCELLED | OUTPATIENT
Start: 2024-11-13

## 2024-11-13 RX ORDER — LORAZEPAM 2 MG/ML
2 INJECTION INTRAMUSCULAR EVERY 6 HOURS PRN
Status: DISCONTINUED | OUTPATIENT
Start: 2024-11-13 | End: 2024-11-29 | Stop reason: HOSPADM

## 2024-11-13 RX ORDER — HALOPERIDOL 5 MG/1
5 TABLET ORAL
Status: DISCONTINUED | OUTPATIENT
Start: 2024-11-13 | End: 2024-11-29 | Stop reason: HOSPADM

## 2024-11-13 RX ORDER — BENZTROPINE MESYLATE 1 MG/ML
1 INJECTION, SOLUTION INTRAMUSCULAR; INTRAVENOUS
Status: CANCELLED | OUTPATIENT
Start: 2024-11-13

## 2024-11-13 RX ORDER — DIPHENHYDRAMINE HYDROCHLORIDE 50 MG/ML
50 INJECTION INTRAMUSCULAR; INTRAVENOUS EVERY 6 HOURS PRN
Status: CANCELLED | OUTPATIENT
Start: 2024-11-13

## 2024-11-13 RX ORDER — IBUPROFEN 600 MG/1
600 TABLET, FILM COATED ORAL EVERY 6 HOURS PRN
Status: CANCELLED | OUTPATIENT
Start: 2024-11-13

## 2024-11-13 RX ORDER — HALOPERIDOL 5 MG/ML
2.5 INJECTION INTRAMUSCULAR
Status: CANCELLED | OUTPATIENT
Start: 2024-11-13

## 2024-11-13 RX ORDER — IBUPROFEN 600 MG/1
600 TABLET, FILM COATED ORAL EVERY 6 HOURS PRN
Status: DISCONTINUED | OUTPATIENT
Start: 2024-11-13 | End: 2024-11-14

## 2024-11-13 RX ORDER — BENZTROPINE MESYLATE 0.5 MG/1
1 TABLET ORAL
Status: CANCELLED | OUTPATIENT
Start: 2024-11-13

## 2024-11-13 RX ORDER — IBUPROFEN 800 MG/1
800 TABLET, FILM COATED ORAL EVERY 8 HOURS PRN
Status: DISCONTINUED | OUTPATIENT
Start: 2024-11-13 | End: 2024-11-14

## 2024-11-13 RX ORDER — LORAZEPAM 2 MG/ML
2 INJECTION INTRAMUSCULAR
Status: CANCELLED | OUTPATIENT
Start: 2024-11-13

## 2024-11-13 RX ORDER — HALOPERIDOL 5 MG/ML
5 INJECTION INTRAMUSCULAR
Status: CANCELLED | OUTPATIENT
Start: 2024-11-13

## 2024-11-13 RX ORDER — PROPRANOLOL HYDROCHLORIDE 10 MG/1
10 TABLET ORAL EVERY 8 HOURS PRN
Status: DISCONTINUED | OUTPATIENT
Start: 2024-11-13 | End: 2024-11-29 | Stop reason: HOSPADM

## 2024-11-13 RX ORDER — AMOXICILLIN 250 MG
1 CAPSULE ORAL DAILY PRN
Status: DISCONTINUED | OUTPATIENT
Start: 2024-11-13 | End: 2024-11-29 | Stop reason: HOSPADM

## 2024-11-13 RX ORDER — IBUPROFEN 400 MG/1
800 TABLET, FILM COATED ORAL EVERY 8 HOURS PRN
Status: CANCELLED | OUTPATIENT
Start: 2024-11-13

## 2024-11-13 RX ORDER — HYDROXYZINE HYDROCHLORIDE 50 MG/1
100 TABLET, FILM COATED ORAL
Status: DISCONTINUED | OUTPATIENT
Start: 2024-11-13 | End: 2024-11-29 | Stop reason: HOSPADM

## 2024-11-13 RX ORDER — LORAZEPAM 2 MG/ML
2 INJECTION INTRAMUSCULAR
Status: DISCONTINUED | OUTPATIENT
Start: 2024-11-13 | End: 2024-11-29 | Stop reason: HOSPADM

## 2024-11-13 RX ORDER — BENZTROPINE MESYLATE 1 MG/ML
1 INJECTION, SOLUTION INTRAMUSCULAR; INTRAVENOUS
Status: DISCONTINUED | OUTPATIENT
Start: 2024-11-13 | End: 2024-11-29 | Stop reason: HOSPADM

## 2024-11-13 RX ORDER — HALOPERIDOL 5 MG/ML
2.5 INJECTION INTRAMUSCULAR
Status: DISCONTINUED | OUTPATIENT
Start: 2024-11-13 | End: 2024-11-29 | Stop reason: HOSPADM

## 2024-11-13 RX ORDER — TRAZODONE HYDROCHLORIDE 50 MG/1
50 TABLET, FILM COATED ORAL
Status: CANCELLED | OUTPATIENT
Start: 2024-11-13

## 2024-11-13 RX ORDER — BISACODYL 10 MG
10 SUPPOSITORY, RECTAL RECTAL DAILY PRN
Status: CANCELLED | OUTPATIENT
Start: 2024-11-13

## 2024-11-13 RX ORDER — LORAZEPAM 2 MG/ML
1 INJECTION INTRAMUSCULAR
Status: DISCONTINUED | OUTPATIENT
Start: 2024-11-13 | End: 2024-11-29 | Stop reason: HOSPADM

## 2024-11-13 RX ORDER — PROPRANOLOL HCL 20 MG
10 TABLET ORAL EVERY 8 HOURS PRN
Status: CANCELLED | OUTPATIENT
Start: 2024-11-13

## 2024-11-13 RX ORDER — POLYETHYLENE GLYCOL 3350 17 G/17G
17 POWDER, FOR SOLUTION ORAL DAILY PRN
Status: CANCELLED | OUTPATIENT
Start: 2024-11-13

## 2024-11-13 RX ORDER — LORAZEPAM 2 MG/ML
2 INJECTION INTRAMUSCULAR EVERY 6 HOURS PRN
Status: CANCELLED | OUTPATIENT
Start: 2024-11-13

## 2024-11-13 RX ORDER — BENZTROPINE MESYLATE 1 MG/ML
0.5 INJECTION, SOLUTION INTRAMUSCULAR; INTRAVENOUS
Status: DISCONTINUED | OUTPATIENT
Start: 2024-11-13 | End: 2024-11-29 | Stop reason: HOSPADM

## 2024-11-13 RX ORDER — HYDROXYZINE HYDROCHLORIDE 25 MG/1
25 TABLET, FILM COATED ORAL
Status: DISCONTINUED | OUTPATIENT
Start: 2024-11-13 | End: 2024-11-29 | Stop reason: HOSPADM

## 2024-11-13 RX ORDER — POLYETHYLENE GLYCOL 3350 17 G/17G
17 POWDER, FOR SOLUTION ORAL DAILY PRN
Status: DISCONTINUED | OUTPATIENT
Start: 2024-11-13 | End: 2024-11-29 | Stop reason: HOSPADM

## 2024-11-13 RX ORDER — HYDROXYZINE HYDROCHLORIDE 25 MG/1
25 TABLET, FILM COATED ORAL
Status: CANCELLED | OUTPATIENT
Start: 2024-11-13

## 2024-11-13 RX ORDER — AMOXICILLIN 250 MG
1 CAPSULE ORAL DAILY PRN
Status: CANCELLED | OUTPATIENT
Start: 2024-11-13

## 2024-11-13 RX ORDER — BENZTROPINE MESYLATE 1 MG/ML
0.5 INJECTION, SOLUTION INTRAMUSCULAR; INTRAVENOUS
Status: CANCELLED | OUTPATIENT
Start: 2024-11-13

## 2024-11-13 RX ORDER — IBUPROFEN 400 MG/1
400 TABLET, FILM COATED ORAL EVERY 4 HOURS PRN
Status: DISCONTINUED | OUTPATIENT
Start: 2024-11-13 | End: 2024-11-14

## 2024-11-13 RX ORDER — HALOPERIDOL 5 MG/ML
5 INJECTION INTRAMUSCULAR
Status: DISCONTINUED | OUTPATIENT
Start: 2024-11-13 | End: 2024-11-29 | Stop reason: HOSPADM

## 2024-11-13 RX ORDER — MAGNESIUM HYDROXIDE/ALUMINUM HYDROXICE/SIMETHICONE 120; 1200; 1200 MG/30ML; MG/30ML; MG/30ML
30 SUSPENSION ORAL EVERY 4 HOURS PRN
Status: DISCONTINUED | OUTPATIENT
Start: 2024-11-13 | End: 2024-11-29 | Stop reason: HOSPADM

## 2024-11-13 RX ORDER — IBUPROFEN 400 MG/1
400 TABLET, FILM COATED ORAL EVERY 4 HOURS PRN
Status: CANCELLED | OUTPATIENT
Start: 2024-11-13

## 2024-11-13 RX ORDER — HALOPERIDOL 2 MG/1
1 TABLET ORAL EVERY 6 HOURS PRN
Status: CANCELLED | OUTPATIENT
Start: 2024-11-13

## 2024-11-13 RX ORDER — HALOPERIDOL 1 MG/1
1 TABLET ORAL EVERY 6 HOURS PRN
Status: DISCONTINUED | OUTPATIENT
Start: 2024-11-13 | End: 2024-11-29 | Stop reason: HOSPADM

## 2024-11-13 RX ORDER — MAGNESIUM HYDROXIDE/ALUMINUM HYDROXICE/SIMETHICONE 120; 1200; 1200 MG/30ML; MG/30ML; MG/30ML
30 SUSPENSION ORAL EVERY 4 HOURS PRN
Status: CANCELLED | OUTPATIENT
Start: 2024-11-13

## 2024-11-13 RX ORDER — HYDROXYZINE HYDROCHLORIDE 50 MG/1
50 TABLET, FILM COATED ORAL
Status: DISCONTINUED | OUTPATIENT
Start: 2024-11-13 | End: 2024-11-29 | Stop reason: HOSPADM

## 2024-11-13 RX ORDER — BISACODYL 10 MG
10 SUPPOSITORY, RECTAL RECTAL DAILY PRN
Status: DISCONTINUED | OUTPATIENT
Start: 2024-11-13 | End: 2024-11-29 | Stop reason: HOSPADM

## 2024-11-13 RX ORDER — TRAZODONE HYDROCHLORIDE 50 MG/1
50 TABLET, FILM COATED ORAL
Status: DISCONTINUED | OUTPATIENT
Start: 2024-11-13 | End: 2024-11-29 | Stop reason: HOSPADM

## 2024-11-13 RX ORDER — DIPHENHYDRAMINE HYDROCHLORIDE 50 MG/ML
50 INJECTION INTRAMUSCULAR; INTRAVENOUS EVERY 6 HOURS PRN
Status: DISCONTINUED | OUTPATIENT
Start: 2024-11-13 | End: 2024-11-29 | Stop reason: HOSPADM

## 2024-11-13 RX ORDER — HYDROXYZINE HYDROCHLORIDE 25 MG/1
100 TABLET, FILM COATED ORAL
Status: CANCELLED | OUTPATIENT
Start: 2024-11-13

## 2024-11-13 RX ORDER — BENZTROPINE MESYLATE 1 MG/1
1 TABLET ORAL
Status: DISCONTINUED | OUTPATIENT
Start: 2024-11-13 | End: 2024-11-29 | Stop reason: HOSPADM

## 2024-11-13 RX ADMIN — Medication 3 MG: at 21:09

## 2024-11-13 NOTE — ED NOTES
34 y.o female patient presented to the ED via EMS.  Pt appeared very anxious and was constantly rocking her body during the assessment.  Pt reported she has been off her medications due to not having insurance to pay for it. Pt stated she has been depressed and overwhelmed. Pt reported stressors of financial instability, being evicted from her apartment , no insurance, no transportation , unemployed and mental health issues.  Pt denies any SI, HI and AVH. Pt reported having SI in the past and had made attempts to overdose or stab self with a knife.   Pt has no access to firearms. Pt reported past inpatient mental health hospitalizations. Pt does not have any outpatient services. Pt denies any legal and substance abuse issues. Pt is willing to sign a 201. Provider is in agreement with this treatment plan.

## 2024-11-13 NOTE — ED NOTES
Patient is accepted at [facility]  Patient is accepted by Dr.Qureshi glenny awan     Transportation is arranged with [employer].     Transportation is scheduled for [facility].   Patient may go to the floor at [facility].          Nurse report is to be called to [phone] prior to patient transfer.

## 2024-11-13 NOTE — ED NOTES
AINSLEY Sánchez    Recipient ID: [id number]    Inactive  Service Type: 30-Health Benefit Plan Coverage  Plan 11/13/2024

## 2024-11-14 PROBLEM — F19.10 DRUG ABUSE (HCC): Status: ACTIVE | Noted: 2024-11-14

## 2024-11-14 PROBLEM — F33.2 SEVERE EPISODE OF RECURRENT MAJOR DEPRESSIVE DISORDER, WITHOUT PSYCHOTIC FEATURES (HCC): Status: RESOLVED | Noted: 2019-01-03 | Resolved: 2024-11-14

## 2024-11-14 PROBLEM — F40.00 AGORAPHOBIA: Status: ACTIVE | Noted: 2024-11-14

## 2024-11-14 PROBLEM — F32.A DEPRESSION: Status: ACTIVE | Noted: 2024-11-14

## 2024-11-14 PROBLEM — R79.89 ELEVATED LFTS: Status: ACTIVE | Noted: 2024-11-14

## 2024-11-14 PROBLEM — Z00.8 MEDICAL CLEARANCE FOR PSYCHIATRIC ADMISSION: Status: ACTIVE | Noted: 2024-11-14

## 2024-11-14 PROBLEM — E66.9 OBESITY: Status: ACTIVE | Noted: 2024-11-14

## 2024-11-14 PROBLEM — F32.3 MAJOR DEPRESSION WITH PSYCHOTIC FEATURES (HCC): Status: RESOLVED | Noted: 2018-08-09 | Resolved: 2024-11-14

## 2024-11-14 LAB
25(OH)D3 SERPL-MCNC: 19.3 NG/ML (ref 30–100)
ALBUMIN SERPL BCG-MCNC: 3.8 G/DL (ref 3.5–5)
ALP SERPL-CCNC: 67 U/L (ref 34–104)
ALT SERPL W P-5'-P-CCNC: 66 U/L (ref 7–52)
ANION GAP SERPL CALCULATED.3IONS-SCNC: 7 MMOL/L (ref 4–13)
AST SERPL W P-5'-P-CCNC: 40 U/L (ref 13–39)
BACTERIA UR QL AUTO: ABNORMAL /HPF
BASOPHILS # BLD AUTO: 0.06 THOUSANDS/ΜL (ref 0–0.1)
BASOPHILS NFR BLD AUTO: 1 % (ref 0–1)
BILIRUB SERPL-MCNC: 0.54 MG/DL (ref 0.2–1)
BILIRUB UR QL STRIP: NEGATIVE
BUN SERPL-MCNC: 13 MG/DL (ref 5–25)
CALCIUM SERPL-MCNC: 8.7 MG/DL (ref 8.4–10.2)
CHLORIDE SERPL-SCNC: 105 MMOL/L (ref 96–108)
CHOLEST SERPL-MCNC: 182 MG/DL (ref ?–200)
CLARITY UR: ABNORMAL
CO2 SERPL-SCNC: 25 MMOL/L (ref 21–32)
COLOR UR: YELLOW
CREAT SERPL-MCNC: 1.69 MG/DL (ref 0.6–1.3)
EOSINOPHIL # BLD AUTO: 0.15 THOUSAND/ΜL (ref 0–0.61)
EOSINOPHIL NFR BLD AUTO: 2 % (ref 0–6)
ERYTHROCYTE [DISTWIDTH] IN BLOOD BY AUTOMATED COUNT: 15.8 % (ref 11.6–15.1)
FOLATE SERPL-MCNC: 5 NG/ML
GFR SERPL CREATININE-BSD FRML MDRD: 38 ML/MIN/1.73SQ M
GLUCOSE P FAST SERPL-MCNC: 91 MG/DL (ref 65–99)
GLUCOSE SERPL-MCNC: 91 MG/DL (ref 65–140)
GLUCOSE UR STRIP-MCNC: NEGATIVE MG/DL
HCG SERPL QL: NEGATIVE
HCT VFR BLD AUTO: 40.1 % (ref 34.8–46.1)
HDLC SERPL-MCNC: 39 MG/DL
HGB BLD-MCNC: 12.6 G/DL (ref 11.5–15.4)
HGB UR QL STRIP.AUTO: NEGATIVE
HYALINE CASTS #/AREA URNS LPF: ABNORMAL /LPF
IMM GRANULOCYTES # BLD AUTO: 0.01 THOUSAND/UL (ref 0–0.2)
IMM GRANULOCYTES NFR BLD AUTO: 0 % (ref 0–2)
KETONES UR STRIP-MCNC: NEGATIVE MG/DL
LDLC SERPL CALC-MCNC: 113 MG/DL (ref 0–100)
LEUKOCYTE ESTERASE UR QL STRIP: ABNORMAL
LYMPHOCYTES # BLD AUTO: 2.22 THOUSANDS/ΜL (ref 0.6–4.47)
LYMPHOCYTES NFR BLD AUTO: 29 % (ref 14–44)
MCH RBC QN AUTO: 27.8 PG (ref 26.8–34.3)
MCHC RBC AUTO-ENTMCNC: 31.4 G/DL (ref 31.4–37.4)
MCV RBC AUTO: 88 FL (ref 82–98)
MONOCYTES # BLD AUTO: 0.64 THOUSAND/ΜL (ref 0.17–1.22)
MONOCYTES NFR BLD AUTO: 9 % (ref 4–12)
NEUTROPHILS # BLD AUTO: 4.49 THOUSANDS/ΜL (ref 1.85–7.62)
NEUTS SEG NFR BLD AUTO: 59 % (ref 43–75)
NITRITE UR QL STRIP: NEGATIVE
NON-SQ EPI CELLS URNS QL MICRO: ABNORMAL /HPF
NONHDLC SERPL-MCNC: 143 MG/DL
NRBC BLD AUTO-RTO: 0 /100 WBCS
PH UR STRIP.AUTO: 5 [PH]
PLATELET # BLD AUTO: 358 THOUSANDS/UL (ref 149–390)
PMV BLD AUTO: 11.5 FL (ref 8.9–12.7)
POTASSIUM SERPL-SCNC: 3.8 MMOL/L (ref 3.5–5.3)
PROT SERPL-MCNC: 7.4 G/DL (ref 6.4–8.4)
PROT UR STRIP-MCNC: NEGATIVE MG/DL
RBC # BLD AUTO: 4.54 MILLION/UL (ref 3.81–5.12)
RBC #/AREA URNS AUTO: ABNORMAL /HPF
SODIUM SERPL-SCNC: 137 MMOL/L (ref 135–147)
SP GR UR STRIP.AUTO: 1.02 (ref 1–1.03)
TREPONEMA PALLIDUM IGG+IGM AB [PRESENCE] IN SERUM OR PLASMA BY IMMUNOASSAY: NORMAL
TRIGL SERPL-MCNC: 148 MG/DL (ref ?–150)
TSH SERPL DL<=0.05 MIU/L-ACNC: 1.31 UIU/ML (ref 0.45–4.5)
UROBILINOGEN UR STRIP-ACNC: <2 MG/DL
VIT B12 SERPL-MCNC: 441 PG/ML (ref 180–914)
WBC # BLD AUTO: 7.57 THOUSAND/UL (ref 4.31–10.16)
WBC #/AREA URNS AUTO: ABNORMAL /HPF

## 2024-11-14 PROCEDURE — 80061 LIPID PANEL: CPT | Performed by: PSYCHIATRY & NEUROLOGY

## 2024-11-14 PROCEDURE — 82746 ASSAY OF FOLIC ACID SERUM: CPT | Performed by: PSYCHIATRY & NEUROLOGY

## 2024-11-14 PROCEDURE — 80053 COMPREHEN METABOLIC PANEL: CPT | Performed by: PSYCHIATRY & NEUROLOGY

## 2024-11-14 PROCEDURE — 84443 ASSAY THYROID STIM HORMONE: CPT | Performed by: PSYCHIATRY & NEUROLOGY

## 2024-11-14 PROCEDURE — 82607 VITAMIN B-12: CPT | Performed by: PSYCHIATRY & NEUROLOGY

## 2024-11-14 PROCEDURE — 99222 1ST HOSP IP/OBS MODERATE 55: CPT | Performed by: PHYSICIAN ASSISTANT

## 2024-11-14 PROCEDURE — 81001 URINALYSIS AUTO W/SCOPE: CPT | Performed by: PSYCHIATRY & NEUROLOGY

## 2024-11-14 PROCEDURE — 86780 TREPONEMA PALLIDUM: CPT | Performed by: PSYCHIATRY & NEUROLOGY

## 2024-11-14 PROCEDURE — 93005 ELECTROCARDIOGRAM TRACING: CPT

## 2024-11-14 PROCEDURE — 82306 VITAMIN D 25 HYDROXY: CPT | Performed by: PSYCHIATRY & NEUROLOGY

## 2024-11-14 PROCEDURE — 84703 CHORIONIC GONADOTROPIN ASSAY: CPT | Performed by: PSYCHIATRY & NEUROLOGY

## 2024-11-14 PROCEDURE — 85025 COMPLETE CBC W/AUTO DIFF WBC: CPT | Performed by: PSYCHIATRY & NEUROLOGY

## 2024-11-14 PROCEDURE — 99223 1ST HOSP IP/OBS HIGH 75: CPT | Performed by: PSYCHIATRY & NEUROLOGY

## 2024-11-14 RX ORDER — MIRTAZAPINE 15 MG/1
7.5 TABLET, FILM COATED ORAL
Status: DISCONTINUED | OUTPATIENT
Start: 2024-11-14 | End: 2024-11-15

## 2024-11-14 RX ADMIN — MIRTAZAPINE 7.5 MG: 15 TABLET, FILM COATED ORAL at 21:36

## 2024-11-14 NOTE — ED PROVIDER NOTES
Time reflects when diagnosis was documented in both MDM as applicable and the Disposition within this note       Time User Action Codes Description Comment    11/13/2024  5:43 PM Hilario Mckeon Add [F32.A] Depression           ED Disposition       ED Disposition   Transfer to Behavioral Health    Condition   --    Date/Time   Wed Nov 13, 2024  5:43 PM    Comment   Gabby Mares should be transferred out to TBD and has been medically cleared.               Assessment & Plan       Medical Decision Making  34-year-old female who presented for concerns as noted in HPI.  Exam performed no obvious cysts or abscess appreciated.  Patient may have dense breast tissue causing her symptoms.  Advise she needs to have follow-up with PCP and possible mammogram outpatient.  Patient was seen and evaluated by crisis, signed a voluntary admission and would like to pursue inpatient treatment.  She was transferred in stable care to psychiatric treatment facility.    Amount and/or Complexity of Data Reviewed  Labs: ordered.    Risk  Decision regarding hospitalization.        ED Course as of 11/14/24 0026   Wed Nov 13, 2024   1609 No SI/HI. Feels like she can't get her life in order due to worsening MH symptoms. Has agoraphobia. Going to public places difficult for her to complete establishment with PCP, Psych and came off meds due to lack of insurance.       Medications - No data to display    ED Risk Strat Scores                           SBIRT 22yo+      Flowsheet Row Most Recent Value   Initial Alcohol Screen: US AUDIT-C     1. How often do you have a drink containing alcohol? 0 Filed at: 11/13/2024 1427   2. How many drinks containing alcohol do you have on a typical day you are drinking?  0 Filed at: 11/13/2024 1427   3a. Male UNDER 65: How often do you have five or more drinks on one occasion? 0 Filed at: 11/13/2024 1427   3b. FEMALE Any Age, or MALE 65+: How often do you have 4 or more drinks on one occassion? 0 Filed at:  11/13/2024 1427   Audit-C Score 0 Filed at: 11/13/2024 1427   FRIDA: How many times in the past year have you...    Used an illegal drug or used a prescription medication for non-medical reasons? Never Filed at: 11/13/2024 1427                            History of Present Illness       Chief Complaint   Patient presents with    Medical Problem     Pt to er with reports of not eating for the past couple days because she is out of food, has lumps on her breasts and her body, and has a lot of mental health issues. Does not currently have insurance, and had to stop her medications due to not being able to afford them. Denies SI/HI       Past Medical History:   Diagnosis Date    Acne     Agoraphobia     Anxiety     Depression     Eating disorder     Heart disease     Hypertension     Impulse control disorder     Obesity     Panic attack     Panic disorder     Psychiatric disorder     depression, bipolar, schizophrenia    Psychiatric illness     Psychosis (HCC)     Schizophrenia (HCC)     Schizophrenia (HCC)     Schizophrenia (HCC)     Seizures (HCC)     Self-injurious behavior     Sleep difficulties     Suicide attempt (HCC)       Past Surgical History:   Procedure Laterality Date    CYSTOSCOPY      INCISION AND DRAINAGE OF WOUND N/A 2/1/2019    Procedure: INCISION AND DRAINAGE (I&D) TRUNK;  Surgeon: Mathew Fatima DO;  Location:  MAIN OR;  Service: General    WISDOM TOOTH EXTRACTION        Family History   Problem Relation Age of Onset    No Known Problems Mother     Hypertension Father     Mental illness Father     Cancer Father     Glaucoma Paternal Grandfather       Social History     Tobacco Use    Smoking status: Never     Passive exposure: Never    Smokeless tobacco: Never   Vaping Use    Vaping status: Never Used   Substance Use Topics    Alcohol use: No    Drug use: No      E-Cigarette/Vaping    E-Cigarette Use Never User       E-Cigarette/Vaping Substances    Nicotine No     THC No     CBD No     Flavoring No      Other No     Unknown No       I have reviewed and agree with the history as documented.     34-year-old female presenting for concerns of mental health evaluation.  She states that she has been off her medications for several months due to financial difficulties.  She is lost her PCP and her therapist.  She feels like her anxiety and agoraphobia are preventing her from being able to even perform basic daily tasks.  No suicidal homicidal ideations no auditory or visual hallucinations.  Patient also notes that she feels like she has lumps in her chest breast and groin.  She states been there for several months.  No fevers no chills no history of abscess or hidradenitis.  Denies trauma.        Review of Systems   Constitutional: Negative.  Negative for chills and fever.   HENT: Negative.  Negative for rhinorrhea, sore throat, trouble swallowing and voice change.    Eyes: Negative.  Negative for pain and visual disturbance.   Respiratory: Negative.  Negative for cough, shortness of breath and wheezing.    Cardiovascular: Negative.  Negative for chest pain and palpitations.   Gastrointestinal:  Negative for abdominal pain, diarrhea, nausea and vomiting.   Genitourinary: Negative.  Negative for dysuria and frequency.   Musculoskeletal: Negative.  Negative for neck pain and neck stiffness.   Skin: Negative.  Negative for rash.   Neurological: Negative.  Negative for dizziness, speech difficulty, weakness, light-headedness and numbness.   Psychiatric/Behavioral:  Negative for suicidal ideas. The patient is nervous/anxious.            Objective       ED Triage Vitals   Temperature Pulse Blood Pressure Respirations SpO2 Patient Position - Orthostatic VS   11/13/24 1426 11/13/24 1426 11/13/24 1427 11/13/24 1426 11/13/24 1426 11/13/24 1427   97.9 °F (36.6 °C) 93 112/84 18 96 % Sitting      Temp Source Heart Rate Source BP Location FiO2 (%) Pain Score    11/13/24 1426 11/13/24 1426 11/13/24 1427 -- --    Temporal Monitor  Right arm        Vitals      Date and Time Temp Pulse SpO2 Resp BP Pain Score FACES Pain Rating User   11/13/24 1607 -- 87 97 % 20 133/87 -- -- KP   11/13/24 1427 -- -- -- -- 112/84 -- -- HR   11/13/24 1426 97.9 °F (36.6 °C) 93 96 % 18 -- -- -- HR            Physical Exam  Vitals and nursing note reviewed.   Constitutional:       General: She is not in acute distress.     Appearance: She is well-developed.   HENT:      Head: Normocephalic and atraumatic.   Eyes:      Conjunctiva/sclera: Conjunctivae normal.      Pupils: Pupils are equal, round, and reactive to light.   Neck:      Trachea: No tracheal deviation.   Cardiovascular:      Rate and Rhythm: Normal rate and regular rhythm.   Pulmonary:      Effort: Pulmonary effort is normal. No respiratory distress.      Breath sounds: Normal breath sounds. No wheezing or rales.   Abdominal:      General: Bowel sounds are normal. There is no distension.      Palpations: Abdomen is soft.      Tenderness: There is no abdominal tenderness. There is no guarding or rebound.   Musculoskeletal:         General: No tenderness or deformity. Normal range of motion.      Cervical back: Normal range of motion and neck supple.   Skin:     General: Skin is warm and dry.      Capillary Refill: Capillary refill takes less than 2 seconds.      Findings: No rash.      Comments: Exam performed with female nurse at the bedside.  The patient has no obvious abscess or cystic formations in the breast bilaterally anterior chest.  Or groin.   Neurological:      Mental Status: She is alert and oriented to person, place, and time.   Psychiatric:         Behavior: Behavior normal.         Results Reviewed       Procedure Component Value Units Date/Time    Rapid drug screen, urine [[id number]]  (Abnormal) Collected: 11/13/24 1718    Lab Status: Final result Specimen: Urine, Other Updated: 11/13/24 1895     Amph/Meth UR Negative     Barbiturate Ur Negative     Benzodiazepine Urine Negative     Cocaine  Urine Negative     Methadone Urine Negative     Opiate Urine Negative     PCP Ur Negative     THC Urine Positive     Oxycodone Urine Negative     Fentanyl Urine Negative     HYDROCODONE URINE Negative    Narrative:      Presumptive report. If requested, specimen will be sent to reference lab for confirmation.  FOR MEDICAL PURPOSES ONLY.   IF CONFIRMATION NEEDED PLEASE CONTACT THE LAB WITHIN 5 DAYS.    Drug Screen Cutoff Levels:  AMPHETAMINE/METHAMPHETAMINES  1000 ng/mL  BARBITURATES     200 ng/mL  BENZODIAZEPINES     200 ng/mL  COCAINE      300 ng/mL  METHADONE      300 ng/mL  OPIATES      300 ng/mL  PHENCYCLIDINE     25 ng/mL  THC       50 ng/mL  OXYCODONE      100 ng/mL  FENTANYL      5 ng/mL  HYDROCODONE     300 ng/mL    POCT pregnancy, urine [[id number]]  (Normal) Collected: 11/13/24 1725    Lab Status: Final result Updated: 11/13/24 1725     EXT Preg Test, Ur Negative     Control Valid    POCT alcohol breath test [[id number]]  (Normal) Resulted: 11/13/24 1613    Lab Status: Final result Updated: 11/13/24 1613     EXTBreath Alcohol 0.00            No orders to display       Procedures    ED Medication and Procedure Management   Prior to Admission Medications   Prescriptions Last Dose Informant Patient Reported? Taking?   ARIPiprazole (ABILIFY) 10 mg tablet   No No   Sig: Take 1 tablet (10 mg total) by mouth daily at bedtime   Patient not taking: Reported on 11/13/2024   buPROPion (WELLBUTRIN XL) 150 mg 24 hr tablet   Yes No   Sig: Take 150 mg by mouth every morning   Patient not taking: Reported on 11/13/2024   buPROPion (WELLBUTRIN) 75 mg tablet   Yes No   Sig: Take 75 mg by mouth every morning   Patient not taking: Reported on 11/13/2024   busPIRone (BUSPAR) 10 mg tablet   Yes No   Sig: Take 10 mg by mouth 2 (two) times a day   Patient not taking: Reported on 11/13/2024   ferrous sulfate 325 (65 Fe) mg tablet   No No   Sig: Take 1 tablet (325 mg total) by mouth daily with breakfast   Patient not taking: Reported  on 11/13/2024   topiramate (TOPAMAX) 50 MG tablet   Yes No   Sig: Take 50 mg by mouth 2 (two) times a day   Patient not taking: Reported on 11/13/2024   traZODone (DESYREL) 50 mg tablet   Yes No   Sig: Take 50 mg by mouth daily at bedtime   Patient not taking: Reported on 11/13/2024   zolpidem (AMBIEN) 10 mg tablet   Yes No   Sig: Take 10 mg by mouth daily at bedtime as needed for sleep   Patient not taking: Reported on 11/13/2024      Facility-Administered Medications: None     Discharge Medication List as of 11/13/2024  7:55 PM        CONTINUE these medications which have NOT CHANGED    Details   ARIPiprazole (ABILIFY) 10 mg tablet Take 1 tablet (10 mg total) by mouth daily at bedtime, Starting Thu 1/10/2019, Print      buPROPion (WELLBUTRIN XL) 150 mg 24 hr tablet Take 150 mg by mouth every morning, Historical Med      buPROPion (WELLBUTRIN) 75 mg tablet Take 75 mg by mouth every morning, Historical Med      busPIRone (BUSPAR) 10 mg tablet Take 10 mg by mouth 2 (two) times a day, Historical Med      ferrous sulfate 325 (65 Fe) mg tablet Take 1 tablet (325 mg total) by mouth daily with breakfast, Starting Sat 8/11/2018, No Print      topiramate (TOPAMAX) 50 MG tablet Take 50 mg by mouth 2 (two) times a day, Historical Med      traZODone (DESYREL) 50 mg tablet Take 50 mg by mouth daily at bedtime, Historical Med      zolpidem (AMBIEN) 10 mg tablet Take 10 mg by mouth daily at bedtime as needed for sleep, Historical Med           No discharge procedures on file.  ED SEPSIS DOCUMENTATION   Time reflects when diagnosis was documented in both MDM as applicable and the Disposition within this note       Time User Action Codes Description Comment    11/13/2024  5:43 PM Hilario Mckeon Add [F32.A] Depression                  Hilario Mckeon DO  11/14/24 0028

## 2024-11-14 NOTE — NURSING NOTE
Pt pleasant and cooperative during interaction. Pt social with peers and attending groups. Educated on starting Remeron at bedtime. Pt denies SI/HI or hallucination. Pt thankful for admission.

## 2024-11-14 NOTE — ASSESSMENT & PLAN NOTE
Patient with noted creatinine of 1.69  Per chart review, appears this may be likely near patient's baseline  Creatinine in 2022 1.5-1.7  Encourage oral hydration  Avoid nephrotoxic agents  Repeat tomorrow

## 2024-11-14 NOTE — PLAN OF CARE
Problem: Alteration in Thoughts and Perception  Goal: Verbalize thoughts and feelings  Description: Interventions:  - Promote a nonjudgmental and trusting relationship with the patient through active listening and therapeutic communication  - Assess patient's level of functioning, behavior and potential for risk  - Engage patient in 1 on 1 interactions  - Encourage patient to express fears, feelings, frustrations, and discuss symptoms    - [place] patient to reality, help patient recognize reality-based thinking   - Administer medications as ordered and assess for potential side effects  - Provide the patient education related to the signs and symptoms of the illness and desired effects of prescribed medications  Outcome: Progressing  Goal: Refrain from acting on delusional thinking/internal stimuli  Description: Interventions:  - Monitor patient closely, per order   - Utilize least restrictive measures   - Set reasonable limits, give positive feedback for acceptable   - Administer medications as ordered and monitor of potential side effects  Outcome: Progressing  Goal: Agree to be compliant with medication regime, as prescribed and report medication side effects  Description: Interventions:  - Offer appropriate PRN medication and supervise ingestion; conduct AIMS, as needed   Outcome: Progressing  Goal: Attend and participate in unit activities, including therapeutic, recreational, and educational groups  Description: Interventions:  -Encourage Visitation and family involvement in care  Outcome: Not Progressing  Goal: Recognize dysfunctional thoughts, communicate reality-based thoughts at the time of discharge  Description: Interventions:  - Provide medication and psycho-education to assist patient in compliance and developing insight into his/her illness   Outcome: Progressing  Goal: Complete daily ADLs, including personal hygiene independently, as able  Description: Interventions:  - Observe, teach, and assist  patient with ADLS  - Monitor and promote a balance of rest/activity, with adequate nutrition and elimination   Outcome: Progressing     Problem: Ineffective Coping  Goal: Cooperates with admission process  Description: Interventions:   - Complete admission process  Outcome: Completed  Goal: Identifies ineffective coping skills  Outcome: Not Progressing  Goal: Identifies healthy coping skills  Outcome: Not Progressing  Goal: Demonstrates healthy coping skills  Outcome: Not Progressing  Goal: Participates in unit activities  Description: Interventions:  - Provide therapeutic environment   - Provide required programming   - Redirect inappropriate behaviors   Outcome: Not Progressing     Problem: Risk for Self Injury/Neglect  Goal: Treatment Goal: Remain safe during length of stay, learn and adopt new coping skills, and be free of self-injurious ideation, impulses and acts at the time of discharge  Outcome: Progressing  Goal: Verbalize thoughts and feelings  Description: Interventions:  - Assess and re-assess patient's lethality and potential for self-injury  - Engage patient in 1:1 interactions, daily, for a minimum of 15 minutes  - Encourage patient to express feelings, fears, frustrations, hopes  - Establish rapport/trust with patient   Outcome: Progressing  Goal: Refrain from harming self  Description: Interventions:  - Monitor patient closely, per order  - Develop a trusting relationship  - Supervise medication ingestion, monitor effects and side effects   Outcome: Progressing     Problem: DISCHARGE PLANNING  Goal: Discharge to home or other facility with appropriate resources  Description: INTERVENTIONS:  - Identify barriers to discharge w/patient and caregiver  - Arrange for needed discharge resources and transportation as appropriate  - Identify discharge learning needs (meds, wound care, etc.)  - Arrange for interpretive services to assist at discharge as needed  - Refer to Case Management Department for  coordinating discharge planning if the patient needs post-hospital services based on physician/advanced practitioner order or complex needs related to functional status, cognitive ability, or social support system  Outcome: Progressing

## 2024-11-14 NOTE — TREATMENT PLAN
TREATMENT PLAN REVIEW - Behavioral Health Gabby Mares 34 y.o. 1990 female MRN: [id number]    [facility] Room / Bed: [facility] 208/[facility] 208-01 Encounter: [id number]          Admit Date/Time:  11/13/2024  8:14 PM    Treatment Team:   DO Helen Fosterlotte Radha  Siria Mathew, JOHN Mathew, MARY JO Calderon, JOHN Matthews RN    Diagnosis: Principal Problem:    Depression  Active Problems:    Anxiety disorder    Medical clearance for psychiatric admission    Drug abuse (HCC)    Agoraphobia      Patient Strengths/Assets: cooperative, communication skills    Patient Barriers/Limitations: lack of financial means, lack of social/family support, limited family ties    Short Term Goals: decrease in depressive symptoms, decrease in anxiety symptoms, decrease in suicidal thoughts    Long Term Goals: improvement in depression, improvement in anxiety, free of suicidal thoughts    Progress Towards Goals: starting psychiatric medications as prescribed, improving gradually    Recommended Treatment: medication management, patient medication education, group therapy, milieu therapy, continued Behavioral Health psychiatric evaluation/assessment process    Treatment Frequency: daily medication monitoring, group and milieu therapy daily, monitoring through interdisciplinary rounds, monitoring through weekly patient care conferences    Expected Discharge Date:  5-7 days    Discharge Plan: discharge to home vs. shelter    Treatment Plan Created/Updated By: Puma Newby DO

## 2024-11-14 NOTE — NURSING NOTE
"Pt resting in bed this morning. Pt states \"I am not okay\". Pt rambling about being off medications d/t insurance issue. Pt concerned about not being able to return to apartment. Reports living alone and being behind on rent. States \"I think I can return, if they give me an eviction notice I will still have time before it goes in front of the court\". Pt denies SI/HI or hallucination. Pt appears anxious and fidgeting during interaction. Educated on unit schedule. Pt social with roommate.   "

## 2024-11-14 NOTE — CONSULTS
Consultation - Hospitalist   Name: Gabby Mares 34 y.o. female I MRN: [id number]  Unit/Bed#: -01 I Date of Admission: 11/13/2024   Date of Service: 11/14/2024 I Hospital Day: 1   Inpatient consult for Medical Clearance for  patient  Consult performed by: Sarika Jones PA-C  Consult ordered by: Margot De La Cruz MD        Physician Requesting Evaluation: Puma Newby DO   Reason for Evaluation / Principal Problem: Medical clearance    Assessment & Plan  Medical clearance for psychiatric admission  Vital signs stable at time of assessment  CBC, CMP, TSH reviewed   EKG: NSR normal Qtc HR 67  Patient appears medically stable at this time for inpatient psychiatric treatment  Elevated serum creatinine  Patient with noted creatinine of 1.69  Per chart review, appears this may be likely near patient's baseline  Creatinine in 2022 1.5-1.7  Encourage oral hydration  Avoid nephrotoxic agents  Repeat tomorrow  Elevated LFTs  Very mildly elevated AST/ALT  T bilirubin normal  Denies abdominal pain, nausea/vomiting, alcohol use  Given obesity suspect may have hepatic steatosis  Check hepatitis panel  Repeat LFTs tomorrow  Depression  Presented to the emergency department with worsening depression/anxiety  Currently does not have health insurance therefore unable to afford medications  Further plan per psychiatry  Anxiety disorder  Per psychiatry  Drug abuse (HCC)  UDS + THC  Agoraphobia  Per psychiatry  Obesity  BMI 45.23  Lifestyle modifications  Please contact the SecureChat role for the Hospitalist service with any questions/concerns.    Recommendations for Discharge:  Follow-up with PCP and GYN after discharge    Collaboration of Care: Were Recommendations Directly Discussed with Primary Treatment Team? No    History of Present Illness   Chief Complaint: Depression/anxiety    Gabby Mares is a 34 y.o. female with a PMH of depression/anxiety, drug abuse, agoraphobia with obesity who presents with  "depression and anxiety.    Patient presents to the emergency department with worsening depression and anxiety.  Reports that she lost health insurance therefore unable to afford her medications.  She describes that she has \"multiple lumps all over her body and breasts\" that she has had for many years.  She claims her sister works as a nurse on the unit and was arguing with staff about getting appropriate work up.  Per nursing staff patient's sister does not appear to work on the unit.  Denies chest pain/palpitations, shortness of breath, nausea/vomiting, abdominal pain, fever/chills, constipation or diarrhea.  Denies unintentional weight loss.    Review of Systems   Constitutional:  Negative for chills, fatigue, fever and unexpected weight change.   HENT:  Negative for congestion, sore throat and trouble swallowing.    Eyes:  Negative for photophobia, pain and visual disturbance.   Respiratory:  Negative for cough, shortness of breath and wheezing.    Cardiovascular:  Negative for chest pain, palpitations and leg swelling.   Gastrointestinal:  Negative for abdominal pain, constipation, diarrhea, nausea and vomiting.   Endocrine: Negative for polyuria.   Genitourinary:  Negative for difficulty urinating, dysuria, flank pain, hematuria and urgency.   Musculoskeletal:  Negative for back pain, myalgias, neck pain and neck stiffness.   Skin:  Negative for pallor and rash.        Reported \"lumps\" all over body   Neurological:  Negative for dizziness, tremors, syncope, weakness, light-headedness, numbness and headaches.   Hematological:  Does not bruise/bleed easily.   Psychiatric/Behavioral:  Positive for dysphoric mood. Negative for agitation and confusion. The patient is nervous/anxious.        Historical Information   Past Medical History:   Diagnosis Date    Acne     Agoraphobia     Anxiety     Depression     Drug abuse (HCC) 11/14/2024    Diphenhydramine abuse    Eating disorder     Heart disease     Hypertension     " Impulse control disorder     Obesity     Panic attack     Panic disorder     Psychiatric disorder     depression, bipolar, schizophrenia    Psychiatric illness     Psychosis (HCC)     Schizophrenia (HCC)     Schizophrenia (HCC)     Schizophrenia (HCC)     Seizures (HCC)     Self-injurious behavior     Sleep difficulties     Suicide attempt (HCC)      Past Surgical History:   Procedure Laterality Date    CYSTOSCOPY      INCISION AND DRAINAGE OF WOUND N/A 2/1/2019    Procedure: INCISION AND DRAINAGE (I&D) TRUNK;  Surgeon: Mathew Fatima DO;  Location:  MAIN OR;  Service: General    WISDOM TOOTH EXTRACTION       Social History     Tobacco Use    Smoking status: Never     Passive exposure: Never    Smokeless tobacco: Never   Vaping Use    Vaping status: Never Used   Substance and Sexual Activity    Alcohol use: No    Drug use: No    Sexual activity: Never     E-Cigarette/Vaping    E-Cigarette Use Never User      E-Cigarette/Vaping Substances    Nicotine No     THC No     CBD No     Flavoring No     Other No     Unknown No      Family History   Problem Relation Age of Onset    No Known Problems Mother     Hypertension Father     Mental illness Father     Cancer Father     Glaucoma Paternal Grandfather      Social History:  Marital Status: Single   Occupation:   Patient Pre-hospital Living Situation: Home  Patient Pre-hospital Level of Mobility: walks  Patient Pre-hospital Diet Restrictions:     Meds/Allergies   I have reviewed home medications using recent Epic encounter.  Prior to Admission medications    Medication Sig Start Date End Date Taking? Authorizing Provider   ARIPiprazole (ABILIFY) 10 mg tablet Take 1 tablet (10 mg total) by mouth daily at bedtime  Patient not taking: Reported on 11/13/2024 1/10/19   Stacey Richey PA-C   buPROPion (WELLBUTRIN XL) 150 mg 24 hr tablet Take 150 mg by mouth every morning  Patient not taking: Reported on 11/13/2024    Historical Provider, MD   buPROPion (WELLBUTRIN) 75 mg  tablet Take 75 mg by mouth every morning  Patient not taking: Reported on 11/13/2024    Historical Provider, MD   busPIRone (BUSPAR) 10 mg tablet Take 10 mg by mouth 2 (two) times a day  Patient not taking: Reported on 11/13/2024    Historical Provider, MD   ferrous sulfate 325 (65 Fe) mg tablet Take 1 tablet (325 mg total) by mouth daily with breakfast  Patient not taking: Reported on 11/13/2024 8/11/18   Mary Lou Anguiano MD   topiramate (TOPAMAX) 50 MG tablet Take 50 mg by mouth 2 (two) times a day  Patient not taking: Reported on 11/13/2024    Historical Provider, MD   traZODone (DESYREL) 50 mg tablet Take 50 mg by mouth daily at bedtime  Patient not taking: Reported on 11/13/2024    Historical Provider, MD   zolpidem (AMBIEN) 10 mg tablet Take 10 mg by mouth daily at bedtime as needed for sleep  Patient not taking: Reported on 11/13/2024    Historical Provider, MD     Allergies   Allergen Reactions    Adhesive [Medical Tape]      rash       Objective :  Temp:  [97.6 °F (36.4 °C)-97.9 °F (36.6 °C)] 97.6 °F (36.4 °C)  HR:  [85-93] 85  BP: (110-137)/(77-87) 137/77  Resp:  [18-20] 18  SpO2:  [96 %-100 %] 100 %  O2 Device: None (Room air)    Physical Exam  Vitals and nursing note reviewed.   Constitutional:       Appearance: Normal appearance.      Comments: No acute distress   HENT:      Head: Normocephalic.   Eyes:      General: No scleral icterus.     Extraocular Movements: Extraocular movements intact.      Conjunctiva/sclera: Conjunctivae normal.   Cardiovascular:      Rate and Rhythm: Normal rate and regular rhythm.   Pulmonary:      Effort: Pulmonary effort is normal.      Breath sounds: Normal breath sounds. No wheezing, rhonchi or rales.   Abdominal:      General: Bowel sounds are normal.      Palpations: Abdomen is soft.      Tenderness: There is no abdominal tenderness. There is no guarding or rebound.   Musculoskeletal:         General: No swelling, tenderness or deformity.      Cervical back: Normal  "range of motion.      Comments: Ambulating unit without difficulty, no edema   Skin:     General: Skin is warm and dry.      Comments: Do not appreciate any skin abnormalities or reported \"lumps\" reported by patient.  Patient pointed out areas that she was referring to as lumps but do not appreciate any nodules or masses on exam.  Patient deferred breast exam at this time.   Neurological:      Mental Status: She is alert and oriented to person, place, and time.   Psychiatric:         Mood and Affect: Mood is anxious.         Speech: Speech is rapid and pressured and tangential.            Lab Results: I have reviewed the following results:   Results from last 7 days   Lab Units 11/14/24  0555   WBC Thousand/uL 7.57   HEMOGLOBIN g/dL 12.6   HEMATOCRIT % 40.1   PLATELETS Thousands/uL 358   SEGS PCT % 59   LYMPHO PCT % 29   MONO PCT % 9   EOS PCT % 2     Results from last 7 days   Lab Units 11/14/24  0554   SODIUM mmol/L 137   POTASSIUM mmol/L 3.8   CHLORIDE mmol/L 105   CO2 mmol/L 25   BUN mg/dL 13   CREATININE mg/dL 1.69*   ANION GAP mmol/L 7   CALCIUM mg/dL 8.7   ALBUMIN g/dL 3.8   TOTAL BILIRUBIN mg/dL 0.54   ALK PHOS U/L 67   ALT U/L 66*   AST U/L 40*   GLUCOSE RANDOM mg/dL 91             No results found for: \"HGBA1C\"            Imaging Results Review: No pertinent imaging studies reviewed.  Other Study Results Review: EKG was reviewed.     Administrative Statements   Today, Patient Was Seen By: Sarika Jones PA-C    ** Please Note: This note may have been constructed using a voice recognition system.**  "

## 2024-11-14 NOTE — CMS CERTIFICATION NOTE
Recertification: Based upon physical, mental and social evaluations, I certify that inpatient psychiatric services continue to be medically necessary for this patient for a duration of 7 midnights for the treatment of  Depression Available alternative community resources still do not meet the patient's mental health care needs. I further attest that an established written individualized plan of care has been updated and is outlined in the patient's medical records.

## 2024-11-14 NOTE — H&P
"Psychiatric Evaluation - Behavioral Health   [facility] 34 y.o. female MRN: [id number]  Unit/Bed#: [facility] 208-01 Encounter: [id number]  Hospital Day: 1    Assessment & Plan   Principal Problem:    Depression  Active Problems:    Anxiety disorder    Medical clearance for psychiatric admission    Drug abuse (HCC)    Agoraphobia      Plan:   Assessment & Plan  Depression  Start Remeron 7.5mg po HS   Anxiety disorder  See \"depression\" plan  Medical clearance for psychiatric admission  As per [facility]  Drug abuse ([facility])  Abuses diphenhydramine. Encourage cessation. Stable.  Agoraphobia  stable     Admit to [facility] inpatient psychiatry.  Medical management per SLIM.  Check admission labs: CMP, CBC, TSH, RPR, UDS, Lipid Panel, A1c.  Collaborate with case management for baseline assessment and disposition planning.  Chart reviewed and case discussed with treatment team.    Treatment options and alternatives were reviewed with the patient, who concurs with the above plan. Risks, benefits, and possible side effects of medications were explained to the patient, and she verbalizes understanding.      -----------------------------------    Chief Complaint: \"I am very overwhelmed\"    History of Present Illness     Per Crisis worker note:  34 y.o female patient presented to the ED via EMS.  Pt appeared very anxious and was constantly rocking her body during the assessment.  Pt reported she has been off her medications due to not having insurance to pay for it. Pt stated she has been depressed and overwhelmed. Pt reported stressors of financial instability, being evicted from her apartment , no insurance, no transportation , unemployed and mental health issues.  Pt denies any SI, HI and AVH. Pt reported having SI in the past and had made attempts to overdose or stab self with a knife.   Pt has no access to firearms. Pt reported past inpatient mental health hospitalizations. Pt does not have any outpatient " services. Pt denies any legal and substance abuse issues. Pt is willing to sign a 201. Provider is in agreement with this treatment plan.     On admission to Inpatient Psychiatric Unit:  Patient is a 34-year-old white female with a past psychiatric history of unspecified depression and unspecified anxiety, agoraphobia, and an apparent history of Benadryl abuse, who presents voluntarily to inpatient U complaining of depression, anxiety, and suicidal ideation.    Symptoms prior to hospitalization include: Suicidal ideation with no plan, generalized anxiety, agoraphobia, panic attacks, depressed mood, decreased sleep, feeling overwhelmed, hopelessness, helplessness, and decreased concentration.  Symptom severity is rated as severe.  Timeline is progressively worsening over the past 1 to 3 weeks.  Mitigating factors are none.  Exacerbating stressors are being off medications for 3 months, losing her health insurance, and going through an eviction process.    On initial psychiatric evaluation today, the patient is extremely anxious and is fidgeting throughout the interview.  She has difficulty sitting still.  She states that she has been feeling overwhelmed by multiple psychosocial stressors including going through an eviction process, having financial instability and having her checking account overdrawn, being in credit card debt, and having no health insurance and being off psychiatric medications for the past 3 months.  She feels depressed, severely anxious, and was feeling passively suicidal.  She denies manic or psychotic symptoms.  She states that in the past, she was diagnosed with both bipolar disorder and psychosis.  However, after an extensive conversation, the patient's symptoms do not fit the natural history of bipolar disorder or a primary psychotic disorder.  She previously had agoraphobia and was unwilling to leave her home.  She currently lives alone in an apartment.  She has been off medications 3  months but previously took Wellbutrin, Ambien, Abilify, and Topamax, but states that these medications were not working in the past.  She has no psychiatrist.  She is not currently suicidal at the time of my exam and denies homicidal ideation.  Despite her denial of SI, she states that she is too anxious to take care of herself right now. She is agreeable w/ plan to start Remeron.  She states that she uses THC and abuses Benadryl.  She has 1 prior suicide attempt via self stabbing in 2019 which she states was impulsive.    Psychiatric Review Of Systems:  Medication side effects: none  Sleep: poor  Appetite: no change  Hygiene: able to tend to instrumental and basic ADLs  Anxiety Symptoms: +  Psychotic Symptoms: denies  Depression Symptoms: +  Manic Symptoms: denies  PTSD Symptoms: denies  Suicidal Thoughts: +  Homicidal Thoughts: denies    Medical Review Of Systems:   Patient denies headache or dizziness.   Patient denies chest pain or palpitations.  Patient denies difficulty breathing or wheezing.  Patient denies nausea, vomiting, or diarrhea.  Patient denies polyuria or polydipsia.  Patient denies weakness or numbness.  Pertinent positives as per HPI.    Historical Information     Psychiatric History:   Diagnoses: Depression, anxiety, psychosis, bipolar, agoraphobia  Inpatient Hx: 2019 [facility]   Outpatient Hx: none  Medications/Trials: Wellbutrin, Abilify, Prozac, Topamax, Ambien    Substance Abuse History:  Social History     Substance and Sexual Activity   Alcohol Use No     Social History     Substance and Sexual Activity   Drug Use No     Benadryl - unspecified  THC - unspecified    I spent time with Gabby in counseling and education on risk of substance abuse. I assessed motivation and encouraged her for treatment as appropriate.     Family History:   Family History   Problem Relation Age of Onset    No Known Problems Mother     Hypertension Father     Mental illness Father     Cancer Father     Glaucoma Paternal  Grandfather        Social History:  Highest education: HS  Currently living: Alone in an apartment  Family is  except for her sister  Occupation: unemployed, on SSD    Rest of social history as per below:    Social History     Socioeconomic History    Marital status: Single     Spouse name: Not on file    Number of children: Not on file    Years of education: 12    Highest education level: Not on file   Occupational History    Occupation: Disabled    Tobacco Use    Smoking status: Never     Passive exposure: Never    Smokeless tobacco: Never   Vaping Use    Vaping status: Never Used   Substance and Sexual Activity    Alcohol use: No    Drug use: No    Sexual activity: Never   Other Topics Concern    Not on file   Social History Narrative    ** Merged History Encounter **        Most recent tobacco use screenin2019    Do you currently or have you served in the  [employer]: No    Were you activated, into active duty, as a member of the [employer] or as a Reservist: No    Occupation: disabled    Education: 12    Marital status: Single    Exercise level: Occasional    Diet: Regular    General stress level: High    Alcohol intake: None    Caffeine intake: Occasional    Chewing tobacco: none    Illicit drugs: Denied    Guns present in home: No    Seat belts used routinely: Yes    Sunscreen used routinely: No    Smoke alarm in home: Yes    Advance directive: No    Salt Intake: Normal Use    Has the Patient had a mammogram to screen for breast cancer within 24 months: No    Would the patient like to schedule a Mammogram: No    Is the patient interested in a colorectal cancer screening: No          Social Drivers of Health     Financial Resource Strain: Not on file   Food Insecurity: Food Insecurity Present (2024)    Nursing - Inadequate Food Risk Classification     Worried About Running Out of Food in the Last Year: Not on file     Ran Out of Food in the Last Year: Not on file     Ran Out of  "Food in the Last Year: 2   Transportation Needs: Unmet Transportation Needs (2024)    Nursing - Transportation Risk Classification     Lack of Transportation: Not on file     Lack of Transportation: 1   Physical Activity: Not on file   Stress: Not on file   Social Connections: Not on file   Intimate Partner Violence: Unknown (2024)    Nursing IPS     Feels Physically and Emotionally Safe: Not on file     Physically Hurt by Someone: Not on file     Humiliated or Emotionally Abused by Someone: Not on file     Physically Hurt by Someone: 2     Hurt or Threatened by Someone: 2   Housing Stability: At Risk (2024)    Nursing: Inadequate Housing Risk Classification     Has Housing: Not on file     Worried About Losing Housing: Not on file     Unable to Get Utilities: Not on file     Unable to Pay for Housing in the Last Year: 1     Has Housin       Past Medical History:   Past Medical History:   Diagnosis Date    Acne     Agoraphobia     Anxiety     Depression     Drug abuse (HCC) 2024    Diphenhydramine abuse    Eating disorder     Heart disease     Hypertension     Impulse control disorder     Obesity     Panic attack     Panic disorder     Psychiatric disorder     depression, bipolar, schizophrenia    Psychiatric illness     Psychosis (HCC)     Schizophrenia (HCC)     Schizophrenia (HCC)     Schizophrenia (HCC)     Seizures (HCC)     Self-injurious behavior     Sleep difficulties     Suicide attempt (HCC)         -----------------------------------  Objective    Temp:  [97.6 °F (36.4 °C)-97.9 °F (36.6 °C)] 97.6 °F (36.4 °C)  HR:  [85-93] 85  BP: (110-137)/(77-87) 137/77  Resp:  [18-20] 18  SpO2:  [96 %-100 %] 100 %  O2 Device: None (Room air)    Mental Status Evaluation:  Appearance: moderately kempt  Motor: +psychomotor agitation, fidgeting, rocking back and forth  Behavior: cooperative, pleasant  Speech: normal rate, rhythm, and volume  Mood: \"very anxious\"  Affect: mood-congruent, anxious " appearing  Thought Process: organized and linear  Thought Content: denies auditory hallucinations, denies visual hallucinations, denies delusions  Risk Potential: denies suicidal ideation, plan, or intent. Denies homicidal ideation  Sensorium: Oriented to person, place, time, and situation  Cognition: cognitive ability appears intact but was not quantitatively tested  Consciousness: alert and awake  Attention: currently intact  Insight: fair  Judgement: fair      Meds/Allergies   Allergies   Allergen Reactions    Adhesive [Medical Tape]      rash         Behavioral Health Medications: all current active meds have been reviewed as per above. Changes as per above. Risks, benefits, indications, and alternatives to treatment were discussed with patient.     Laboratory results:  I have personally reviewed all pertinent laboratory/tests results.  Recent Results (from the past 48 hours)   POCT alcohol breath test    Collection Time: 11/13/24  4:13 PM   Result Value Ref Range    EXTBreath Alcohol 0.00    Rapid drug screen, urine    Collection Time: 11/13/24  5:18 PM   Result Value Ref Range    Amph/Meth UR Negative Negative    Barbiturate Ur Negative Negative    Benzodiazepine Urine Negative Negative    Cocaine Urine Negative Negative    Methadone Urine Negative Negative    Opiate Urine Negative Negative    PCP Ur Negative Negative    THC Urine Positive (A) Negative    Oxycodone Urine Negative Negative    Fentanyl Urine Negative Negative    HYDROCODONE URINE Negative Negative   POCT pregnancy, urine    Collection Time: 11/13/24  5:25 PM   Result Value Ref Range    EXT Preg Test, Ur Negative     Control Valid    hCG, serum, qualitative    Collection Time: 11/14/24  5:53 AM   Result Value Ref Range    Preg, Serum Negative Negative   Comprehensive metabolic panel    Collection Time: 11/14/24  5:54 AM   Result Value Ref Range    Sodium 137 135 - 147 mmol/L    Potassium 3.8 3.5 - 5.3 mmol/L    Chloride 105 96 - 108 mmol/L    CO2  25 21 - 32 mmol/L    ANION GAP 7 4 - 13 mmol/L    BUN 13 5 - 25 mg/dL    Creatinine 1.69 (H) 0.60 - 1.30 mg/dL    Glucose 91 65 - 140 mg/dL    Glucose, Fasting 91 65 - 99 mg/dL    Calcium 8.7 8.4 - 10.2 mg/dL    AST 40 (H) 13 - 39 U/L    ALT 66 (H) 7 - 52 U/L    Alkaline Phosphatase 67 34 - 104 U/L    Total Protein 7.4 6.4 - 8.4 g/dL    Albumin 3.8 3.5 - 5.0 g/dL    Total Bilirubin 0.54 0.20 - 1.00 mg/dL    eGFR 38 ml/min/1.73sq m   TSH, 3rd generation with Free T4 reflex    Collection Time: 11/14/24  5:54 AM   Result Value Ref Range    TSH 3RD GENERATON 1.306 0.450 - 4.500 uIU/mL   Lipid panel    Collection Time: 11/14/24  5:54 AM   Result Value Ref Range    Cholesterol 182 See Comment mg/dL    Triglycerides 148 See Comment mg/dL    HDL, Direct 39 (L) >=50 mg/dL    LDL Calculated 113 (H) 0 - 100 mg/dL    Non-HDL-Chol (CHOL-HDL) 143 mg/dl   CBC and differential    Collection Time: 11/14/24  5:55 AM   Result Value Ref Range    WBC 7.57 4.31 - 10.16 Thousand/uL    RBC 4.54 3.81 - 5.12 Million/uL    Hemoglobin 12.6 11.5 - 15.4 g/dL    Hematocrit 40.1 34.8 - 46.1 %    MCV 88 82 - 98 fL    MCH 27.8 26.8 - 34.3 pg    MCHC 31.4 31.4 - 37.4 g/dL    RDW 15.8 (H) 11.6 - 15.1 %    MPV 11.5 8.9 - 12.7 fL    Platelets 358 149 - 390 Thousands/uL    nRBC 0 /100 WBCs    Segmented % 59 43 - 75 %    Immature Grans % 0 0 - 2 %    Lymphocytes % 29 14 - 44 %    Monocytes % 9 4 - 12 %    Eosinophils Relative 2 0 - 6 %    Basophils Relative 1 0 - 1 %    Absolute Neutrophils 4.49 1.85 - 7.62 Thousands/µL    Absolute Immature Grans 0.01 0.00 - 0.20 Thousand/uL    Absolute Lymphocytes 2.22 0.60 - 4.47 Thousands/µL    Absolute Monocytes 0.64 0.17 - 1.22 Thousand/µL    Eosinophils Absolute 0.15 0.00 - 0.61 Thousand/µL    Basophils Absolute 0.06 0.00 - 0.10 Thousands/µL        Progress Toward Goals & Illness Status: Patient is not at goal. They are psychiatrically unstable and are not yet ready for discharge. The patient's condition currently  requires active psychopharmacological medication management, interdisciplinary coordination with case management, and the utilization of adjunctive milieu and group therapy to augment psychopharmacological efficacy. The patient's risk of morbidity, and progression or decompensation of psychiatric disease, is high without this current treatment.           -----------------------------------    Risks / Benefits of Treatment:     Risks, benefits, and possible side effects of medications explained to patient. The patient verbalizes understanding and agreement for treatment.     Counseling / Coordination of Care:     Patient's presentation on admission and proposed treatment plan were discussed with the treatment team.  Diagnosis, medication changes and treatment plan were reviewed with the patient.  Recent stressors were discussed with the patient.  Events leading to admission were reviewed with the patient.  Importance of medication and treatment compliance was reviewed with the patient.          Inpatient Psychiatric Certification:     Certification: Based upon physical, mental and social evaluations, I certify that inpatient psychiatric services are medically necessary for this patient for a duration of 5-7 midnights (exact duration TBD pending patient's response to psychiatric treatment) for the diagnosis listed above.     Available alternative community resources do not meet the patient's mental health care needs.  I further attest that an established written individualized plan of care has been implemented and is outlined in the patient's medical records.        This note has been constructed using a voice recognition system. There may be translation, syntax, or grammatical errors. If you have any questions, please contact the dictating provider.

## 2024-11-14 NOTE — PLAN OF CARE
Problem: DISCHARGE PLANNING  Goal: Discharge to home or other facility with appropriate resources  Description: INTERVENTIONS:  - Identify barriers to discharge w/patient and caregiver  - Arrange for needed discharge resources and transportation as appropriate  - Identify discharge learning needs (meds, wound care, etc.)  - Arrange for interpretive services to assist at discharge as needed  - Refer to Case Management Department for coordinating discharge planning if the patient needs post-hospital services based on physician/advanced practitioner order or complex needs related to functional status, cognitive ability, or social support system  Outcome: Progressing  - CM met with patient and completed Intake, ROIs, and Treatment Plan.    - Pt is a 201

## 2024-11-14 NOTE — ASSESSMENT & PLAN NOTE
Vital signs stable at time of assessment  CBC, CMP, TSH reviewed   EKG: NSR normal Qtc HR 67  Patient appears medically stable at this time for inpatient psychiatric treatment

## 2024-11-14 NOTE — NURSING NOTE
No cellphone  Paperwork  Keys  Wallet w/ss card, pa dl, credit cards  Debit cards  Medical cards  2 ebt cards  Change  Shirt  pants

## 2024-11-14 NOTE — PLAN OF CARE
Problem: Alteration in Thoughts and Perception  Goal: Verbalize thoughts and feelings  Description: Interventions:  - Promote a nonjudgmental and trusting relationship with the patient through active listening and therapeutic communication  - Assess patient's level of functioning, behavior and potential for risk  - Engage patient in 1 on 1 interactions  - Encourage patient to express fears, feelings, frustrations, and discuss symptoms    - [place] patient to reality, help patient recognize reality-based thinking   - Administer medications as ordered and assess for potential side effects  - Provide the patient education related to the signs and symptoms of the illness and desired effects of prescribed medications  Outcome: Progressing  Goal: Refrain from acting on delusional thinking/internal stimuli  Description: Interventions:  - Monitor patient closely, per order   - Utilize least restrictive measures   - Set reasonable limits, give positive feedback for acceptable   - Administer medications as ordered and monitor of potential side effects  Outcome: Progressing  Goal: Agree to be compliant with medication regime, as prescribed and report medication side effects  Description: Interventions:  - Offer appropriate PRN medication and supervise ingestion; conduct AIMS, as needed   Outcome: Progressing  Goal: Attend and participate in unit activities, including therapeutic, recreational, and educational groups  Description: Interventions:  -Encourage Visitation and family involvement in care  Outcome: Progressing  Goal: Recognize dysfunctional thoughts, communicate reality-based thoughts at the time of discharge  Description: Interventions:  - Provide medication and psycho-education to assist patient in compliance and developing insight into his/her illness   Outcome: Progressing  Goal: Complete daily ADLs, including personal hygiene independently, as able  Description: Interventions:  - Observe, teach, and assist  patient with ADLS  - Monitor and promote a balance of rest/activity, with adequate nutrition and elimination   Outcome: Progressing     Problem: Ineffective Coping  Goal: Cooperates with admission process  Description: Interventions:   - Complete admission process  Outcome: Progressing  Goal: Identifies ineffective coping skills  Outcome: Progressing  Goal: Identifies healthy coping skills  Outcome: Progressing  Goal: Demonstrates healthy coping skills  Outcome: Progressing  Goal: Participates in unit activities  Description: Interventions:  - Provide therapeutic environment   - Provide required programming   - Redirect inappropriate behaviors   Outcome: Progressing  Goal: Understands least restrictive measures  Description: Interventions:  - Utilize least restrictive behavior  Outcome: Progressing  Goal: Free from restraint events  Description: - Utilize least restrictive measures   - Provide behavioral interventions   - Redirect inappropriate behaviors   Outcome: Progressing     Problem: Risk for Self Injury/Neglect  Goal: Treatment Goal: Remain safe during length of stay, learn and adopt new coping skills, and be free of self-injurious ideation, impulses and acts at the time of discharge  Outcome: Progressing  Goal: Verbalize thoughts and feelings  Description: Interventions:  - Assess and re-assess patient's lethality and potential for self-injury  - Engage patient in 1:1 interactions, daily, for a minimum of 15 minutes  - Encourage patient to express feelings, fears, frustrations, hopes  - Establish rapport/trust with patient   Outcome: Progressing  Goal: Refrain from harming self  Description: Interventions:  - Monitor patient closely, per order  - Develop a trusting relationship  - Supervise medication ingestion, monitor effects and side effects   Outcome: Progressing

## 2024-11-14 NOTE — EMTALA/ACUTE CARE TRANSFER
[facility] EMERGENCY DEPARTMENT  [address] [address]  Dept: [phone]      EMTALA TRANSFER CONSENT    NAME Gabby Mares                                         1990                              MRN [id number]    I have been informed of my rights regarding examination, treatment, and transfer   by Dr. Hilario Mckeon DO    Benefits: Specialized equipment and/or services available at the receiving facility (Include comment)________________________    Risks: Potential for delay in receiving treatment      Consent for Transfer:  I acknowledge that my medical condition has been evaluated and explained to me by the emergency department physician or other qualified medical person and/or my attending physician, who has recommended that I be transferred to the service of  Accepting Physician: Dr. De La Cruz at Accepting Facility Name, City & State : [facility]. The above potential benefits of such transfer, the potential risks associated with such transfer, and the probable risks of not being transferred have been explained to me, and I fully understand them.  The doctor has explained that, in my case, the benefits of transfer outweigh the risks.  I agree to be transferred.    I authorize the performance of emergency medical procedures and treatments upon me in both transit and upon arrival at the receiving facility.  Additionally, I authorize the release of any and all medical records to the receiving facility and request they be transported with me, if possible.  I understand that the safest mode of transportation during a medical emergency is an ambulance and that the Hospital advocates the use of this mode of transport. Risks of traveling to the receiving facility by car, including absence of medical control, life sustaining equipment, such as oxygen, and medical personnel has been explained to me and I fully understand them.    (DAKOTA CORRECT BOX BELOW)  [  ]  I  consent to the stated transfer and to be transported by ambulance/helicopter.  [  ]  I consent to the stated transfer, but refuse transportation by ambulance and accept full responsibility for my transportation by car.  I understand the risks of non-ambulance transfers and I exonerate the Hospital and its staff from any deterioration in my condition that results from this refusal.    X___________________________________________    DATE  24  TIME________  Signature of patient or legally responsible individual signing on patient behalf           RELATIONSHIP TO PATIENT_________________________          Provider Certification    NAME Gabby Mares                                         1990                              MRN [id number]    A medical screening exam was performed on the above named patient.  Based on the examination:    Condition Necessitating Transfer The encounter diagnosis was Depression.    Patient Condition: The patient has been stabilized such that within reasonable medical probability, no material deterioration of the patient condition or the condition of the unborn child(lilia) is likely to result from the transfer    Reason for Transfer: Level of Care needed not available at this facility    Transfer Requirements: Facility Q   Space available and qualified personnel available for treatment as acknowledged by Neida  Agreed to accept transfer and to provide appropriate medical treatment as acknowledged by       Dr. De La Cruz  Appropriate medical records of the examination and treatment of the patient are provided at the time of transfer   STAFF INITIAL WHEN COMPLETED _______  Transfer will be performed by qualified personnel from    and appropriate transfer equipment as required, including the use of necessary and appropriate life support measures.    Provider Certification: I have examined the patient and explained the following risks and benefits of being transferred/refusing  transfer to the patient/family:  General risk, such as traffic hazards, adverse weather conditions, rough terrain or turbulence, possible failure of equipment (including vehicle or aircraft), or consequences of actions of persons outside the control of the transport personnel      Based on these reasonable risks and benefits to the patient and/or the unborn child(lilia), and based upon the information available at the time of the patient’s examination, I certify that the medical benefits reasonably to be expected from the provision of appropriate medical treatments at another medical facility outweigh the increasing risks, if any, to the individual’s medical condition, and in the case of labor to the unborn child, from effecting the transfer.    X____________________________________________ DATE 11/13/24        TIME_______      ORIGINAL - SEND TO MEDICAL RECORDS   COPY - SEND WITH PATIENT DURING TRANSFER
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
## 2024-11-14 NOTE — ASSESSMENT & PLAN NOTE
Presented to the emergency department with worsening depression/anxiety  Currently does not have health insurance therefore unable to afford medications  Further plan per psychiatry

## 2024-11-14 NOTE — MALNUTRITION/BMI
This medical record reflects one or more clinical indicators suggestive of malnutrition and/or morbid obesity.      BMI Findings:  Adult BMI Classifications: Morbid Obesity 45-49.9        Body mass index is 45.23 kg/m².   Treat with diet.     See Nutrition note dated 11/14/24  for additional details.  Completed nutrition assessment is viewable in the nutrition documentation.

## 2024-11-14 NOTE — PROGRESS NOTES
Reviewed Diagnosis of:  Principal Problem:    Depression  Active Problems:    Anxiety disorder    Medical clearance for psychiatric admission    Drug abuse (HCC)    Agoraphobia    Reviewed Short Term Goals of: decrease in depressive symptoms, decrease in anxiety symptoms, decrease in suicidal thoughts        11/14/24 0137   Team Meeting   Meeting Type Tx Team Meeting   Initial Conference Date 11/14/24   Next Conference Date 12/13/24   Team Members Present   Team Members Present Physician;Nurse;   Physician Team Member Dr. Newby   Nursing Team Member Aly   Care Management Team Member Miguel   Patient/Family Present   Patient Present No  (pt declined to participate)   Patient's Family Present No
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
## 2024-11-15 LAB
ATRIAL RATE: 67 BPM
P AXIS: 56 DEGREES
PR INTERVAL: 162 MS
QRS AXIS: 23 DEGREES
QRSD INTERVAL: 80 MS
QT INTERVAL: 410 MS
QTC INTERVAL: 433 MS
T WAVE AXIS: -13 DEGREES
VENTRICULAR RATE: 67 BPM

## 2024-11-15 PROCEDURE — 93010 ELECTROCARDIOGRAM REPORT: CPT | Performed by: INTERNAL MEDICINE

## 2024-11-15 PROCEDURE — 99232 SBSQ HOSP IP/OBS MODERATE 35: CPT | Performed by: PSYCHIATRY & NEUROLOGY

## 2024-11-15 RX ORDER — ACETAMINOPHEN 325 MG/1
650 TABLET ORAL EVERY 6 HOURS PRN
Status: DISCONTINUED | OUTPATIENT
Start: 2024-11-15 | End: 2024-11-29 | Stop reason: HOSPADM

## 2024-11-15 RX ORDER — MIRTAZAPINE 15 MG/1
15 TABLET, FILM COATED ORAL
Status: DISCONTINUED | OUTPATIENT
Start: 2024-11-15 | End: 2024-11-18

## 2024-11-15 RX ORDER — ERGOCALCIFEROL 1.25 MG/1
50000 CAPSULE, LIQUID FILLED ORAL WEEKLY
Status: DISCONTINUED | OUTPATIENT
Start: 2024-11-15 | End: 2024-11-29 | Stop reason: HOSPADM

## 2024-11-15 RX ORDER — MUSCLE RUB CREAM 100; 150 MG/G; MG/G
CREAM TOPICAL 4 TIMES DAILY PRN
Status: DISCONTINUED | OUTPATIENT
Start: 2024-11-15 | End: 2024-11-29 | Stop reason: HOSPADM

## 2024-11-15 RX ADMIN — ERGOCALCIFEROL 50000 UNITS: 1.25 CAPSULE ORAL at 12:18

## 2024-11-15 RX ADMIN — MIRTAZAPINE 15 MG: 15 TABLET, FILM COATED ORAL at 22:17

## 2024-11-15 RX ADMIN — HYDROXYZINE HYDROCHLORIDE 100 MG: 50 TABLET, FILM COATED ORAL at 09:11

## 2024-11-15 RX ADMIN — PROPRANOLOL HYDROCHLORIDE 10 MG: 10 TABLET ORAL at 11:11

## 2024-11-15 NOTE — NURSING NOTE
Pt pleasant during assessment. Pt is visible on the unit and social with select peers. Pt reports ongoing anxiety and depression. Pt reports feeling the same as she did this morning. Denies SI/HI/AVH. Pt is compliant with medication and OOB for meals. Pt attends most groups. Denies unmet needs at this time.

## 2024-11-15 NOTE — NURSING NOTE
@ 0911 pt requested and received atarax 100 mg PO for severe anxiety. Dorado 25. Upon follow up pt reported PRN was mildly effective.

## 2024-11-15 NOTE — PROGRESS NOTES
"Progress Note - Behavioral Health   Gabby YAIMA Mares 34 y.o. female MRN: [id number]  Unit/Bed#: U 208-01 Encounter: [id number]  Hospital Day: 2    Assessment:  Principal Problem:    Depression  Active Problems:    Elevated serum creatinine    Anxiety disorder    Medical clearance for psychiatric admission    Drug abuse (HCC)    Agoraphobia    Obesity    Elevated LFTs      Plan:  Assessment & Plan  Depression  Increase Remeron to 15mg po HS  Anxiety disorder  See \"depression\" plan  Medical clearance for psychiatric admission  As per [facility]  Drug abuse (HCC)  Abuses diphenhydramine. Encourage cessation. Stable.  Agoraphobia  stable  Elevated serum creatinine    Obesity    Elevated LFTs         In addition to the above:  --Continue with psychiatric hospitalization  --Continue with individual, group, and milieu therapy      Subjective: Patient was seen for continuation of care. Chart was reviewed and discussed with treatment team.     No acute behavioral events over the past 24 hours. Today, patient was seen and examined at bedside for continuation of care.     Today, patient denies SI, HI, or AVH.  She is visible and social in the milieu.  She states that the Remeron helped her sleep last night but made her feel little bit groggy.  She reports ongoing fluctuating anxiety and depression but no improvement since being hospitalized.    Patient denied adverse effects to their psychiatric medication regimen. Patient denied other new or worsening psychiatric symptoms/complaints at this time. Discussed the importance of continuing to take medications as prescribed, as well as the importance of continuing to attend groups on the unit.     Psychiatric Review of Systems:  Medication adverse effects: sedation 2/2 Remeron  Sleep: unchanged  Appetite: unchanged  Behaviors over the past 24 hours: unchanged    Vitals:  Vitals:    11/15/24 0747   BP: 123/74   Pulse: 85   Resp: 17   Temp: (!) 97.4 °F (36.3 °C)   SpO2: 100% "       Laboratory results:    I have personally reviewed all pertinent laboratory/tests results  Recent Results (from the past 48 hours)   POCT alcohol breath test    Collection Time: 11/13/24  4:13 PM   Result Value Ref Range    EXTBreath Alcohol 0.00    Rapid drug screen, urine    Collection Time: 11/13/24  5:18 PM   Result Value Ref Range    Amph/Meth UR Negative Negative    Barbiturate Ur Negative Negative    Benzodiazepine Urine Negative Negative    Cocaine Urine Negative Negative    Methadone Urine Negative Negative    Opiate Urine Negative Negative    PCP Ur Negative Negative    THC Urine Positive (A) Negative    Oxycodone Urine Negative Negative    Fentanyl Urine Negative Negative    HYDROCODONE URINE Negative Negative   POCT pregnancy, urine    Collection Time: 11/13/24  5:25 PM   Result Value Ref Range    EXT Preg Test, Ur Negative     Control Valid    hCG, serum, qualitative    Collection Time: 11/14/24  5:53 AM   Result Value Ref Range    Preg, Serum Negative Negative   Total Syphilis (IgG/IgM) Screening    Collection Time: 11/14/24  5:53 AM   Result Value Ref Range    Syphilis Total Antibody Non-reactive Non-Reactive   Comprehensive metabolic panel    Collection Time: 11/14/24  5:54 AM   Result Value Ref Range    Sodium 137 135 - 147 mmol/L    Potassium 3.8 3.5 - 5.3 mmol/L    Chloride 105 96 - 108 mmol/L    CO2 25 21 - 32 mmol/L    ANION GAP 7 4 - 13 mmol/L    BUN 13 5 - 25 mg/dL    Creatinine 1.69 (H) 0.60 - 1.30 mg/dL    Glucose 91 65 - 140 mg/dL    Glucose, Fasting 91 65 - 99 mg/dL    Calcium 8.7 8.4 - 10.2 mg/dL    AST 40 (H) 13 - 39 U/L    ALT 66 (H) 7 - 52 U/L    Alkaline Phosphatase 67 34 - 104 U/L    Total Protein 7.4 6.4 - 8.4 g/dL    Albumin 3.8 3.5 - 5.0 g/dL    Total Bilirubin 0.54 0.20 - 1.00 mg/dL    eGFR 38 ml/min/1.73sq m   TSH, 3rd generation with Free T4 reflex    Collection Time: 11/14/24  5:54 AM   Result Value Ref Range    TSH 3RD GENERATON 1.306 0.450 - 4.500 uIU/mL   Vitamin D 25  hydroxy    Collection Time: 11/14/24  5:54 AM   Result Value Ref Range    Vit D, 25-Hydroxy 19.3 (L) 30.0 - 100.0 ng/mL   Lipid panel    Collection Time: 11/14/24  5:54 AM   Result Value Ref Range    Cholesterol 182 See Comment mg/dL    Triglycerides 148 See Comment mg/dL    HDL, Direct 39 (L) >=50 mg/dL    LDL Calculated 113 (H) 0 - 100 mg/dL    Non-HDL-Chol (CHOL-HDL) 143 mg/dl   CBC and differential    Collection Time: 11/14/24  5:55 AM   Result Value Ref Range    WBC 7.57 4.31 - 10.16 Thousand/uL    RBC 4.54 3.81 - 5.12 Million/uL    Hemoglobin 12.6 11.5 - 15.4 g/dL    Hematocrit 40.1 34.8 - 46.1 %    MCV 88 82 - 98 fL    MCH 27.8 26.8 - 34.3 pg    MCHC 31.4 31.4 - 37.4 g/dL    RDW 15.8 (H) 11.6 - 15.1 %    MPV 11.5 8.9 - 12.7 fL    Platelets 358 149 - 390 Thousands/uL    nRBC 0 /100 WBCs    Segmented % 59 43 - 75 %    Immature Grans % 0 0 - 2 %    Lymphocytes % 29 14 - 44 %    Monocytes % 9 4 - 12 %    Eosinophils Relative 2 0 - 6 %    Basophils Relative 1 0 - 1 %    Absolute Neutrophils 4.49 1.85 - 7.62 Thousands/µL    Absolute Immature Grans 0.01 0.00 - 0.20 Thousand/uL    Absolute Lymphocytes 2.22 0.60 - 4.47 Thousands/µL    Absolute Monocytes 0.64 0.17 - 1.22 Thousand/µL    Eosinophils Absolute 0.15 0.00 - 0.61 Thousand/µL    Basophils Absolute 0.06 0.00 - 0.10 Thousands/µL   Vitamin B12    Collection Time: 11/14/24  5:55 AM   Result Value Ref Range    Vitamin B-12 441 180 - 914 pg/mL   Folate    Collection Time: 11/14/24  5:55 AM   Result Value Ref Range    Folate 5.0 (L) >5.9 ng/mL   Urinalysis    Collection Time: 11/14/24  5:59 AM   Result Value Ref Range    Color, UA Yellow     Clarity, UA Slightly Cloudy     Specific Gravity, UA 1.020 1.005 - 1.030    pH, UA 5.0 4.5, 5.0, 5.5, 6.0, 6.5, 7.0, 7.5, 8.0    Leukocytes, UA Moderate (A) Negative    Nitrite, UA Negative Negative    Protein, UA Negative Negative mg/dl    Glucose, UA Negative Negative mg/dl    Ketones, UA Negative Negative mg/dl     "Urobilinogen, UA <2.0 <2.0 mg/dl mg/dl    Bilirubin, UA Negative Negative    Occult Blood, UA Negative Negative    RBC, UA 0-1 (A) None Seen, 2-4 /hpf    WBC, UA 10-20 (A) None Seen, 2-4, 5-60 /hpf    Epithelial Cells Moderate (A) None Seen, Occasional /hpf    Bacteria, UA Occasional None Seen, Occasional /hpf    Hyaline Casts, UA 0-1 (A) (none) /lpf        Current Medications:  Current medications as per above. All medications have been reviewed.   Risks, benefits, alternatives, and possible side effects of patient's psychiatric medications were discussed with patient.     Mental Status Evaluation:  Appearance: moderately kempt  Motor: +psychomotor retardation  Behavior: cooperative, pleasant  Speech: normal rate, rhythm, and volume  Mood: \"somewhat groggy\"  Affect: mood-congruent, anxious appearing  Thought Process: organized and linear  Thought Content: denies auditory hallucinations, denies visual hallucinations, denies delusions  Risk Potential: denies suicidal ideation, plan, or intent. Denies homicidal ideation  Sensorium: Oriented to person, place, time, and situation  Cognition: cognitive ability appears intact but was not quantitatively tested  Consciousness: alert and awake  Attention: currently intact  Insight: fair  Judgement: fair      Progress Toward Goals & Illness Status: Patient is not at goal. They are not yet ready for discharge. The patient's condition currently requires active psychopharmacological medication management, interdisciplinary coordination with case management, and the utilization of adjunctive milieu and group therapy to augment psychopharmacological efficacy. The patient's risk of morbidity, and progression or decompensation of psychiatric disease, is higher without this current treatment.       This note has been constructed using a voice recognition system. There may be translation, syntax, or grammatical errors. If you have any questions, please contact the dictating " provider.

## 2024-11-15 NOTE — NURSING NOTE
Pt pleasant during assessment. Pt reports the Remeron helped her sleep well last night. Pt reports high anxiety and ongoing depression. Pt reports no improvement since admission. Pt appears anxious during interaction and observed fidgeting. Denies SI/HI/AVH.

## 2024-11-15 NOTE — PLAN OF CARE
Problem: Alteration in Thoughts and Perception  Goal: Verbalize thoughts and feelings  Description: Interventions:  - Promote a nonjudgmental and trusting relationship with the patient through active listening and therapeutic communication  - Assess patient's level of functioning, behavior and potential for risk  - Engage patient in 1 on 1 interactions  - Encourage patient to express fears, feelings, frustrations, and discuss symptoms    - [place] patient to reality, help patient recognize reality-based thinking   - Administer medications as ordered and assess for potential side effects  - Provide the patient education related to the signs and symptoms of the illness and desired effects of prescribed medications  Outcome: Progressing  Goal: Refrain from acting on delusional thinking/internal stimuli  Description: Interventions:  - Monitor patient closely, per order   - Utilize least restrictive measures   - Set reasonable limits, give positive feedback for acceptable   - Administer medications as ordered and monitor of potential side effects  Outcome: Progressing  Goal: Agree to be compliant with medication regime, as prescribed and report medication side effects  Description: Interventions:  - Offer appropriate PRN medication and supervise ingestion; conduct AIMS, as needed   Outcome: Progressing  Goal: Attend and participate in unit activities, including therapeutic, recreational, and educational groups  Description: Interventions:  -Encourage Visitation and family involvement in care  Outcome: Progressing  Goal: Recognize dysfunctional thoughts, communicate reality-based thoughts at the time of discharge  Description: Interventions:  - Provide medication and psycho-education to assist patient in compliance and developing insight into his/her illness   Outcome: Progressing  Goal: Complete daily ADLs, including personal hygiene independently, as able  Description: Interventions:  - Observe, teach, and assist  patient with ADLS  - Monitor and promote a balance of rest/activity, with adequate nutrition and elimination   Outcome: Progressing     Problem: Risk for Self Injury/Neglect  Goal: Treatment Goal: Remain safe during length of stay, learn and adopt new coping skills, and be free of self-injurious ideation, impulses and acts at the time of discharge  Outcome: Progressing  Goal: Verbalize thoughts and feelings  Description: Interventions:  - Assess and re-assess patient's lethality and potential for self-injury  - Engage patient in 1:1 interactions, daily, for a minimum of 15 minutes  - Encourage patient to express feelings, fears, frustrations, hopes  - Establish rapport/trust with patient   Outcome: Progressing  Goal: Refrain from harming self  Description: Interventions:  - Monitor patient closely, per order  - Develop a trusting relationship  - Supervise medication ingestion, monitor effects and side effects   Outcome: Progressing

## 2024-11-15 NOTE — NURSING NOTE
@ 1111 pt requested and received inderal 10 mg PO for restlessness. Upon follow up pt reported medication was mildly effective.

## 2024-11-16 LAB
ALBUMIN SERPL BCG-MCNC: 3.7 G/DL (ref 3.5–5)
ALP SERPL-CCNC: 73 U/L (ref 34–104)
ALT SERPL W P-5'-P-CCNC: 49 U/L (ref 7–52)
ANION GAP SERPL CALCULATED.3IONS-SCNC: 7 MMOL/L (ref 4–13)
AST SERPL W P-5'-P-CCNC: 29 U/L (ref 13–39)
BILIRUB SERPL-MCNC: 0.49 MG/DL (ref 0.2–1)
BUN SERPL-MCNC: 21 MG/DL (ref 5–25)
CALCIUM SERPL-MCNC: 8.3 MG/DL (ref 8.4–10.2)
CHLORIDE SERPL-SCNC: 107 MMOL/L (ref 96–108)
CO2 SERPL-SCNC: 25 MMOL/L (ref 21–32)
CREAT SERPL-MCNC: 1.37 MG/DL (ref 0.6–1.3)
GFR SERPL CREATININE-BSD FRML MDRD: 49 ML/MIN/1.73SQ M
GLUCOSE P FAST SERPL-MCNC: 93 MG/DL (ref 65–99)
GLUCOSE SERPL-MCNC: 93 MG/DL (ref 65–140)
HAV IGM SER QL: NORMAL
HBV CORE IGM SER QL: NORMAL
HBV SURFACE AG SER QL: NORMAL
HCV AB SER QL: NORMAL
POTASSIUM SERPL-SCNC: 3.9 MMOL/L (ref 3.5–5.3)
PROT SERPL-MCNC: 7.1 G/DL (ref 6.4–8.4)
SODIUM SERPL-SCNC: 139 MMOL/L (ref 135–147)

## 2024-11-16 PROCEDURE — 80074 ACUTE HEPATITIS PANEL: CPT | Performed by: PHYSICIAN ASSISTANT

## 2024-11-16 PROCEDURE — 80053 COMPREHEN METABOLIC PANEL: CPT | Performed by: PHYSICIAN ASSISTANT

## 2024-11-16 PROCEDURE — 99232 SBSQ HOSP IP/OBS MODERATE 35: CPT | Performed by: STUDENT IN AN ORGANIZED HEALTH CARE EDUCATION/TRAINING PROGRAM

## 2024-11-16 RX ADMIN — ACETAMINOPHEN 650 MG: 325 TABLET, FILM COATED ORAL at 18:05

## 2024-11-16 RX ADMIN — MIRTAZAPINE 15 MG: 15 TABLET, FILM COATED ORAL at 21:20

## 2024-11-16 NOTE — NURSING NOTE
Patient complaining of chronic b/l feel pain and right ankle pain rate 9/10, described as stabbing. Has been using cold and hot application with little effect. EPIC chat message sent to [facility] at [phone] for pain medication recommendations as patient does not have any current standard order due to her extensive medical history. Currently awaiting response from [facility]. Patient aware. Provided warm compresses for pain relief in the meantime.

## 2024-11-16 NOTE — NURSING NOTE
Pt in room on approach. Calm, cooperative and pleasant during interaction. Pt reports doing well this afternoon; Denies SI/HI/AVH. Plan of care ongoing. Pt denies any unmet need or concerns at this time.

## 2024-11-16 NOTE — NURSING NOTE
"Pt reports feeling frustrated d/t unit noise. States \"someone kept slamming the doors over and over\". Pt making phones call to sister this morning. Able to ignore other peers near the phone. Pt states \"I am just trying to stay sane\". Pt denies SI/HI or hallucination.   "

## 2024-11-16 NOTE — PROGRESS NOTES
"Progress Note - Behavioral Health   Name: Gabby Mares 34 y.o. female I MRN: [id number]  Unit/Bed#: -01 I Date of Admission: 11/13/2024   Date of Service: 11/16/2024 I Hospital Day: 3     Assessment & Plan  Depression  Increase Remeron to 15mg po HS  Anxiety disorder  See \"depression\" plan  Medical clearance for psychiatric admission  As per SLIM  Drug abuse (HCC)  Abuses diphenhydramine. Encourage cessation. Stable.  Agoraphobia  stable  Elevated serum creatinine    Obesity    Elevated LFTs      Progress Toward Goals: Not at goal.  Patient continues to report anxiety and depressive symptoms.  Recently required as needed Atarax and Inderal to help anxiety.    Recommended Treatment: Continue with group therapy, milieu therapy and occupational therapy.      Risks, benefits and possible side effects of Medications:   Risks, benefits, and possible side effects of medications explained to patient and patient verbalizes understanding.      History of Present Illness   Behavior over the last 24 hours:  unchanged  Sleep: normal  Appetite: normal  Medication side effects: No  ROS: no complaints    Subjective: Examination today, the is seen lying in her bed.  She wakes easily and tells me that she is still suffering from anxiety and depression.  She feels no improvement as of yet.  Today required as needed of Atarax and Inderal.  At this time, continues to require inpatient care and treatment secondary to continued persistent symptoms.    Objective   Mental Status Evaluation:  Appearance:  age appropriate, casually dressed, and disheveled   Behavior:  Pleasant and cooperative   Speech:  normal pitch and normal volume   Mood:  anxious and depressed   Affect:  flat   Thought Process:  perserverative   Associations: perseverative   Thought Content:  No overt delusions   Perceptual Disturbances: None   Risk Potential: Suicidal Ideations none  Homicidal Ideations none  Potential for Aggression No   Sensorium:  person, " place, time/date, and situation   Memory:  recent and remote memory grossly intact   Consciousness:  alert and awake    Attention: attention span and concentration were age appropriate   Insight:  fair   Judgment: fair   Gait/Station: normal gait/station   Motor Activity: no abnormal movements     Medications: continue current psychiatric medications.      Lab Results: I have reviewed the following results:  Most Recent Labs:   Lab Results   Component Value Date    WBC 7.57 11/14/2024    RBC 4.54 11/14/2024    HGB 12.6 11/14/2024    HCT 40.1 11/14/2024     11/14/2024    RDW 15.8 (H) 11/14/2024    NEUTROABS 4.49 11/14/2024    SODIUM 139 11/16/2024    K 3.9 11/16/2024     11/16/2024    CO2 25 11/16/2024    BUN 21 11/16/2024    CREATININE 1.37 (H) 11/16/2024    GLUC 93 11/16/2024    GLUF 93 11/16/2024    CALCIUM 8.3 (L) 11/16/2024    AST 29 11/16/2024    ALT 49 11/16/2024    ALKPHOS 73 11/16/2024    TP 7.1 11/16/2024    ALB 3.7 11/16/2024    TBILI 0.49 11/16/2024    CHOLESTEROL 182 11/14/2024    HDL 39 (L) 11/14/2024    TRIG 148 11/14/2024    LDLCALC 113 (H) 11/14/2024    NONHDLC 143 11/14/2024    AMMONIA 32 10/17/2022    [id number] 1.306 11/14/2024    FREET4 0.86 05/11/2018    PREGSERUM Negative 11/14/2024    HCGQUANT <2 08/07/2018    RPR Non-Reactive 01/06/2019       Administrative Statements   Minutes were spent in the visit.  Time spent reviewing the patient record, reviewing patient medications, monitoring the treatment plan and medication management.

## 2024-11-16 NOTE — PLAN OF CARE
Problem: Alteration in Thoughts and Perception  Goal: Verbalize thoughts and feelings  Description: Interventions:  - Promote a nonjudgmental and trusting relationship with the patient through active listening and therapeutic communication  - Assess patient's level of functioning, behavior and potential for risk  - Engage patient in 1 on 1 interactions  - Encourage patient to express fears, feelings, frustrations, and discuss symptoms    - [place] patient to reality, help patient recognize reality-based thinking   - Administer medications as ordered and assess for potential side effects  - Provide the patient education related to the signs and symptoms of the illness and desired effects of prescribed medications  Outcome: Progressing  Goal: Refrain from acting on delusional thinking/internal stimuli  Description: Interventions:  - Monitor patient closely, per order   - Utilize least restrictive measures   - Set reasonable limits, give positive feedback for acceptable   - Administer medications as ordered and monitor of potential side effects  Outcome: Progressing  Goal: Agree to be compliant with medication regime, as prescribed and report medication side effects  Description: Interventions:  - Offer appropriate PRN medication and supervise ingestion; conduct AIMS, as needed   Outcome: Progressing  Goal: Attend and participate in unit activities, including therapeutic, recreational, and educational groups  Description: Interventions:  -Encourage Visitation and family involvement in care  Outcome: Progressing  Goal: Recognize dysfunctional thoughts, communicate reality-based thoughts at the time of discharge  Description: Interventions:  - Provide medication and psycho-education to assist patient in compliance and developing insight into his/her illness   Outcome: Progressing  Goal: Complete daily ADLs, including personal hygiene independently, as able  Description: Interventions:  - Observe, teach, and assist  patient with ADLS  - Monitor and promote a balance of rest/activity, with adequate nutrition and elimination   Outcome: Progressing     Problem: Ineffective Coping  Goal: Identifies ineffective coping skills  Outcome: Progressing  Goal: Identifies healthy coping skills  Outcome: Progressing  Goal: Demonstrates healthy coping skills  Outcome: Progressing  Goal: Participates in unit activities  Description: Interventions:  - Provide therapeutic environment   - Provide required programming   - Redirect inappropriate behaviors   Outcome: Progressing  Goal: Understands least restrictive measures  Description: Interventions:  - Utilize least restrictive behavior  Outcome: Progressing  Goal: Free from restraint events  Description: - Utilize least restrictive measures   - Provide behavioral interventions   - Redirect inappropriate behaviors   Outcome: Progressing     Problem: Risk for Self Injury/Neglect  Goal: Treatment Goal: Remain safe during length of stay, learn and adopt new coping skills, and be free of self-injurious ideation, impulses and acts at the time of discharge  Outcome: Progressing  Goal: Verbalize thoughts and feelings  Description: Interventions:  - Assess and re-assess patient's lethality and potential for self-injury  - Engage patient in 1:1 interactions, daily, for a minimum of 15 minutes  - Encourage patient to express feelings, fears, frustrations, hopes  - Establish rapport/trust with patient   Outcome: Progressing  Goal: Refrain from harming self  Description: Interventions:  - Monitor patient closely, per order  - Develop a trusting relationship  - Supervise medication ingestion, monitor effects and side effects   Outcome: Progressing     Problem: DISCHARGE PLANNING  Goal: Discharge to home or other facility with appropriate resources  Description: INTERVENTIONS:  - Identify barriers to discharge w/patient and caregiver  - Arrange for needed discharge resources and transportation as  appropriate  - Identify discharge learning needs (meds, wound care, etc.)  - Arrange for interpretive services to assist at discharge as needed  - Refer to Case Management Department for coordinating discharge planning if the patient needs post-hospital services based on physician/advanced practitioner order or complex needs related to functional status, cognitive ability, or social support system  Outcome: Progressing

## 2024-11-16 NOTE — TREATMENT TEAM
11/16/24 1000   Team Meeting   Meeting Type Daily Rounds   Initial Conference Date 11/16/24   Team Members Present   Team Members Present Physician;Nurse  (NP)   Physician Team Member Dr. Ramirez, ODILON [employer]   Nursing Team Member Clauser   Patient/Family Present   Patient Present No   Patient's Family Present No       AM rounds - Denies SI/HI or hallucination. Pt tearful. Utilized PRN Atarax and Inderal.

## 2024-11-16 NOTE — NURSING NOTE
As per Ivonne SCHMITZ from [facility], verified with patient medications taken PTA for pain. Patient confirmed taking ibuprofen 200 mg tablets 1-2 up to three times daily. States Tylenol is not effective for pain. Provider made aware. However, due to patient renal function provider is only able to provide her with Tylenol 675 mg PO Q6H and [place] QID to affected area as needed. Orders are in placed and have been acknowledge by writer.

## 2024-11-17 PROCEDURE — 99232 SBSQ HOSP IP/OBS MODERATE 35: CPT | Performed by: NURSE PRACTITIONER

## 2024-11-17 RX ADMIN — HYDROXYZINE HYDROCHLORIDE 100 MG: 50 TABLET, FILM COATED ORAL at 09:14

## 2024-11-17 RX ADMIN — MIRTAZAPINE 15 MG: 15 TABLET, FILM COATED ORAL at 21:42

## 2024-11-17 NOTE — NURSING NOTE
Pt describes PRN Atarax as somewhat effective. Pt spent time pacing calmly in the halls. Appears more relaxed and less anxious.

## 2024-11-17 NOTE — TREATMENT TEAM
11/17/24 0700   Team Meeting   Meeting Type Daily Rounds   Team Members Present   Team Members Present Physician;Nurse   [facility]   Nursing Team Member Ole   Patient/Family Present   Patient Present No   Patient's Family Present No     AM rounds- Reported feeling frustrated r/t unit noise. Denies SI/HI.  Walking halls with room-mate, declined to attend group.   Slept well.

## 2024-11-17 NOTE — NURSING NOTE
Pt calm and cooperative. Appears flat and depressed. Brightens and pleasant on approach. Visible on the unit. Minimal interaction w/ peers observed. On assessment denies SI/HI AV/H. Endorses fluctuating depression and anxiety. Complains of poor sleep and appetite due to noise on the unit. Encouraged to seek staff when unable to sleep in order to gain most appropriate care. Coping skills and PRN medications discussed. Q15 safety checks maintained.

## 2024-11-17 NOTE — PLAN OF CARE
Problem: Alteration in Thoughts and Perception  Goal: Verbalize thoughts and feelings  Description: Interventions:  - Promote a nonjudgmental and trusting relationship with the patient through active listening and therapeutic communication  - Assess patient's level of functioning, behavior and potential for risk  - Engage patient in 1 on 1 interactions  - Encourage patient to express fears, feelings, frustrations, and discuss symptoms    - [place] patient to reality, help patient recognize reality-based thinking   - Administer medications as ordered and assess for potential side effects  - Provide the patient education related to the signs and symptoms of the illness and desired effects of prescribed medications  Outcome: Progressing  Goal: Refrain from acting on delusional thinking/internal stimuli  Description: Interventions:  - Monitor patient closely, per order   - Utilize least restrictive measures   - Set reasonable limits, give positive feedback for acceptable   - Administer medications as ordered and monitor of potential side effects  Outcome: Progressing  Goal: Agree to be compliant with medication regime, as prescribed and report medication side effects  Description: Interventions:  - Offer appropriate PRN medication and supervise ingestion; conduct AIMS, as needed   Outcome: Progressing  Goal: Attend and participate in unit activities, including therapeutic, recreational, and educational groups  Description: Interventions:  -Encourage Visitation and family involvement in care  Outcome: Progressing  Goal: Recognize dysfunctional thoughts, communicate reality-based thoughts at the time of discharge  Description: Interventions:  - Provide medication and psycho-education to assist patient in compliance and developing insight into his/her illness   Outcome: Progressing  Goal: Complete daily ADLs, including personal hygiene independently, as able  Description: Interventions:  - Observe, teach, and assist  patient with ADLS  - Monitor and promote a balance of rest/activity, with adequate nutrition and elimination   Outcome: Progressing     Problem: Ineffective Coping  Goal: Identifies ineffective coping skills  Outcome: Progressing  Goal: Identifies healthy coping skills  Outcome: Progressing  Goal: Demonstrates healthy coping skills  Outcome: Progressing  Goal: Participates in unit activities  Description: Interventions:  - Provide therapeutic environment   - Provide required programming   - Redirect inappropriate behaviors   Outcome: Progressing  Goal: Understands least restrictive measures  Description: Interventions:  - Utilize least restrictive behavior  Outcome: Progressing  Goal: Free from restraint events  Description: - Utilize least restrictive measures   - Provide behavioral interventions   - Redirect inappropriate behaviors   Outcome: Progressing     Problem: Risk for Self Injury/Neglect  Goal: Treatment Goal: Remain safe during length of stay, learn and adopt new coping skills, and be free of self-injurious ideation, impulses and acts at the time of discharge  Outcome: Progressing  Goal: Verbalize thoughts and feelings  Description: Interventions:  - Assess and re-assess patient's lethality and potential for self-injury  - Engage patient in 1:1 interactions, daily, for a minimum of 15 minutes  - Encourage patient to express feelings, fears, frustrations, hopes  - Establish rapport/trust with patient   Outcome: Progressing  Goal: Refrain from harming self  Description: Interventions:  - Monitor patient closely, per order  - Develop a trusting relationship  - Supervise medication ingestion, monitor effects and side effects   Outcome: Progressing     Problem: DISCHARGE PLANNING  Goal: Discharge to home or other facility with appropriate resources  Description: INTERVENTIONS:  - Identify barriers to discharge w/patient and caregiver  - Arrange for needed discharge resources and transportation as  appropriate  - Identify discharge learning needs (meds, wound care, etc.)  - Arrange for interpretive services to assist at discharge as needed  - Refer to Case Management Department for coordinating discharge planning if the patient needs post-hospital services based on physician/advanced practitioner order or complex needs related to functional status, cognitive ability, or social support system  Outcome: Progressing

## 2024-11-17 NOTE — NURSING NOTE
Pt observed by staff to be tearful in bedroom. On approach Pt inconsolable. Complaints of severe anxiety. Unable to identift current trigger. Agreeable to PRN medication. Atarax 100 mg administered for a christensen of 28. Emotional support provided. Pt plans to attempt to get some rest. Ear plugs provided. Will monitor for medication effectiveness.

## 2024-11-17 NOTE — NURSING NOTE
"Encountered pt in room sitting up in bed doing a crossword puzzle. Pt calm, pleasant, and cooperative. Pt reports feeling \"a little better, less anxious.\" Pt denies SI/HI, pt reports last night seeing her door vibrating when she knew it was not, \"I don't know if its the lack of sleep and anxiety.\" Pt states she is nervous about going back to her apartment and that \"things will just go back to the way they were\". Pt states she is planning to talk to case management regarding a group home. \"My sister and I feel like that would be the best place for me.\"   "

## 2024-11-17 NOTE — PROGRESS NOTES
"Progress Note - Behavioral Health   Name: Gabby Mares 34 y.o. female I MRN: [id number]  Unit/Bed#: -01 I Date of Admission: 11/13/2024   Date of Service: 11/17/2024 I Hospital Day: 4     Assessment & Plan  Depression  Increase Remeron to 15mg po HS  Anxiety disorder  See \"depression\" plan  Medical clearance for psychiatric admission  As per SLIM  Drug abuse (HCC)  Abuses diphenhydramine. Encourage cessation. Stable.  Agoraphobia  stable  Elevated serum creatinine    Obesity    Elevated LFTs      Progress Toward Goals: Continues to require inpatient care and treatment.  Patient remains depressed and anxious.  Appears more anxious today and tearful at times.    Recommended Treatment: Continue with group therapy, milieu therapy and occupational therapy.      Risks, benefits and possible side effects of Medications:   Risks, benefits, and possible side effects of medications explained to patient and patient verbalizes understanding.      History of Present Illness   Behavior over the last 24 hours:  unchanged  Sleep: normal  Appetite: normal  Medication side effects: No  ROS: no complaints    Subjective: Patient was seen for continuing care and treatment.  Case was discussed with the nursing staff.  On examination today, the patient's mood is tearful and anxious.  She becomes very easily frustrated with the fact that she is here but very overwhelmed what is facing her when she leaves here.  Currently has a lot of social issues outside of the hospital.  I encouraged patient to utilize the staff when needed, attend groups which would be very helpful in an effort to decrease her stress response.  Patient agrees that she is going to try.  New current meds and treatment.  Discharge planning and disposition is ongoing.    Objective   Mental Status Evaluation:  Appearance:  age appropriate, casually dressed, and disheveled   Behavior:  Cooperative.  Tearful on approach today   Speech:  normal pitch and normal " volume   Mood:  anxious and depressed   Affect:  flat   Thought Process:  perserverative   Associations: perseveration   Thought Content:  No overt delusions   Perceptual Disturbances: None   Risk Potential: Suicidal Ideations none  Homicidal Ideations none  Potential for Aggression No   Sensorium:  person, place, time/date, and situation   Memory:  recent and remote memory grossly intact   Consciousness:  alert and awake    Attention: attention span and concentration were age appropriate   Insight:  fair   Judgment: fair   Gait/Station: normal gait/station   Motor Activity: no abnormal movements     Medications: current meds:   Current Facility-Administered Medications:     acetaminophen (TYLENOL) tablet 650 mg, Q6H PRN    aluminum-magnesium hydroxide-simethicone (MAALOX) oral suspension 30 mL, Q4H PRN    haloperidol lactate (HALDOL) injection 2.5 mg, Q4H PRN Max 4/day **AND** LORazepam (ATIVAN) injection 1 mg, Q4H PRN Max 4/day **AND** benztropine (COGENTIN) injection 0.5 mg, Q4H PRN Max 4/day    haloperidol lactate (HALDOL) injection 5 mg, Q4H PRN Max 4/day **AND** LORazepam (ATIVAN) injection 2 mg, Q4H PRN Max 4/day **AND** benztropine (COGENTIN) injection 1 mg, Q4H PRN Max 4/day    benztropine (COGENTIN) injection 1 mg, Q4H PRN Max 6/day    benztropine (COGENTIN) tablet 1 mg, Q4H PRN Max 6/day    bisacodyl (DULCOLAX) rectal suppository 10 mg, Daily PRN    hydrOXYzine HCL (ATARAX) tablet 50 mg, Q6H PRN Max 4/day **OR** diphenhydrAMINE (BENADRYL) injection 50 mg, Q6H PRN    ergocalciferol (VITAMIN D2) capsule 50,000 Units, Weekly    haloperidol (HALDOL) tablet 1 mg, Q6H PRN    haloperidol (HALDOL) tablet 2.5 mg, Q4H PRN Max 4/day    haloperidol (HALDOL) tablet 5 mg, Q4H PRN Max 4/day    hydrOXYzine HCL (ATARAX) tablet 100 mg, Q6H PRN Max 4/day **OR** LORazepam (ATIVAN) injection 2 mg, Q6H PRN    hydrOXYzine HCL (ATARAX) tablet 25 mg, Q6H PRN Max 4/day    menthol-methyl salicylate (BENGAY) 10-15 % cream, 4x Daily  PRN    mirtazapine (REMERON) tablet 15 mg, HS    polyethylene glycol (MIRALAX) packet 17 g, Daily PRN    propranolol (INDERAL) tablet 10 mg, Q8H PRN    senna-docusate sodium (SENOKOT S) 8.6-50 mg per tablet 1 tablet, Daily PRN    traZODone (DESYREL) tablet 50 mg, HS PRN.      Lab Results: I have reviewed the following results:  Most Recent Labs:   Lab Results   Component Value Date    WBC 7.57 11/14/2024    RBC 4.54 11/14/2024    HGB 12.6 11/14/2024    HCT 40.1 11/14/2024     11/14/2024    RDW 15.8 (H) 11/14/2024    NEUTROABS 4.49 11/14/2024    SODIUM 139 11/16/2024    K 3.9 11/16/2024     11/16/2024    CO2 25 11/16/2024    BUN 21 11/16/2024    CREATININE 1.37 (H) 11/16/2024    GLUC 93 11/16/2024    GLUF 93 11/16/2024    CALCIUM 8.3 (L) 11/16/2024    AST 29 11/16/2024    ALT 49 11/16/2024    ALKPHOS 73 11/16/2024    TP 7.1 11/16/2024    ALB 3.7 11/16/2024    TBILI 0.49 11/16/2024    CHOLESTEROL 182 11/14/2024    HDL 39 (L) 11/14/2024    TRIG 148 11/14/2024    LDLCALC 113 (H) 11/14/2024    NONHDLC 143 11/14/2024    AMMONIA 32 10/17/2022    [id number] 1.306 11/14/2024    FREET4 0.86 05/11/2018    PREGSERUM Negative 11/14/2024    HCGQUANT <2 08/07/2018    RPR Non-Reactive 01/06/2019       Administrative Statements   Minutes were spent in the visit.  Time spent reviewing the treatment plan, reviewing medications, reviewing the patient record, medication management and completion of the progress note.

## 2024-11-18 PROCEDURE — 99232 SBSQ HOSP IP/OBS MODERATE 35: CPT | Performed by: PSYCHIATRY & NEUROLOGY

## 2024-11-18 RX ORDER — MIRTAZAPINE 15 MG/1
30 TABLET, FILM COATED ORAL
Status: DISCONTINUED | OUTPATIENT
Start: 2024-11-18 | End: 2024-11-29 | Stop reason: HOSPADM

## 2024-11-18 RX ADMIN — ACETAMINOPHEN 650 MG: 325 TABLET, FILM COATED ORAL at 18:15

## 2024-11-18 RX ADMIN — TRAZODONE HYDROCHLORIDE 50 MG: 50 TABLET ORAL at 21:16

## 2024-11-18 RX ADMIN — MIRTAZAPINE 30 MG: 15 TABLET, FILM COATED ORAL at 21:16

## 2024-11-18 NOTE — NURSING NOTE
Awake and alert. Polite in conversation. Walks the halls, minimal interaction with peers. Completed laundry. Denies SI/HI and hallucinations. Reports depression and anxiety are worse in AM but improve as the day progresses. Poor sleep per patient. Good appetite. Compliant with medications, denies side effects. No questions or concerns.

## 2024-11-18 NOTE — PROGRESS NOTES
11/18/24 0752   Team Meeting   Meeting Type Daily Rounds   Team Members Present   Team Members Present Physician;Nurse;;Other (Discipline and Name)   Physician Team Member Dr. Ramirez / MARY JO Gonzales / MARY JO Student / ODILON Cook   Nursing Team Member Ole   Care Management Team Member Serafin / Suyl / Miguel   Other (Discipline and Name) Sigmund - Group Facilitator   Patient/Family Present   Patient Present No   Patient's Family Present No     DC: This week (?) Pt has flat affect, social with select peers. Reported fluctuating anxiety and depression. Reported she is not sleeping but staff observe her sleeping. Tearful on Sunday and reported she is afraid to go back to her apartment.

## 2024-11-18 NOTE — PLAN OF CARE
Problem: Alteration in Thoughts and Perception  Goal: Verbalize thoughts and feelings  Description: Interventions:  - Promote a nonjudgmental and trusting relationship with the patient through active listening and therapeutic communication  - Assess patient's level of functioning, behavior and potential for risk  - Engage patient in 1 on 1 interactions  - Encourage patient to express fears, feelings, frustrations, and discuss symptoms    - [place] patient to reality, help patient recognize reality-based thinking   - Administer medications as ordered and assess for potential side effects  - Provide the patient education related to the signs and symptoms of the illness and desired effects of prescribed medications  Outcome: Progressing  Goal: Refrain from acting on delusional thinking/internal stimuli  Description: Interventions:  - Monitor patient closely, per order   - Utilize least restrictive measures   - Set reasonable limits, give positive feedback for acceptable   - Administer medications as ordered and monitor of potential side effects  Outcome: Progressing  Goal: Agree to be compliant with medication regime, as prescribed and report medication side effects  Description: Interventions:  - Offer appropriate PRN medication and supervise ingestion; conduct AIMS, as needed   Outcome: Progressing  Goal: Attend and participate in unit activities, including therapeutic, recreational, and educational groups  Description: Interventions:  -Encourage Visitation and family involvement in care  Outcome: Not Progressing  Goal: Recognize dysfunctional thoughts, communicate reality-based thoughts at the time of discharge  Description: Interventions:  - Provide medication and psycho-education to assist patient in compliance and developing insight into his/her illness   Outcome: Progressing  Goal: Complete daily ADLs, including personal hygiene independently, as able  Description: Interventions:  - Observe, teach, and assist  patient with ADLS  - Monitor and promote a balance of rest/activity, with adequate nutrition and elimination   Outcome: Progressing     Problem: Ineffective Coping  Goal: Identifies ineffective coping skills  Outcome: Not Progressing  Goal: Identifies healthy coping skills  Outcome: Progressing  Goal: Demonstrates healthy coping skills  Outcome: Not Progressing  Goal: Participates in unit activities  Description: Interventions:  - Provide therapeutic environment   - Provide required programming   - Redirect inappropriate behaviors   Outcome: Not Progressing     Problem: Risk for Self Injury/Neglect  Goal: Treatment Goal: Remain safe during length of stay, learn and adopt new coping skills, and be free of self-injurious ideation, impulses and acts at the time of discharge  Outcome: Progressing  Goal: Verbalize thoughts and feelings  Description: Interventions:  - Assess and re-assess patient's lethality and potential for self-injury  - Engage patient in 1:1 interactions, daily, for a minimum of 15 minutes  - Encourage patient to express feelings, fears, frustrations, hopes  - Establish rapport/trust with patient   Outcome: Progressing  Goal: Refrain from harming self  Description: Interventions:  - Monitor patient closely, per order  - Develop a trusting relationship  - Supervise medication ingestion, monitor effects and side effects   Outcome: Progressing     Problem: DISCHARGE PLANNING  Goal: Discharge to home or other facility with appropriate resources  Description: INTERVENTIONS:  - Identify barriers to discharge w/patient and caregiver  - Arrange for needed discharge resources and transportation as appropriate  - Identify discharge learning needs (meds, wound care, etc.)  - Arrange for interpretive services to assist at discharge as needed  - Refer to Case Management Department for coordinating discharge planning if the patient needs post-hospital services based on physician/advanced practitioner order or  complex needs related to functional status, cognitive ability, or social support system  Outcome: Progressing

## 2024-11-18 NOTE — PLAN OF CARE
Patient attends select therapeutic groups and other unit activities.    Problem: Alteration in Thoughts and Perception  Goal: Attend and participate in unit activities, including therapeutic, recreational, and educational groups  Description: Interventions:  -Encourage Visitation and family involvement in care  Outcome: Progressing     Problem: Ineffective Coping  Goal: Participates in unit activities  Description: Interventions:  - Provide therapeutic environment   - Provide required programming   - Redirect inappropriate behaviors   Outcome: Progressing

## 2024-11-18 NOTE — NURSING NOTE
"Approached writer at approx. 0515, stating-\"I didn't do it, but my room-mate clogged the toilet & urinated on the floor.\" Was able to return to sleep quickly.  "

## 2024-11-18 NOTE — PROGRESS NOTES
"Progress Note - Behavioral Health   Name: Gabby Mares 34 y.o. female I MRN: [id number]   Unit/Bed#: -01 I Date of Admission: 11/13/2024   Date of Service: 11/18/2024 I Hospital Day: 5         Assessment & Plan  Depression  Increase Remeron to 30mg po HS  Anxiety disorder  See \"depression\" plan  Medical clearance for psychiatric admission  As per SLIM  Drug abuse (HCC)  Abuses diphenhydramine. Encourage cessation. Stable.  Agoraphobia  stable  Elevated serum creatinine  Per SLIM  Improved to baseline 1.37  Obesity    Elevated LFTs          Recommended Treatment:     Treatment plan and medication changes discussed and per the attending physician the plan is:     1.Continue with group therapy, milieu therapy and occupational therapy  2.Behavioral Health checks every 15 minutes  3.Continue frequent safety checks and vitals per unit protocol  4.Continue with SLIM medical management as indicated  5.Continue with current medication regimen  6.Will review labs in the a.m.  7.Disposition Planning: Discharge planning and efforts remain ongoing     Planned medication and treatment changes:    All current active medications have been reviewed  Encourage group therapy, milieu therapy and occupational therapy  Behavioral Health checks for safety monitoring  Continue treatment with group therapy, milieu therapy and occupational therapy  Discharge planning  Patient continues to require inpatient treatment for depression and anxiety symptoms as well as intermittent SI.  Continue current medications:    Current medications:  Current Facility-Administered Medications   Medication Dose Route Frequency Provider Last Rate    acetaminophen  650 mg Oral Q6H PRN Ivonne Beltre PA-C      aluminum-magnesium hydroxide-simethicone  30 mL Oral Q4H PRN aMrgot De La Cruz MD      haloperidol lactate  2.5 mg Intramuscular Q4H PRN Max 4/day Margot De La Cruz MD      And    LORazepam  1 mg Intramuscular Q4H PRN Max 4/day Margot STODDARD" MD Dorothy      And    benztropine  0.5 mg Intramuscular Q4H PRN Max 4/day Margot De La Cruz MD      haloperidol lactate  5 mg Intramuscular Q4H PRN Max 4/day Margot De La Cruz MD      And    LORazepam  2 mg Intramuscular Q4H PRN Max 4/day Margot De La Cruz MD      And    benztropine  1 mg Intramuscular Q4H PRN Max 4/day Margot De La Cruz MD      benztropine  1 mg Intramuscular Q4H PRN Max 6/day Margot De La Cruz MD      benztropine  1 mg Oral Q4H PRN Max 6/day Margot De La Cruz MD      bisacodyl  10 mg Rectal Daily PRN Margot De La Cruz MD      hydrOXYzine HCL  50 mg Oral Q6H PRN Max 4/day Margot De La Cruz MD      Or    diphenhydrAMINE  50 mg Intramuscular Q6H PRN Margot De La Cruz MD      ergocalciferol  50,000 Units Oral Weekly Puma Newby DO      haloperidol  1 mg Oral Q6H PRN Margot De La Cruz MD      haloperidol  2.5 mg Oral Q4H PRN Max 4/day Margot De La Cruz MD      haloperidol  5 mg Oral Q4H PRN Max 4/day Margot De La Cruz MD      hydrOXYzine HCL  100 mg Oral Q6H PRN Max 4/day Margot De La Cruz MD      Or    LORazepam  2 mg Intramuscular Q6H PRN Margot De La Cruz MD      hydrOXYzine HCL  25 mg Oral Q6H PRN Max 4/day Margot De La Cruz MD      menthol-methyl salicylate   Apply externally 4x Daily PRN Ivonne Beltre PA-C      mirtazapine  30 mg Oral HS ODILON Nunez      polyethylene glycol  17 g Oral Daily PRN Margot De La Cruz MD      propranolol  10 mg Oral Q8H PRN Margot De La Cruz MD      senna-docusate sodium  1 tablet Oral Daily PRN Margot De La Cruz MD      traZODone  50 mg Oral HS PRN Margot De La Cruz MD         Risks / Benefits of Treatment:    Risks, benefits, and possible side effects of medications explained to patient and patient verbalizes understanding and agreement for treatment.    Subjective:    Behavior over the last 24 hours: some improvement.     Per staff, Gabby continues to report fluctuating anxiety and depression.  Staff is observing patient sleeping but  "patient is reporting poor sleep.  She was tearful and anxious appearing over the weekend at times.      Gabby was seen in the consult room for psychiatric follow up.  She reports that she is \"not great\" today.  She reports continued fluctuating mood, at times feeling anxiety and at times feeling more depression.  She denies any euphoric mood shifts. She denies any current suicidal ideation, intent or plan but at times during the day she has SI.  She contracts for safety while on the unit but feels she would not be safe if discharged.  She is agreeable to further titration of remeron for continuing anxiety or depression.  Patient with elevated creatinine level so Lithium not a safe option at this time for ongoing suicidal thoughts. She denies homicidal ideation intent or plan.  She denies any current VH/AH and does not appear internally preoccupied.      Sleep: decreased  Appetite: normal  Medication side effects: No   ROS: no complaints    Mental Status Evaluation:    Appearance:  disheveled, marginal hygiene   Behavior:  pleasant, cooperative, calm   Speech:  normal rate and volume   Mood:  anxious   Affect:  constricted   Thought Process:  linear, perseverative   Associations: circumstantial associations   Thought Content:  no overt delusions, negative thoughts, intrusive thoughts, ruminations   Perceptual Disturbances: denies auditory or visual hallucinations when asked, does not appear responding to internal stimuli   Risk Potential: Suicidal ideation - Yes, fleeting suicidal thoughts, contracts for safety on the unit, would talk to staff if not feeling safe on the unit  Homicidal ideation - None  Potential for aggression - No   Sensorium:  oriented to person, place, and time/date   Memory:  recent memory intact   Consciousness:  alert and awake   Attention/Concentration: attention span and concentration appear shorter than expected for age   Insight:  limited   Judgment: limited   Gait/Station: normal " gait/station   Motor Activity: no abnormal movements     Vital signs in last 24 hours:    Temp:  [98 °F (36.7 °C)-98.6 °F (37 °C)] 98.6 °F (37 °C)  HR:  [72-80] 72  BP: (113-137)/(86-90) 137/90  Resp:  [18] 18  SpO2:  [100 %] 100 %  O2 Device: None (Room air)         Laboratory results: I have personally reviewed all pertinent laboratory/tests results    Results from the past 24 hours: No results found for this or any previous visit (from the past 24 hours).  Most Recent Labs:   Lab Results   Component Value Date    WBC 7.57 11/14/2024    RBC 4.54 11/14/2024    HGB 12.6 11/14/2024    HCT 40.1 11/14/2024     11/14/2024    RDW 15.8 (H) 11/14/2024    NEUTROABS 4.49 11/14/2024    SODIUM 139 11/16/2024    K 3.9 11/16/2024     11/16/2024    CO2 25 11/16/2024    BUN 21 11/16/2024    CREATININE 1.37 (H) 11/16/2024    GLUC 93 11/16/2024    CALCIUM 8.3 (L) 11/16/2024    AST 29 11/16/2024    ALT 49 11/16/2024    ALKPHOS 73 11/16/2024    TP 7.1 11/16/2024    ALB 3.7 11/16/2024    TBILI 0.49 11/16/2024    CHOLESTEROL 182 11/14/2024    HDL 39 (L) 11/14/2024    TRIG 148 11/14/2024    LDLCALC 113 (H) 11/14/2024    NONHDLC 143 11/14/2024    AMMONIA 32 10/17/2022    MBD3GHVIWNNN 1.306 11/14/2024    FREET4 0.86 05/11/2018    PREGUR Negative 10/18/2022    PREGSERUM Negative 11/14/2024    HCGQUANT <2 08/07/2018    SYPHILISAB Non-reactive 11/14/2024       Suicide/Homicide Risk Assessment:    Risk of Harm to Self:   Nursing Suicide Risk Assessment Last 24 hours: C-SSRS Risk (Since Last Contact)  Calculated C-SSRS Risk Score (Since Last Contact): No Risk Indicated  Current Specific Risk Factors include: recent suicidal ideation, diagnosis of mood disorder, poor reasoning  Protective Factors: no current suicidal ideation, ability to communicate with staff on the unit, able to contract for safety on the unit, taking medications as ordered on the unit, supportive sister  Based on today's assessment, Gabby presents the following  risk of harm to self: low    Risk of Harm to Others:  Nursing Homicide Risk Assessment: Violence Risk to Others: Denies within past 6 months  Current Specific Risk Factors include: multiple stressors, social difficulties  Protective Factors: no current homicidal ideation, no current psychotic symptoms, compliant with medications on the unit as ordered, compliant with unit milieu, follows staff redirection  Based on today's assessment, Gabby presents the following risk of harm to others: low    The following interventions are recommended: Behavioral Health checks for safety monitoring, continued hospitalization on locked unit    Progress Toward Goals: limited improvement, patient continues with anxiety and depression symptoms with intermittent SI    Counseling / Coordination of Care:    Total floor / unit time spent today 35 minutes. Greater than 50% of total time was spent with the patient and / or family counseling and / or coordination of care. A description of counseling / coordination of care:  Medications, treatment progress and treatment plan reviewed with patient.  Medication education provided to patient.  Patient's diagnosis and treatment indicated reviewed with patient.    Administrative Statements   Topics discussed with the patient / family include symptom assessment and management, medication review, medication adjustment, psychosocial support, and goals of care.    ODILON Nunez 11/18/24

## 2024-11-19 PROCEDURE — 99232 SBSQ HOSP IP/OBS MODERATE 35: CPT | Performed by: PSYCHIATRY & NEUROLOGY

## 2024-11-19 RX ADMIN — MUSCLE RUB CREAM 1 APPLICATION: 100; 150 CREAM TOPICAL at 22:12

## 2024-11-19 RX ADMIN — ACETAMINOPHEN 650 MG: 325 TABLET, FILM COATED ORAL at 22:12

## 2024-11-19 RX ADMIN — HYDROXYZINE HYDROCHLORIDE 100 MG: 50 TABLET, FILM COATED ORAL at 09:23

## 2024-11-19 RX ADMIN — MIRTAZAPINE 30 MG: 15 TABLET, FILM COATED ORAL at 21:23

## 2024-11-19 NOTE — PROGRESS NOTES
"Progress Note - Behavioral Health   Gabby YAIMA [facility] 34 y.o. female MRN: [id number]  Unit/Bed#: U 208-01 Encounter: [id number]  Hospital Day: 6    Assessment:  Principal Problem:    Depression  Active Problems:    Elevated serum creatinine    Anxiety disorder    Medical clearance for psychiatric admission    Drug abuse ([facility])    Agoraphobia    Obesity    Elevated LFTs      Plan:  Assessment & Plan  Depression  Continue Remeron 30 mg p.o. at bedtime  Anxiety disorder  See \"depression\" plan  Medical clearance for psychiatric admission  As per SLIM  Drug abuse ([facility])  Abuses diphenhydramine. Encourage cessation. Stable.  Agoraphobia  stable  Elevated serum creatinine  Per SLIM  Improved to baseline 1.37  Obesity    Elevated LFTs         In addition to the above:  --Continue with psychiatric hospitalization  --Continue with individual, group, and milieu therapy      Subjective: Patient was seen for continuation of care. Chart was reviewed and discussed with treatment team.     No acute behavioral events over the past 24 hours.  Today, the patient endorses intermittent passive suicidal ideation but denies homicidal ideation.  She denies psychotic symptoms.  She has fluctuating levels of moderate to severe depression and anxiety.  She describes her mood is \"all over the place.\"  She took trazodone last evening and stated that it gave her disturbing vivid dreams. She is tolerating increased dose of Remeron. Does not yet feel stable, subjectively.    Patient denied adverse effects to their psychiatric medication regimen. Patient denied other new or worsening psychiatric symptoms/complaints at this time. Discussed the importance of continuing to take medications as prescribed, as well as the importance of continuing to attend groups on the unit.     Psychiatric Review of Systems:  Medication adverse effects: none  Sleep: unchanged  Appetite: unchanged  Behaviors over the past 24 hours: unchanged    Vitals:  Vitals:    " "11/19/24 0724   BP: (!) 146/103   Pulse: 70   Resp: 18   Temp: 97.8 °F (36.6 °C)   SpO2: 100%       Laboratory results:    I have personally reviewed all pertinent laboratory/tests results  No results found for this or any previous visit (from the past 48 hours).       Current Medications:  Current medications as per above. All medications have been reviewed.   Risks, benefits, alternatives, and possible side effects of patient's psychiatric medications were discussed with patient.     Mental Status Evaluation:  Appearance: moderately kempt  Motor: +psychomotor retardation  Behavior: cooperative, pleasant  Speech: normal rate, rhythm, and volume  Mood: \"all over the place\"  Affect: mood-congruent, mildly anxious appearing  Thought Process: organized and linear  Thought Content: denies auditory hallucinations, denies visual hallucinations, denies delusions  Risk Potential: +passive suicidal ideation, no plan, no intent. Denies homicidal ideation  Sensorium: Oriented to person, place, time, and situation  Cognition: cognitive ability appears intact but was not quantitatively tested  Consciousness: alert and awake  Attention: currently intact  Insight: fair  Judgement: fair      Progress Toward Goals & Illness Status: Patient is not at goal. They are not yet ready for discharge. The patient's condition currently requires active psychopharmacological medication management, interdisciplinary coordination with case management, and the utilization of adjunctive milieu and group therapy to augment psychopharmacological efficacy. The patient's risk of morbidity, and progression or decompensation of psychiatric disease, is higher without this current treatment.       This note has been constructed using a voice recognition system. There may be translation, syntax, or grammatical errors. If you have any questions, please contact the dictating provider.      "

## 2024-11-19 NOTE — NURSING NOTE
Patient given PRN Trazodone for sleep. At one hour follow-up patient appears asleep with even and unlabored respirations.

## 2024-11-19 NOTE — PLAN OF CARE
Problem: Alteration in Thoughts and Perception  Goal: Verbalize thoughts and feelings  Description: Interventions:  - Promote a nonjudgmental and trusting relationship with the patient through active listening and therapeutic communication  - Assess patient's level of functioning, behavior and potential for risk  - Engage patient in 1 on 1 interactions  - Encourage patient to express fears, feelings, frustrations, and discuss symptoms    - [place] patient to reality, help patient recognize reality-based thinking   - Administer medications as ordered and assess for potential side effects  - Provide the patient education related to the signs and symptoms of the illness and desired effects of prescribed medications  Outcome: Progressing  Goal: Refrain from acting on delusional thinking/internal stimuli  Description: Interventions:  - Monitor patient closely, per order   - Utilize least restrictive measures   - Set reasonable limits, give positive feedback for acceptable   - Administer medications as ordered and monitor of potential side effects  Outcome: Progressing  Goal: Agree to be compliant with medication regime, as prescribed and report medication side effects  Description: Interventions:  - Offer appropriate PRN medication and supervise ingestion; conduct AIMS, as needed   Outcome: Progressing  Goal: Attend and participate in unit activities, including therapeutic, recreational, and educational groups  Description: Interventions:  -Encourage Visitation and family involvement in care  Outcome: Progressing  Goal: Recognize dysfunctional thoughts, communicate reality-based thoughts at the time of discharge  Description: Interventions:  - Provide medication and psycho-education to assist patient in compliance and developing insight into his/her illness   Outcome: Progressing  Goal: Complete daily ADLs, including personal hygiene independently, as able  Description: Interventions:  - Observe, teach, and assist  patient with ADLS  - Monitor and promote a balance of rest/activity, with adequate nutrition and elimination   Outcome: Progressing     Problem: Risk for Self Injury/Neglect  Goal: Treatment Goal: Remain safe during length of stay, learn and adopt new coping skills, and be free of self-injurious ideation, impulses and acts at the time of discharge  Outcome: Progressing  Goal: Verbalize thoughts and feelings  Description: Interventions:  - Assess and re-assess patient's lethality and potential for self-injury  - Engage patient in 1:1 interactions, daily, for a minimum of 15 minutes  - Encourage patient to express feelings, fears, frustrations, hopes  - Establish rapport/trust with patient   Outcome: Progressing  Goal: Refrain from harming self  Description: Interventions:  - Monitor patient closely, per order  - Develop a trusting relationship  - Supervise medication ingestion, monitor effects and side effects   Outcome: Progressing

## 2024-11-19 NOTE — CASE MANAGEMENT
CM informed by Patient  that pt does have active Medicare [employer] Insurance. CM sent to identifiers and UR department to see if they can add it to pt's chart.

## 2024-11-19 NOTE — NURSING NOTE
"Pt walking the halls this morning. Anxious d/t calling sister and getting \"busy signal\". Informed that the nurses station and pt phones are down at this time. Pt relieved. Denies SI/HI or hallucination. Fluctuating depression.   "

## 2024-11-19 NOTE — NURSING NOTE
"Pt visible on the unit this evening walking the halls. Pt reports ongoing depression and anxiety. \"My mood is all over the place.\" Pt reports ongoing poor sleep overnight. Pt reports a good appetite. Denies SI/HI/AVH. Pt reports no improvement since admission. Denies unmet needs at this time.   "

## 2024-11-19 NOTE — PROGRESS NOTES
11/19/24 0756   Team Meeting   Meeting Type Daily Rounds   Team Members Present   Team Members Present Physician;Nurse;;Other (Discipline and Name)   Physician Team Member Dr. Ramirez / Dr. Newby / MARY JO Gonzales / PA Student   Nursing Team Member Aly / [place]   Care Management Team Member Serafin / Suly / Miguel   Patient/Family Present   Patient Present No   Patient's Family Present No     Pt reported depression and anxiety and worse in the morning. Seclusive to self.   DC: Monday (?)

## 2024-11-20 PROCEDURE — 99232 SBSQ HOSP IP/OBS MODERATE 35: CPT | Performed by: PSYCHIATRY & NEUROLOGY

## 2024-11-20 RX ADMIN — HYDROXYZINE HYDROCHLORIDE 100 MG: 50 TABLET, FILM COATED ORAL at 17:45

## 2024-11-20 RX ADMIN — ACETAMINOPHEN 650 MG: 325 TABLET, FILM COATED ORAL at 17:45

## 2024-11-20 RX ADMIN — MUSCLE RUB CREAM 1 APPLICATION: 100; 150 CREAM TOPICAL at 17:45

## 2024-11-20 RX ADMIN — MIRTAZAPINE 30 MG: 15 TABLET, FILM COATED ORAL at 21:32

## 2024-11-20 NOTE — NURSING NOTE
Patient requested and received Tylenol 650 mg PO c/o 9/10 bilateral foot pain, at 2212. She also received 1 application of Armond Packer at that time. Patient able to apply Armond Packer to her feet and lower legs independently. Upon follow up, patient appears to be asleep in her bed. Her eyes are closed, respirations are even and unlabored, and she appears to be in no apparent distress. Medication appears to be effective.   Yes

## 2024-11-20 NOTE — PLAN OF CARE
Problem: Alteration in Thoughts and Perception  Goal: Verbalize thoughts and feelings  Description: Interventions:  - Promote a nonjudgmental and trusting relationship with the patient through active listening and therapeutic communication  - Assess patient's level of functioning, behavior and potential for risk  - Engage patient in 1 on 1 interactions  - Encourage patient to express fears, feelings, frustrations, and discuss symptoms    - [place] patient to reality, help patient recognize reality-based thinking   - Administer medications as ordered and assess for potential side effects  - Provide the patient education related to the signs and symptoms of the illness and desired effects of prescribed medications  Outcome: Progressing  Goal: Refrain from acting on delusional thinking/internal stimuli  Description: Interventions:  - Monitor patient closely, per order   - Utilize least restrictive measures   - Set reasonable limits, give positive feedback for acceptable   - Administer medications as ordered and monitor of potential side effects  Outcome: Progressing  Goal: Agree to be compliant with medication regime, as prescribed and report medication side effects  Description: Interventions:  - Offer appropriate PRN medication and supervise ingestion; conduct AIMS, as needed   Outcome: Progressing  Goal: Attend and participate in unit activities, including therapeutic, recreational, and educational groups  Description: Interventions:  -Encourage Visitation and family involvement in care  Outcome: Progressing  Goal: Recognize dysfunctional thoughts, communicate reality-based thoughts at the time of discharge  Description: Interventions:  - Provide medication and psycho-education to assist patient in compliance and developing insight into his/her illness   Outcome: Progressing  Goal: Complete daily ADLs, including personal hygiene independently, as able  Description: Interventions:  - Observe, teach, and assist  patient with ADLS  - Monitor and promote a balance of rest/activity, with adequate nutrition and elimination   Outcome: Progressing     Problem: Risk for Self Injury/Neglect  Goal: Treatment Goal: Remain safe during length of stay, learn and adopt new coping skills, and be free of self-injurious ideation, impulses and acts at the time of discharge  Outcome: Progressing  Goal: Verbalize thoughts and feelings  Description: Interventions:  - Assess and re-assess patient's lethality and potential for self-injury  - Engage patient in 1:1 interactions, daily, for a minimum of 15 minutes  - Encourage patient to express feelings, fears, frustrations, hopes  - Establish rapport/trust with patient   Outcome: Progressing  Goal: Refrain from harming self  Description: Interventions:  - Monitor patient closely, per order  - Develop a trusting relationship  - Supervise medication ingestion, monitor effects and side effects   Outcome: Progressing

## 2024-11-20 NOTE — PLAN OF CARE
Patient regularly attends groups and other unit activities.     Problem: Alteration in Thoughts and Perception  Goal: Attend and participate in unit activities, including therapeutic, recreational, and educational groups  Description: Interventions:  -Encourage Visitation and family involvement in care  Outcome: Progressing     Problem: Ineffective Coping  Goal: Participates in unit activities  Description: Interventions:  - Provide therapeutic environment   - Provide required programming   - Redirect inappropriate behaviors   Outcome: Progressing

## 2024-11-20 NOTE — NURSING NOTE
Pt walking the halls slowly. Reports feet are hurting. Declined PRNs. Pt denies SI/HI or hallucination. Pt attending groups and social with peers. Asking for a brush d/t long hair and comb not being effective. Brush placed at bedside.

## 2024-11-20 NOTE — NURSING NOTE
Patient friendly upon approach. Denies SI/HI/AVH. Stated she was feeling severely anxious and also complaining of bilateral foot pain, rated 10/10. Atarax 100 mg PO and Tylenol 650 mg PO given at 1745. [place] Anxiety scale score was 26 at that time. She rated her bilateral foot pain at 9/10. 1 application of Armond Packer also given at that time. She stated her sister will be calling for an update, and phone number to nursing station was provided. She continued to wander around unit despite stating her foot pain. Upon follow up, patient reported that the Tylenol and Atarax were somewhat effective, and offered no further complaints at that time.

## 2024-11-21 PROCEDURE — 99232 SBSQ HOSP IP/OBS MODERATE 35: CPT | Performed by: PSYCHIATRY & NEUROLOGY

## 2024-11-21 RX ORDER — QUETIAPINE FUMARATE 25 MG/1
25 TABLET, FILM COATED ORAL
Status: DISCONTINUED | OUTPATIENT
Start: 2024-11-21 | End: 2024-11-25

## 2024-11-21 RX ADMIN — MIRTAZAPINE 30 MG: 15 TABLET, FILM COATED ORAL at 21:18

## 2024-11-21 RX ADMIN — HYDROXYZINE HYDROCHLORIDE 100 MG: 50 TABLET, FILM COATED ORAL at 19:33

## 2024-11-21 RX ADMIN — QUETIAPINE FUMARATE 25 MG: 25 TABLET ORAL at 21:18

## 2024-11-21 NOTE — PROGRESS NOTES
11/21/24 0753   Team Meeting   Meeting Type Daily Rounds   Team Members Present   Team Members Present Physician;Nurse;;Other (Discipline and Name)   Physician Team Member Dr. Ramirez / MARY JO Gonzales / PA Student   Nursing Team Member [facility] Management Team Member Serafin / Suly   Other (Discipline and Name) Sigmund - Group Facilitator   Patient/Family Present   Patient Present No   Patient's Family Present No     DC: next week (?)

## 2024-11-21 NOTE — NURSING NOTE
"Pt walking the halls with slight limp. States feet hurt. Declined PRN. Reports feeling \"okay\". Denies SI/HI or hallucination. Identified sister as support system. Pt requested and provided with chap stick.  "

## 2024-11-21 NOTE — PLAN OF CARE
Problem: Alteration in Thoughts and Perception  Goal: Verbalize thoughts and feelings  Description: Interventions:  - Promote a nonjudgmental and trusting relationship with the patient through active listening and therapeutic communication  - Assess patient's level of functioning, behavior and potential for risk  - Engage patient in 1 on 1 interactions  - Encourage patient to express fears, feelings, frustrations, and discuss symptoms    - [place] patient to reality, help patient recognize reality-based thinking   - Administer medications as ordered and assess for potential side effects  - Provide the patient education related to the signs and symptoms of the illness and desired effects of prescribed medications  Outcome: Progressing  Goal: Refrain from acting on delusional thinking/internal stimuli  Description: Interventions:  - Monitor patient closely, per order   - Utilize least restrictive measures   - Set reasonable limits, give positive feedback for acceptable   - Administer medications as ordered and monitor of potential side effects  Outcome: Progressing  Goal: Agree to be compliant with medication regime, as prescribed and report medication side effects  Description: Interventions:  - Offer appropriate PRN medication and supervise ingestion; conduct AIMS, as needed   Outcome: Progressing  Goal: Attend and participate in unit activities, including therapeutic, recreational, and educational groups  Description: Interventions:  -Encourage Visitation and family involvement in care  Outcome: Progressing  Goal: Recognize dysfunctional thoughts, communicate reality-based thoughts at the time of discharge  Description: Interventions:  - Provide medication and psycho-education to assist patient in compliance and developing insight into his/her illness   Outcome: Progressing  Goal: Complete daily ADLs, including personal hygiene independently, as able  Description: Interventions:  - Observe, teach, and assist  patient with ADLS  - Monitor and promote a balance of rest/activity, with adequate nutrition and elimination   Outcome: Progressing     Problem: Ineffective Coping  Goal: Identifies ineffective coping skills  Outcome: Progressing  Goal: Identifies healthy coping skills  Outcome: Progressing  Goal: Demonstrates healthy coping skills  Outcome: Progressing  Goal: Participates in unit activities  Description: Interventions:  - Provide therapeutic environment   - Provide required programming   - Redirect inappropriate behaviors   Outcome: Progressing     Problem: Risk for Self Injury/Neglect  Goal: Treatment Goal: Remain safe during length of stay, learn and adopt new coping skills, and be free of self-injurious ideation, impulses and acts at the time of discharge  Outcome: Progressing  Goal: Verbalize thoughts and feelings  Description: Interventions:  - Assess and re-assess patient's lethality and potential for self-injury  - Engage patient in 1:1 interactions, daily, for a minimum of 15 minutes  - Encourage patient to express feelings, fears, frustrations, hopes  - Establish rapport/trust with patient   Outcome: Progressing  Goal: Refrain from harming self  Description: Interventions:  - Monitor patient closely, per order  - Develop a trusting relationship  - Supervise medication ingestion, monitor effects and side effects   Outcome: Progressing     Problem: DISCHARGE PLANNING  Goal: Discharge to home or other facility with appropriate resources  Description: INTERVENTIONS:  - Identify barriers to discharge w/patient and caregiver  - Arrange for needed discharge resources and transportation as appropriate  - Identify discharge learning needs (meds, wound care, etc.)  - Arrange for interpretive services to assist at discharge as needed  - Refer to Case Management Department for coordinating discharge planning if the patient needs post-hospital services based on physician/advanced practitioner order or complex needs  related to functional status, cognitive ability, or social support system  Outcome: Progressing

## 2024-11-21 NOTE — PROGRESS NOTES
"Progress Note - Behavioral Health   Name: Gabby Mares 34 y.o. female I MRN: [id number]   Unit/Bed#: -02 I Date of Admission: 11/13/2024   Date of Service: 11/21/2024 I Hospital Day: 8         Assessment & Plan  Depression  Continue Remeron 30 mg p.o. at bedtime  Add Seroquel 25mg PO at HS for mood/anxiety and insomnia  Anxiety disorder  See \"depression\" plan  Medical clearance for psychiatric admission  As per SLIM  Drug abuse (HCC)  Abuses diphenhydramine. Encourage cessation. Stable.  Agoraphobia  stable  Elevated serum creatinine  Per SLIM  Improved to baseline 1.37  Recheck CMP on 11/23  Obesity    Elevated LFTs  Stable  Recheck CMP on 11/23        Recommended Treatment:     Treatment plan and medication changes discussed and per the attending physician the plan is:     1.Continue with group therapy, milieu therapy and occupational therapy  2.Behavioral Health checks every 15 minutes  3.Continue frequent safety checks and vitals per unit protocol  4.Continue with SLIM medical management as indicated  5.Continue with current medication regimen  6.Will review labs in the a.m.  7.Disposition Planning: Discharge planning and efforts remain ongoing     Planned medication and treatment changes:    All current active medications have been reviewed  Encourage group therapy, milieu therapy and occupational therapy  Behavioral Health checks for safety monitoring  Continue treatment with group therapy, milieu therapy and occupational therapy  Discharge planning  Add Seroquel 25mg PO at HS for mood and insomnia    Current medications:  Current Facility-Administered Medications   Medication Dose Route Frequency Provider Last Rate    acetaminophen  650 mg Oral Q6H PRN Ivonne Beltre PA-C      aluminum-magnesium hydroxide-simethicone  30 mL Oral Q4H PRN Margot De La Cruz MD      haloperidol lactate  2.5 mg Intramuscular Q4H PRN Max 4/day Margot De La Cruz MD      And    LORazepam  1 mg Intramuscular Q4H PRN " Max 4/day Margot De La Cruz MD      And    benztropine  0.5 mg Intramuscular Q4H PRN Max 4/day Margot De La Cruz MD      haloperidol lactate  5 mg Intramuscular Q4H PRN Max 4/day Margot De La Cruz MD      And    LORazepam  2 mg Intramuscular Q4H PRN Max 4/day Margot De La Cruz MD      And    benztropine  1 mg Intramuscular Q4H PRN Max 4/day Margot De La Cruz MD      benztropine  1 mg Intramuscular Q4H PRN Max 6/day Margot De La Cruz MD      benztropine  1 mg Oral Q4H PRN Max 6/day Margot De La Cruz MD      bisacodyl  10 mg Rectal Daily PRN Margot De La Cruz MD      hydrOXYzine HCL  50 mg Oral Q6H PRN Max 4/day Margot De La Cruz MD      Or    diphenhydrAMINE  50 mg Intramuscular Q6H PRN Margot De La Cruz MD      ergocalciferol  50,000 Units Oral Weekly Puma Newby DO      haloperidol  1 mg Oral Q6H PRN Margot De La Cruz MD      haloperidol  2.5 mg Oral Q4H PRN Max 4/day Margot De La Cruz MD      haloperidol  5 mg Oral Q4H PRN Max 4/day Margot De La Cruz MD      hydrOXYzine HCL  100 mg Oral Q6H PRN Max 4/day Margot De La Cruz MD      Or    LORazepam  2 mg Intramuscular Q6H PRN Margot De La Cruz MD      hydrOXYzine HCL  25 mg Oral Q6H PRN Max 4/day Margot De La Cruz MD      menthol-methyl salicylate   Apply externally 4x Daily PRN Ivonne Beltre PA-C      mirtazapine  30 mg Oral HS ODILON Nunez      polyethylene glycol  17 g Oral Daily PRN Margot De La Cruz MD      propranolol  10 mg Oral Q8H PRN Margot De La Cruz MD      QUEtiapine  25 mg Oral HS ODILON Nunez      senna-docusate sodium  1 tablet Oral Daily PRN Margot De La Cruz MD      traZODone  50 mg Oral HS PRN Margot De La Cruz MD         Risks / Benefits of Treatment:    Risks, benefits, and possible side effects of medications explained to patient and patient verbalizes understanding and agreement for treatment.    Subjective:    Behavior over the last 24 hours: slowly improving.     Per staff, Gabby has been calm and  cooperative on the unit.  She is meal and medication compliant.  She has been attending groups.    Gabby was seen in the consult room for psychiatric follow up today.  She reporting continued anxiety and depression.  She reports some small improvement in sleep last night but continues to complain of poor sleep.  She also reports uncontrolled anxiety.  She states biggest triggers are her financial situation.  She states she impulsively spends money on things she does not need.  This has caused her to be behind on her rent and now eviction process is being started.  She reports being on multiple antipsychotics in the past for mood stabilization.  She has not tried Seroquel which may be helpful for mood/anxiety, insomnia and impulsive behaviors.  We discussed risks and benefits of Seroquel and patient was agreeable to trying at low dose tonight.  Gabby is denying any current suicidal or homicidal ideation intent or plan.  She denies AH/VH and does not appear internally preoccupied.     Sleep: decreased  Appetite: normal  Medication side effects: No   ROS: no complaints    Mental Status Evaluation:    Appearance:  casually dressed, marginal hygiene, poor dentition    Behavior:  cooperative, calm   Speech:  normal rate and volume   Mood:  anxious   Affect:  constricted   Thought Process:  linear, concrete   Associations: concrete associations   Thought Content:  no overt delusions, negative thoughts, ruminating thoughts   Perceptual Disturbances: none   Risk Potential: Suicidal ideation - None at present, contracts for safety on the unit, would talk to staff if not feeling safe on the unit  Homicidal ideation - None  Potential for aggression - No   Sensorium:  oriented to person, place, and time/date   Memory:  recent memory intact   Consciousness:  alert and awake   Attention/Concentration: attention span and concentration are age appropriate   Insight:  limited   Judgment: limited   Gait/Station: normal gait/station    Motor Activity: no abnormal movements     Vital signs in last 24 hours:    Temp:  [97 °F (36.1 °C)-98.2 °F (36.8 °C)] 97 °F (36.1 °C)  HR:  [67-75] 67  BP: (112)/(69) 112/69  Resp:  [16-17] 16  SpO2:  [100 %] 100 %  O2 Device: None (Room air)         Laboratory results: I have personally reviewed all pertinent laboratory/tests results    Results from the past 24 hours: No results found for this or any previous visit (from the past 24 hours).  Most Recent Labs:   Lab Results   Component Value Date    WBC 7.57 11/14/2024    RBC 4.54 11/14/2024    HGB 12.6 11/14/2024    HCT 40.1 11/14/2024     11/14/2024    RDW 15.8 (H) 11/14/2024    NEUTROABS 4.49 11/14/2024    SODIUM 139 11/16/2024    K 3.9 11/16/2024     11/16/2024    CO2 25 11/16/2024    BUN 21 11/16/2024    CREATININE 1.37 (H) 11/16/2024    GLUC 93 11/16/2024    CALCIUM 8.3 (L) 11/16/2024    AST 29 11/16/2024    ALT 49 11/16/2024    ALKPHOS 73 11/16/2024    TP 7.1 11/16/2024    ALB 3.7 11/16/2024    TBILI 0.49 11/16/2024    CHOLESTEROL 182 11/14/2024    HDL 39 (L) 11/14/2024    TRIG 148 11/14/2024    LDLCALC 113 (H) 11/14/2024    NONHDLC 143 11/14/2024    AMMONIA 32 10/17/2022    MZD9FYJPGGNL 1.306 11/14/2024    FREET4 0.86 05/11/2018    PREGUR Negative 10/18/2022    PREGSERUM Negative 11/14/2024    HCGQUANT <2 08/07/2018    SYPHILISAB Non-reactive 11/14/2024       Suicide/Homicide Risk Assessment:    Risk of Harm to Self:   Nursing Suicide Risk Assessment Last 24 hours: C-SSRS Risk (Since Last Contact)  Calculated C-SSRS Risk Score (Since Last Contact): No Risk Indicated  Current Specific Risk Factors include: diagnosis of mood disorder  Protective Factors: no current suicidal ideation, ability to communicate with staff on the unit, able to contract for safety on the unit, taking medications as ordered on the unit, having a desire to live, medical compliance, supportive sister  Based on today's assessment, Gabby presents the following risk of harm  to self: low    Risk of Harm to Others:  Nursing Homicide Risk Assessment: Violence Risk to Others: Denies within past 6 months  Current Specific Risk Factors include: multiple stressors, social difficulties  Protective Factors: no current homicidal ideation, compliant with medications on the unit as ordered, compliant with unit milieu, follows staff redirection  Based on today's assessment, Gabby presents the following risk of harm to others: low    The following interventions are recommended: Behavioral Health checks for safety monitoring, continued hospitalization on locked unit    Progress Toward Goals: progressing, attends groups, participates in milieu therapy, depression is improving, discharge planning    Counseling / Coordination of Care:    Total floor / unit time spent today 40 minutes. Greater than 50% of total time was spent with the patient and / or family counseling and / or coordination of care. A description of counseling / coordination of care:    Administrative Statements   I have spent a total time of 40 minutes in caring for this patient on the day of the visit/encounter including Diagnostic results, Counseling / Coordination of care, Documenting in the medical record, Reviewing / ordering tests, medicine, procedures  , Obtaining or reviewing history  , and Communicating with other healthcare professionals .    ODILON Nunez 11/21/24

## 2024-11-21 NOTE — NURSING NOTE
Patient approach [facility] and informed him that she didn't feel comfortable with her room make anymore. Patient says she was standing over her bed and asking when she was going to sleep. When patient told her roommate made a throat slitting gesture towards patient. Patient moved to a different room due to concerns for safety.

## 2024-11-21 NOTE — NURSING NOTE
Pt attending groups and social with peers. Walking the halls with slight limp. Declined PRNs. Reports calling sister on the phones. Pt denies SI/HI or hallucination. Pt scant during interaction. Denies any question or concern at this time.

## 2024-11-21 NOTE — CASE MANAGEMENT
"CM met with pt to check in and discuss potential dc plan options. Pt focused on group home referral and not returning to her apartment. Pt spoke about her spending habits and that she owes money on all of her bills and her rent. CM asked if her sister would be an option for her to stay short term and pt reported no as pt reported \"she has kids and her own stuff going on.\"     CM explained to pt the process for group homes is lengthy and that CM can complete a referral for MH housing but it takes several months to a year to get into programs and pt is considered to be a resident of [facility]. CM spoke to pt about referring her for a  and new MH OP and PCP provider to add some support for her to return to her apartment and possibly for CM to help assist her with rental assistance or alternate housing options.     CM will refer pt for new PCP closer to her home and a new MH OP Provider that accepts Medicare as well as refer her to [employer]  for Counts include 234 beds at the [facility] funding.   "

## 2024-11-22 PROCEDURE — 99233 SBSQ HOSP IP/OBS HIGH 50: CPT | Performed by: PSYCHIATRY & NEUROLOGY

## 2024-11-22 RX ADMIN — MIRTAZAPINE 30 MG: 15 TABLET, FILM COATED ORAL at 21:01

## 2024-11-22 RX ADMIN — ERGOCALCIFEROL 50000 UNITS: 1.25 CAPSULE ORAL at 08:57

## 2024-11-22 RX ADMIN — HYDROXYZINE HYDROCHLORIDE 100 MG: 50 TABLET, FILM COATED ORAL at 10:21

## 2024-11-22 RX ADMIN — QUETIAPINE FUMARATE 25 MG: 25 TABLET ORAL at 21:01

## 2024-11-22 RX ADMIN — PROPRANOLOL HYDROCHLORIDE 10 MG: 10 TABLET ORAL at 21:01

## 2024-11-22 NOTE — ASSESSMENT & PLAN NOTE
Continue Remeron 30 mg p.o. at bedtime, consider further titration for mood  Continue Seroquel 25mg PO at HS for mood/anxiety and insomnia, consider further titration if patient tolerates

## 2024-11-22 NOTE — PLAN OF CARE
Patient regularly attends therapeutic groups and other unit activities.    Problem: Ineffective Coping  Goal: Participates in unit activities  Description: Interventions:  - Provide therapeutic environment   - Provide required programming   - Redirect inappropriate behaviors   Outcome: Progressing     Problem: Alteration in Thoughts and Perception  Goal: Attend and participate in unit activities, including therapeutic, recreational, and educational groups  Description: Interventions:  -Encourage Visitation and family involvement in care  Outcome: Progressing

## 2024-11-22 NOTE — NURSING NOTE
Upon follow-up, endorses Atarax ineffectiveness. However, patient reports calming down after conversing with peers.

## 2024-11-22 NOTE — CASE MANAGEMENT
CM emailed [facility] CM/ Supervisors Jazmyn and Melita to ask if they serve [place] for Adult CM services as CM would like to refer pt for CM upon dc.     They reported that since pt lives in [place] that [facility] serves that area.

## 2024-11-22 NOTE — PLAN OF CARE
Problem: Alteration in Thoughts and Perception  Goal: Verbalize thoughts and feelings  Description: Interventions:  - Promote a nonjudgmental and trusting relationship with the patient through active listening and therapeutic communication  - Assess patient's level of functioning, behavior and potential for risk  - Engage patient in 1 on 1 interactions  - Encourage patient to express fears, feelings, frustrations, and discuss symptoms    - [place] patient to reality, help patient recognize reality-based thinking   - Administer medications as ordered and assess for potential side effects  - Provide the patient education related to the signs and symptoms of the illness and desired effects of prescribed medications  Outcome: Progressing  Goal: Refrain from acting on delusional thinking/internal stimuli  Description: Interventions:  - Monitor patient closely, per order   - Utilize least restrictive measures   - Set reasonable limits, give positive feedback for acceptable   - Administer medications as ordered and monitor of potential side effects  Outcome: Progressing  Goal: Agree to be compliant with medication regime, as prescribed and report medication side effects  Description: Interventions:  - Offer appropriate PRN medication and supervise ingestion; conduct AIMS, as needed   Outcome: Progressing  Goal: Attend and participate in unit activities, including therapeutic, recreational, and educational groups  Description: Interventions:  -Encourage Visitation and family involvement in care  Outcome: Progressing  Goal: Recognize dysfunctional thoughts, communicate reality-based thoughts at the time of discharge  Description: Interventions:  - Provide medication and psycho-education to assist patient in compliance and developing insight into his/her illness   Outcome: Progressing  Goal: Complete daily ADLs, including personal hygiene independently, as able  Description: Interventions:  - Observe, teach, and assist  patient with ADLS  - Monitor and promote a balance of rest/activity, with adequate nutrition and elimination   Outcome: Progressing     Problem: Risk for Self Injury/Neglect  Goal: Treatment Goal: Remain safe during length of stay, learn and adopt new coping skills, and be free of self-injurious ideation, impulses and acts at the time of discharge  Outcome: Progressing  Goal: Verbalize thoughts and feelings  Description: Interventions:  - Assess and re-assess patient's lethality and potential for self-injury  - Engage patient in 1:1 interactions, daily, for a minimum of 15 minutes  - Encourage patient to express feelings, fears, frustrations, hopes  - Establish rapport/trust with patient   Outcome: Progressing  Goal: Refrain from harming self  Description: Interventions:  - Monitor patient closely, per order  - Develop a trusting relationship  - Supervise medication ingestion, monitor effects and side effects   Outcome: Progressing

## 2024-11-22 NOTE — NURSING NOTE
Pt is visibly upset, tearful in hallway. Requested to meet with provider this morning regarding discharge. Pt spoke with sister who will be going away, locking up pt's room. Pt states feeling overwhelmed and anxious regarding this information. Pt returned following meeting with provider. Pt received PRN Atarax 100mg po at 1021, Dorado: 25.

## 2024-11-22 NOTE — CASE MANAGEMENT
CM called [facility] [phone] to see if pt is still connected with their MH OP services as pt reported that was her last provider.     [address] [place] [address]    CM spoke to Roxana and she transferred CM to their CM department. CM spoke to their CM Sarah.     Sarah reported:   She used to see Dr. Falcon and was last seen in August 2024 but she told us that she moved to [place] and we told her she would need to switch providers to closer to where she lives now and we provided her with 3 refills.     CM will look into finding a new provider that accepts Medicare and set her up with a new PCP.

## 2024-11-22 NOTE — NURSING NOTE
"Pt requested a PRN for anxiety, Dorado=25. Upon follow up one hour later, pt reported it was \"mildly effective,\" in relieving the anxiety.  "

## 2024-11-22 NOTE — PROGRESS NOTES
"Progress Note - Behavioral Health   Name: Gabby Mares 34 y.o. female I MRN: [id number]   Unit/Bed#: -02 I Date of Admission: 11/13/2024   Date of Service: 11/22/2024 I Hospital Day: 9         Assessment & Plan  Depression  Continue Remeron 30 mg p.o. at bedtime, consider further titration for mood  Continue Seroquel 25mg PO at HS for mood/anxiety and insomnia, consider further titration if patient tolerates  Anxiety disorder  See \"depression\" plan  Medical clearance for psychiatric admission  As per SLIM  Drug abuse (HCC)  Abuses diphenhydramine. Encourage cessation. Stable.  Agoraphobia  stable  Elevated serum creatinine  Per SLIM  Improved to baseline 1.37  Recheck CMP on 11/23  Obesity    Elevated LFTs  Stable  Recheck CMP on 11/23        Recommended Treatment:     Treatment plan and medication changes discussed and per the attending physician the plan is:     1.Continue with group therapy, milieu therapy and occupational therapy  2.Behavioral Health checks every 15 minutes  3.Continue frequent safety checks and vitals per unit protocol  4.Continue with SLIM medical management as indicated  5.Continue with current medication regimen  6.Will review labs in the a.m.  7.Disposition Planning: Discharge planning and efforts remain ongoing     Planned medication and treatment changes:    All current active medications have been reviewed  Encourage group therapy, milieu therapy and occupational therapy  Behavioral Health checks for safety monitoring  Continue treatment with group therapy, milieu therapy and occupational therapy  Discharge planning  Patient continues with passive SI, potential for discharge next week if stable.  Continue current medications:    Current medications:  Current Facility-Administered Medications   Medication Dose Route Frequency Provider Last Rate    acetaminophen  650 mg Oral Q6H PRN Ivonne Beltre PA-C      aluminum-magnesium hydroxide-simethicone  30 mL Oral Q4H PRN Margot " ARLYN De La Cruz MD      haloperidol lactate  2.5 mg Intramuscular Q4H PRN Max 4/day Margot De La Cruz MD      And    LORazepam  1 mg Intramuscular Q4H PRN Max 4/day Margot De La Cruz MD      And    benztropine  0.5 mg Intramuscular Q4H PRN Max 4/day Margot De La Cruz MD      haloperidol lactate  5 mg Intramuscular Q4H PRN Max 4/day Margot De La Cruz MD      And    LORazepam  2 mg Intramuscular Q4H PRN Max 4/day Margot De La Cruz MD      And    benztropine  1 mg Intramuscular Q4H PRN Max 4/day Margot De La Cruz MD      benztropine  1 mg Intramuscular Q4H PRN Max 6/day Margot De La Cruz MD      benztropine  1 mg Oral Q4H PRN Max 6/day Margot De La Cruz MD      bisacodyl  10 mg Rectal Daily PRN Margot De La Cruz MD      hydrOXYzine HCL  50 mg Oral Q6H PRN Max 4/day Margot De La Cruz MD      Or    diphenhydrAMINE  50 mg Intramuscular Q6H PRN Margot De La Cruz MD      ergocalciferol  50,000 Units Oral Weekly Puma Newby DO      haloperidol  1 mg Oral Q6H PRN Margot De La Cruz MD      haloperidol  2.5 mg Oral Q4H PRN Max 4/day Margot De La Cruz MD      haloperidol  5 mg Oral Q4H PRN Max 4/day Margot De La Cruz MD      hydrOXYzine HCL  100 mg Oral Q6H PRN Max 4/day Margot De La Cruz MD      Or    LORazepam  2 mg Intramuscular Q6H PRN Margot De La Cruz MD      hydrOXYzine HCL  25 mg Oral Q6H PRN Max 4/day Margot De La Cruz MD      menthol-methyl salicylate   Apply externally 4x Daily PRN Ivonne Beltre PA-C      mirtazapine  30 mg Oral HS ODILON Nunez      polyethylene glycol  17 g Oral Daily PRN Margot De La Cruz MD      propranolol  10 mg Oral Q8H PRN Margot De La Cruz MD      QUEtiapine  25 mg Oral HS ODILON Nunez      senna-docusate sodium  1 tablet Oral Daily PRN Margot De La Cruz MD      traZODone  50 mg Oral HS PRN Margot De La Cruz MD         Risks / Benefits of Treatment:    Risks, benefits, and possible side effects of medications explained to patient and patient verbalizes  "understanding and agreement for treatment.    Subjective:    Behavior over the last 24 hours: unchanged.     Per staff, Gabby has been visible and social on the unit.  She has been attending groups.  She denies suicidal ideation and homicidal ideation.     Gabby was seen in the consult room today initially for psychiatric follow up.  She reports that she is \"so-so\".  She reports that the Seroquel helped her \"sleep deeper but may have made me restless\".  Agreeable to try low dose Seroquel tonight to see if she can tolerate.  She reports continued anxiety about her home situation.  She continues to want to be discharged to a group home.  Advised we can stabilize her mood on the unit and assist with referral to group home but will not be able to have her on the unit until a group home bed is available.  At that time the patient became upset and tearful reporting she has SI.    Patient later pulled this writer aside and asked for discharge today since we \"are discharging her anyway\".  Advised her that I did not feel today would be appropriate for discharge as we are trialing her on Seroquel and she reported SI this morning.  She then adamantly denied SI with intent or plan.  She reports she needs to go today or her apartment would be locked up.  When asked if she had access to benedryl which she reportedly had been abusing, she states her sister can remove it.  Advised patient that discharge today would unlikely as patient has no outpatient follow up at this time.  This writer attempted phone call x 2 to support person Rosemary (patient sister) to discuss discharge disposition with no return call at this time.      Sleep:  some improvement  Appetite: normal  Medication side effects:  potential restlessness with Seroquel, patient was not certain however    ROS:  foot pain, chronic    Mental Status Evaluation:    Appearance:  marginal hygiene, looks older than stated age, overweight   Behavior:  cooperative   Speech:  " normal rate and volume   Mood:  anxious   Affect:  constricted   Thought Process:  linear, concrete   Associations: intact associations   Thought Content:  no overt delusions, negative thoughts, intrusive thoughts   Perceptual Disturbances: none   Risk Potential: Suicidal ideation -  reported SI initially then denied SI with plan or intent, contracts for safety on the unit, would talk to staff if feeling unsafe  Homicidal ideation - None  Potential for aggression - No   Sensorium:  oriented to person, place, and time/date   Memory:  recent memory intact   Consciousness:  alert and awake   Attention/Concentration: attention span and concentration are age appropriate   Insight:  limited   Judgment: limited   Gait/Station: normal gait/station   Motor Activity: no abnormal movements     Vital signs in last 24 hours:    Temp:  [96.5 °F (35.8 °C)-97 °F (36.1 °C)] 96.5 °F (35.8 °C)  HR:  [81-88] 81  BP: (140-142)/(84-89) 140/89  Resp:  [16-17] 17  SpO2:  [98 %-100 %] 98 %  O2 Device: None (Room air)         Laboratory results: I have personally reviewed all pertinent laboratory/tests results    Results from the past 24 hours: No results found for this or any previous visit (from the past 24 hours).  Most Recent Labs:   Lab Results   Component Value Date    WBC 7.57 11/14/2024    RBC 4.54 11/14/2024    HGB 12.6 11/14/2024    HCT 40.1 11/14/2024     11/14/2024    RDW 15.8 (H) 11/14/2024    NEUTROABS 4.49 11/14/2024    SODIUM 139 11/16/2024    K 3.9 11/16/2024     11/16/2024    CO2 25 11/16/2024    BUN 21 11/16/2024    CREATININE 1.37 (H) 11/16/2024    GLUC 93 11/16/2024    CALCIUM 8.3 (L) 11/16/2024    AST 29 11/16/2024    ALT 49 11/16/2024    ALKPHOS 73 11/16/2024    TP 7.1 11/16/2024    ALB 3.7 11/16/2024    TBILI 0.49 11/16/2024    CHOLESTEROL 182 11/14/2024    HDL 39 (L) 11/14/2024    TRIG 148 11/14/2024    LDLCALC 113 (H) 11/14/2024    NONHDLC 143 11/14/2024    AMMONIA 32 10/17/2022    QUV0MGRPRCZV 1.306  11/14/2024    FREET4 0.86 05/11/2018    PREGUR Negative 10/18/2022    PREGSERUM Negative 11/14/2024    HCGQUANT <2 08/07/2018    SYPHILISAB Non-reactive 11/14/2024       Suicide/Homicide Risk Assessment:    Risk of Harm to Self:   Nursing Suicide Risk Assessment Last 24 hours: C-SSRS Risk (Since Last Contact)  Calculated C-SSRS Risk Score (Since Last Contact): No Risk Indicated  Current Specific Risk Factors include: diagnosis of mood disorder, poor reasoning  Protective Factors: ability to communicate with staff on the unit, able to contract for safety on the unit, taking medications as ordered on the unit, ability to adapt to change, impulse control  Based on today's assessment, Gabby presents the following risk of harm to self: low    Risk of Harm to Others:  Nursing Homicide Risk Assessment: Violence Risk to Others: Denies within past 6 months  Current Specific Risk Factors include: multiple stressors, social difficulties  Protective Factors: no current homicidal ideation, no current psychotic symptoms, compliant with medications on the unit as ordered, compliant with unit milieu, follows staff redirection  Based on today's assessment, Gabby presents the following risk of harm to others: low    The following interventions are recommended: Behavioral Health checks for safety monitoring, continued hospitalization on locked unit    Progress Toward Goals: progressing, attends groups, participates in milieu therapy, discharge planning    Counseling / Coordination of Care:    Total floor / unit time spent today 60 minutes. Greater than 50% of total time was spent with the patient and / or family counseling and / or coordination of care. A description of counseling / coordination of care:    Administrative Statements   I have spent a total time of 60 minutes in caring for this patient on the day of the visit/encounter including Diagnostic results, Patient and family education, Counseling / Coordination of care,  Documenting in the medical record, Reviewing / ordering tests, medicine, procedures  , Obtaining or reviewing history  , and Communicating with other healthcare professionals .    ODILON Nunez 11/22/24

## 2024-11-22 NOTE — NURSING NOTE
Pt reports difficulty with sleep last night. Pt states starting Seroquel and feels they had an opposite effect, felt restless and had difficulty with sleep. Pt encouraged to further discuss with provider, verbally educated on Seroquel at this time. Pt denies SI at this time, states that SI thoughts are intermittent. Denies HI/AVH. Anxiety and depression manageable.

## 2024-11-23 LAB
ALBUMIN SERPL BCG-MCNC: 3.7 G/DL (ref 3.5–5)
ALP SERPL-CCNC: 81 U/L (ref 34–104)
ALT SERPL W P-5'-P-CCNC: 26 U/L (ref 7–52)
ANION GAP SERPL CALCULATED.3IONS-SCNC: 5 MMOL/L (ref 4–13)
AST SERPL W P-5'-P-CCNC: 21 U/L (ref 13–39)
BILIRUB SERPL-MCNC: 0.33 MG/DL (ref 0.2–1)
BUN SERPL-MCNC: 25 MG/DL (ref 5–25)
CALCIUM SERPL-MCNC: 8.9 MG/DL (ref 8.4–10.2)
CHLORIDE SERPL-SCNC: 105 MMOL/L (ref 96–108)
CO2 SERPL-SCNC: 29 MMOL/L (ref 21–32)
CREAT SERPL-MCNC: 1.18 MG/DL (ref 0.6–1.3)
GFR SERPL CREATININE-BSD FRML MDRD: 59 ML/MIN/1.73SQ M
GLUCOSE P FAST SERPL-MCNC: 83 MG/DL (ref 65–99)
GLUCOSE SERPL-MCNC: 83 MG/DL (ref 65–140)
POTASSIUM SERPL-SCNC: 4.2 MMOL/L (ref 3.5–5.3)
PROT SERPL-MCNC: 7.2 G/DL (ref 6.4–8.4)
SODIUM SERPL-SCNC: 139 MMOL/L (ref 135–147)

## 2024-11-23 PROCEDURE — 80053 COMPREHEN METABOLIC PANEL: CPT

## 2024-11-23 PROCEDURE — 99232 SBSQ HOSP IP/OBS MODERATE 35: CPT | Performed by: NURSE PRACTITIONER

## 2024-11-23 RX ADMIN — QUETIAPINE FUMARATE 25 MG: 25 TABLET ORAL at 21:13

## 2024-11-23 RX ADMIN — MIRTAZAPINE 30 MG: 15 TABLET, FILM COATED ORAL at 21:13

## 2024-11-23 RX ADMIN — HYDROXYZINE HYDROCHLORIDE 100 MG: 50 TABLET, FILM COATED ORAL at 16:43

## 2024-11-23 RX ADMIN — MUSCLE RUB CREAM 1 APPLICATION: 100; 150 CREAM TOPICAL at 20:10

## 2024-11-23 RX ADMIN — ACETAMINOPHEN 650 MG: 325 TABLET, FILM COATED ORAL at 16:45

## 2024-11-23 NOTE — NURSING NOTE
@16:43 RN administered Atarax 100 mg PO for severe anxiety (christensen= 25). Upon follow-up, endorses medication effectiveness.

## 2024-11-23 NOTE — TREATMENT TEAM
11/23/24 0800   Team Meeting   Meeting Type Daily Rounds   Team Members Present   Team Members Present Physician;Nurse   Physician Team Member Gaston   Nursing Team Member Natali   Patient/Family Present   Patient Present No   Patient's Family Present No     Daily Rounds: Pt is visible on unit and attending groups.  Tearful after a phone call, received Atarax.  Appeared to sleep overnight.

## 2024-11-23 NOTE — PROGRESS NOTES
"Progress Note - Behavioral Health     Gabby YAIMA Mares 34 y.o. female MRN: [id number]   Unit/Bed#: [facility] 202-02 Encounter: [id number]          Assessment & Plan  Depression  Continue Remeron 30 mg p.o. at bedtime, consider further titration for mood  Continue Seroquel 25mg PO at HS for mood/anxiety and insomnia, consider further titration if patient tolerates  Anxiety disorder  See \"depression\" plan  Medical clearance for psychiatric admission  As per SLIM  Drug abuse (HCC)  Abuses diphenhydramine. Encourage cessation. Stable.  Agoraphobia  stable  Elevated serum creatinine  Per SLIM  Improved to baseline 1.37  Recheck CMP on 11/23  Obesity    Elevated LFTs  Stable  Recheck CMP on 11/23        Recommended Treatment:     All current active medications have been reviewed  Encourage group therapy, milieu therapy and occupational therapy  Behavioral Health checks for safety monitoring    Current medications:  Current Facility-Administered Medications   Medication Dose Route Frequency Provider Last Rate    acetaminophen  650 mg Oral Q6H PRN Ivonne Beltre PA-C      aluminum-magnesium hydroxide-simethicone  30 mL Oral Q4H PRN Margot De La Cruz MD      haloperidol lactate  2.5 mg Intramuscular Q4H PRN Max 4/day Margot De La Cruz MD      And    LORazepam  1 mg Intramuscular Q4H PRN Max 4/day Margot De La Cruz MD      And    benztropine  0.5 mg Intramuscular Q4H PRN Max 4/day Margot D eLa Cruz MD      haloperidol lactate  5 mg Intramuscular Q4H PRN Max 4/day Margot De La Cruz MD      And    LORazepam  2 mg Intramuscular Q4H PRN Max 4/day Margot De La Cruz MD      And    benztropine  1 mg Intramuscular Q4H PRN Max 4/day Margot De La Cruz MD      benztropine  1 mg Intramuscular Q4H PRN Max 6/day Margot De La Cruz MD      benztropine  1 mg Oral Q4H PRN Max 6/day Margot De La Cruz MD      bisacodyl  10 mg Rectal Daily PRN Margot De La Cruz MD      hydrOXYzine HCL  50 mg Oral Q6H PRN Max 4/day Margot De La Cruz MD      Or    " diphenhydrAMINE  50 mg Intramuscular Q6H PRN Margot De La Cruz MD      ergocalciferol  50,000 Units Oral Weekly Puma Newby,       haloperidol  1 mg Oral Q6H PRN Margot De La Cruz MD      haloperidol  2.5 mg Oral Q4H PRN Max 4/day Margot De La Cruz MD      haloperidol  5 mg Oral Q4H PRN Max 4/day Margot De La Cruz MD      hydrOXYzine HCL  100 mg Oral Q6H PRN Max 4/day Margot De La Cruz MD      Or    LORazepam  2 mg Intramuscular Q6H PRN Margot De La Cruz MD      hydrOXYzine HCL  25 mg Oral Q6H PRN Max 4/day Margot De La Cruz MD      menthol-methyl salicylate   Apply externally 4x Daily PRN Ivonne Beltre PA-C      mirtazapine  30 mg Oral HS ODILON Nunez      polyethylene glycol  17 g Oral Daily PRN Margot De La Cruz MD      propranolol  10 mg Oral Q8H PRN Margot De La Cruz MD      QUEtiapine  25 mg Oral HS Shira ODILON Lovell      senna-docusate sodium  1 tablet Oral Daily PRN Margot De La Cruz MD      traZODone  50 mg Oral HS PRN Margot De La Cruz MD           Risks / Benefits of Treatment:    Risks, benefits, and possible side effects of medications explained to patient. Patient has limited understanding of risks and benefits of treatment at this time, but agrees to take medications as prescribed.      Subjective:    Documentation, nursing notes, medication reconciliation, labs, and vitals reviewed. Patient was seen for continuing care and reviewed with treatment team. No acute events over the past 24 hours.  Per nursing report, Did request as needed propranolol for restlessness.  Some irritability and complaints of anxiety yesterday.  Medication changes over the past 24 hours: Seroquel was reduced to 25 mg for bedtime last night due to restlessness. Continues to tolerate current medications with no adverse effects.     On evaluation today, presenting brighter today and reports mood is improved and anxiety is decreased as compared to yesterday.  Reports sleep was better last night  as well.  No suicidal ideation, plan, or intent. Denies perceptual disturbances and does not exhibit any symptoms of casandra on evaluation.    Psychiatric ROS:  Behavior over the last 24 hours: improved  Sleep: slept better  Appetite: fair  Medication side effects: No   ROS: no complaints, all other systems are negative    Mental Status Evaluation:    Appearance:  marginal hygiene   Behavior:  guarded   Speech:  scant   Mood:  depressed, irritable   Affect:  flat   Thought Process:  circumstantial   Associations: concrete associations   Thought Content:  mild paranoia   Perceptual Disturbances: none   Risk Potential: Suicidal ideation - None  Homicidal ideation - None  Potential for aggression - No   Sensorium:  oriented to person, place, and time/date   Memory:  recent and remote memory grossly intact   Consciousness:  alert and awake   Attention/Concentration: decreased concentration and decreased attention span   Insight:  poor   Judgment: poor   Gait/Station: normal gait/station, normal balance   Motor Activity: no abnormal movements     Vital signs in last 24 hours:    Temp:  [97.3 °F (36.3 °C)-98 °F (36.7 °C)] 97.3 °F (36.3 °C)  HR:  [72-78] 78  BP: (129-159)/(66-98) 138/98  Resp:  [20-22] 20  SpO2:  [100 %] 100 %  O2 Device: None (Room air)    Laboratory results: I have personally reviewed all pertinent laboratory/tests results    Results from the past 24 hours:   Recent Results (from the past 24 hours)   Comprehensive metabolic panel    Collection Time: 11/23/24  5:40 AM   Result Value Ref Range    Sodium 139 135 - 147 mmol/L    Potassium 4.2 3.5 - 5.3 mmol/L    Chloride 105 96 - 108 mmol/L    CO2 29 21 - 32 mmol/L    ANION GAP 5 4 - 13 mmol/L    BUN 25 5 - 25 mg/dL    Creatinine 1.18 0.60 - 1.30 mg/dL    Glucose 83 65 - 140 mg/dL    Glucose, Fasting 83 65 - 99 mg/dL    Calcium 8.9 8.4 - 10.2 mg/dL    AST 21 13 - 39 U/L    ALT 26 7 - 52 U/L    Alkaline Phosphatase 81 34 - 104 U/L    Total Protein 7.2 6.4 - 8.4  g/dL    Albumin 3.7 3.5 - 5.0 g/dL    Total Bilirubin 0.33 0.20 - 1.00 mg/dL    eGFR 59 ml/min/1.73sq m       Suicide/Homicide Risk Assessment:    Risk of Harm to Self:   Nursing Suicide Risk Assessment Last 24 hours: C-SSRS Risk (Since Last Contact)  Calculated C-SSRS Risk Score (Since Last Contact): No Risk Indicated  Based on today's assessment, Gabby presents the following risk of harm to self: minimal    Risk of Harm to Others:  Nursing Homicide Risk Assessment: Violence Risk to Others: Denies within past 6 months  Based on today's assessment, Gabby presents the following risk of harm to others: minimal    The following interventions are recommended: Behavioral Health checks for safety monitoring, continued hospitalization on locked unit    Progress Toward Goals: limited improvement, mood is stabilizing gradually    Counseling / Coordination of Care:    Medication changes reviewed with nursing staff.  Patient's progress reviewed with nursing staff.  Patient's diagnosis and treatment indicated reviewed with patient.  Reassurance and supportive therapy provided.    ODILON Hawkins 11/23/24    This note has been constructed using a voice recognition system. There may be translation, syntax, or grammatical errors. If you have any questions, please contact the dictating author.

## 2024-11-23 NOTE — PLAN OF CARE
Problem: Alteration in Thoughts and Perception  Goal: Verbalize thoughts and feelings  Description: Interventions:  - Promote a nonjudgmental and trusting relationship with the patient through active listening and therapeutic communication  - Assess patient's level of functioning, behavior and potential for risk  - Engage patient in 1 on 1 interactions  - Encourage patient to express fears, feelings, frustrations, and discuss symptoms    - [place] patient to reality, help patient recognize reality-based thinking   - Administer medications as ordered and assess for potential side effects  - Provide the patient education related to the signs and symptoms of the illness and desired effects of prescribed medications  Outcome: Progressing  Goal: Refrain from acting on delusional thinking/internal stimuli  Description: Interventions:  - Monitor patient closely, per order   - Utilize least restrictive measures   - Set reasonable limits, give positive feedback for acceptable   - Administer medications as ordered and monitor of potential side effects  Outcome: Progressing  Goal: Agree to be compliant with medication regime, as prescribed and report medication side effects  Description: Interventions:  - Offer appropriate PRN medication and supervise ingestion; conduct AIMS, as needed   Outcome: Progressing  Goal: Attend and participate in unit activities, including therapeutic, recreational, and educational groups  Description: Interventions:  -Encourage Visitation and family involvement in care  Outcome: Progressing  Goal: Recognize dysfunctional thoughts, communicate reality-based thoughts at the time of discharge  Description: Interventions:  - Provide medication and psycho-education to assist patient in compliance and developing insight into his/her illness   Outcome: Progressing  Goal: Complete daily ADLs, including personal hygiene independently, as able  Description: Interventions:  - Observe, teach, and assist  patient with ADLS  - Monitor and promote a balance of rest/activity, with adequate nutrition and elimination   Outcome: Progressing     Problem: Ineffective Coping  Goal: Identifies ineffective coping skills  Outcome: Progressing  Goal: Identifies healthy coping skills  Outcome: Progressing  Goal: Demonstrates healthy coping skills  Outcome: Progressing  Goal: Participates in unit activities  Description: Interventions:  - Provide therapeutic environment   - Provide required programming   - Redirect inappropriate behaviors   Outcome: Progressing  Goal: Understands least restrictive measures  Description: Interventions:  - Utilize least restrictive behavior  Outcome: Progressing  Goal: Free from restraint events  Description: - Utilize least restrictive measures   - Provide behavioral interventions   - Redirect inappropriate behaviors   Outcome: Progressing     Problem: Risk for Self Injury/Neglect  Goal: Treatment Goal: Remain safe during length of stay, learn and adopt new coping skills, and be free of self-injurious ideation, impulses and acts at the time of discharge  Outcome: Progressing  Goal: Verbalize thoughts and feelings  Description: Interventions:  - Assess and re-assess patient's lethality and potential for self-injury  - Engage patient in 1:1 interactions, daily, for a minimum of 15 minutes  - Encourage patient to express feelings, fears, frustrations, hopes  - Establish rapport/trust with patient   Outcome: Progressing  Goal: Refrain from harming self  Description: Interventions:  - Monitor patient closely, per order  - Develop a trusting relationship  - Supervise medication ingestion, monitor effects and side effects   Outcome: Progressing     Problem: DISCHARGE PLANNING  Goal: Discharge to home or other facility with appropriate resources  Description: INTERVENTIONS:  - Identify barriers to discharge w/patient and caregiver  - Arrange for needed discharge resources and transportation as  appropriate  - Identify discharge learning needs (meds, wound care, etc.)  - Arrange for interpretive services to assist at discharge as needed  - Refer to Case Management Department for coordinating discharge planning if the patient needs post-hospital services based on physician/advanced practitioner order or complex needs related to functional status, cognitive ability, or social support system  Outcome: Progressing

## 2024-11-23 NOTE — NURSING NOTE
"Calm, cooperative, and visible on the unit. Endorses moderate depression and anxiety, 5/10. Reports overall mood improvement since yesterday; however, states, \"My mood can go up and down\". Patient hopeful for potential discharge next week. Denies SI/HI/AVH and encouraged to approach staff if ideations occur. Made progress towards treatment goals as evidenced by positive group attendance and positive peer interaction. During group, patient sang karaoke with peers. Patient later had productive phone call with sister. Plan of care ongoing. Denies unmet needs.  "

## 2024-11-23 NOTE — NURSING NOTE
Reassessment post administration of propranolol at 2101 for restlessness. Patient observed lying in bed with eyes closed, calm. Unlabored breathing observed. No signs or symptoms of distress noted at this time. Intervention appears to be effective.

## 2024-11-23 NOTE — NURSING NOTE
Pt visible, attending groups. Pt pleasant on approach. Pt reports poor sleep overnight d/t feelings of restlessness that lasted throughout the day. Pt relates this feeling to her newly prescribed Seroquel. Pt reports feeling an improvement from AM in regards to her anxiety and depression. Pt endorses passive thoughts to harm self, none presently. Pt denies HI/AVH.

## 2024-11-23 NOTE — NURSING NOTE
Pt is awake, walking halls this morning. Reports better mood this morning, stating feeling less anxious than yesterday. Reports better sleep last night. Pt completed ADLs and is washing clothing this morning. Denies SI, HI, AVMORENA.

## 2024-11-24 PROCEDURE — 99232 SBSQ HOSP IP/OBS MODERATE 35: CPT | Performed by: NURSE PRACTITIONER

## 2024-11-24 RX ADMIN — MIRTAZAPINE 30 MG: 15 TABLET, FILM COATED ORAL at 21:22

## 2024-11-24 RX ADMIN — HYDROXYZINE HYDROCHLORIDE 50 MG: 50 TABLET, FILM COATED ORAL at 08:20

## 2024-11-24 RX ADMIN — QUETIAPINE FUMARATE 25 MG: 25 TABLET ORAL at 21:22

## 2024-11-24 RX ADMIN — HALOPERIDOL 5 MG: 5 TABLET ORAL at 13:56

## 2024-11-24 RX ADMIN — MUSCLE RUB CREAM: 100; 150 CREAM TOPICAL at 12:55

## 2024-11-24 RX ADMIN — LORAZEPAM 2 MG: 2 INJECTION, SOLUTION INTRAMUSCULAR; INTRAVENOUS at 15:10

## 2024-11-24 NOTE — NURSING NOTE
Pt has been walking halls, pacing at times. Pt attempted to attend [employer]. Pt left and was heard crying in hallway. Another peer alerted staff as well. Writer approached pt in hallway outside of the nurse's station. Pt standing there with head against the wall, crying. Writer asked pt to have a seat and talk. At this time, writer noticed that pt had scratched B/L arms. Pt reports PRN Atarax to be ineffective for anxiety and reports feeling irritated and agitated, restless. Pt received PRN Haldol 5mg po at 1356 for same. Pt went to room to wash arms with soap and water, as directed by writer. Pt has remained visible on unit, walking halls.

## 2024-11-24 NOTE — PLAN OF CARE
Problem: Alteration in Thoughts and Perception  Goal: Verbalize thoughts and feelings  Description: Interventions:  - Promote a nonjudgmental and trusting relationship with the patient through active listening and therapeutic communication  - Assess patient's level of functioning, behavior and potential for risk  - Engage patient in 1 on 1 interactions  - Encourage patient to express fears, feelings, frustrations, and discuss symptoms    - [place] patient to reality, help patient recognize reality-based thinking   - Administer medications as ordered and assess for potential side effects  - Provide the patient education related to the signs and symptoms of the illness and desired effects of prescribed medications  Outcome: Progressing  Goal: Refrain from acting on delusional thinking/internal stimuli  Description: Interventions:  - Monitor patient closely, per order   - Utilize least restrictive measures   - Set reasonable limits, give positive feedback for acceptable   - Administer medications as ordered and monitor of potential side effects  Outcome: Progressing  Goal: Agree to be compliant with medication regime, as prescribed and report medication side effects  Description: Interventions:  - Offer appropriate PRN medication and supervise ingestion; conduct AIMS, as needed   Outcome: Progressing  Goal: Attend and participate in unit activities, including therapeutic, recreational, and educational groups  Description: Interventions:  -Encourage Visitation and family involvement in care  Outcome: Progressing  Goal: Recognize dysfunctional thoughts, communicate reality-based thoughts at the time of discharge  Description: Interventions:  - Provide medication and psycho-education to assist patient in compliance and developing insight into his/her illness   Outcome: Progressing  Goal: Complete daily ADLs, including personal hygiene independently, as able  Description: Interventions:  - Observe, teach, and assist  patient with ADLS  - Monitor and promote a balance of rest/activity, with adequate nutrition and elimination   Outcome: Progressing     Problem: Ineffective Coping  Goal: Identifies ineffective coping skills  Outcome: Progressing  Goal: Identifies healthy coping skills  Outcome: Progressing  Goal: Demonstrates healthy coping skills  Outcome: Progressing  Goal: Participates in unit activities  Description: Interventions:  - Provide therapeutic environment   - Provide required programming   - Redirect inappropriate behaviors   Outcome: Progressing  Goal: Understands least restrictive measures  Description: Interventions:  - Utilize least restrictive behavior  Outcome: Progressing  Goal: Free from restraint events  Description: - Utilize least restrictive measures   - Provide behavioral interventions   - Redirect inappropriate behaviors   Outcome: Progressing     Problem: Risk for Self Injury/Neglect  Goal: Treatment Goal: Remain safe during length of stay, learn and adopt new coping skills, and be free of self-injurious ideation, impulses and acts at the time of discharge  Outcome: Progressing  Goal: Verbalize thoughts and feelings  Description: Interventions:  - Assess and re-assess patient's lethality and potential for self-injury  - Engage patient in 1:1 interactions, daily, for a minimum of 15 minutes  - Encourage patient to express feelings, fears, frustrations, hopes  - Establish rapport/trust with patient   Outcome: Progressing  Goal: Refrain from harming self  Description: Interventions:  - Monitor patient closely, per order  - Develop a trusting relationship  - Supervise medication ingestion, monitor effects and side effects   Outcome: Progressing     Problem: DISCHARGE PLANNING  Goal: Discharge to home or other facility with appropriate resources  Description: INTERVENTIONS:  - Identify barriers to discharge w/patient and caregiver  - Arrange for needed discharge resources and transportation as  appropriate  - Identify discharge learning needs (meds, wound care, etc.)  - Arrange for interpretive services to assist at discharge as needed  - Refer to Case Management Department for coordinating discharge planning if the patient needs post-hospital services based on physician/advanced practitioner order or complex needs related to functional status, cognitive ability, or social support system  Outcome: Progressing

## 2024-11-24 NOTE — NURSING NOTE
"The staff member rounding at the time reported the patient told her she \"attempted to drown herself\" and when staff did not arrive fast enough began hitting her head on the wall. When staff arrived, patient's hair appeared wet.   "

## 2024-11-24 NOTE — NURSING NOTE
Patient remains on 1 to 1 ordered at 1503, remains in papers, currently denies SI HI AVH at present, patient asked 1 to 1 to go in seclusion . Sink in bathroom clogged with paper towel, informed BHT, that she was not attended soon enough when she tried to drown herself, so she banged her head on the wall. Patient is ambulating hallways with 1 to 1

## 2024-11-24 NOTE — NURSING NOTE
"Patient hit head on wall, red leodan seen, stated \"I don't want to be here' and verbally verified she wants to kill her self.  Mg ativan IM given to patient, order for 1 to 1 placed, patient placed in paper scrubs, requested finger food order from provider  "

## 2024-11-24 NOTE — TREATMENT TEAM
11/24/24 0800   Team Meeting   Meeting Type Daily Rounds   Team Members Present   Team Members Present Physician;Nurse   Physician Team Member Gaston   Nursing Team Member Natali   Patient/Family Present   Patient Present No   Patient's Family Present No     Daily Rounds: Pt denies SI.  Reports mood is good during the day.  Later reports mood is more up and down.

## 2024-11-24 NOTE — PROGRESS NOTES
"Progress Note - Behavioral Health     Gabby YAIMA Mares 34 y.o. female MRN: [id number]   Unit/Bed#: [facility] 202-02 Encounter: [id number]          Assessment & Plan  Depression  Continue Remeron 30 mg p.o. at bedtime, consider further titration for mood  Continue Seroquel 25mg PO at HS for mood/anxiety and insomnia, consider further titration if patient tolerates  Anxiety disorder  See \"depression\" plan  Medical clearance for psychiatric admission  As per SLIM  Drug abuse (HCC)  Abuses diphenhydramine. Encourage cessation. Stable.  Agoraphobia  stable  Elevated serum creatinine  Per SLIM  Improved to baseline on 11/23 WNL  Obesity    Elevated LFTs  Stable, on 11/23 WNL        Recommended Treatment:     All current active medications have been reviewed  Encourage group therapy, milieu therapy and occupational therapy    Current medications:  Current Facility-Administered Medications   Medication Dose Route Frequency Provider Last Rate    acetaminophen  650 mg Oral Q6H PRN Ivonne Beltre PA-C      aluminum-magnesium hydroxide-simethicone  30 mL Oral Q4H PRN Margot De La Cruz MD      haloperidol lactate  2.5 mg Intramuscular Q4H PRN Max 4/day Margot De La Cruz MD      And    LORazepam  1 mg Intramuscular Q4H PRN Max 4/day Margot De La Cruz MD      And    benztropine  0.5 mg Intramuscular Q4H PRN Max 4/day Margot De La Cruz MD      haloperidol lactate  5 mg Intramuscular Q4H PRN Max 4/day Margot De La Cruz MD      And    LORazepam  2 mg Intramuscular Q4H PRN Max 4/day Margot De La Cruz MD      And    benztropine  1 mg Intramuscular Q4H PRN Max 4/day aMrgot De La Cruz MD      benztropine  1 mg Intramuscular Q4H PRN Max 6/day Margot De La Cruz MD      benztropine  1 mg Oral Q4H PRN Max 6/day Margot De La Cruz MD      bisacodyl  10 mg Rectal Daily PRN Margot De La Cruz MD      hydrOXYzine HCL  50 mg Oral Q6H PRN Max 4/day Margot De La Cruz MD      Or    diphenhydrAMINE  50 mg Intramuscular Q6H PRN Margot De La Cruz MD      " "ergocalciferol  50,000 Units Oral Weekly Puma Newby, DO      haloperidol  1 mg Oral Q6H PRN Margot De La Cruz MD      haloperidol  2.5 mg Oral Q4H PRN Max 4/day Margot De La Cruz MD      haloperidol  5 mg Oral Q4H PRN Max 4/day Margot De La Cruz MD      hydrOXYzine HCL  100 mg Oral Q6H PRN Max 4/day Margot De La Cruz MD      Or    LORazepam  2 mg Intramuscular Q6H PRN Margot De La Cruz MD      hydrOXYzine HCL  25 mg Oral Q6H PRN Max 4/day Margot De La Cruz MD      menthol-methyl salicylate   Apply externally 4x Daily PRN Ivonne Beltre PA-C      mirtazapine  30 mg Oral HS ODILON Nunez      polyethylene glycol  17 g Oral Daily PRN Margot De La Cruz MD      propranolol  10 mg Oral Q8H PRN Margot De La Cruz MD      QUEtiapine  25 mg Oral HS ODILON Nunez      senna-docusate sodium  1 tablet Oral Daily PRN Margot De La Cruz MD      traZODone  50 mg Oral HS PRN Margot De La Cruz MD           Risks / Benefits of Treatment:    Risks, benefits, and possible side effects of medications explained to patient and patient verbalizes understanding and agreement for treatment.      Subjective:    Documentation, nursing notes, medication reconciliation, labs, and vitals reviewed. Patient was seen for continuing care and reviewed with treatment team. No acute events over the past 24 hours.  Per nursing report, has been reporting significant mood lability and frequent requests for as needed for anxiety.    None medication changes over the past 24 hours. Continues to tolerate current medications with no adverse effects.     On evaluation today, she is seen pacing the unit after receiving as needed for anxiety.  States she was tearful today.  Remembers \"having a dream or hearing a voice or having her own thought about suicide \".  Reports this has scared her but states she does not wish to die and is not having any suicidal thoughts.  Worried that she feels some worsening in her depression " today.    No suicidal ideation, plan, or intent. Denies perceptual disturbances and does not exhibit any symptoms of casandra on evaluation.    Psychiatric ROS:  Behavior over the last 24 hours: regressed  Sleep: slept off and on  Appetite: fair  Medication side effects: No   ROS: no complaints, all other systems are negative    Mental Status Evaluation:    Appearance:  age appropriate   Behavior:  cooperative   Speech:  normal rate, normal volume   Mood:  depressed, anxious   Affect:  flat   Thought Process:  circumstantial   Associations: concrete associations   Thought Content:  no overt delusions   Perceptual Disturbances: denies auditory hallucinations when asked, does not appear responding to internal stimuli   Risk Potential: Suicidal ideation - None at present  Homicidal ideation - None  Potential for aggression - No   Sensorium:  oriented to person, place, and time/date   Memory:  recent and remote memory grossly intact   Consciousness:  alert and awake   Attention/Concentration: decreased concentration and decreased attention span   Insight:  improving   Judgment: improved   Gait/Station: normal gait/station, normal balance   Motor Activity: no abnormal movements     Vital signs in last 24 hours:    Temp:  [97.8 °F (36.6 °C)-98.2 °F (36.8 °C)] 97.8 °F (36.6 °C)  HR:  [77-84] 84  BP: (126-153)/() 126/91  Resp:  [18] 18  SpO2:  [98 %] 98 %  O2 Device: None (Room air)    Laboratory results: I have personally reviewed all pertinent laboratory/tests results    Results from the past 24 hours: No results found for this or any previous visit (from the past 24 hours).    Suicide/Homicide Risk Assessment:    Risk of Harm to Self:   Nursing Suicide Risk Assessment Last 24 hours: C-SSRS Risk (Since Last Contact)  Calculated C-SSRS Risk Score (Since Last Contact): No Risk Indicated  Based on today's assessment, Gabby presents the following risk of harm to self: minimal    Risk of Harm to Others:  Nursing Homicide  Risk Assessment: Violence Risk to Others: Denies within past 6 months  Based on today's assessment, Gabby presents the following risk of harm to others: minimal    The following interventions are recommended: Behavioral Health checks for safety monitoring, continued hospitalization on locked unit    Progress Toward Goals: limited improvement, still very anxious, still very depressed    Counseling / Coordination of Care:    Medication changes reviewed with nursing staff.  Patient's progress reviewed with nursing staff.  Medications, treatment progress and treatment plan reviewed with patient.  Medication education provided to patient.  Reassurance and supportive therapy provided.    ODILON Hawkins 11/24/24    This note has been constructed using a voice recognition system. There may be translation, syntax, or grammatical errors. If you have any questions, please contact the dictating author.

## 2024-11-25 PROCEDURE — 99232 SBSQ HOSP IP/OBS MODERATE 35: CPT | Performed by: PSYCHIATRY & NEUROLOGY

## 2024-11-25 RX ORDER — QUETIAPINE FUMARATE 25 MG/1
50 TABLET, FILM COATED ORAL
Status: DISCONTINUED | OUTPATIENT
Start: 2024-11-25 | End: 2024-11-26

## 2024-11-25 RX ORDER — LORAZEPAM 1 MG/1
1 TABLET ORAL ONCE
Status: COMPLETED | OUTPATIENT
Start: 2024-11-25 | End: 2024-11-25

## 2024-11-25 RX ADMIN — HYDROXYZINE HYDROCHLORIDE 100 MG: 50 TABLET, FILM COATED ORAL at 15:44

## 2024-11-25 RX ADMIN — LORAZEPAM 1 MG: 1 TABLET ORAL at 18:35

## 2024-11-25 RX ADMIN — HALOPERIDOL 5 MG: 5 TABLET ORAL at 17:43

## 2024-11-25 RX ADMIN — MIRTAZAPINE 30 MG: 15 TABLET, FILM COATED ORAL at 21:18

## 2024-11-25 RX ADMIN — TRAZODONE HYDROCHLORIDE 50 MG: 50 TABLET ORAL at 21:21

## 2024-11-25 RX ADMIN — QUETIAPINE FUMARATE 50 MG: 25 TABLET ORAL at 21:18

## 2024-11-25 NOTE — PROGRESS NOTES
"Progress Note - Behavioral Health     Gabby YAIMA Mares 34 y.o. female MRN: [id number]   Unit/Bed#: [facility] 202-02 Encounter: [id number]  Assessment & Plan  Depression  Continue Remeron 30 mg p.o. at bedtime for depression and anxiety  Increase Seroquel to 50 mg QHS with plan for ongoing gradual titration as tolerated for mood, anxiety & impulsivity  Discharge planning: patient continues to require inpatient hospitalization at present for ongoing medication adjustments   Potential discharge by end of week pending mood stabilization, pending call back from sister for collateral information  Plan to discharge to apartment with referrals for placement options  [facility] for [facility] & outpatient mental health treatment  Would benefit from repayee in the future  Anxiety disorder  See \"depression\" plan  Medical clearance for psychiatric admission  As per SLIM  Drug abuse (HCC)  Abuses diphenhydramine. Encourage cessation. Stable.  Agoraphobia  Stable  Elevated serum creatinine  Per SLIM  Improved to baseline on 11/23 WNL  Obesity    Elevated LFTs  Stable, on 11/23 WNL       Recommended Treatment:     Planned medication and treatment changes:    All current active medications have been reviewed  Encourage group therapy, milieu therapy and occupational therapy  Behavioral Health checks for safety monitoring      Current medications:  Current Facility-Administered Medications   Medication Dose Route Frequency Provider Last Rate    acetaminophen  650 mg Oral Q6H PRN Ivonen Beltre PA-C      aluminum-magnesium hydroxide-simethicone  30 mL Oral Q4H PRN Margot De La Cruz MD      haloperidol lactate  2.5 mg Intramuscular Q4H PRN Max 4/day Margot De La Cruz MD      And    LORazepam  1 mg Intramuscular Q4H PRN Max 4/day Margot De La Cruz MD      And    benztropine  0.5 mg Intramuscular Q4H PRN Max 4/day Margot De La Cruz MD      haloperidol lactate  5 mg Intramuscular Q4H PRN Max 4/day Margot De La Cruz MD      And    LORazepam  " 2 mg Intramuscular Q4H PRN Max 4/day Margot De La Cruz MD      And    benztropine  1 mg Intramuscular Q4H PRN Max 4/day Margot De La Cruz MD      benztropine  1 mg Intramuscular Q4H PRN Max 6/day Margot De La Cruz MD      benztropine  1 mg Oral Q4H PRN Max 6/day Margot De La Cruz MD      bisacodyl  10 mg Rectal Daily PRN Margot De La Cruz MD      hydrOXYzine HCL  50 mg Oral Q6H PRN Max 4/day Margot De La Cruz MD      Or    diphenhydrAMINE  50 mg Intramuscular Q6H PRN Margot De La Cruz MD      ergocalciferol  50,000 Units Oral Weekly Puma Newby DO      haloperidol  1 mg Oral Q6H PRN Margot De La Cruz MD      haloperidol  2.5 mg Oral Q4H PRN Max 4/day Margot De La Cruz MD      haloperidol  5 mg Oral Q4H PRN Max 4/day Margot De La Cruz MD      hydrOXYzine HCL  100 mg Oral Q6H PRN Max 4/day Margot De La Cruz MD      Or    LORazepam  2 mg Intramuscular Q6H PRN Margot De La Cruz MD      hydrOXYzine HCL  25 mg Oral Q6H PRN Max 4/day Margot De La Cruz MD      menthol-methyl salicylate   Apply externally 4x Daily PRN Ivonne Beltre PA-C      mirtazapine  30 mg Oral HS ODILON Nunez      polyethylene glycol  17 g Oral Daily PRN Margot De La Cruz MD      propranolol  10 mg Oral Q8H PRN Margot De La Cruz MD      QUEtiapine  50 mg Oral HS Georgia Anna PA-HERNAN      senna-docusate sodium  1 tablet Oral Daily PRN Margot De La Cruz MD      traZODone  50 mg Oral HS PRN Margot De La Cruz MD         Risks / Benefits of Treatment:    Risks, benefits, and possible side effects of medications explained to patient and patient verbalizes understanding and agreement for treatment.    Subjective:    Behavior over the last 24 hours: unchanged.     Gabby was seen today in follow-up for continuation of care. Per staff, had a difficult weekend with some outbursts, episodes of self-harming behavior (by scratching and head banging) and making suicidal threats. She did accept multiple PRN medications. Eventually she  "was placed on 1:1 for her behavior and safety. Per chart review, this seemed to be triggered related to patients frustration with discharge planning. She is seen today, generally cooperative, pleasant seemingly apologetic for actions over the weekend. We discussed potential increase in Seroquel to assist with her mood and anxiety which she agreed to. Does report she is sensitive to medication increases and will adjust slowly. She is requesting to be taken off 1:1 and she states she feels safe without any thoughts of self-harm. Gabby presently is contacting for safety on the unit and feels comfortable to confide in staff if thoughts arise. At time of interview, no overt psychosis elicited. Gabby has been complaint with medications and tolerating without any side effects.     Sleep: normal  Appetite: normal  Medication side effects: No   ROS: no complaints    Mental Status Evaluation:    Appearance:  age appropriate, wearing hospital clothes   Behavior:  cooperative, calm   Speech:  normal rate, normal volume, normal pitch   Mood:  euthymic, \"better\"   Affect:  constricted   Thought Process:  organized   Associations: concrete associations   Thought Content:  ruminating thoughts, racing thoughts   Perceptual Disturbances: no auditory hallucinations, no visual hallucinations, does not appear responding to internal stimuli   Risk Potential: Suicidal ideation - None at present  Homicidal ideation - None at present  Potential for aggression - No   Sensorium:  oriented to person, place, and time/date   Memory:  recent and remote memory grossly intact   Consciousness:  alert and awake   Attention/Concentration: attention span and concentration are age appropriate   Insight:  limited   Judgment: limited   Gait/Station: normal gait/station   Motor Activity: no abnormal movements     Vital signs in last 24 hours:    Temp:  [98 °F (36.7 °C)-99.4 °F (37.4 °C)] 98 °F (36.7 °C)  HR:  [77-90] 77  BP: (115-129)/(79-82) 115/82  Resp: "  [18] 18  SpO2:  [98 %] 98 %  O2 Device: None (Room air)    Laboratory results: I have personally reviewed all pertinent laboratory/tests results    Results from the past 24 hours: No results found for this or any previous visit (from the past 24 hours).    Suicide/Homicide Risk Assessment:    Risk of Harm to Self:   Nursing Suicide Risk Assessment Last 24 hours: C-SSRS Risk (Since Last Contact)  Calculated C-SSRS Risk Score (Since Last Contact): No Risk Indicated    Risk of Harm to Others:  Nursing Homicide Risk Assessment: Violence Risk to Others: Denies within past 6 months    The following interventions are recommended: Behavioral Health checks for safety monitoring, continued hospitalization on locked unit    Progress Toward Goals: progressing    Counseling / Coordination of Care:    Total floor / unit time spent today 30 minutes. Greater than 50% of total time was spent with the patient and / or family counseling and / or coordination of care. A description of counseling / coordination of care:    Georgia Anna PA-C 11/25/24

## 2024-11-25 NOTE — PLAN OF CARE
Problem: Alteration in Thoughts and Perception  Goal: Verbalize thoughts and feelings  Description: Interventions:  - Promote a nonjudgmental and trusting relationship with the patient through active listening and therapeutic communication  - Assess patient's level of functioning, behavior and potential for risk  - Engage patient in 1 on 1 interactions  - Encourage patient to express fears, feelings, frustrations, and discuss symptoms    - [place] patient to reality, help patient recognize reality-based thinking   - Administer medications as ordered and assess for potential side effects  - Provide the patient education related to the signs and symptoms of the illness and desired effects of prescribed medications  Outcome: Progressing  Goal: Refrain from acting on delusional thinking/internal stimuli  Description: Interventions:  - Monitor patient closely, per order   - Utilize least restrictive measures   - Set reasonable limits, give positive feedback for acceptable   - Administer medications as ordered and monitor of potential side effects  Outcome: Progressing  Goal: Agree to be compliant with medication regime, as prescribed and report medication side effects  Description: Interventions:  - Offer appropriate PRN medication and supervise ingestion; conduct AIMS, as needed   Outcome: Progressing  Goal: Attend and participate in unit activities, including therapeutic, recreational, and educational groups  Description: Interventions:  -Encourage Visitation and family involvement in care  Outcome: Progressing  Goal: Recognize dysfunctional thoughts, communicate reality-based thoughts at the time of discharge  Description: Interventions:  - Provide medication and psycho-education to assist patient in compliance and developing insight into his/her illness   Outcome: Progressing  Goal: Complete daily ADLs, including personal hygiene independently, as able  Description: Interventions:  - Observe, teach, and assist  patient with ADLS  - Monitor and promote a balance of rest/activity, with adequate nutrition and elimination   Outcome: Progressing     Problem: Risk for Self Injury/Neglect  Goal: Treatment Goal: Remain safe during length of stay, learn and adopt new coping skills, and be free of self-injurious ideation, impulses and acts at the time of discharge  Outcome: Progressing  Goal: Verbalize thoughts and feelings  Description: Interventions:  - Assess and re-assess patient's lethality and potential for self-injury  - Engage patient in 1:1 interactions, daily, for a minimum of 15 minutes  - Encourage patient to express feelings, fears, frustrations, hopes  - Establish rapport/trust with patient   Outcome: Progressing  Goal: Refrain from harming self  Description: Interventions:  - Monitor patient closely, per order  - Develop a trusting relationship  - Supervise medication ingestion, monitor effects and side effects   Outcome: Progressing

## 2024-11-25 NOTE — ASSESSMENT & PLAN NOTE
Continue Remeron 30 mg p.o. at bedtime for depression and anxiety  Increase Seroquel to 50 mg QHS with plan for ongoing gradual titration as tolerated for mood, anxiety & impulsivity  Discharge planning: patient continues to require inpatient hospitalization at present for ongoing medication adjustments   Potential discharge by end of week pending mood stabilization, pending call back from sister for collateral information  Plan to discharge to apartment with referrals for placement options  [facility] for [facility] & outpatient mental health treatment  Would benefit from repayee in the future

## 2024-11-25 NOTE — CASE MANAGEMENT
MARKO called Mari at [facility] ([phone]) dept and left  asking for a call back regarding their [facility] funded  services for pt.     MARKO called pt's sister Rosemary ([phone]) to discuss pt's progress and status and to gain better history on pt and her housing situation. CM left  asking for a call back.   (CM never received a return call from sister regarding dc planning)

## 2024-11-25 NOTE — NURSING NOTE
"This morning pt remains on 1:1 at all times for safety. Pt denies SI/HI or hallucination. Pt remorseful for actions yesterday. Spoke about the importance of coping skills and communicating with staff. Pt verbalized understanding. After breakfast, pt spoke with MARY JO. Pt in front of nurses station stating \"I am off the 1:1 now\". PA-C discontinued 1:1. Pt provided with hospital attire. Diet order changed to regular. Toiletries placed in room. Pt walking the halls with peers.  "

## 2024-11-25 NOTE — NURSING NOTE
Gabby remains on 1:1 observation, per physician's order. Patient denies SI or urges to self-harm at this time. Gabby is observed walking the halls briefly and resting in room. Patient expresses hopefulness for 1:1 to be discontinued tomorrow, endorses feeling of safety on the unit and is agreeable to seek staff if feeling unsafe or having negative/intrusive thoughts.

## 2024-11-25 NOTE — PLAN OF CARE
Patient regularly attends therapeutic groups and other unit activities.    Problem: Alteration in Thoughts and Perception  Goal: Attend and participate in unit activities, including therapeutic, recreational, and educational groups  Description: Interventions:  -Encourage Visitation and family involvement in care  Outcome: Progressing     Problem: Ineffective Coping  Goal: Participates in unit activities  Description: Interventions:  - Provide therapeutic environment   - Provide required programming   - Redirect inappropriate behaviors   Outcome: Progressing

## 2024-11-25 NOTE — NURSING NOTE
"Pt walking the halls during group time. Reports feeling \"okay\". Having fluctuating anxiety. Able to use coping skills. Denies SI/HI or hallucination. Pt received one outfit at this time. Goal is to obtain shoes tomorrow for good behavior. Denies any question or concern at this time.  "

## 2024-11-25 NOTE — NURSING NOTE
"Patient requesting PRN for anxiety. 100mg of atarax given for Dorado score of 26. At one hour follow up patient reports her anxiety is \"50% better\".   "

## 2024-11-25 NOTE — NURSING NOTE
Patient at nursing station stating she is still feeling anxious and is also now agitated. Patient given 5mg of haldol for a broset of 3. At one hour follow up patient reporting less agitation but still reporting anxiety. Dr. De La Cruz made aware. Ativan 1mg x1 ordered and given.

## 2024-11-26 PROCEDURE — 99232 SBSQ HOSP IP/OBS MODERATE 35: CPT | Performed by: STUDENT IN AN ORGANIZED HEALTH CARE EDUCATION/TRAINING PROGRAM

## 2024-11-26 RX ORDER — QUETIAPINE FUMARATE 25 MG/1
75 TABLET, FILM COATED ORAL
Status: DISCONTINUED | OUTPATIENT
Start: 2024-11-26 | End: 2024-11-27

## 2024-11-26 RX ADMIN — MIRTAZAPINE 30 MG: 15 TABLET, FILM COATED ORAL at 21:34

## 2024-11-26 RX ADMIN — ACETAMINOPHEN 650 MG: 325 TABLET, FILM COATED ORAL at 21:35

## 2024-11-26 RX ADMIN — HYDROXYZINE HYDROCHLORIDE 100 MG: 50 TABLET, FILM COATED ORAL at 09:56

## 2024-11-26 RX ADMIN — HYDROXYZINE HYDROCHLORIDE 50 MG: 50 TABLET, FILM COATED ORAL at 21:35

## 2024-11-26 RX ADMIN — QUETIAPINE FUMARATE 75 MG: 25 TABLET ORAL at 21:34

## 2024-11-26 NOTE — NURSING NOTE
Patient received Trazodone to help facilitate sleep. At one hour follow up patient appears asleep in bed with even and unlabored respirations.

## 2024-11-26 NOTE — PLAN OF CARE
Problem: Alteration in Thoughts and Perception  Goal: Verbalize thoughts and feelings  Description: Interventions:  - Promote a nonjudgmental and trusting relationship with the patient through active listening and therapeutic communication  - Assess patient's level of functioning, behavior and potential for risk  - Engage patient in 1 on 1 interactions  - Encourage patient to express fears, feelings, frustrations, and discuss symptoms    - [place] patient to reality, help patient recognize reality-based thinking   - Administer medications as ordered and assess for potential side effects  - Provide the patient education related to the signs and symptoms of the illness and desired effects of prescribed medications  Outcome: Progressing  Goal: Refrain from acting on delusional thinking/internal stimuli  Description: Interventions:  - Monitor patient closely, per order   - Utilize least restrictive measures   - Set reasonable limits, give positive feedback for acceptable   - Administer medications as ordered and monitor of potential side effects  Outcome: Progressing  Goal: Agree to be compliant with medication regime, as prescribed and report medication side effects  Description: Interventions:  - Offer appropriate PRN medication and supervise ingestion; conduct AIMS, as needed   Outcome: Progressing  Goal: Attend and participate in unit activities, including therapeutic, recreational, and educational groups  Description: Interventions:  -Encourage Visitation and family involvement in care  Outcome: Progressing  Goal: Recognize dysfunctional thoughts, communicate reality-based thoughts at the time of discharge  Description: Interventions:  - Provide medication and psycho-education to assist patient in compliance and developing insight into his/her illness   Outcome: Progressing  Goal: Complete daily ADLs, including personal hygiene independently, as able  Description: Interventions:  - Observe, teach, and assist  patient with ADLS  - Monitor and promote a balance of rest/activity, with adequate nutrition and elimination   Outcome: Progressing     Problem: Risk for Self Injury/Neglect  Goal: Treatment Goal: Remain safe during length of stay, learn and adopt new coping skills, and be free of self-injurious ideation, impulses and acts at the time of discharge  Outcome: Progressing  Goal: Verbalize thoughts and feelings  Description: Interventions:  - Assess and re-assess patient's lethality and potential for self-injury  - Engage patient in 1:1 interactions, daily, for a minimum of 15 minutes  - Encourage patient to express feelings, fears, frustrations, hopes  - Establish rapport/trust with patient   Outcome: Progressing  Goal: Refrain from harming self  Description: Interventions:  - Monitor patient closely, per order  - Develop a trusting relationship  - Supervise medication ingestion, monitor effects and side effects   Outcome: Progressing

## 2024-11-26 NOTE — NURSING NOTE
Patient given Ativan 1mg for continued anxiety. At one hour follow up patient reports improvement in symptoms and is walking with peers in the hallways. PRN appears effective.

## 2024-11-26 NOTE — ASSESSMENT & PLAN NOTE
Continue Remeron 30 mg p.o. at bedtime for depression and anxiety  Increase Seroquel to 75 mg QHS with plan for ongoing gradual titration as tolerated for mood, anxiety & impulsivity  Discharge planning: patient continues to require inpatient hospitalization at present for ongoing medication adjustments   Potential discharge by end of week pending mood stabilization, pending call back from sister for collateral information  Plan to discharge to apartment with referrals for placement options  [facility] for [facility] & outpatient mental health treatment  Would benefit from repayee in the future

## 2024-11-26 NOTE — NURSING NOTE
Patient visible, social, attending most groups and is pleasant. Reports no active thoughts to harm self, or any plans/intentions to harm self, however, does report passive SI. Does consent to safety on unit. Pt showered today, reports she is eating well and sleeping well. Denies HI, AVH. Pt c/o unrelieved anxiety, does not appear anxious, reports Atarax at highest dose is not relieving her symptoms, and suggested Ativan, as she reports having this during her stay here and this being effective. RN explained to pt that she would need to discuss with provider.

## 2024-11-26 NOTE — PROGRESS NOTES
"Progress Note - Behavioral Health     Gabby YAIMA Mares 34 y.o. female MRN: [id number]   Unit/Bed#: [facility] 202-02 Encounter: [id number]  Assessment & Plan  Depression  Continue Remeron 30 mg p.o. at bedtime for depression and anxiety  Increase Seroquel to 75 mg QHS with plan for ongoing gradual titration as tolerated for mood, anxiety & impulsivity  Discharge planning: patient continues to require inpatient hospitalization at present for ongoing medication adjustments   Potential discharge by end of week pending mood stabilization, pending call back from sister for collateral information  Plan to discharge to apartment with referrals for placement options  [facility] for [facility] & outpatient mental health treatment  Would benefit from repayee in the future  Anxiety disorder  See \"depression\" plan  Medical clearance for psychiatric admission  As per SLIM  Drug abuse (HCC)  Abuses diphenhydramine. Encourage cessation. Stable.  Agoraphobia  Stable  Elevated serum creatinine  Per SLIM  Improved to baseline on 11/23 WNL  Obesity    Elevated LFTs  Stable, on 11/23 WNL       Recommended Treatment:     Planned medication and treatment changes:    All current active medications have been reviewed  Encourage group therapy, milieu therapy and occupational therapy  Behavioral Health checks for safety monitoring      Current medications:  Current Facility-Administered Medications   Medication Dose Route Frequency Provider Last Rate    acetaminophen  650 mg Oral Q6H PRN Ivonne Beltre PA-C      aluminum-magnesium hydroxide-simethicone  30 mL Oral Q4H PRN Margot De La Cruz MD      haloperidol lactate  2.5 mg Intramuscular Q4H PRN Max 4/day Margot De La Cruz MD      And    LORazepam  1 mg Intramuscular Q4H PRN Max 4/day Margot De La Cruz MD      And    benztropine  0.5 mg Intramuscular Q4H PRN Max 4/day Margot De La Cruz MD      haloperidol lactate  5 mg Intramuscular Q4H PRN Max 4/day Margot De La Cruz MD      And    LORazepam  " "2 mg Intramuscular Q4H PRN Max 4/day Margot De La Cruz MD      And    benztropine  1 mg Intramuscular Q4H PRN Max 4/day Margot De La Cruz MD      benztropine  1 mg Intramuscular Q4H PRN Max 6/day Margot De La Cruz MD      benztropine  1 mg Oral Q4H PRN Max 6/day Margot De La Cruz MD      bisacodyl  10 mg Rectal Daily PRN Margot De La Cruz MD      hydrOXYzine HCL  50 mg Oral Q6H PRN Max 4/day Margot De La Cruz MD      Or    diphenhydrAMINE  50 mg Intramuscular Q6H PRN Margot De La Cruz MD      ergocalciferol  50,000 Units Oral Weekly Puma Newby DO      haloperidol  1 mg Oral Q6H PRN Margot De La Cruz MD      haloperidol  2.5 mg Oral Q4H PRN Max 4/day Margot De La Cruz MD      haloperidol  5 mg Oral Q4H PRN Max 4/day Margot De La Cruz MD      hydrOXYzine HCL  100 mg Oral Q6H PRN Max 4/day Margot De La Cruz MD      Or    LORazepam  2 mg Intramuscular Q6H PRN Margot De La Cruz MD      hydrOXYzine HCL  25 mg Oral Q6H PRN Max 4/day Margot De La Cruz MD      menthol-methyl salicylate   Apply externally 4x Daily PRN Ivonne Beltre PA-C      mirtazapine  30 mg Oral HS ODILON Nunez      polyethylene glycol  17 g Oral Daily PRN Margot De La Cruz MD      propranolol  10 mg Oral Q8H PRN Margot De La Cruz MD      QUEtiapine  50 mg Oral HS Georgia Anna PA-HERNAN      senna-docusate sodium  1 tablet Oral Daily PRN Margot De La Cruz MD      traZODone  50 mg Oral HS PRN Margot De La Cruz MD         Risks / Benefits of Treatment:    Risks, benefits, and possible side effects of medications explained to patient and patient verbalizes understanding and agreement for treatment.    Subjective:    Behavior over the last 24 hours: unchanged.     Gabby was seen today in follow-up for continuation of care. Per staff, yesterday was reporting feeling \"agitated\" (however did not appear overtly agitated) and was complaining of anxiety symptoms. She did accept PRNs to include atarax, ativan, haldol and trazodone. She " "is seen resting in bed comfortably today. She reports continued anxiety symptoms secondary to her apartment and financial stressors. She is hopeful for eventual placement to group home. States her sister is a positive support for her. She denies depression today such as hopelessness, worthlessness and helplessness. Behaviors are improving since the weekend without any further outbursts. Gabby adamantly denies suicidal/homicidal ideation in addition to thoughts of self-injury. Gabby presently is contacting for safety on the unit and feels comfortable to confide in staff if thoughts arise. At time of interview, no overt psychosis elicited. Gabby has been complaint with medications and tolerating without any side effects.       Sleep: normal  Appetite: normal  Medication side effects: No   ROS: no complaints    Mental Status Evaluation:    Appearance:  age appropriate, dressed appropriately, adequate grooming   Behavior:  cooperative, calm, superficial and brief   Speech:  normal rate, normal volume, normal pitch   Mood:  euthymic, \"good\"   Affect:  constricted   Thought Process:  logical, coherent   Associations: concrete associations   Thought Content:  ruminating thoughts   Perceptual Disturbances: no auditory hallucinations, no visual hallucinations, does not appear responding to internal stimuli   Risk Potential: Suicidal ideation - None at present  Homicidal ideation - None at present  Potential for aggression - No   Sensorium:  oriented to person, place, and time/date   Memory:  recent and remote memory grossly intact   Consciousness:  alert and awake   Attention/Concentration: attention span and concentration are age appropriate   Insight:  limited   Judgment: limited   Gait/Station: normal gait/station   Motor Activity: no abnormal movements     Vital signs in last 24 hours:    Temp:  [96.9 °F (36.1 °C)-97.2 °F (36.2 °C)] 96.9 °F (36.1 °C)  HR:  [75-77] 77  BP: (119-133)/(76-82) 119/76  Resp:  [17-18] " 17  SpO2:  [96 %-98 %] 96 %  O2 Device: None (Room air)    Laboratory results: I have personally reviewed all pertinent laboratory/tests results    Results from the past 24 hours: No results found for this or any previous visit (from the past 24 hours).    Suicide/Homicide Risk Assessment:    Risk of Harm to Self:   Nursing Suicide Risk Assessment Last 24 hours: C-SSRS Risk (Since Last Contact)  Calculated C-SSRS Risk Score (Since Last Contact): No Risk Indicated    Risk of Harm to Others:  Nursing Homicide Risk Assessment: Violence Risk to Others: Denies within past 6 months    The following interventions are recommended: Behavioral Health checks for safety monitoring, continued hospitalization on locked unit    Progress Toward Goals: progressing    Counseling / Coordination of Care:    Total floor / unit time spent today 30 minutes. Greater than 50% of total time was spent with the patient and / or family counseling and / or coordination of care. A description of counseling / coordination of care:    Georgia Anna PA-C 11/26/24

## 2024-11-27 PROCEDURE — 99232 SBSQ HOSP IP/OBS MODERATE 35: CPT | Performed by: STUDENT IN AN ORGANIZED HEALTH CARE EDUCATION/TRAINING PROGRAM

## 2024-11-27 RX ORDER — MIRTAZAPINE 30 MG/1
30 TABLET, FILM COATED ORAL
Qty: 30 TABLET | Refills: 1 | Status: SHIPPED | OUTPATIENT
Start: 2024-11-27 | End: 2025-01-26

## 2024-11-27 RX ORDER — HYDROXYZINE HYDROCHLORIDE 50 MG/1
50 TABLET, FILM COATED ORAL 2 TIMES DAILY PRN
Qty: 30 TABLET | Refills: 0 | Status: SHIPPED | OUTPATIENT
Start: 2024-11-27 | End: 2024-12-27

## 2024-11-27 RX ORDER — QUETIAPINE FUMARATE 100 MG/1
100 TABLET, FILM COATED ORAL
Status: DISCONTINUED | OUTPATIENT
Start: 2024-11-27 | End: 2024-11-28

## 2024-11-27 RX ORDER — QUETIAPINE FUMARATE 100 MG/1
100 TABLET, FILM COATED ORAL
Qty: 30 TABLET | Refills: 1 | Status: SHIPPED | OUTPATIENT
Start: 2024-11-27 | End: 2025-01-26

## 2024-11-27 RX ORDER — ERGOCALCIFEROL 1.25 MG/1
50000 CAPSULE, LIQUID FILLED ORAL WEEKLY
Qty: 4 CAPSULE | Refills: 0 | Status: SHIPPED | OUTPATIENT
Start: 2024-11-29 | End: 2024-12-21

## 2024-11-27 RX ADMIN — HYDROXYZINE HYDROCHLORIDE 100 MG: 50 TABLET, FILM COATED ORAL at 16:58

## 2024-11-27 RX ADMIN — QUETIAPINE FUMARATE 100 MG: 100 TABLET ORAL at 21:39

## 2024-11-27 RX ADMIN — HALOPERIDOL 5 MG: 5 TABLET ORAL at 18:19

## 2024-11-27 RX ADMIN — MIRTAZAPINE 30 MG: 15 TABLET, FILM COATED ORAL at 21:39

## 2024-11-27 NOTE — NURSING NOTE
Patient reported anxiety and requested medication for relief. Dorado Anxiety Scale Score = 18. Patient was medicated with PRN hydrOXYzine HCL (ATARAX) tablet 50mg PO at 2135.    Patient was later observed on unit cooperative. No irritability or agitation reported.

## 2024-11-27 NOTE — ASSESSMENT & PLAN NOTE
Continue Remeron 30 mg p.o. at bedtime for depression and anxiety  Increase Seroquel to 100 mg QHS for mood, anxiety & impulsivity  Discharge planning: patient continues to require inpatient hospitalization at present for ongoing medication adjustments   Potential discharge by end of week pending mood stabilization, pending call back from sister for collateral information  Plan to discharge to apartment with referrals for placement options  [facility] for [facility] & outpatient mental health treatment  Would benefit from repayee in the future

## 2024-11-27 NOTE — NURSING NOTE
"Patient is pleasant, coloring in room on approach. Reports no active thoughts to harm self, however, has had passive thoughts, denies plan/intent at this time. Reports feeling anxious and depressed, states \"I know it probably doesn't look like it, but I'm really anxious and so depressed I'm numb inside.\" RN acknowledged this and asked if there was anything more writer could do, pt declined.   "

## 2024-11-27 NOTE — PLAN OF CARE
Patient regularly attends groups and other unit activities.     Problem: Ineffective Coping  Goal: Participates in unit activities  Description: Interventions:  - Provide therapeutic environment   - Provide required programming   - Redirect inappropriate behaviors   Outcome: Progressing     Problem: Alteration in Thoughts and Perception  Goal: Attend and participate in unit activities, including therapeutic, recreational, and educational groups  Description: Interventions:  -Encourage Visitation and family involvement in care  Outcome: Progressing

## 2024-11-27 NOTE — CASE MANAGEMENT
"MARKO called [facility] ([phone]) and left VM asking for a call back to schedule pt for MH OP intake appointment for after dc on Friday 11/29/24.   (CM received a return call and they reported they do not have any providers that accept Medicare so they would not be able to schedule pt at this time)       MARKO received VM from Mari in the [facility] [employer] [facility] Admin CM department ([phone]) as MARKO has been playing phone tag with her to see if they can work with pt for CM services. She reported that she will be out of the office and return on Monday 12/2/2024. MARKO will provide pt with phone number and will follow up with Mari.       MARKO called [facility]-[address] [address] [phone]     They looked into pt's chart and they reported \"She cancelled every appt she had. She would need to be scheduled as a new patient again and we are not able to schedule new patients until March 2025 when our new doctor comes.\" MARKO was advised to schedule pt in the [facility] where she is now living.      "

## 2024-11-27 NOTE — PROGRESS NOTES
"Progress Note - Behavioral Health     [facility] 34 [facility] MRN: [id number]   Unit/Bed#: [facility] 202-02 Encounter: [id number]  Assessment & Plan  Depression  Continue Remeron 30 mg p.o. at bedtime for depression and anxiety  Increase Seroquel to 100 mg QHS for mood, anxiety & impulsivity  Discharge planning: patient continues to require inpatient hospitalization at present for ongoing medication adjustments   Potential discharge by end of week pending mood stabilization, pending call back from sister for collateral information  Plan to discharge to apartment with referrals for placement options  [facility] for [facility] & outpatient mental health treatment  Would benefit from repayee in the future  Anxiety disorder  See \"depression\" plan  Medical clearance for psychiatric admission  As per SLIM  Drug abuse (HCC)  Abuses diphenhydramine. Encourage cessation. Stable.  Agoraphobia  Stable  Elevated serum creatinine  Per SLIM  Improved to baseline on 11/23 WNL  Obesity    Elevated LFTs  Stable, on 11/23 WNL       Recommended Treatment:     Planned medication and treatment changes:    All current active medications have been reviewed  Encourage group therapy, milieu therapy and occupational therapy  Behavioral Health checks for safety monitoring      Current medications:  Current Facility-Administered Medications   Medication Dose Route Frequency Provider Last Rate    acetaminophen  650 mg Oral Q6H PRN Ivonne Beltre PA-C      aluminum-magnesium hydroxide-simethicone  30 mL Oral Q4H PRN Margot De La Cruz MD      haloperidol lactate  2.5 mg Intramuscular Q4H PRN Max 4/day Magrot De La Cruz MD      And    LORazepam  1 mg Intramuscular Q4H PRN Max 4/day Margot De La Cruz MD      And    benztropine  0.5 mg Intramuscular Q4H PRN Max 4/day Margot De La Cruz MD      haloperidol lactate  5 mg Intramuscular Q4H PRN Max 4/day Margot De La Cruz MD      And    LORazepam  2 mg Intramuscular Q4H PRN Max 4/day Margot STODDARD" MD Dorothy      And    benztropine  1 mg Intramuscular Q4H PRN Max 4/day Margot De La Cruz MD      benztropine  1 mg Intramuscular Q4H PRN Max 6/day Margot De La Cruz MD      benztropine  1 mg Oral Q4H PRN Max 6/day Margot De La Cruz MD      bisacodyl  10 mg Rectal Daily PRN Margot De La Cruz MD      hydrOXYzine HCL  50 mg Oral Q6H PRN Max 4/day Margot De La Cruz MD      Or    diphenhydrAMINE  50 mg Intramuscular Q6H PRN Margot De La Cruz MD      ergocalciferol  50,000 Units Oral Weekly Puma Newby DO      haloperidol  1 mg Oral Q6H PRN Margot De La Cruz MD      haloperidol  2.5 mg Oral Q4H PRN Max 4/day Margot De La Cruz MD      haloperidol  5 mg Oral Q4H PRN Max 4/day Margot De La Cruz MD      hydrOXYzine HCL  100 mg Oral Q6H PRN Max 4/day Margot De La Cruz MD      Or    LORazepam  2 mg Intramuscular Q6H PRN Margot De La Cruz MD      hydrOXYzine HCL  25 mg Oral Q6H PRN Max 4/day Margot De La Cruz MD      menthol-methyl salicylate   Apply externally 4x Daily PRN Ivonne Beltre PA-C      mirtazapine  30 mg Oral HS ODILON Nunez      polyethylene glycol  17 g Oral Daily PRN Margot De La Cruz MD      propranolol  10 mg Oral Q8H PRN Margot De La Cruz MD      QUEtiapine  100 mg Oral HS Georgia Anna PA-HERNAN      senna-docusate sodium  1 tablet Oral Daily PRN Margot De La Cruz MD      traZODone  50 mg Oral HS PRN Margot De La Cruz MD         Risks / Benefits of Treatment:    Risks, benefits, and possible side effects of medications explained to patient and patient verbalizes understanding and agreement for treatment.    Subjective:    Behavior over the last 24 hours: unchanged.     Gabby was seen today in follow-up for continuation of care. Per staff, she has been cooperative and complains of anxiety symptoms. Did accept atarax which was helpful per patient. She reports continued anxiety symptoms due to her possible eviction once she gets discharged. She reports that she was able to notify  "her sister yesterday about her discharge. Today is making some vague suicidal remarks that  \"I shouldn't live.\" There is some concern that these are conditional in nature due to living situation. She adamantly denies any active SI and has not demonstrated any suicidal activity. No recent self-harming behaviors. She denies any depressive symptoms such as hopelessness and helplessness. Gabby has been tolerating the increase in Seroquel with no complaints or side effects currently. Gabby adamantly denies homicidal ideation in addition to thoughts of self-injury. Gabby presently is contacting for safety on the unit and feels comfortable to confide in staff if thoughts arise. At time of interview, no overt psychosis elicited. Gabby has been complaint with medications and tolerating without any side effects.     Sleep: normal  Appetite: normal  Medication side effects: No   ROS: no complaints    Mental Status Evaluation:    Appearance:  age appropriate, dressed appropriately, adequate grooming   Behavior:  cooperative, calm   Speech:  normal rate, normal volume, normal pitch   Mood:  euthymic, \"good\"   Affect:  brighter, smiling on approach   Thought Process:  logical, goal directed   Associations: concrete associations   Thought Content:  ruminating thoughts   Perceptual Disturbances: no auditory hallucinations, no visual hallucinations, does not appear responding to internal stimuli   Risk Potential: Suicidal ideation - Denies SI  Homicidal ideation - None at present  Potential for aggression - No   Sensorium:  oriented to person, place, and time/date   Memory:  recent and remote memory grossly intact   Consciousness:  alert and awake   Attention/Concentration: attention span and concentration are age appropriate   Insight:  limited   Judgment: limited   Gait/Station: normal gait/station   Motor Activity: no abnormal movements     Vital signs in last 24 hours:    Temp:  [96.3 °F (35.7 °C)-98.1 °F (36.7 °C)] 96.3 °F " (35.7 °C)  HR:  [80-83] 80  BP: (147-162)/(79-96) 162/96  Resp:  [16-18] 16  SpO2:  [98 %] 98 %  O2 Device: None (Room air)    Laboratory results: I have personally reviewed all pertinent laboratory/tests results    Results from the past 24 hours: No results found for this or any previous visit (from the past 24 hours).    Suicide/Homicide Risk Assessment:    Risk of Harm to Self:   Nursing Suicide Risk Assessment Last 24 hours: C-SSRS Risk (Since Last Contact)  Calculated C-SSRS Risk Score (Since Last Contact): No Risk Indicated    Risk of Harm to Others:  Nursing Homicide Risk Assessment: Violence Risk to Others: Denies within past 6 months    The following interventions are recommended: Behavioral Health checks for safety monitoring, continued hospitalization on locked unit    Progress Toward Goals: progressing    Counseling / Coordination of Care:    Total floor / unit time spent today 30 minutes. Greater than 50% of total time was spent with the patient and / or family counseling and / or coordination of care. A description of counseling / coordination of care:    Georgia Anna PA-C 11/27/24

## 2024-11-27 NOTE — NURSING NOTE
Patient reported 9/10 feet pain and requested medication for relief. Patient was medicated with PRN Acetaminophen tablet 650mg PO at 2135.    Patient was later observed on unit cooperative and settled in room in preparation for sleep. No irritability or agitation reported.

## 2024-11-27 NOTE — NURSING NOTE
Pt reporting elevated anxiety. PRN Atarax 100 mg po effective per pt. Dorado score 25. Pt walking the halls. Observed visibly calmer.

## 2024-11-27 NOTE — NURSING NOTE
"Patient walking the halls this evening. Denied current SI/HI and hallucinations. Stated she was feeling anxious and provided PRN Atarax. She reported to med nurse that it was effective, but then approached this nurse and requested \"something else\". She reported that she was feeling agitated, and received Haldol 5 mg PO at 1819. Broset agitation scale score at that time was 2. She continued to walk the hallway. Upon follow up, patient reported medication was effective and was feeling better.   "

## 2024-11-27 NOTE — PLAN OF CARE
Problem: Alteration in Thoughts and Perception  Goal: Verbalize thoughts and feelings  Description: Interventions:  - Promote a nonjudgmental and trusting relationship with the patient through active listening and therapeutic communication  - Assess patient's level of functioning, behavior and potential for risk  - Engage patient in 1 on 1 interactions  - Encourage patient to express fears, feelings, frustrations, and discuss symptoms    - [place] patient to reality, help patient recognize reality-based thinking   - Administer medications as ordered and assess for potential side effects  - Provide the patient education related to the signs and symptoms of the illness and desired effects of prescribed medications  Outcome: Progressing  Goal: Refrain from acting on delusional thinking/internal stimuli  Description: Interventions:  - Monitor patient closely, per order   - Utilize least restrictive measures   - Set reasonable limits, give positive feedback for acceptable   - Administer medications as ordered and monitor of potential side effects  Outcome: Progressing  Goal: Agree to be compliant with medication regime, as prescribed and report medication side effects  Description: Interventions:  - Offer appropriate PRN medication and supervise ingestion; conduct AIMS, as needed   Outcome: Progressing  Goal: Attend and participate in unit activities, including therapeutic, recreational, and educational groups  Description: Interventions:  -Encourage Visitation and family involvement in care  Outcome: Progressing  Goal: Recognize dysfunctional thoughts, communicate reality-based thoughts at the time of discharge  Description: Interventions:  - Provide medication and psycho-education to assist patient in compliance and developing insight into his/her illness   Outcome: Progressing  Goal: Complete daily ADLs, including personal hygiene independently, as able  Description: Interventions:  - Observe, teach, and assist  patient with ADLS  - Monitor and promote a balance of rest/activity, with adequate nutrition and elimination   Outcome: Progressing     Problem: Risk for Self Injury/Neglect  Goal: Treatment Goal: Remain safe during length of stay, learn and adopt new coping skills, and be free of self-injurious ideation, impulses and acts at the time of discharge  Outcome: Progressing  Goal: Verbalize thoughts and feelings  Description: Interventions:  - Assess and re-assess patient's lethality and potential for self-injury  - Engage patient in 1:1 interactions, daily, for a minimum of 15 minutes  - Encourage patient to express feelings, fears, frustrations, hopes  - Establish rapport/trust with patient   Outcome: Progressing  Goal: Refrain from harming self  Description: Interventions:  - Monitor patient closely, per order  - Develop a trusting relationship  - Supervise medication ingestion, monitor effects and side effects   Outcome: Progressing

## 2024-11-27 NOTE — NURSING NOTE
Patient was seen ambulating in the day-area at initial of shift. Patient cooperated with answering psychiatric assessment questions. Good eye-contact noted. Patient denied having auditory or visual hallucinations. Denied suicidal or homicidal ideation and denied endorsing passive death wishes. Patient reported feeling safe inside the hospital setting, however denied that she would feel/be safe outside of hospital. Mood rated by patient as 5/10. Last bowel movement: Today, per patient. No irritability or agitation noted on assessment.

## 2024-11-27 NOTE — DISCHARGE INSTR - APPOINTMENTS
You will be discharged to [address] [address]  You reported that you do not have a phone at this time but can be contacted on your sister Rosemary's number: [phone]    You will be discharged with a 30 day supply of your medications.         Behavioral Health Nurse Navigator, Laureen or Mili will be calling you after your discharge, on the phone number that you provided.  They will be available as an additional support, if needed.   If you wish to speak with Laureen, you may contact her at [phone].

## 2024-11-28 PROBLEM — F32.3 CURRENT SEVERE EPISODE OF MAJOR DEPRESSIVE DISORDER WITH PSYCHOTIC FEATURES (HCC): Status: ACTIVE | Noted: 2024-11-14

## 2024-11-28 PROCEDURE — 99232 SBSQ HOSP IP/OBS MODERATE 35: CPT | Performed by: STUDENT IN AN ORGANIZED HEALTH CARE EDUCATION/TRAINING PROGRAM

## 2024-11-28 RX ADMIN — HYDROXYZINE HYDROCHLORIDE 100 MG: 50 TABLET, FILM COATED ORAL at 12:37

## 2024-11-28 RX ADMIN — MIRTAZAPINE 30 MG: 15 TABLET, FILM COATED ORAL at 21:08

## 2024-11-28 RX ADMIN — QUETIAPINE FUMARATE 150 MG: 100 TABLET ORAL at 21:07

## 2024-11-28 NOTE — ASSESSMENT & PLAN NOTE
Impression: Ocular hypertension, bilateral: H40.053. ONH stable OU, s/p LPI OU Tmax 30/29, /612
7/18 /93 stable in NFL OU vs. OCT x 1yr ago IOP today 10/9  Plan: Discussed diagnosis in detail with patient. IOP stable. Pt will continue to use Latanoprost QHS OU (Lumigan) and Timolol BID OU. Stable

## 2024-11-28 NOTE — NURSING NOTE
Patient reported she was feeling anxious (6/10) and depressed (9/10). Was pacing in the hallway as a coping skill. Stated she was experiencing passive SI, but has no plan or intent. Denies HI and hallucinations. PRN medication offered but she refused at this time. Stated she will seek out staff if the SI urges increase. She offers no further questions or concerns at this time.

## 2024-11-28 NOTE — PROGRESS NOTES
"Progress Note - Behavioral Health   Name: Gabby Ramosland 34 y.o. female I MRN: [id number]  Unit/Bed#: -02 I Date of Admission: 11/13/2024   Date of Service: 11/28/2024 I Hospital Day: 15        Assessment & Plan  Current severe episode of major depressive disorder with psychotic features (HCC)  Continue Remeron 30 mg p.o. at bedtime for depression and anxiety  Increase Seroquel to 150 mg QHS for mood, anxiety & impulsivity  Discharge planning: patient continues to require inpatient hospitalization at present for ongoing medication adjustments   Potential discharge by end of week pending mood stabilization, pending call back from sister for collateral information  Plan to discharge to apartment with referrals for placement options  [facility] for [facility] & outpatient mental health treatment  Would benefit from repayee in the future  Anxiety disorder  See \"depression\" plan  Medical clearance for psychiatric admission  As per [facility]  Drug abuse (HCC)  Abuses diphenhydramine. Encourage cessation. Stable.  Agoraphobia  Stable  Elevated serum creatinine  Per SLIM  Improved to baseline on 11/23 WNL  Obesity    Elevated LFTs  Stable, on 11/23 WNL       Additional Recommendations:  Behavioral Health checks for safety monitoring.  Continue treatment with group therapy, milieu therapy and occupational therapy.  Medical management per [facility].  Discharge and disposition planning.    ----------------------------------------      Subjective: Per nursing report patient attending group and milieu activities.  Noted to be pleasant and appropriate.  Denying SI or HI.  Received Atarax yesterday for anxiety which was effective.  Haldol as a as needed for \"feeling agitated\".    She reports today that her mood is \"depressed and tired\".  Reports that she has been having some passive suicidal thoughts, no longer wishing to exist although able to contract for safety and denying any intention to harm herself.  She reports that she had " "some intense and vivid dreams last night, was having a nightmare that \"the world was going to end\".  She reports continuing to intermittently experience auditory hallucinations, states that yesterday she heard a voice that said \"go kill yourself\".  Denies any voices today.  He was in agreement to increasing the Seroquel to target mood and hallucinations.    Behavior over the last 24 hours:  regressed  Sleep: decreased  Appetite: normal  Medication side effects: No  ROS: no complaints    Mental Status Evaluation:  Appearance:  marginal hygiene   Behavior:  cooperative, calm, decreased eye contact   Speech:  scant, soft   Mood:  depressed   Affect:  constricted   Thought Process:  organized, logical, coherent, goal directed   Associations: intact associations   Thought Content:  no overt delusions   Perceptual Disturbances: no auditory hallucinations, no visual hallucinations, does not appear responding to internal stimuli   Risk Potential: Suicidal ideation - passive thoughts; contracts to safety  Homicidal ideation - None at present  Potential for aggression - No   Sensorium:  oriented to person, place, and time/date   Memory:  recent and remote memory grossly intact   Consciousness:  alert and awake   Attention/Concentration: attention span and concentration are age appropriate   Insight:  limited   Judgment: limited   Gait/Station: normal gait/station   Motor Activity: no abnormal movements     Medications: all current active meds have been reviewed and continue current psychiatric medications.  Current Facility-Administered Medications   Medication Dose Route Frequency Provider Last Rate    acetaminophen  650 mg Oral Q6H PRN Ivonne Beltre PA-C      aluminum-magnesium hydroxide-simethicone  30 mL Oral Q4H PRN Margot De La Cruz MD      haloperidol lactate  2.5 mg Intramuscular Q4H PRN Max 4/day Margot De La Cruz MD      And    LORazepam  1 mg Intramuscular Q4H PRN Max 4/day Margot De La Cruz MD      And    " benztropine  0.5 mg Intramuscular Q4H PRN Max 4/day Margot De La Cruz MD      haloperidol lactate  5 mg Intramuscular Q4H PRN Max 4/day Margot De La Cruz MD      And    LORazepam  2 mg Intramuscular Q4H PRN Max 4/day Margot De La Cruz MD      And    benztropine  1 mg Intramuscular Q4H PRN Max 4/day Margot De La Cruz MD      benztropine  1 mg Intramuscular Q4H PRN Max 6/day Margot De La Cruz MD      benztropine  1 mg Oral Q4H PRN Max 6/day Margot De La Cruz MD      bisacodyl  10 mg Rectal Daily PRN Margot De La Cruz MD      hydrOXYzine HCL  50 mg Oral Q6H PRN Max 4/day Margot De La Cruz MD      Or    diphenhydrAMINE  50 mg Intramuscular Q6H PRN Margot De La Cruz MD      ergocalciferol  50,000 Units Oral Weekly Puma Newby,       haloperidol  1 mg Oral Q6H PRN Margot De La Cruz MD      haloperidol  2.5 mg Oral Q4H PRN Max 4/day Margot De La Cruz MD      haloperidol  5 mg Oral Q4H PRN Max 4/day Margot De La Cruz MD      hydrOXYzine HCL  100 mg Oral Q6H PRN Max 4/day Margot De La Cruz MD      Or    LORazepam  2 mg Intramuscular Q6H PRN Margot De La Cruz MD      hydrOXYzine HCL  25 mg Oral Q6H PRN Max 4/day Margot De La Cruz MD      menthol-methyl salicylate   Apply externally 4x Daily PRN Ivonne Beltre PA-C      mirtazapine  30 mg Oral HS ODILON Nunez      polyethylene glycol  17 g Oral Daily PRN Margot De La Cruz MD      propranolol  10 mg Oral Q8H PRN Margot De La Cruz MD      QUEtiapine  150 mg Oral HS Mynor Parr MD      senna-docusate sodium  1 tablet Oral Daily PRN Margot De La Cruz MD      traZODone  50 mg Oral HS PRN Margot De La Cruz MD         Labs: I have personally reviewed all pertinent laboratory/tests results  Most Recent Labs:   Lab Results   Component Value Date    WBC 7.57 11/14/2024    RBC 4.54 11/14/2024    HGB 12.6 11/14/2024    HCT 40.1 11/14/2024     11/14/2024    RDW 15.8 (H) 11/14/2024    NEUTROABS 4.49 11/14/2024    SODIUM 139 11/23/2024    K 4.2 11/23/2024      11/23/2024    CO2 29 11/23/2024    BUN 25 11/23/2024    CREATININE 1.18 11/23/2024    GLUC 83 11/23/2024    CALCIUM 8.9 11/23/2024    AST 21 11/23/2024    ALT 26 11/23/2024    ALKPHOS 81 11/23/2024    TP 7.2 11/23/2024    ALB 3.7 11/23/2024    TBILI 0.33 11/23/2024    CHOLESTEROL 182 11/14/2024    HDL 39 (L) 11/14/2024    TRIG 148 11/14/2024    LDLCALC 113 (H) 11/14/2024    NONHDLC 143 11/14/2024    AMMONIA 32 10/17/2022    SYO4QJCAAXVD 1.306 11/14/2024    FREET4 0.86 05/11/2018    PREGUR Negative 10/18/2022    PREGSERUM Negative 11/14/2024    HCGQUANT <2 08/07/2018    SYPHILISAB Non-reactive 11/14/2024       Progress Toward Goals: progressing    Risks / Benefits of Treatment:    Risks, benefits, and possible side effects of medications explained to patient and patient verbalizes understanding and agreement for treatment.    Counseling / Coordination of Care:    Total floor / unit time spent today 25 minutes. Greater than 50% of total time was spent with the patient and / or family counseling and / or coordination of care. A description of counseling / coordination of care:  Patient's progress discussed with staff in treatment team meeting.  Medications, treatment progress and treatment plan reviewed with patient.  Reassurance and supportive therapy provided.  Encouraged participation in milieu and group therapy on the unit.    Mynor Parr MD 11/28/24

## 2024-11-28 NOTE — NURSING NOTE
@12:37 RN administered Atarax 100 mg PO for severe anxiety (Dorado- 26). Upon follow-up, endorses medication effectiveness.

## 2024-11-28 NOTE — ASSESSMENT & PLAN NOTE
Continue Remeron 30 mg p.o. at bedtime for depression and anxiety  Increase Seroquel to 150 mg QHS for mood, anxiety & impulsivity  Discharge planning: patient continues to require inpatient hospitalization at present for ongoing medication adjustments   Potential discharge by end of week pending mood stabilization, pending call back from sister for collateral information  Plan to discharge to apartment with referrals for placement options  [facility] for [facility] & outpatient mental health treatment  Would benefit from repayee in the future

## 2024-11-28 NOTE — PLAN OF CARE
Problem: Alteration in Thoughts and Perception  Goal: Verbalize thoughts and feelings  Description: Interventions:  - Promote a nonjudgmental and trusting relationship with the patient through active listening and therapeutic communication  - Assess patient's level of functioning, behavior and potential for risk  - Engage patient in 1 on 1 interactions  - Encourage patient to express fears, feelings, frustrations, and discuss symptoms    - [place] patient to reality, help patient recognize reality-based thinking   - Administer medications as ordered and assess for potential side effects  - Provide the patient education related to the signs and symptoms of the illness and desired effects of prescribed medications  Outcome: Progressing  Goal: Refrain from acting on delusional thinking/internal stimuli  Description: Interventions:  - Monitor patient closely, per order   - Utilize least restrictive measures   - Set reasonable limits, give positive feedback for acceptable   - Administer medications as ordered and monitor of potential side effects  Outcome: Progressing  Goal: Agree to be compliant with medication regime, as prescribed and report medication side effects  Description: Interventions:  - Offer appropriate PRN medication and supervise ingestion; conduct AIMS, as needed   Outcome: Progressing  Goal: Attend and participate in unit activities, including therapeutic, recreational, and educational groups  Description: Interventions:  -Encourage Visitation and family involvement in care  Outcome: Progressing  Goal: Recognize dysfunctional thoughts, communicate reality-based thoughts at the time of discharge  Description: Interventions:  - Provide medication and psycho-education to assist patient in compliance and developing insight into his/her illness   Outcome: Progressing  Goal: Complete daily ADLs, including personal hygiene independently, as able  Description: Interventions:  - Observe, teach, and assist  patient with ADLS  - Monitor and promote a balance of rest/activity, with adequate nutrition and elimination   Outcome: Progressing     Problem: Risk for Self Injury/Neglect  Goal: Treatment Goal: Remain safe during length of stay, learn and adopt new coping skills, and be free of self-injurious ideation, impulses and acts at the time of discharge  Outcome: Progressing  Goal: Verbalize thoughts and feelings  Description: Interventions:  - Assess and re-assess patient's lethality and potential for self-injury  - Engage patient in 1:1 interactions, daily, for a minimum of 15 minutes  - Encourage patient to express feelings, fears, frustrations, hopes  - Establish rapport/trust with patient   Outcome: Progressing  Goal: Refrain from harming self  Description: Interventions:  - Monitor patient closely, per order  - Develop a trusting relationship  - Supervise medication ingestion, monitor effects and side effects   Outcome: Progressing

## 2024-11-28 NOTE — NURSING NOTE
"Pt reports \"rough start in the morning\". Verbalized having intense dreams. Encouraged to speak with provider about medication. Pt reports passive SI no plan or intent. Feels safe on the unit. Having fluctuating anxiety. Declined PRN at this time. Pt denies HI or hallucination. Pt attending groups and social with peers. Pt walking the halls with this writer.  "

## 2024-11-29 VITALS
HEIGHT: 66 IN | WEIGHT: 293 LBS | BODY MASS INDEX: 47.09 KG/M2 | HEART RATE: 80 BPM | DIASTOLIC BLOOD PRESSURE: 97 MMHG | OXYGEN SATURATION: 100 % | RESPIRATION RATE: 18 BRPM | TEMPERATURE: 97.6 F | SYSTOLIC BLOOD PRESSURE: 139 MMHG

## 2024-11-29 PROCEDURE — 99239 HOSP IP/OBS DSCHRG MGMT >30: CPT | Performed by: STUDENT IN AN ORGANIZED HEALTH CARE EDUCATION/TRAINING PROGRAM

## 2024-11-29 RX ORDER — QUETIAPINE FUMARATE 50 MG/1
50 TABLET, FILM COATED ORAL
Qty: 30 TABLET | Refills: 0 | Status: SHIPPED | OUTPATIENT
Start: 2024-11-29 | End: 2024-12-29

## 2024-11-29 RX ADMIN — ERGOCALCIFEROL 50000 UNITS: 1.25 CAPSULE ORAL at 08:19

## 2024-11-29 NOTE — BH TRANSITION RECORD
Contact Information: If you have any questions, concerns, pended studies, tests and/or procedures, or emergencies regarding your inpatient behavioral health visit. Please contact [place] [facility] [phone] and ask to speak to a , nurse or physician. A contact is available 24 hours/ 7 days a week at this number.     Summary of Procedures Performed During your Stay:  Below is a list of major procedures performed during your hospital stay and a summary of results:  - Cardiac Procedures/Studies: non-specific T-wave abnormality, sinus rhythm w/ sinus arrhythmia.    Pending Studies (From admission, onward)      None          Please follow up on the above pending studies with your PCP and/or referring provider.

## 2024-11-29 NOTE — PLAN OF CARE
Problem: Alteration in Thoughts and Perception  Goal: Verbalize thoughts and feelings  Description: Interventions:  - Promote a nonjudgmental and trusting relationship with the patient through active listening and therapeutic communication  - Assess patient's level of functioning, behavior and potential for risk  - Engage patient in 1 on 1 interactions  - Encourage patient to express fears, feelings, frustrations, and discuss symptoms    - [place] patient to reality, help patient recognize reality-based thinking   - Administer medications as ordered and assess for potential side effects  - Provide the patient education related to the signs and symptoms of the illness and desired effects of prescribed medications  Outcome: Progressing  Goal: Refrain from acting on delusional thinking/internal stimuli  Description: Interventions:  - Monitor patient closely, per order   - Utilize least restrictive measures   - Set reasonable limits, give positive feedback for acceptable   - Administer medications as ordered and monitor of potential side effects  Outcome: Progressing  Goal: Agree to be compliant with medication regime, as prescribed and report medication side effects  Description: Interventions:  - Offer appropriate PRN medication and supervise ingestion; conduct AIMS, as needed   Outcome: Progressing  Goal: Attend and participate in unit activities, including therapeutic, recreational, and educational groups  Description: Interventions:  -Encourage Visitation and family involvement in care  Outcome: Progressing  Goal: Recognize dysfunctional thoughts, communicate reality-based thoughts at the time of discharge  Description: Interventions:  - Provide medication and psycho-education to assist patient in compliance and developing insight into his/her illness   Outcome: Progressing  Goal: Complete daily ADLs, including personal hygiene independently, as able  Description: Interventions:  - Observe, teach, and assist  patient with ADLS  - Monitor and promote a balance of rest/activity, with adequate nutrition and elimination   Outcome: Progressing     Problem: Risk for Self Injury/Neglect  Goal: Treatment Goal: Remain safe during length of stay, learn and adopt new coping skills, and be free of self-injurious ideation, impulses and acts at the time of discharge  Outcome: Progressing  Goal: Verbalize thoughts and feelings  Description: Interventions:  - Assess and re-assess patient's lethality and potential for self-injury  - Engage patient in 1:1 interactions, daily, for a minimum of 15 minutes  - Encourage patient to express feelings, fears, frustrations, hopes  - Establish rapport/trust with patient   Outcome: Progressing  Goal: Refrain from harming self  Description: Interventions:  - Monitor patient closely, per order  - Develop a trusting relationship  - Supervise medication ingestion, monitor effects and side effects   Outcome: Progressing

## 2024-11-29 NOTE — DISCHARGE SUMMARY
Discharge Summary - Behavioral Health   Gabby Mares 34 y.o. female MRN: [id number]  Unit/Bed#: [facility] 202-02 Encounter: [id number]     Admission Date: 11/13/2024         Discharge Date: 11/29/24    Attending Psychiatrist: Dr. Newby    Reason for Admission/HPI:     According to H&P completed by Dr. Newby on 11/14/24    Per Crisis worker note:  34 y.o female patient presented to the ED via EMS.  Pt appeared very anxious and was constantly rocking her body during the assessment.  Pt reported she has been off her medications due to not having insurance to pay for it. Pt stated she has been depressed and overwhelmed. Pt reported stressors of financial instability, being evicted from her apartment , no insurance, no transportation , unemployed and mental health issues.  Pt denies any SI, HI and AVH. Pt reported having SI in the past and had made attempts to overdose or stab self with a knife.   Pt has no access to firearms. Pt reported past inpatient mental health hospitalizations. Pt does not have any outpatient services. Pt denies any legal and substance abuse issues. Pt is willing to sign a 201. Provider is in agreement with this treatment plan.      On admission to Inpatient Psychiatric Unit:  Patient is a 34-year-old white female with a past psychiatric history of unspecified depression and unspecified anxiety, agoraphobia, and an apparent history of Benadryl abuse, who presents voluntarily to inpatient [facility] complaining of depression, anxiety, and suicidal ideation.     Symptoms prior to hospitalization include: Suicidal ideation with no plan, generalized anxiety, agoraphobia, panic attacks, depressed mood, decreased sleep, feeling overwhelmed, hopelessness, helplessness, and decreased concentration.  Symptom severity is rated as severe.  Timeline is progressively worsening over the past 1 to 3 weeks.  Mitigating factors are none.  Exacerbating stressors are being off medications for 3 months, losing her health  insurance, and going through an eviction process.     On initial psychiatric evaluation today, the patient is extremely anxious and is fidgeting throughout the interview.  She has difficulty sitting still.  She states that she has been feeling overwhelmed by multiple psychosocial stressors including going through an eviction process, having financial instability and having her checking account overdrawn, being in credit card debt, and having no health insurance and being off psychiatric medications for the past 3 months.  She feels depressed, severely anxious, and was feeling passively suicidal.  She denies manic or psychotic symptoms.  She states that in the past, she was diagnosed with both bipolar disorder and psychosis.  However, after an extensive conversation, the patient's symptoms do not fit the natural history of bipolar disorder or a primary psychotic disorder.  She previously had agoraphobia and was unwilling to leave her home.  She currently lives alone in an apartment.  She has been off medications 3 months but previously took Wellbutrin, Ambien, Abilify, and Topamax, but states that these medications were not working in the past.  She has no psychiatrist.  She is not currently suicidal at the time of my exam and denies homicidal ideation.  Despite her denial of SI, she states that she is too anxious to take care of herself right now. She is agreeable w/ plan to start Remeron.  She states that she uses THC and abuses Benadryl.  She has 1 prior suicide attempt via self stabbing in 2019 which she states was impulsive.      Social History       Tobacco History       Smoking Status  Never      Passive Exposure  Never      Smokeless Tobacco Use  Never              Alcohol History       Alcohol Use Status  No              Drug Use       Drug Use Status  No              Sexual Activity       Sexually Active  Never              Other Factors    Not Asked                 Additional Substance Use Detail        Questions Responses    Problems Due to Past Use of Alcohol? No    Problems Due to Past Use of Substances? No    Substance Use Assessment Denies substance use within the past 12 months    Alcohol Use Frequency Denies use in past 12 months    Cannabis frequency Never used    Comment: Never used on 1/2/2019     Heroin Frequency Denies use in past 12 months    Cocaine frequency Never used    Comment: Never used on 1/2/2019     Crack Cocaine Frequency Denies use in past 12 months    Methamphetamine Frequency Denies use in past 12 months    Narcotic Frequency Denies use in past 12 months    Benzodiazepine Frequency Denies use in past 12 months    Amphetamine frequency Prior dependence    Benzodiazepine Method Pill    Amphetamine method Pill    Comment: Pill on 1/2/2019     Benzodiazepine 1st Use Ativan and Ambien    Amphetamine 1st Use diet pills from PCP    Benzodiazepine Last Use & Amount unknown    Barbituate Frequency Denies use use in past 12 months    Inhalant frequency Never used    Comment: Never used on 1/2/2019     Hallucinogen frequency Never used    Comment: Never used on 1/2/2019     Ecstasy frequency Never used    Comment: Never used on 1/2/2019     Other drug frequency Never used    Comment: Never used on 1/2/2019     Opiate frequency Denies use in past 12 months    Last reviewed by Georgia Quach RN on 11/13/2024            Past Medical History:   Diagnosis Date    Acne     Agoraphobia     Anxiety     Depression     Drug abuse ([facility]) 11/14/2024    Diphenhydramine abuse    Eating disorder     Heart disease     Hypertension     Impulse control disorder     Obesity     Panic attack     Panic disorder     Psychiatric disorder     depression, bipolar, schizophrenia    Psychiatric illness     Psychosis (HCC)     Schizophrenia (HCC)     Schizophrenia (HCC)     Schizophrenia (HCC)     Seizures (HCC)     Self-injurious behavior     Sleep difficulties     Suicide attempt (HCC)      Past Surgical History:    Procedure Laterality Date    CYSTOSCOPY      INCISION AND DRAINAGE OF WOUND N/A 2/1/2019    Procedure: INCISION AND DRAINAGE (I&D) TRUNK;  Surgeon: Mathew Fatima DO;  Location:  MAIN OR;  Service: General    WISDOM TOOTH EXTRACTION         Medications:    All current active medications have been reviewed.    Allergies:     Allergies   Allergen Reactions    Adhesive [Medical Tape]      rash       Objective     Vital signs in last 24 hours:    Temp:  [97.6 °F (36.4 °C)-97.9 °F (36.6 °C)] 97.6 °F (36.4 °C)  HR:  [76-80] 80  BP: (139)/(97) 139/97  Resp:  [18] 18  SpO2:  [100 %] 100 %  O2 Device: None (Room air)    No intake or output data in the 24 hours ending 11/29/24 1106    Hospital Course:     Gabby was admitted to the inpatient psychiatric unit and started on Behavioral Health checks for safety monitoring. During the hospitalization she was attending individual therapy, group therapy, milieu therapy and occupational therapy..   Gabby was treated with a multidisciplinary approach that included daily lethality assessments, pharmacotherapy, psychotherapy as well as consultation to hospital internal medicine.    Psychiatric medications were adjusted over the hospital stay. To address depressive symptoms, mood instability, impulsivity, and anxiety symptoms, Gabby was treated with antidepressant Remeron, antipsychotic medication Seroquel, and anxiolytic medication Hydroxyzine. Medication doses were added and adjusted during the hospital course.  The patient was discharged on the following medication regimen:    Remeron 30 mg QHS for depression and anxiety symptoms  Seroquel 150 mg QHS for impulsivity and anxiety  Atarax 50 mg BID PRN for anxiety symptoms    Prior to beginning of treatment medications risks and benefits and possible side effects including risk of parkinsonian symptoms, Tardive Dyskinesia and metabolic syndrome related to treatment with antipsychotic medications and risk of suicidality and serotonin  syndrome related to treatment with antidepressants were reviewed with Gabby. She verbalized understanding and agreement for treatment. Upon admission Gabby was seen by medical service for medical clearance for inpatient treatment and medical follow up.    Gabby's symptoms slowly improved over the hospital course. Initially after admission she was still feeling depressed, anxious, frustrated, and overwhelmed. With adjustment of medications and therapeutic milieu her symptoms improved. At the end of treatment Gabby was doing well. Her mood was doing well at the time of discharge. She did mention some anxiety related to possible eviction however was goal oriented and optimistic with new services and will be looking into group home programs. At the present moment, patient's acute lethality risk in the community is LOW, long-term/chronic lethality risk is mildly elevated given history of noncompliance with treatment recommendations and medications, limited psychosocial support, and limited understanding of illness. These chronic risk factors cannot be mitigated with further inpatient hospitalization which is not currently warranted. To mitigate future risk, patient should adhere to treatment recommendations set forth above, avoid alcohol/illicit substance use, utilize community-based resources and familiar support, and prioritize mental health treatment.  Patient has no recent history of suicidal gestures/attempts and does not currently have access to weapons/firearms. At the current moment of assessment, patient is future-oriented and demonstrates ability to act in a self-preserving manner. Gabby has maximally benefited from inpatient admission and thus, will be discharged with appropriate, outpatient linkage.  Patient is not at acute risk of harm to self or others. Risk has been mitigated by inpatient stay and treatment.  Gabby verbalized an adequate safety plan to utilize post discharge.  This includes: listening to  music and lastly, patient was encouraged to seek the nearest Emergency Department should suicidal thoughts arise.  On the day of discharge, Gabby appears pleasant, calm and cooperative. Denies any symptoms of depression, casandra/hypomania or psychosis.   Gabby denied suicidal ideation, intent or plan at the time of discharge and denied homicidal ideation, intent or plan at the time of discharge. Gabby was now remorseful about suicide attempt and had more hope for the future. There was no overt psychosis at the time of discharge. Gabby was participating appropriately in milieu at the time of discharge. Behavior was appropriate on the unit at the time of discharge. Sleep and appetite were improved. Gabby was tolerating medications and was not reporting any significant side effects at the time of discharge..    Since Gabby was doing well at the end of the hospitalization, treatment team felt that Gabby could be safely discharged to outpatient care. Gabby expressed their noted improvement and was in agreement with discharge. The outpatient follow up was arranged by the unit  upon discharge.  Time was afforded for questions and all questions were addressed.  Patient was given the number for the Suicide and Crisis Lifeline at 988 as well as their local [facility] Crisis in case they find themselves in a psychiatric emergency in the future.  Gabby is amenable to calling/contacting crisis and/or attending to the nearest emergency department if their clinical condition deteriorates to assure their safety and stability. Progression of hospitalization, current presentation, and discharge plans reviewed with Dr. Ramirez who expressed agreement with such:      Mental Status at Time of Discharge:     Appearance:  age appropriate, casually dressed, dressed appropriately   Behavior:  normal, pleasant, cooperative, calm   Speech:  normal rate, normal volume, normal pitch   Mood:  anxious   Affect:  appropriate   Thought  Process:  goal directed, linear   Associations: intact associations   Thought Content:  no overt delusions   Perceptual Disturbances: no auditory hallucinations, no visual hallucinations, does not appear responding to internal stimuli   Risk Potential: Suicidal ideation - None at present  Homicidal ideation - None at present  Potential for aggression - No   Sensorium:  oriented to person, place, and time/date   Memory:  recent and remote memory grossly intact   Consciousness:  alert and awake   Attention/Concentration: attention span and concentration are age appropriate   Insight:  fair   Judgment: fair   Gait/Station: normal gait/station   Motor Activity: no abnormal movements     Suicide/Homicide Risk Assessment:    Risk of Harm to Self:   The following ratings are based on assessment at the time of discharge and review of the hospital stay progress  Demographic risk factors include:   Historical Risk Factors include: history of anxiety, history of impulsive behaviors  Current Specific Risk Factors include: discharge from the psychiatric unit  Protective Factors: no current suicidal ideation, stable mood, improved mood, ability to make plans for the future, no current suicidal plan or intent, family support established, stable housing, effective coping skills, having a desire to live, resiliency, responsibilities and duties to others, ability to contract for safety with staff, ability to communicate with staff  Weapons/Firearms: none and no firearms. The following steps have been taken to ensure weapons are properly secured: not applicable  Based on today's assessment, Gabby presents the following risk of harm to self: minimal    Risk of Harm to Others:  The following ratings are based on assessment at the time of discharge and review of the hospital stay progress  Demographic Risk Factors include: none.  Historical Risk Factors include: none.  Current Specific Risk Factors include: none  Protective  "Factors: no current homicidal ideation, good impulse control, able to manage anger well, responsibilities and duties to others, connection to community, Holiness beliefs, restricted access to lethal means  Weapons/Firearms: none. The following steps have been taken to ensure weapons are properly secured: not applicable  Based on today's assessment, Gabby presents the following risk of harm to others: none    The following interventions are recommended: outpatient follow up with a psychiatrist, referral for Intensive Case Management services    Admission Diagnosis:    Principal Problem:    Current severe episode of major depressive disorder with psychotic features (HCC)  Active Problems:    Elevated serum creatinine    Anxiety disorder    Medical clearance for psychiatric admission    Drug abuse (HCC)    Agoraphobia    Obesity    Elevated LFTs      Discharge Diagnosis:     Assessment & Plan  Current severe episode of major depressive disorder with psychotic features (HCC)  Continue Remeron 30 mg p.o. at bedtime for depression and anxiety  Increase Seroquel to 150 mg QHS for mood, anxiety & impulsivity  Discharge planning: patient continues to require inpatient hospitalization at present for ongoing medication adjustments   Potential discharge by end of week pending mood stabilization, pending call back from sister for collateral information  Plan to discharge to apartment with referrals for placement options  [facility] & outpatient mental health treatment  Would benefit from repayee in the future  Anxiety disorder  See \"depression\" plan  Medical clearance for psychiatric admission  As per SLIM  Drug abuse (HCC)  Abuses diphenhydramine. Encourage cessation. Stable.  Agoraphobia  Stable  Elevated serum creatinine  Per SLIM  Improved to baseline on 11/23 WNL  Obesity    Elevated LFTs  Stable, on 11/23 WNL      Lab Results: I have personally reviewed all pertinent laboratory/tests results.    Discharge " Medications:    See after visit summary for all reconciled discharge medications provided to patient and family.      Discharge instructions/Information to patient and family:     See after visit summary for information provided to patient and family.      Provisions for Follow-Up Care:    See after visit summary for information related to follow-up care and any pertinent home health orders.      Discharge Statement:    I spent 40 minutes discharging the patient. This time was spent on the day of discharge. I had direct contact with the patient on the day of discharge.     Additional documentation is required if more than 30 minutes were spent on discharge:    I reviewed with Gabby importance of compliance with medications and outpatient treatment after discharge.  I discussed the medication regimen and possible side effects of the medications with Gabby prior to discharge. At the time of discharge she was tolerating psychiatric medications.  I discussed outpatient follow up with Gabby.  I reviewed with Gabby crisis plan and safety plan upon discharge.  Gabby was competent to understand risks and benefits of withholding information and risks and benefits of her actions.    Discharge on Two Antipsychotic Medications : No    Georgia Anna PA-C 11/29/24

## 2024-11-29 NOTE — ASSESSMENT & PLAN NOTE
Continue Remeron 30 mg p.o. at bedtime for depression and anxiety  Increase Seroquel to 150 mg QHS for mood, anxiety & impulsivity  Discharge planning: patient continues to require inpatient hospitalization at present for ongoing medication adjustments   Potential discharge by end of week pending mood stabilization, pending call back from sister for collateral information  Plan to discharge to apartment with referrals for placement options  [facility] for [facility] & outpatient mental health treatment  Would benefit from repayee in the future

## 2024-11-29 NOTE — CASE MANAGEMENT
CM called [facility] ([phone]) to see if they can schedule pt for Virtual  OP Therapy and Medication Management. CM left  asking for a call back.     CM called [facility] ([phone]) and left a  asking for a call back to see if they can schedule pt for a virtual intake for therapy and medication management.       MARKO called [facility] ([phone]) and spoke to Mili to schedule her for a new patient appt.     Pt scheduled for:   Thursday December 5, 2:20 pm MARY JO Garcia  [address] [place] [address] [address] [place] [address]    Pt provided with the above information about her appts and pt will dc with 30 day supply of her medications.

## 2024-11-29 NOTE — PLAN OF CARE
Problem: Alteration in Thoughts and Perception  Goal: Verbalize thoughts and feelings  Description: Interventions:  - Promote a nonjudgmental and trusting relationship with the patient through active listening and therapeutic communication  - Assess patient's level of functioning, behavior and potential for risk  - Engage patient in 1 on 1 interactions  - Encourage patient to express fears, feelings, frustrations, and discuss symptoms    - [place] patient to reality, help patient recognize reality-based thinking   - Administer medications as ordered and assess for potential side effects  - Provide the patient education related to the signs and symptoms of the illness and desired effects of prescribed medications  Outcome: Adequate for Discharge  Goal: Refrain from acting on delusional thinking/internal stimuli  Description: Interventions:  - Monitor patient closely, per order   - Utilize least restrictive measures   - Set reasonable limits, give positive feedback for acceptable   - Administer medications as ordered and monitor of potential side effects  Outcome: Adequate for Discharge  Goal: Agree to be compliant with medication regime, as prescribed and report medication side effects  Description: Interventions:  - Offer appropriate PRN medication and supervise ingestion; conduct AIMS, as needed   Outcome: Adequate for Discharge  Goal: Attend and participate in unit activities, including therapeutic, recreational, and educational groups  Description: Interventions:  -Encourage Visitation and family involvement in care  Outcome: Adequate for Discharge  Goal: Recognize dysfunctional thoughts, communicate reality-based thoughts at the time of discharge  Description: Interventions:  - Provide medication and psycho-education to assist patient in compliance and developing insight into his/her illness   Outcome: Adequate for Discharge  Goal: Complete daily ADLs, including personal hygiene independently, as  able  Description: Interventions:  - Observe, teach, and assist patient with ADLS  - Monitor and promote a balance of rest/activity, with adequate nutrition and elimination   Outcome: Adequate for Discharge     Problem: Ineffective Coping  Goal: Identifies ineffective coping skills  Outcome: Adequate for Discharge  Goal: Identifies healthy coping skills  Outcome: Adequate for Discharge  Goal: Demonstrates healthy coping skills  Outcome: Adequate for Discharge  Goal: Participates in unit activities  Description: Interventions:  - Provide therapeutic environment   - Provide required programming   - Redirect inappropriate behaviors   Outcome: Adequate for Discharge  Goal: Understands least restrictive measures  Description: Interventions:  - Utilize least restrictive behavior  Outcome: Adequate for Discharge  Goal: Free from restraint events  Description: - Utilize least restrictive measures   - Provide behavioral interventions   - Redirect inappropriate behaviors   Outcome: Adequate for Discharge     Problem: Risk for Self Injury/Neglect  Goal: Treatment Goal: Remain safe during length of stay, learn and adopt new coping skills, and be free of self-injurious ideation, impulses and acts at the time of discharge  Outcome: Adequate for Discharge  Goal: Verbalize thoughts and feelings  Description: Interventions:  - Assess and re-assess patient's lethality and potential for self-injury  - Engage patient in 1:1 interactions, daily, for a minimum of 15 minutes  - Encourage patient to express feelings, fears, frustrations, hopes  - Establish rapport/trust with patient   Outcome: Adequate for Discharge  Goal: Refrain from harming self  Description: Interventions:  - Monitor patient closely, per order  - Develop a trusting relationship  - Supervise medication ingestion, monitor effects and side effects   Outcome: Adequate for Discharge

## 2024-11-29 NOTE — NURSING NOTE
Pt denies SI/HI, reports feeling anxious regarding discharge however reports understanding of supports she can reach out to if mood worsens. Pt reports sleeping well overnight. Reviewed scheduled medications with pt, pt denies any questions at this time.

## 2024-11-29 NOTE — NURSING NOTE
AVS reviewed with pt. Belongings packed with staff, all personal belongings accounted for. Medications filled prior to discharge. Pt expresses readiness for discharge. Transport provided by sister.

## 2025-02-21 NOTE — PROGRESS NOTES
"Progress Note - Behavioral Health   Name: Gabby Mares 34 y.o. female I MRN: [id number]   Unit/Bed#: -01 I Date of Admission: 11/13/2024   Date of Service: 11/20/2024 I Hospital Day: 7         Assessment & Plan  Depression  Continue Remeron 30 mg p.o. at bedtime  Anxiety disorder  See \"depression\" plan  Medical clearance for psychiatric admission  As per SLIM  Drug abuse (HCC)  Abuses diphenhydramine. Encourage cessation. Stable.  Agoraphobia  stable  Elevated serum creatinine  Per SLIM  Improved to baseline 1.37  Obesity    Elevated LFTs  stable        Recommended Treatment:     Treatment plan and medication changes discussed and per the attending physician the plan is:     1.Continue with group therapy, milieu therapy and occupational therapy  2.Behavioral Health checks every 15 minutes  3.Continue frequent safety checks and vitals per unit protocol  4.Continue with SLIM medical management as indicated  5.Continue with current medication regimen  6.Will review labs in the a.m.  7.Disposition Planning: Discharge planning and efforts remain ongoing     Planned medication and treatment changes:    All current active medications have been reviewed  Encourage group therapy, milieu therapy and occupational therapy  Behavioral Health checks for safety monitoring  Continue treatment with group therapy, milieu therapy and occupational therapy  Discharge planning  Potential for discharge early next week if mood improved  Continue current medications:    Current medications:  Current Facility-Administered Medications   Medication Dose Route Frequency Provider Last Rate    acetaminophen  650 mg Oral Q6H PRN Ivonne Beltre PA-C      aluminum-magnesium hydroxide-simethicone  30 mL Oral Q4H PRN Margot De La Cruz MD      haloperidol lactate  2.5 mg Intramuscular Q4H PRN Max 4/day Margot De La Cruz MD      And    LORazepam  1 mg Intramuscular Q4H PRN Max 4/day Margot De La Cruz MD      And    benztropine  0.5 mg " Reason for call:   [x] Refill   [] Prior Auth  [] Other:     Office:   [x] [facility] / Carolyne Munguia  [] Specialty/Provider -     Medication: valACYclovir (VALTREX) 500 mg tablet     Dose/Frequency: Take 1 tablet (500 mg total) by mouth 2 (two) times a day for 7 days     Quantity: 14    Pharmacy: [facility]/pharmacy #[id number]  [place], [place] - [address]     Does the patient have enough for 3 days?   [] Yes   [x] No - Send as HP to POD     Intramuscular Q4H PRN Max 4/day Margot De La Cruz MD      haloperidol lactate  5 mg Intramuscular Q4H PRN Max 4/day Margot De La Cruz MD      And    LORazepam  2 mg Intramuscular Q4H PRN Max 4/day Margot De La Cruz MD      And    benztropine  1 mg Intramuscular Q4H PRN Max 4/day Margot De La Cruz MD      benztropine  1 mg Intramuscular Q4H PRN Max 6/day Margot De La Cruz MD      benztropine  1 mg Oral Q4H PRN Max 6/day Margot De La Cruz MD      bisacodyl  10 mg Rectal Daily PRN Margot De La Cruz MD      hydrOXYzine HCL  50 mg Oral Q6H PRN Max 4/day Margot De La Cruz MD      Or    diphenhydrAMINE  50 mg Intramuscular Q6H PRN Margot De La Cruz MD      ergocalciferol  50,000 Units Oral Weekly Puma Newby DO      haloperidol  1 mg Oral Q6H PRN Margot De La Cruz MD      haloperidol  2.5 mg Oral Q4H PRN Max 4/day Margot De La Cruz MD      haloperidol  5 mg Oral Q4H PRN Max 4/day Margot De La Cruz MD      hydrOXYzine HCL  100 mg Oral Q6H PRN Max 4/day Margot De La Cruz MD      Or    LORazepam  2 mg Intramuscular Q6H PRN Margot De La Cruz MD      hydrOXYzine HCL  25 mg Oral Q6H PRN Max 4/day Margot De La Cruz MD      menthol-methyl salicylate   Apply externally 4x Daily PRN Ivonne Beltre PA-C      mirtazapine  30 mg Oral HS ODILON Nunez      polyethylene glycol  17 g Oral Daily PRN Margot De La Cruz MD      propranolol  10 mg Oral Q8H PRN Margot De La Cruz MD      senna-docusate sodium  1 tablet Oral Daily PRN Margot De La Cruz MD      traZODone  50 mg Oral HS PRN Margot De La Cruz MD         Risks / Benefits of Treatment:    Risks, benefits, and possible side effects of medications explained to patient and patient verbalizes understanding and agreement for treatment.    Subjective:    Behavior over the last 24 hours: slowly improving.     Per staff, patient has been pleasant and cooperative.  She was anxious yesterday when she was initially unable to speak with her sister.  She received PRN  "Atarax which was effective.      Gabby was seen in consult room for psychiatric follow up today.  She reports her mood is \"up and down\".  She denies any suicidal or homicidal ideation intent or plan.  She reports however she continues to have anxiety when she thinks about having \"no place to go\".  She has been observed sleeping by staff but continues to report she is not sleeping well, she was encouraged to use PRN medications if needed for sleep.  She reports ongoing negative thoughts related to financial and housing issues.  She is somatically preoccupied at times but redirectable.     Sleep: normal  Appetite: normal  Medication side effects: No   ROS: no complaints    Mental Status Evaluation:    Appearance:  casually dressed, marginal hygiene, poor dentition   Behavior:  pleasant, cooperative, calm   Speech:  normal rate and volume, fluent   Mood:  anxious   Affect:  constricted   Thought Process:  linear, concrete   Associations: concrete associations   Thought Content:  no overt delusions, negative thoughts, ruminating thoughts   Perceptual Disturbances: none   Risk Potential: Suicidal ideation - None at present, contracts for safety on the unit, would talk to staff if not feeling safe on the unit  Homicidal ideation - None  Potential for aggression - No   Sensorium:  oriented to person, place, time/date, and situation   Memory:  recent memory intact   Consciousness:  alert and awake   Attention/Concentration: attention span and concentration are age appropriate   Insight:  limited   Judgment: limited   Gait/Station: normal gait/station   Motor Activity: no abnormal movements     Vital signs in last 24 hours:    Temp:  [97.4 °F (36.3 °C)-97.6 °F (36.4 °C)] 97.4 °F (36.3 °C)  HR:  [71-87] 71  BP: (108-154)/(71-88) 108/71  Resp:  [17-18] 17  SpO2:  [98 %-100 %] 98 %  O2 Device: None (Room air)         Laboratory results: I have personally reviewed all pertinent laboratory/tests results    Results from the past " 24 hours: No results found for this or any previous visit (from the past 24 hours).  Most Recent Labs:   Lab Results   Component Value Date    WBC 7.57 11/14/2024    RBC 4.54 11/14/2024    HGB 12.6 11/14/2024    HCT 40.1 11/14/2024     11/14/2024    RDW 15.8 (H) 11/14/2024    NEUTROABS 4.49 11/14/2024    SODIUM 139 11/16/2024    K 3.9 11/16/2024     11/16/2024    CO2 25 11/16/2024    BUN 21 11/16/2024    CREATININE 1.37 (H) 11/16/2024    GLUC 93 11/16/2024    CALCIUM 8.3 (L) 11/16/2024    AST 29 11/16/2024    ALT 49 11/16/2024    ALKPHOS 73 11/16/2024    TP 7.1 11/16/2024    ALB 3.7 11/16/2024    TBILI 0.49 11/16/2024    CHOLESTEROL 182 11/14/2024    HDL 39 (L) 11/14/2024    TRIG 148 11/14/2024    LDLCALC 113 (H) 11/14/2024    NONHDLC 143 11/14/2024    AMMONIA 32 10/17/2022    [id number] 1.306 11/14/2024    FREET4 0.86 05/11/2018    PREGUR Negative 10/18/2022    PREGSERUM Negative 11/14/2024    HCGQUANT <2 08/07/2018    SYPHILISAB Non-reactive 11/14/2024       Suicide/Homicide Risk Assessment:    Risk of Harm to Self:   Nursing Suicide Risk Assessment Last 24 hours: C-SSRS Risk (Since Last Contact)  Calculated C-SSRS Risk Score (Since Last Contact): No Risk Indicated  Current Specific Risk Factors include: mental illness diagnosis, unstable housing  Protective Factors: no current suicidal ideation, ability to communicate with staff on the unit, able to contract for safety on the unit, taking medications as ordered on the unit  Based on today's assessment, Gabby presents the following risk of harm to self: low    Risk of Harm to Others:  Nursing Homicide Risk Assessment: Violence Risk to Others: Denies within past 6 months  Current Specific Risk Factors include: multiple stressors, social difficulties  Protective Factors: no current homicidal ideation, no current psychotic symptoms, compliant with medications on the unit as ordered, compliant with unit milieu, follows staff redirection  Based on  today's assessment, Gabby presents the following risk of harm to others: low    The following interventions are recommended: Behavioral Health checks for safety monitoring, continued hospitalization on locked unit    Progress Toward Goals: progressing, depression is improving, working on coping skills, discharge planning    Counseling / Coordination of Care:    Total floor / unit time spent today 35 minutes. Greater than 50% of total time was spent with the patient and / or family counseling and / or coordination of care. A description of counseling / coordination of care:    Administrative Statements   I have spent a total time of 40 minutes in caring for this patient on the day of the visit/encounter including Diagnostic results, Prognosis, Counseling / Coordination of care, Documenting in the medical record, Reviewing / ordering tests, medicine, procedures  , Obtaining or reviewing history  , and Communicating with other healthcare professionals .    ODILON Nunez 11/20/24

## 2025-02-26 ENCOUNTER — HOSPITAL ENCOUNTER (INPATIENT)
Facility: HOSPITAL | Age: 35
LOS: 15 days | Discharge: HOME | DRG: 885 | End: 2025-03-13
Attending: PSYCHIATRY & NEUROLOGY | Admitting: PSYCHIATRY & NEUROLOGY
Payer: MEDICARE

## 2025-02-26 ENCOUNTER — HOSPITAL ENCOUNTER (EMERGENCY)
Facility: HOSPITAL | Age: 35
End: 2025-02-26
Attending: EMERGENCY MEDICINE
Payer: MEDICARE

## 2025-02-26 VITALS
HEART RATE: 107 BPM | RESPIRATION RATE: 18 BRPM | DIASTOLIC BLOOD PRESSURE: 73 MMHG | OXYGEN SATURATION: 100 % | SYSTOLIC BLOOD PRESSURE: 124 MMHG | TEMPERATURE: 98.4 F

## 2025-02-26 DIAGNOSIS — Z00.8 MEDICAL CLEARANCE FOR PSYCHIATRIC ADMISSION: ICD-10-CM

## 2025-02-26 DIAGNOSIS — R45.851 SUICIDAL IDEATIONS: Primary | ICD-10-CM

## 2025-02-26 DIAGNOSIS — R45.851 SUICIDAL IDEATIONS: ICD-10-CM

## 2025-02-26 DIAGNOSIS — F31.9 BIPOLAR AFFECTIVE DISORDER, REMISSION STATUS UNSPECIFIED (HCC): Primary | ICD-10-CM

## 2025-02-26 LAB
AMORPH URATE CRY URNS QL MICRO: ABNORMAL
AMPHETAMINES SERPL QL SCN: NEGATIVE
BACTERIA UR QL AUTO: ABNORMAL /HPF
BARBITURATES UR QL: NEGATIVE
BENZODIAZ UR QL: NEGATIVE
BILIRUB UR QL STRIP: NEGATIVE
CLARITY UR: CLEAR
COCAINE UR QL: NEGATIVE
COLOR UR: YELLOW
ETHANOL EXG-MCNC: 0 MG/DL
EXT PREGNANCY TEST URINE: NEGATIVE
EXT. CONTROL: NORMAL
FENTANYL UR QL SCN: NEGATIVE
GLUCOSE UR STRIP-MCNC: NEGATIVE MG/DL
HGB UR QL STRIP.AUTO: NEGATIVE
HYALINE CASTS #/AREA URNS LPF: ABNORMAL /LPF
HYDROCODONE UR QL SCN: NEGATIVE
KETONES UR STRIP-MCNC: ABNORMAL MG/DL
LEUKOCYTE ESTERASE UR QL STRIP: ABNORMAL
METHADONE UR QL: NEGATIVE
NITRITE UR QL STRIP: NEGATIVE
NON-SQ EPI CELLS URNS QL MICRO: ABNORMAL /HPF
OPIATES UR QL SCN: NEGATIVE
OXYCODONE+OXYMORPHONE UR QL SCN: NEGATIVE
PCP UR QL: NEGATIVE
PH UR STRIP.AUTO: 6.5 [PH]
PROT UR STRIP-MCNC: ABNORMAL MG/DL
RBC #/AREA URNS AUTO: ABNORMAL /HPF
SP GR UR STRIP.AUTO: 1.02 (ref 1–1.03)
THC UR QL: NEGATIVE
UROBILINOGEN UR STRIP-ACNC: <2 MG/DL
WBC #/AREA URNS AUTO: ABNORMAL /HPF

## 2025-02-26 PROCEDURE — 99285 EMERGENCY DEPT VISIT HI MDM: CPT

## 2025-02-26 PROCEDURE — 99285 EMERGENCY DEPT VISIT HI MDM: CPT | Performed by: EMERGENCY MEDICINE

## 2025-02-26 PROCEDURE — 80307 DRUG TEST PRSMV CHEM ANLYZR: CPT | Performed by: EMERGENCY MEDICINE

## 2025-02-26 PROCEDURE — 81001 URINALYSIS AUTO W/SCOPE: CPT | Performed by: EMERGENCY MEDICINE

## 2025-02-26 PROCEDURE — 81025 URINE PREGNANCY TEST: CPT | Performed by: EMERGENCY MEDICINE

## 2025-02-26 PROCEDURE — 82075 ASSAY OF BREATH ETHANOL: CPT | Performed by: EMERGENCY MEDICINE

## 2025-02-26 RX ORDER — IBUPROFEN 400 MG/1
400 TABLET, FILM COATED ORAL EVERY 4 HOURS PRN
Status: CANCELLED | OUTPATIENT
Start: 2025-02-26

## 2025-02-26 RX ORDER — IBUPROFEN 400 MG/1
800 TABLET, FILM COATED ORAL EVERY 8 HOURS PRN
Status: CANCELLED | OUTPATIENT
Start: 2025-02-26

## 2025-02-26 RX ORDER — IBUPROFEN 400 MG/1
400 TABLET, FILM COATED ORAL EVERY 4 HOURS PRN
Status: DISCONTINUED | OUTPATIENT
Start: 2025-02-26 | End: 2025-02-27

## 2025-02-26 RX ORDER — BENZTROPINE MESYLATE 1 MG/1
1 TABLET ORAL
Status: CANCELLED | OUTPATIENT
Start: 2025-02-26

## 2025-02-26 RX ORDER — HALOPERIDOL 5 MG/ML
5 INJECTION INTRAMUSCULAR
Status: DISCONTINUED | OUTPATIENT
Start: 2025-02-26 | End: 2025-03-13 | Stop reason: HOSPADM

## 2025-02-26 RX ORDER — POLYETHYLENE GLYCOL 3350 17 G/17G
17 POWDER, FOR SOLUTION ORAL DAILY PRN
Status: DISCONTINUED | OUTPATIENT
Start: 2025-02-26 | End: 2025-02-27

## 2025-02-26 RX ORDER — BISACODYL 10 MG
10 SUPPOSITORY, RECTAL RECTAL DAILY PRN
Status: CANCELLED | OUTPATIENT
Start: 2025-02-26

## 2025-02-26 RX ORDER — HALOPERIDOL 5 MG/ML
2.5 INJECTION INTRAMUSCULAR
Status: CANCELLED | OUTPATIENT
Start: 2025-02-26

## 2025-02-26 RX ORDER — POLYETHYLENE GLYCOL 3350 17 G/17G
17 POWDER, FOR SOLUTION ORAL DAILY PRN
Status: CANCELLED | OUTPATIENT
Start: 2025-02-26

## 2025-02-26 RX ORDER — HALOPERIDOL 2 MG/1
1 TABLET ORAL EVERY 6 HOURS PRN
Status: CANCELLED | OUTPATIENT
Start: 2025-02-26

## 2025-02-26 RX ORDER — TRAZODONE HYDROCHLORIDE 50 MG/1
50 TABLET ORAL
Status: DISCONTINUED | OUTPATIENT
Start: 2025-02-26 | End: 2025-03-13 | Stop reason: HOSPADM

## 2025-02-26 RX ORDER — BENZTROPINE MESYLATE 1 MG/1
1 TABLET ORAL
Status: DISCONTINUED | OUTPATIENT
Start: 2025-02-26 | End: 2025-03-13 | Stop reason: HOSPADM

## 2025-02-26 RX ORDER — HALOPERIDOL 10 MG/1
5 TABLET ORAL
Status: CANCELLED | OUTPATIENT
Start: 2025-02-26

## 2025-02-26 RX ORDER — LORAZEPAM 2 MG/ML
2 INJECTION INTRAMUSCULAR EVERY 6 HOURS PRN
Status: CANCELLED | OUTPATIENT
Start: 2025-02-26

## 2025-02-26 RX ORDER — HYDROXYZINE HYDROCHLORIDE 25 MG/1
25 TABLET, FILM COATED ORAL
Status: DISCONTINUED | OUTPATIENT
Start: 2025-02-26 | End: 2025-03-13 | Stop reason: HOSPADM

## 2025-02-26 RX ORDER — LORAZEPAM 2 MG/ML
1 INJECTION INTRAMUSCULAR
Status: DISCONTINUED | OUTPATIENT
Start: 2025-02-26 | End: 2025-03-13 | Stop reason: HOSPADM

## 2025-02-26 RX ORDER — LORAZEPAM 2 MG/ML
2 INJECTION INTRAMUSCULAR
Status: DISCONTINUED | OUTPATIENT
Start: 2025-02-26 | End: 2025-03-13 | Stop reason: HOSPADM

## 2025-02-26 RX ORDER — MAGNESIUM HYDROXIDE/ALUMINUM HYDROXICE/SIMETHICONE 120; 1200; 1200 MG/30ML; MG/30ML; MG/30ML
30 SUSPENSION ORAL EVERY 4 HOURS PRN
Status: CANCELLED | OUTPATIENT
Start: 2025-02-26

## 2025-02-26 RX ORDER — IBUPROFEN 600 MG/1
600 TABLET, FILM COATED ORAL EVERY 6 HOURS PRN
Status: CANCELLED | OUTPATIENT
Start: 2025-02-26

## 2025-02-26 RX ORDER — MAGNESIUM HYDROXIDE/ALUMINUM HYDROXICE/SIMETHICONE 120; 1200; 1200 MG/30ML; MG/30ML; MG/30ML
30 SUSPENSION ORAL EVERY 4 HOURS PRN
Status: DISCONTINUED | OUTPATIENT
Start: 2025-02-26 | End: 2025-03-13 | Stop reason: HOSPADM

## 2025-02-26 RX ORDER — LORAZEPAM 2 MG/ML
1 INJECTION INTRAMUSCULAR
Status: CANCELLED | OUTPATIENT
Start: 2025-02-26

## 2025-02-26 RX ORDER — HALOPERIDOL 5 MG/ML
5 INJECTION INTRAMUSCULAR
Status: CANCELLED | OUTPATIENT
Start: 2025-02-26

## 2025-02-26 RX ORDER — LORAZEPAM 2 MG/ML
2 INJECTION INTRAMUSCULAR EVERY 6 HOURS PRN
Status: DISCONTINUED | OUTPATIENT
Start: 2025-02-26 | End: 2025-03-13 | Stop reason: HOSPADM

## 2025-02-26 RX ORDER — DIPHENHYDRAMINE HYDROCHLORIDE 50 MG/ML
50 INJECTION INTRAMUSCULAR; INTRAVENOUS EVERY 6 HOURS PRN
Status: DISCONTINUED | OUTPATIENT
Start: 2025-02-26 | End: 2025-03-13 | Stop reason: HOSPADM

## 2025-02-26 RX ORDER — HALOPERIDOL 1 MG/1
1 TABLET ORAL EVERY 6 HOURS PRN
Status: DISCONTINUED | OUTPATIENT
Start: 2025-02-26 | End: 2025-03-13 | Stop reason: HOSPADM

## 2025-02-26 RX ORDER — BENZTROPINE MESYLATE 1 MG/ML
1 INJECTION, SOLUTION INTRAMUSCULAR; INTRAVENOUS
Status: DISCONTINUED | OUTPATIENT
Start: 2025-02-26 | End: 2025-03-13 | Stop reason: HOSPADM

## 2025-02-26 RX ORDER — DIPHENHYDRAMINE HYDROCHLORIDE 50 MG/ML
50 INJECTION INTRAMUSCULAR; INTRAVENOUS EVERY 6 HOURS PRN
Status: CANCELLED | OUTPATIENT
Start: 2025-02-26

## 2025-02-26 RX ORDER — BISACODYL 10 MG
10 SUPPOSITORY, RECTAL RECTAL DAILY PRN
Status: DISCONTINUED | OUTPATIENT
Start: 2025-02-26 | End: 2025-02-27

## 2025-02-26 RX ORDER — IBUPROFEN 400 MG/1
800 TABLET, FILM COATED ORAL EVERY 8 HOURS PRN
Status: DISCONTINUED | OUTPATIENT
Start: 2025-02-26 | End: 2025-02-27

## 2025-02-26 RX ORDER — BENZTROPINE MESYLATE 1 MG/ML
0.5 INJECTION, SOLUTION INTRAMUSCULAR; INTRAVENOUS
Status: CANCELLED | OUTPATIENT
Start: 2025-02-26

## 2025-02-26 RX ORDER — TRAZODONE HYDROCHLORIDE 50 MG/1
50 TABLET ORAL
Status: CANCELLED | OUTPATIENT
Start: 2025-02-26

## 2025-02-26 RX ORDER — LORAZEPAM 2 MG/ML
2 INJECTION INTRAMUSCULAR
Status: CANCELLED | OUTPATIENT
Start: 2025-02-26

## 2025-02-26 RX ORDER — AMOXICILLIN 250 MG
1 CAPSULE ORAL DAILY PRN
Status: DISCONTINUED | OUTPATIENT
Start: 2025-02-26 | End: 2025-03-13 | Stop reason: HOSPADM

## 2025-02-26 RX ORDER — BENZTROPINE MESYLATE 1 MG/ML
1 INJECTION, SOLUTION INTRAMUSCULAR; INTRAVENOUS
Status: CANCELLED | OUTPATIENT
Start: 2025-02-26

## 2025-02-26 RX ORDER — HYDROXYZINE HYDROCHLORIDE 25 MG/1
50 TABLET, FILM COATED ORAL
Status: CANCELLED | OUTPATIENT
Start: 2025-02-26

## 2025-02-26 RX ORDER — IBUPROFEN 600 MG/1
600 TABLET, FILM COATED ORAL EVERY 6 HOURS PRN
Status: DISCONTINUED | OUTPATIENT
Start: 2025-02-26 | End: 2025-02-27

## 2025-02-26 RX ORDER — HYDROXYZINE HYDROCHLORIDE 50 MG/1
100 TABLET, FILM COATED ORAL
Status: DISCONTINUED | OUTPATIENT
Start: 2025-02-26 | End: 2025-03-13 | Stop reason: HOSPADM

## 2025-02-26 RX ORDER — HYDROXYZINE HYDROCHLORIDE 50 MG/1
50 TABLET, FILM COATED ORAL
Status: DISCONTINUED | OUTPATIENT
Start: 2025-02-26 | End: 2025-03-13 | Stop reason: HOSPADM

## 2025-02-26 RX ORDER — HALOPERIDOL 5 MG/ML
2.5 INJECTION INTRAMUSCULAR
Status: DISCONTINUED | OUTPATIENT
Start: 2025-02-26 | End: 2025-03-13 | Stop reason: HOSPADM

## 2025-02-26 RX ORDER — PROPRANOLOL HYDROCHLORIDE 10 MG/1
10 TABLET ORAL EVERY 8 HOURS PRN
Status: DISCONTINUED | OUTPATIENT
Start: 2025-02-26 | End: 2025-03-13 | Stop reason: HOSPADM

## 2025-02-26 RX ORDER — AMOXICILLIN 250 MG
1 CAPSULE ORAL DAILY PRN
Status: CANCELLED | OUTPATIENT
Start: 2025-02-26

## 2025-02-26 RX ORDER — BENZTROPINE MESYLATE 1 MG/ML
0.5 INJECTION, SOLUTION INTRAMUSCULAR; INTRAVENOUS
Status: DISCONTINUED | OUTPATIENT
Start: 2025-02-26 | End: 2025-03-13 | Stop reason: HOSPADM

## 2025-02-26 RX ORDER — HYDROXYZINE HYDROCHLORIDE 25 MG/1
25 TABLET, FILM COATED ORAL
Status: CANCELLED | OUTPATIENT
Start: 2025-02-26

## 2025-02-26 RX ORDER — HYDROXYZINE HYDROCHLORIDE 25 MG/1
100 TABLET, FILM COATED ORAL
Status: CANCELLED | OUTPATIENT
Start: 2025-02-26

## 2025-02-26 RX ORDER — PROPRANOLOL HCL 20 MG
10 TABLET ORAL EVERY 8 HOURS PRN
Status: CANCELLED | OUTPATIENT
Start: 2025-02-26

## 2025-02-26 RX ORDER — HALOPERIDOL 5 MG/1
5 TABLET ORAL
Status: DISCONTINUED | OUTPATIENT
Start: 2025-02-26 | End: 2025-03-13 | Stop reason: HOSPADM

## 2025-02-26 NOTE — ED NOTES
34 y.o female patient presented to the ED via EMS with Self harming behaviors and Suicidal Ideations.  This writer introduced himself as Crisis and performed the assessment.  Pt stated she has not been taking her medications for a quite awhile. Pt has not been able to afford to pay for her medications.  Pt reported having increase suicidal ideations but did not disclose any plan.  Pt appeared anxious and depressed. Pt described her  stressors as financial instability, not being employed and being evicted from her place of residence with a fear of being homeless.  Pt reported having command auditory hallucinations of a male and female voices telling her to kill herself.  Pt reported visual hallucinations of seeing shadows and when she sees words they become mixed up and move around.  Pt stated she self harms by cutting her arms due to stress. Pt has a previous history of SI, SIB and AVH.  Pt denies any HI. Pt has no access to firearms.  Pt has previous IP MH tx.  Pt has outpatient Psychiatry. Pt has no outpatient therapy. Pt denies any legal and substance abuse issues. Pt is willing to sign a 201 for IP MH tx.   Provider is in agreement with this treatment plan.

## 2025-02-26 NOTE — Clinical Note
Gabby A Slingland should be transferred out to [facility] and has been medically cleared.

## 2025-02-26 NOTE — ED PROVIDER NOTES
Time reflects when diagnosis was documented in both MDM as applicable and the Disposition within this note       Time User Action Codes Description Comment    2/26/2025  5:40 PM SantosedmarParamjitdavid Add [R45.851] Suicidal ideations           ED Disposition       ED Disposition   Transfer to Behavioral [facility]   --    Date/Time   Wed Feb 26, 2025 10:08 PM    Comment   Gabby Mares should be transferred out to get her behavioral health unit under Dr. De La Cruz and has been medically cleared.               Assessment & Plan       Medical Decision Making  Obtain medical clearance workup  Consult on patient    Patient is currently medically cleared for inpatient psychiatric admission and evaluation.  Patient was seen by our crisis worker.  Currently patient is requesting voluntary psychiatric admission.  201 form completed.  Patient is currently awaiting inpatient bed search and placement.  Patient accepted to [facility] behavioral health unit under Dr. De La Cruz.  EMTALA completed.  Patient stable at time of transport.    Amount and/or Complexity of Data Reviewed  Labs: ordered. Decision-making details documented in ED Course.    Risk  Decision regarding hospitalization.        ED Course as of 02/26/25 2213   Wed Feb 26, 2025   1803 Patient is currently medically cleared for inpatient psychiatric admission and evaluation.  Patient was seen by our crisis worker.  Currently patient is requesting voluntary psychiatric admission.  201 form completed.  Patient is currently awaiting inpatient bed search and placement.   2208 Patient accepted to [facility] behavioral health unit under Dr. De La Cruz.  EMTALA completed.  Patient stable at time of transport.       Medications - No data to display    ED Risk Strat Scores                            SBIRT 22yo+      Flowsheet Row Most Recent Value   Initial Alcohol Screen: US AUDIT-C     1. How often do you have a drink containing alcohol? 0 Filed at: 02/26/2025 4589   2.  "How many drinks containing alcohol do you have on a typical day you are drinking?  0 Filed at: 02/26/2025 1735   3a. Male UNDER 65: How often do you have five or more drinks on one occasion? 0 Filed at: 02/26/2025 1735   3b. FEMALE Any Age, or MALE 65+: How often do you have 4 or more drinks on one occassion? 0 Filed at: 02/26/2025 1735   Audit-C Score 0 Filed at: 02/26/2025 1735   FRIDA: How many times in the past year have you...    Used an illegal drug or used a prescription medication for non-medical reasons? Never Filed at: 02/26/2025 1735                            History of Present Illness       Chief Complaint   Patient presents with    Psychiatric Evaluation     Pt states \"I've been cutting myself and I don't know how to get help.\" Pt reports hx of psych disorders, she is suppose to take medication but unable to afford medications at this time. Per EMS pt unable to afford medications or food       Past Medical History:   Diagnosis Date    Acne     Agoraphobia     Anxiety     Depression     Drug abuse (HCC) 11/14/2024    Diphenhydramine abuse    Eating disorder     Heart disease     Hypertension     Impulse control disorder     Obesity     Panic attack     Panic disorder     Psychiatric disorder     depression, bipolar, schizophrenia    Psychiatric illness     Psychosis (HCC)     Schizophrenia (HCC)     Schizophrenia (HCC)     Schizophrenia (HCC)     Seizures (HCC)     Self-injurious behavior     Sleep difficulties     Suicide attempt (HCC)       Past Surgical History:   Procedure Laterality Date    CYSTOSCOPY      INCISION AND DRAINAGE OF WOUND N/A 2/1/2019    Procedure: INCISION AND DRAINAGE (I&D) TRUNK;  Surgeon: Mathew Fatima DO;  Location:  MAIN OR;  Service: General    WISDOM TOOTH EXTRACTION        Family History   Problem Relation Age of Onset    No Known Problems Mother     Hypertension Father     Mental illness Father     Cancer Father     Glaucoma Paternal Grandfather       Social History "     Tobacco Use    Smoking status: Never     Passive exposure: Never    Smokeless tobacco: Never   Vaping Use    Vaping status: Never Used   Substance Use Topics    Alcohol use: No    Drug use: No      E-Cigarette/Vaping    E-Cigarette Use Never User       E-Cigarette/Vaping Substances    Nicotine No     THC No     CBD No     Flavoring No     Other No     Unknown No       I have reviewed and agree with the history as documented.     34-year-old female with past history of hypertension, obesity, psychosis, schizophrenia, depression, bipolar disorder, anxiety, panic disorder, sleep difficulty, suicide attempt, drug abuse, presents to the ED for psychiatric evaluation.  Patient states that she has had suicidal thoughts intermittently over the past several weeks.  Patient states that she has not been on any of her psychiatric medications because she cannot afford them.  Patient has history of self cutting behavior.  Patient states that she cut herself few weeks ago.  Patient is requesting inpatient psychiatric admission for further help and evaluation.      Psychiatric Evaluation  Presenting symptoms: suicidal thoughts    Associated symptoms: no abdominal pain and no chest pain        Review of Systems   Constitutional:  Negative for chills and fever.   HENT:  Negative for ear pain and sore throat.    Eyes:  Negative for pain and visual disturbance.   Respiratory:  Negative for cough and shortness of breath.    Cardiovascular:  Negative for chest pain and palpitations.   Gastrointestinal:  Negative for abdominal pain and vomiting.   Genitourinary:  Negative for dysuria and hematuria.   Musculoskeletal:  Negative for arthralgias and back pain.   Skin:  Negative for color change and rash.   Neurological:  Negative for seizures and syncope.   Psychiatric/Behavioral:  Positive for suicidal ideas.    All other systems reviewed and are negative.          Objective       ED Triage Vitals [02/26/25 1712]   Temperature Pulse  Blood Pressure Respirations SpO2 Patient Position - Orthostatic VS   98.4 °F (36.9 °C) (!) 107 124/73 18 100 % Sitting      Temp Source Heart Rate Source BP Location FiO2 (%) Pain Score    Temporal Monitor Left arm -- --      Vitals      Date and Time Temp Pulse SpO2 Resp BP Pain Score FACES Pain Rating User   02/26/25 1712 98.4 °F (36.9 °C) 107 100 % 18 124/73 -- -- CM            Physical Exam  Vitals and nursing note reviewed.   Constitutional:       General: She is not in acute distress.     Appearance: She is well-developed.   HENT:      Head: Normocephalic and atraumatic.   Eyes:      Conjunctiva/sclera: Conjunctivae normal.   Cardiovascular:      Rate and Rhythm: Normal rate and regular rhythm.      Heart sounds: No murmur heard.  Pulmonary:      Effort: Pulmonary effort is normal. No respiratory distress.      Breath sounds: Normal breath sounds.   Abdominal:      Palpations: Abdomen is soft.      Tenderness: There is no abdominal tenderness.   Musculoskeletal:         General: No swelling.      Cervical back: Neck supple.   Skin:     General: Skin is warm and dry.      Capillary Refill: Capillary refill takes less than 2 seconds.      Comments: No wounds noted.   Neurological:      Mental Status: She is alert.   Psychiatric:      Comments: Suicidal during interview         Results Reviewed       Procedure Component Value Units Date/Time    Urine Microscopic [[id number]]  (Abnormal) Collected: 02/26/25 2111    Lab Status: Final result Specimen: Urine, Clean Catch Updated: 02/26/25 2147     RBC, UA None Seen /hpf      WBC, UA 2-4 /hpf      Epithelial Cells Occasional /hpf      Bacteria, UA Occasional /hpf      Hyaline Casts, UA 0-1 /lpf      Amorphous Crystals, UA Occasional    Rapid drug screen, urine [[id number]]  (Normal) Collected: 02/26/25 2111    Lab Status: Final result Specimen: Urine, Clean Catch Updated: 02/26/25 2130     Amph/Meth UR Negative     Barbiturate Ur Negative     Benzodiazepine Urine  Negative     Cocaine Urine Negative     Methadone Urine Negative     Opiate Urine Negative     PCP Ur Negative     THC Urine Negative     Oxycodone Urine Negative     Fentanyl Urine Negative     HYDROCODONE URINE Negative    Narrative:      FOR MEDICAL PURPOSES ONLY.   IF CONFIRMATION NEEDED PLEASE CONTACT THE LAB WITHIN 5 DAYS.    Drug Screen Cutoff Levels:  AMPHETAMINE/METHAMPHETAMINES  1000 ng/mL  BARBITURATES     200 ng/mL  BENZODIAZEPINES     200 ng/mL  COCAINE      300 ng/mL  METHADONE      300 ng/mL  OPIATES      300 ng/mL  PHENCYCLIDINE     25 ng/mL  THC       50 ng/mL  OXYCODONE      100 ng/mL  FENTANYL      5 ng/mL  HYDROCODONE     300 ng/mL    UA w Reflex to Microscopic w Reflex to Culture [[id number]]  (Abnormal) Collected: 02/26/25 2111    Lab Status: Final result Specimen: Urine, Clean Catch Updated: 02/26/25 2123     Color, UA Yellow     Clarity, UA Clear     Specific Gravity, UA 1.020     pH, UA 6.5     Leukocytes, UA Small     Nitrite, UA Negative     Protein,  (2+) mg/dl      Glucose, UA Negative mg/dl      Ketones, UA Trace mg/dl      Urobilinogen, UA <2.0 mg/dl      Bilirubin, UA Negative     Occult Blood, UA Negative    POCT pregnancy, urine [[id number]]  (Normal) Collected: 02/26/25 2040    Lab Status: Final result Updated: 02/26/25 2040     EXT Preg Test, Ur Negative     Control Valid    POCT alcohol breath test [[id number]]  (Normal) Resulted: 02/26/25 1740    Lab Status: Final result Updated: 02/26/25 1741     EXTBreath Alcohol 0.000            No orders to display       Procedures    ED Medication and Procedure Management   Prior to Admission Medications   Prescriptions Last Dose Informant Patient Reported? Taking?   QUEtiapine (SEROquel) 100 mg tablet   No No   Sig: Take 1 tablet (100 mg total) by mouth daily at bedtime   QUEtiapine (SEROquel) 50 mg tablet   No No   Sig: Take 1 tablet (50 mg total) by mouth daily at bedtime Take with nighttime Seroquel   ergocalciferol (VITAMIN D2)  50,000 units   No No   Sig: Take 1 capsule (50,000 Units total) by mouth once a week for 4 doses   hydrOXYzine HCL (ATARAX) 50 mg tablet   No No   Sig: Take 1 tablet (50 mg total) by mouth 2 (two) times a day as needed for anxiety   mirtazapine (REMERON) 30 mg tablet   No No   Sig: Take 1 tablet (30 mg total) by mouth daily at bedtime      Facility-Administered Medications: None     Patient's Medications   Discharge Prescriptions    No medications on file     No discharge procedures on file.  ED SEPSIS DOCUMENTATION   Time reflects when diagnosis was documented in both MDM as applicable and the Disposition within this note       Time User Action Codes Description Comment    2/26/2025  5:40 PM Facundo Fernandez Add [R45.851] Suicidal ideations                  Facundo Fernandez DO  02/26/25 5537

## 2025-02-27 PROBLEM — F25.9 SCHIZOAFFECTIVE DISORDER (HCC): Status: ACTIVE | Noted: 2025-02-27

## 2025-02-27 PROCEDURE — 99222 1ST HOSP IP/OBS MODERATE 55: CPT | Performed by: NURSE PRACTITIONER

## 2025-02-27 PROCEDURE — 99223 1ST HOSP IP/OBS HIGH 75: CPT | Performed by: PSYCHIATRY & NEUROLOGY

## 2025-02-27 RX ORDER — ACETAMINOPHEN 325 MG/1
975 TABLET ORAL EVERY 6 HOURS PRN
Status: DISCONTINUED | OUTPATIENT
Start: 2025-02-27 | End: 2025-03-13 | Stop reason: HOSPADM

## 2025-02-27 RX ORDER — BISACODYL 10 MG
10 SUPPOSITORY, RECTAL RECTAL DAILY PRN
Status: DISCONTINUED | OUTPATIENT
Start: 2025-02-27 | End: 2025-03-13 | Stop reason: HOSPADM

## 2025-02-27 RX ORDER — POLYETHYLENE GLYCOL 3350 17 G/17G
17 POWDER, FOR SOLUTION ORAL DAILY PRN
Status: DISCONTINUED | OUTPATIENT
Start: 2025-02-27 | End: 2025-03-13 | Stop reason: HOSPADM

## 2025-02-27 RX ORDER — RISPERIDONE 1 MG/1
1 TABLET ORAL 2 TIMES DAILY
Status: DISCONTINUED | OUTPATIENT
Start: 2025-02-27 | End: 2025-02-28

## 2025-02-27 RX ORDER — TOPIRAMATE 50 MG/1
50 TABLET, FILM COATED ORAL 2 TIMES DAILY
COMMUNITY
End: 2025-03-13

## 2025-02-27 RX ORDER — ESCITALOPRAM OXALATE 5 MG/1
5 TABLET ORAL DAILY
Status: DISCONTINUED | OUTPATIENT
Start: 2025-02-28 | End: 2025-02-27

## 2025-02-27 RX ORDER — ACETAMINOPHEN 325 MG/1
650 TABLET ORAL EVERY 4 HOURS PRN
Status: DISCONTINUED | OUTPATIENT
Start: 2025-02-27 | End: 2025-03-13 | Stop reason: HOSPADM

## 2025-02-27 RX ORDER — ESCITALOPRAM OXALATE 10 MG/1
10 TABLET ORAL DAILY
Status: DISCONTINUED | OUTPATIENT
Start: 2025-02-27 | End: 2025-02-27

## 2025-02-27 RX ORDER — RISPERIDONE 1 MG/1
1 TABLET ORAL 2 TIMES DAILY
Status: DISCONTINUED | OUTPATIENT
Start: 2025-02-27 | End: 2025-02-27

## 2025-02-27 RX ORDER — ESCITALOPRAM OXALATE 10 MG/1
10 TABLET ORAL DAILY
Status: DISCONTINUED | OUTPATIENT
Start: 2025-02-28 | End: 2025-03-05

## 2025-02-27 RX ORDER — ACETAMINOPHEN 325 MG/1
650 TABLET ORAL EVERY 6 HOURS PRN
Status: DISCONTINUED | OUTPATIENT
Start: 2025-02-27 | End: 2025-03-13 | Stop reason: HOSPADM

## 2025-02-27 RX ADMIN — RISPERIDONE 1 MG: 1 TABLET, FILM COATED ORAL at 22:00

## 2025-02-27 RX ADMIN — BENZTROPINE MESYLATE 1 MG: 1 INJECTION INTRAMUSCULAR; INTRAVENOUS at 08:55

## 2025-02-27 RX ADMIN — LORAZEPAM 2 MG: 2 INJECTION INTRAMUSCULAR; INTRAVENOUS at 08:55

## 2025-02-27 RX ADMIN — Medication 3 MG: at 22:00

## 2025-02-27 RX ADMIN — HALOPERIDOL LACTATE 5 MG: 5 INJECTION, SOLUTION INTRAMUSCULAR at 08:55

## 2025-02-27 NOTE — PROGRESS NOTES
02/27/25 0857   Team Meeting   Meeting Type Daily Rounds   Team Members Present   Team Members Present Physician;Nurse;   Physician Team Member Arya   Nursing Team Member Sunny   Care Management Team Member Sharon   Patient/Family Present   Patient Present No   Patient's Family Present No     Pt is a 34 year old female coming from [facility] ED on a 201 with a readmit score of 22. Patient presented to the ED via EMS with Self harming behaviors and Suicidal Ideations. Pt has not been taking her medications due to reporting she cannot afford them. Pt has Hx of U admissions. Pt reported financial stressors. Pt reported AVH upon admission. Pt is presenting disorganized, paranoid. Pt attempted to get off the unit and pushed on the doors. Pt is going to be on a 1:1 due to behaviors.

## 2025-02-27 NOTE — NURSING NOTE
"Patient is a 34 year old, female. 201. Patient transferred from [facility]. As per ED, \"Patient presented to the ED via EMS with Self harming behaviors and Suicidal Ideations. Pt stated she has not been taking her medications for a quite awhile. Pt has not been able to afford to pay for her medications. Pt reported having increase suicidal ideations but did not disclose any plan. Pt appeared anxious and depressed. Pt described her stressors as financial instability, not being employed and being evicted from her place of residence with a fear of being homeless. Pt reported having command auditory hallucinations of a male and female voices telling her to kill herself. Pt reported visual hallucinations of seeing shadows and when she sees words they become mixed up and move around. Pt stated she self harms by cutting her arms due to stress.\" Patient has a hx of inpatient psych. UDS negative. Patient reported hx of sz.    Upon admission, patient pressured, disorganized, and paranoid. Patient compliant and cooperative with admission process. Patient denies SI/HI/AH/VH, reported depression and anxiety. Patient verbalized feeling safe on unit. Patient compliant with unit.   "

## 2025-02-27 NOTE — SOCIAL WORK
"Admission Status    Status of admission 201   [facility]          Patient Intake   Address to discharge to [address] [address]    Living Arrangement lives alone in an apartment provided by sister    Can patient return home yes   Patient's Telephone Number [phone]    Patient's e-mail Address [email or web address]        Insurance MEDICARE A AND B    PCP Belgica Garcia PA-C   [address]   [address] [place] [id number]   Phone: tel:[phone]   fax:[phone]     [facility]    Education high school graduate    Type of work Pt has Hx of working at [employer] History Pt denied    Access to Firearms Pt denied    Marital Status/Children Single/ none   Spirituality/Yazidi Pt denied    Transportation family   Preferred Pharmacy [facility] - AINSLEY Mercado - [address] [facility].       [id number] [place], [place] - [address] [facility]             Patient History   Presenting Problem Increase suicidal ideations but did not disclose any plan.     Pt reported having command auditory hallucinations of a male and female voices telling her to kill herself. Pt reported visual hallucinations of seeing shadows    Stressor/Trigger Financial instability, not being employed and being evicted from her place of residence with a fear of being homeless.    Treatment History Multiple past inpatient psychiatric admissions    Current psychiatrist/therapist Pt denied    ACT/ICM Pt denied    Family History of Mental Health  Pt reported \"there's lots of trauma in my family\" and does not elaborate further when asked about psychiatric or substance use history in family.    Suicide Attempts At least 1 reported in 2019 via stabbing    Legal Issues Pt denied    Trauma/Psychosocial loss Unable to assess due to sleeping from IM PRN.                 Substance Abuse Assessment   UDS:(-) for Everything   Audit Score: 0    Nicotine/Tobacco: " Pt denied tobacco or nicotine use.    Substance First use Last Use and amount Frequency Amount Used How long Longest period of sobriety and when Method of use   THC                   Heroin                   Cocaine                   ETOH              Meth                   Benzos                   Other:                    History of RYDER  Unable to assess due to sleeping from IM PRN.    Family History of RYDER Unable to assess due to sleeping from IM PRN.    Prior Inpatient RYDER Treatment Unable to assess due to sleeping from IM PRN.    Current Outpatient treatment Unable to assess due to sleeping from IM PRN.    Response to Referral Unable to assess due to sleeping from IM PRN.           Referrals/ROIs   Referrals Needed OP Psych and therapy    ROIs Signed  Unable to obtain due to Pt sleeping from IM PRN.

## 2025-02-27 NOTE — H&P
H&P - Behavioral Health   Name: Gbaby Mares 34 y.o. female I MRN: [id number]  Unit/Bed#: U 341-02 I Date of Admission: 2/26/2025   Date of Service: 2/27/2025 I Hospital Day: 1     Assessment & Plan  Schizoaffective disorder (HCC)  Patient presenting with a longstanding history of both mood and psychotic symptoms. Current presentation appears to be psychotic in nature in the absence of a mood component.    Plan:  Initiate Risperdal 1 mg BID  Initiate Lexapro 10 mg qAM  Anxiety disorder    Medical clearance for psychiatric admission      Current medications:  Current Facility-Administered Medications   Medication Dose Route Frequency Provider Last Rate    acetaminophen  650 mg Oral Q6H PRN Ronan Stewart MD      acetaminophen  650 mg Oral Q4H PRN Ronan Stewart MD      acetaminophen  975 mg Oral Q6H PRN Ronan Stewart MD      aluminum-magnesium hydroxide-simethicone  30 mL Oral Q4H PRN Margot De La Cruz MD      haloperidol lactate  2.5 mg Intramuscular Q4H PRN Max 4/day Margot De La Cruz MD      And    LORazepam  1 mg Intramuscular Q4H PRN Max 4/day Margot De La Cruz MD      And    benztropine  0.5 mg Intramuscular Q4H PRN Max 4/day Margot De La Cruz MD      haloperidol lactate  5 mg Intramuscular Q4H PRN Max 4/day Margot De La Cruz MD      And    LORazepam  2 mg Intramuscular Q4H PRN Max 4/day Margot De La Cruz MD      And    benztropine  1 mg Intramuscular Q4H PRN Max 4/day Margot De La Cruz MD      benztropine  1 mg Intramuscular Q4H PRN Max 6/day Margot De La Cruz MD      benztropine  1 mg Oral Q4H PRN Max 6/day Margot De La Cruz MD      bisacodyl  10 mg Rectal Daily PRN Margot De La Cruz MD      hydrOXYzine HCL  50 mg Oral Q6H PRN Max 4/day Margot De La Cruz MD      Or    diphenhydrAMINE  50 mg Intramuscular Q6H PRN Margot De La Cruz MD      haloperidol  1 mg Oral Q6H PRN Margot De La Cruz MD      haloperidol  2.5 mg Oral Q4H PRN Max 4/day Margot De La Cruz MD      haloperidol  5 mg Oral Q4H PRN Max  "4/day Margot De La Cruz MD      hydrOXYzine HCL  100 mg Oral Q6H PRN Max 4/day Margot De La Cruz MD      Or    LORazepam  2 mg Intramuscular Q6H PRN Margot De La Cruz MD      hydrOXYzine HCL  25 mg Oral Q6H PRN Max 4/day Margot De La Cruz MD      melatonin  3 mg Oral HS Margot De La Cruz MD      polyethylene glycol  17 g Oral Daily PRN Margot De La Cruz MD      propranolol  10 mg Oral Q8H PRN Margot De La Cruz MD      senna-docusate sodium  1 tablet Oral Daily PRN Margot De La Cruz MD      traZODone  50 mg Oral HS PRN Margot De La Cruz MD          Risks/Benefits of Treatment:     Risks, benefits, and possible side effects of medications explained to patient and patient verbalizes understanding and agreement for treatment.    Treatment Planning:     All current active medications have been reviewed.  Continue to monitor response to treatment and assess for potential side effects of medications.  Encourage group therapy, milieu therapy and occupational therapy  Collaboration with medical service for medical comorbidities as indicated.  Behavioral Health checks for safety monitoring.  Estimated Discharge Day: 3/6/2025 7 days (3/6/2025)  Legal Status : Voluntary 201 commitment.    Psychiatric Evaluation    Chief Complaint: \"glitches in the clock\"    History of Present Illness     Per ED crisis worker Frederick Alex on 2/26/25:    34 y.o female patient presented to the ED via EMS with Self harming behaviors and Suicidal Ideations. This writer introduced himself as Crisis and performed the assessment. Pt stated she has not been taking her medications for a quite awhile. Pt has not been able to afford to pay for her medications. Pt reported having increase suicidal ideations but did not disclose any plan. Pt appeared anxious and depressed. Pt described her stressors as financial instability, not being employed and being evicted from her place of residence with a fear of being homeless. Pt reported having command auditory " "hallucinations of a male and female voices telling her to kill herself. Pt reported visual hallucinations of seeing shadows and when she sees words they become mixed up and move around. Pt stated she self harms by cutting her arms due to stress. Pt has a previous history of SI, SIB and AVH. Pt denies any HI. Pt has no access to firearms. Pt has previous IP MH tx. Pt has outpatient Psychiatry. Pt has no outpatient therapy. Pt denies any legal and substance abuse issues. Pt is willing to sign a 201 for IP MH tx. Provider is in agreement with this treatment plan.     On initial evaluation, Gabby appears to be suffering from psychosis and paranoia. She begins interview by stating she is here due to \"glitches in the clock\". Her thought process is disorganized throughout the interview, but she is able to describe that when she woke up on the day of presentation to the ED, the numbers on her clock were not making sense. She called her sister, who is her main support system. The details of this phone call were difficult to follow, and the patient described 'glitches with the phones' complicating the phone call, however the outcome of the call was that police were sent to her apartment. She willingly was taken by police to the [place] ED. She exhibits inappropriate responses to certain questions, stating things like 'Caesar' or 'they're making people eat stuff'. She states that when her blood was drawn a 'dirty needle' was used.    She has a history of psychiatric illness, but she is unable to identify what her diagnosis is. She is familiar with medications such as Risperdal, Haldol, and Zyprexa. She has a history of multiple inpatient psychiatric hospitalizations, the most recent of which was in late November 2024.  She was apparently discharged on Seroquel, Remeron and Atarax at that time. However, she has been unable to take these medications as she has had recent difficulties securing a job and the medications have " been too expensive. She was also in the process of an eviction, and these social stressors have likely contributed to her decompensation. She has a history of a self-reported suicide attempt in 2019 by stabbing during which she felt severely depressed.    On psychiatric review of systems, she denies suicidal ideation, intent, or plan. She denies homicidal ideation, intent, or plan. She does not appear to be in a depressive episode: she is not suicidal, she is future oriented, and she is hopeful for the future. She does endorse increased need for sleep. She has a good appetite. She is unable to respond to questions regarding memory, concentration, interest, or energy. She does not describe a history of a manic episode. She does endorse symptoms of PTSD and 'trauma' including flashbacks, nightmares, avoidance, and hypervigilance.      She reports being 'addicted to benzos' in the past. She was on Xanax in 2019 and this was discontinued. She denies street drug use and uses alcohol rarely.    Psychiatric Review Of Systems:    Pertinent items are noted in HPI; all others negative    Historical Information     Past Psychiatric History:     Past Inpatient Psychiatric Treatment:   Multiple past inpatient psychiatric admissions   Past Outpatient Psychiatric Treatment:    Past outpatient psychiatric treatment    Past Suicide Attempts: At least 1 reported in 2019 via stabbing  Past Violent Behavior: none reported  Past Psychiatric Medication Trials: buspar, Ativan, Xanax, Wellbutrin, Abilify, Topamax, Doxepin, Prozac, Vimpat, Trazodone, Zyprexa,     Substance Abuse History:    Social History       Tobacco History       Smoking Status  Never      Passive Exposure  Never      Smokeless Tobacco Use  Never              Alcohol History       Alcohol Use Status  No              Drug Use       Drug Use Status  No              Sexual Activity       Sexually Active  Never              Other Factors    Not Asked              "    Additional Substance Use Detail       Questions Responses    Problems Due to Past Use of Alcohol? No    Problems Due to Past Use of Substances? No    Substance Use Assessment Denies substance use within the past 12 months    Alcohol Use Frequency Denies use in past 12 months    Cannabis frequency Never used    Comment: Never used on 1/2/2019     Heroin Frequency Denies use in past 12 months    Cocaine frequency Never used    Comment: Never used on 1/2/2019     Crack Cocaine Frequency Denies use in past 12 months    Methamphetamine Frequency Denies use in past 12 months    Narcotic Frequency Denies use in past 12 months    Benzodiazepine Frequency Denies use in past 12 months    Amphetamine frequency Prior dependence    Benzodiazepine Method Pill    Amphetamine method Pill    Comment: Pill on 1/2/2019     Benzodiazepine 1st Use Ativan and Ambien    Amphetamine 1st Use diet pills from PCP    Benzodiazepine Last Use & Amount unknown    Barbituate Frequency Denies use use in past 12 months    Inhalant frequency Never used    Comment: Never used on 1/2/2019     Hallucinogen frequency Never used    Comment: Never used on 1/2/2019     Ecstasy frequency Never used    Comment: Never used on 1/2/2019     Other drug frequency Never used    Comment: Never used on 1/2/2019     Opiate frequency Denies use in past 12 months    Last reviewed by Carey Pandey RN on 2/26/2025          I have assessed this patient for substance use within the past 12 months    Alcohol use: rare  Recreational drug use:   Cocaine: denies use  Heroin: denies use  Cannabis: denies use  Other drugs:   denies use  Longest clean time: not applicable  History of Inpatient/Outpatient rehabilitation program: not willing to discuss  Smoking history: denies use    Family Psychiatric History:     Patient states \"there's lots of trauma in my family\" and does not elaborate further when asked about psychiatric or substance use history in family    Social " "History:    Education: high school graduate  Learning Disabilities: unable to obtain  Marital History: single  Children: none  Living Arrangement: lives alone in an apartment provided by sister  Occupational History: [employer]  Functioning Relationships: Sister. Did not talk about other family  Legal History: unknown   History: None  Access to firearms: no firearms    Traumatic History:     Abuse:\"I have trauma\" does not elaborate  Other Traumatic Events: none    Past Medical History:    History of Seizures: yes  History of Head injury with loss of consciousness: no    Past Medical History:   Diagnosis Date    Acne     Agoraphobia     Anxiety     Depression     Drug abuse ([facility]) 11/14/2024    Diphenhydramine abuse    Eating disorder     Heart disease     Hypertension     Impulse control disorder     Obesity     Panic attack     Panic disorder     Psychiatric disorder     depression, bipolar, schizophrenia    Psychiatric illness     Psychosis ([facility])     Schizoaffective disorder ([facility]) 2/27/2025    Schizophrenia ([facility])     Schizophrenia ([facility])     Schizophrenia ([facility])     Seizures ([facility])     Self-injurious behavior     Sleep difficulties     Suicide attempt ([facility])      Past Surgical History:   Procedure Laterality Date    CYSTOSCOPY      INCISION AND DRAINAGE OF WOUND N/A 2/1/2019    Procedure: INCISION AND DRAINAGE (I&D) TRUNK;  Surgeon: Mathew Fatima DO;  Location:  MAIN OR;  Service: General    WISDOM TOOTH EXTRACTION       Meds/Allergies      Objective :  Temp:  [97.2 °F (36.2 °C)-98.4 °F (36.9 °C)] 97.2 °F (36.2 °C)  HR:  [] 94  BP: (117-131)/(66-86) 117/66  Resp:  [18] 18  SpO2:  [98 %-100 %] 98 %  O2 Device: None (Room air)    Temp:  [97.2 °F (36.2 °C)-98.4 °F (36.9 °C)] 97.2 °F (36.2 °C)  HR:  [] 94  BP: (117-131)/(66-86) 117/66  Resp:  [18] 18  SpO2:  [98 %-100 %] 98 %  O2 Device: None (Room air)    Mental Status Evaluation:    Appearance:  marginal hygiene, dressed in hospital attire, looks " "older than stated age, overweight   Behavior:  guarded, generally cooperative   Speech:  normal rate, normal volume, normal pitch, spontaneous   Mood:  \"I have to talk to you\"   Affect:  anxious   Language: naming objects, repeating phrases   Thought Process:  Disorganized, tangential, loose associations, rarely goal-directed   Thought Content:  paranoid ideation   Perceptual Disturbances: Denies auditory and visual hallucinations. Appears to be internally preoccupied throughout interview.   Risk Potential: Suicidal Ideation - denies  Homicidal Ideations - denies  Potential for Aggression - Yes, due to acute psychosis   Sensorium:  oriented to person   Memory:  recent and remote memory: unable to assess due to lack of cooperation   Consciousness:  alert and awake   Attention/Concentration: attention span and concentration appear shorter than expected for age   Intellect: impaired abstract thinking   Fund of Knowledge: Not formally assessed but appears reduced   Insight:  poor   Judgment: poor   Muscle Strength:  Muscle Tone: normal  normal   Gait/Station: normal gait/station, normal balance   Motor Activity: no abnormal movements     Patient Strengths/Assets: family ties, patient is on a voluntary commitment    Patient Barriers/Limitations: chronic mental illness, financial instability, lack of financial means, lack of stable employment, poor insight, poor reasoning ability, poor self-care, unstable housing situation          Lab Results: I have reviewed the following results:  Results from the past 24 hours:   Recent Results (from the past 24 hours)   POCT alcohol breath test    Collection Time: 02/26/25  5:40 PM   Result Value Ref Range    EXTBreath Alcohol 0.000    POCT pregnancy, urine    Collection Time: 02/26/25  8:40 PM   Result Value Ref Range    EXT Preg Test, Ur Negative     Control Valid    UA w Reflex to Microscopic w Reflex to Culture    Collection Time: 02/26/25  9:11 PM    Specimen: Urine, Clean Catch "   Result Value Ref Range    Color, UA Yellow     Clarity, UA Clear     Specific Gravity, UA 1.020 1.005 - 1.030    pH, UA 6.5 4.5, 5.0, 5.5, 6.0, 6.5, 7.0, 7.5, 8.0    Leukocytes, UA Small (A) Negative    Nitrite, UA Negative Negative    Protein,  (2+) (A) Negative mg/dl    Glucose, UA Negative Negative mg/dl    Ketones, UA Trace (A) Negative mg/dl    Urobilinogen, UA <2.0 <2.0 mg/dl mg/dl    Bilirubin, UA Negative Negative    Occult Blood, UA Negative Negative   Rapid drug screen, urine    Collection Time: 02/26/25  9:11 PM   Result Value Ref Range    Amph/Meth UR Negative Negative    Barbiturate Ur Negative Negative    Benzodiazepine Urine Negative Negative    Cocaine Urine Negative Negative    Methadone Urine Negative Negative    Opiate Urine Negative Negative    PCP Ur Negative Negative    THC Urine Negative Negative    Oxycodone Urine Negative Negative    Fentanyl Urine Negative Negative    HYDROCODONE URINE Negative Negative   Urine Microscopic    Collection Time: 02/26/25  9:11 PM   Result Value Ref Range    RBC, UA None Seen None Seen, 0-1, 1-2, 2-4, 0-5 /hpf    WBC, UA 2-4 None Seen, 0-1, 1-2, 0-5, 2-4 /hpf    Epithelial Cells Occasional None Seen, Occasional /hpf    Bacteria, UA Occasional None Seen, Occasional /hpf    Hyaline Casts, UA 0-1 (A) (none) /lpf    Amorphous Crystals, UA Occasional        Imaging Results Review: No pertinent imaging studies reviewed.  Other Study Results Review: No additional pertinent studies reviewed.    Code Status: Level 1 - Full Code  Advance Directive and Living Will: <no information>  Next of Kin: Extended Emergency Contact Information  Primary Emergency Contact: Rosemary Fonseca  Mobile Phone: [phone]  Relation: Sister    Administrative Statements     Counseling / Coordination of Care:   Patient's progress discussed with staff in treatment team meeting.  Medication changes reviewed with staff in treatment team meeting..    Inpatient Psychiatric  Certification:  Estimated length of stay: 7 midnights   Based upon physical, mental and social evaluations, I certify that inpatient psychiatric services are medically necessary for this patient for a duration of 7 midnights for the treatment of Schizoaffective disorder (HCC)  Available alternative community resources do not meet the patient's mental health care needs.  I further attest that an established written individualized plan of care has been implemented and is outlined in the patient's medical records.    Ronan Stewart MD 02/27/25

## 2025-02-27 NOTE — CONSULTS
"Consultation - Hospitalist   Name: Gabby Mares 34 y.o. female I MRN: [id number]  Unit/Bed#: [facility] 341-02 I Date of Admission: 2/26/2025   Date of Service: 2/27/2025 I Hospital Day: 1   Inpatient consult for Medical Clearance for  patient  Consult performed by: ODILON Key  Consult ordered by: Margot De La Cruz MD        Physician Requesting Evaluation: Maicol Koenig MD   Reason for Evaluation / Principal Problem: [facility]      Assessment & Plan  Medical clearance for psychiatric admission  Admission labs: CBC, CMP, lipid panel, TSH pending   Folate, B12, Vitamin D pending  Vitals stable   UA with trace ketones, will encourage hydration    UDS negative   EKG from 11/2024 noted  Patient is medically cleared for admission to U and treatment of underlying psychiatric illness based on available results  Please contact [facility] with any questions or concerns  Obesity  Would benefit from weight loss and therapeutic lifestyle changes  Education and counseling as tolerated   Consider nutrition evaluation     Current severe episode of major depressive disorder with psychotic features (HCC)  Admitted to IPU  Management per primary service   Anxiety disorder  [facility]  Management per primary service     Please contact the SecureChat role,\" \", with any questions/concerns.   psychiatry medical provider     Collaboration of Care: Were Recommendations Directly Discussed with Primary Treatment Team? - Yes     History of Present Illness   Gabby Mares is a 34 y.o. female who is originally admitted to the psychiatry service due to self harming behaviors and SI. We are consulted for medical clearance for admission to Behavioral Health Unit and treatment of underlying psychiatric illness.  Patient with history of U admission in the past. She has a history of drug abuse and obesity. She has not been taking her medications. She appeared anxious and depressed in the ED. She was " able to note stressors at home. Patient was seen by Crisis. She signed a 201 and was admitted to IPU. Currently, she not cooperative. She tried to escape the unit earlier today which required IM medications. She is resting in bed with a 1:1 at bedside.     Review of Systems   Unable to perform ROS: Psychiatric disorder     Historical Information   Past Medical History:   Diagnosis Date    Acne     Agoraphobia     Anxiety     Depression     Drug abuse (HCC) 11/14/2024    Diphenhydramine abuse    Eating disorder     Heart disease     Hypertension     Impulse control disorder     Obesity     Panic attack     Panic disorder     Psychiatric disorder     depression, bipolar, schizophrenia    Psychiatric illness     Psychosis (HCC)     Schizophrenia (HCC)     Schizophrenia (HCC)     Schizophrenia (HCC)     Seizures (HCC)     Self-injurious behavior     Sleep difficulties     Suicide attempt ([facility])      Past Surgical History:   Procedure Laterality Date    CYSTOSCOPY      INCISION AND DRAINAGE OF WOUND N/A 2/1/2019    Procedure: INCISION AND DRAINAGE (I&D) TRUNK;  Surgeon: Mathew Fatima DO;  Location:  MAIN OR;  Service: General    WISDOM TOOTH EXTRACTION       Social History     Tobacco Use    Smoking status: Never     Passive exposure: Never    Smokeless tobacco: Never   Vaping Use    Vaping status: Never Used   Substance and Sexual Activity    Alcohol use: No    Drug use: No    Sexual activity: Never     E-Cigarette/Vaping    E-Cigarette Use Never User      E-Cigarette/Vaping Substances    Nicotine No     THC No     CBD No     Flavoring No     Other No     Unknown No      Family history non-contributory  Marital Status: Single    Meds/Allergies   I have reviewed home medications using recent Epic encounter.  Prior to Admission medications    Medication Sig Start Date End Date Taking? Authorizing Provider   ergocalciferol (VITAMIN D2) 50,000 units Take 1 capsule (50,000 Units total) by mouth once a week for 4 doses  "11/29/24 12/21/24  Georgia Anna PA-C   hydrOXYzine HCL (ATARAX) 50 mg tablet Take 1 tablet (50 mg total) by mouth 2 (two) times a day as needed for anxiety 11/27/24 12/27/24  Georgia Anna PA-C   mirtazapine (REMERON) 30 mg tablet Take 1 tablet (30 mg total) by mouth daily at bedtime 11/27/24 1/26/25  Georgia Anna PA-C   QUEtiapine (SEROquel) 100 mg tablet Take 1 tablet (100 mg total) by mouth daily at bedtime 11/27/24 1/26/25  Georgia Anna PA-C   QUEtiapine (SEROquel) 50 mg tablet Take 1 tablet (50 mg total) by mouth daily at bedtime Take with nighttime Seroquel 11/29/24 12/29/24  Georgia Anna PA-C     Allergies   Allergen Reactions    Adhesive [Medical Tape]      rash    Benazepril Cough       Objective :  Temp:  [97.2 °F (36.2 °C)-98.4 °F (36.9 °C)] 97.2 °F (36.2 °C)  HR:  [] 94  BP: (117-131)/(66-86) 117/66  Resp:  [18] 18  SpO2:  [98 %-100 %] 98 %  O2 Device: None (Room air)    Height: 5' 6\" (167.6 cm) (02/26/25 2300)  Weight - Scale: 132 kg (291 lb 9.6 oz) (02/26/25 2300)  Physical Exam  Constitutional:       General: She is not in acute distress.     Appearance: She is obese. She is not toxic-appearing or diaphoretic.   HENT:      Head: Normocephalic.      Mouth/Throat:      Mouth: Mucous membranes are moist.   Cardiovascular:      Rate and Rhythm: Normal rate.   Pulmonary:      Effort: Pulmonary effort is normal. No respiratory distress.   Abdominal:      General: Abdomen is flat.   Musculoskeletal:         General: Normal range of motion.      Cervical back: Normal range of motion.      Right lower leg: No edema.      Left lower leg: No edema.   Skin:     General: Skin is warm and dry.   Neurological:      Mental Status: She is alert.      Comments: Unable to assess    Psychiatric:         Behavior: Behavior is uncooperative.           Lab Results: I have reviewed the following results:                  No results found for: \"HGBA1C\"        Imaging Results " Review: No pertinent imaging studies reviewed.  Other Study Results Review: EKG was personally reviewed and my interpretation is: NSR. HR 67 on EKG from 11/14/24..    Administrative Statements   I have spent a total time of  minutes in caring for this patient on the day of the visit/encounter including Diagnostic results, Patient and family education, Importance of tx compliance, Risk factor reductions, Impressions, Counseling / Coordination of care, Documenting in the medical record, Reviewing/placing orders in the medical record (including tests, medications, and/or procedures), Obtaining or reviewing history  , and Communicating with other healthcare professionals .  ** Please Note: This note has been constructed using a voice recognition system. **

## 2025-02-27 NOTE — EMTALA/ACUTE CARE TRANSFER
[facility] EMERGENCY DEPARTMENT  [address]  [place] [address]  Dept: [phone]      EMTALA TRANSFER CONSENT    NAME Gabby Mares                                         1990                              MRN [id number]    I have been informed of my rights regarding examination, treatment, and transfer   by Dr. Facundo Fernandez DO    Benefits: Specialized equipment and/or services available at the receiving facility (Include comment)________________________    Risks: Potential for delay in receiving treatment, Potential deterioration of medical condition, Increased discomfort during transfer, Possible worsening of condition or death during transfer      Consent for Transfer:  I acknowledge that my medical condition has been evaluated and explained to me by the emergency department physician or other qualified medical person and/or my attending physician, who has recommended that I be transferred to the service of  Accepting Physician: Dr De La Cruz at Accepting Facility Name, City & State : [address] The above potential benefits of such transfer, the potential risks associated with such transfer, and the probable risks of not being transferred have been explained to me, and I fully understand them.  The doctor has explained that, in my case, the benefits of transfer outweigh the risks.  I agree to be transferred.    I authorize the performance of emergency medical procedures and treatments upon me in both transit and upon arrival at the receiving facility.  Additionally, I authorize the release of any and all medical records to the receiving facility and request they be transported with me, if possible.  I understand that the safest mode of transportation during a medical emergency is an ambulance and that the Hospital advocates the use of this mode of transport. Risks of traveling to the receiving facility by car, including absence of medical  control, life sustaining equipment, such as oxygen, and medical personnel has been explained to me and I fully understand them.    (DAKOTA CORRECT BOX BELOW)  [  ]  I consent to the stated transfer and to be transported by ambulance/helicopter.  [  ]  I consent to the stated transfer, but refuse transportation by ambulance and accept full responsibility for my transportation by car.  I understand the risks of non-ambulance transfers and I exonerate the Hospital and its staff from any deterioration in my condition that results from this refusal.    X___________________________________________    DATE  25  TIME________  Signature of patient or legally responsible individual signing on patient behalf           RELATIONSHIP TO PATIENT_________________________          Provider Certification    [facility] 1990                              MRN [id number]    A medical screening exam was performed on the above named patient.  Based on the examination:    Condition Necessitating Transfer The encounter diagnosis was Suicidal ideations.    Patient Condition: The patient has been stabilized such that within reasonable medical probability, no material deterioration of the patient condition or the condition of the unborn child(lilia) is likely to result from the transfer    Reason for Transfer: Level of Care needed not available at this facility    Transfer Requirements: Facility [address]   Space available and qualified personnel available for treatment as acknowledged by Neida  Agreed to accept transfer and to provide appropriate medical treatment as acknowledged by       Dr De La Cruz  Appropriate medical records of the examination and treatment of the patient are provided at the time of transfer   STAFF INITIAL WHEN COMPLETED _______  Transfer will be performed by qualified personnel from    and appropriate transfer equipment as required, including  the use of necessary and appropriate life support measures.    Provider Certification: I have examined the patient and explained the following risks and benefits of being transferred/refusing transfer to the patient/family:  General risk, such as traffic hazards, adverse weather conditions, rough terrain or turbulence, possible failure of equipment (including vehicle or aircraft), or consequences of actions of persons outside the control of the transport personnel, Unanticipated needs of medical equipment and personnel during transport, Risk of worsening condition, The possibility of a transport vehicle being unavailable, The patient is stable for psychiatric transfer because they are medically stable, and is protected from harming him/herself or others during transport      Based on these reasonable risks and benefits to the patient and/or the unborn child(lilia), and based upon the information available at the time of the patient’s examination, I certify that the medical benefits reasonably to be expected from the provision of appropriate medical treatments at another medical facility outweigh the increasing risks, if any, to the individual’s medical condition, and in the case of labor to the unborn child, from effecting the transfer.    X____________________________________________ DATE 02/26/25        TIME_______      ORIGINAL - SEND TO MEDICAL RECORDS   COPY - SEND WITH PATIENT DURING TRANSFER

## 2025-02-27 NOTE — NURSING NOTE
Patient approaches nurse's station in an attempt to convince staff that she is here in error. Patient is convinced that we have the wrong person and that she is being held against her will. Patient was educated on her 201 commitment and assured that she will have the opportunity to clarify information in the morning. Patient is visibly confused and paranoid but redirectable. Patient returned to her room, hesitantly but compliant.

## 2025-02-27 NOTE — NURSING NOTE
Pt was walking in the hallway and as doctor was going through the dooirs to exit and pt went out the doors. Pt got into loly port area. With redirection, pt came back on unit. Pt is disorganized and paranoid. Pt was talking about how she shouldn't be here and had ID to confirm it. Making incoherent comments about her sister, her housing situation and thinking food is poisoned. Difficult to follow in conversation. Pt continued to need multiple redirection to stay away from doors. She was offered po prn medication but refused. Pt was then found in a male peer's room and was sitting on bed opposite of him, just staring at him. Security was called to unit. Pt was refusing to leave male's room and was becoming argumentative. Pt escorted back to her room and received 5 mg IM Haldol, 1 mh IM Cogentin and 2 mg IM Ativan prn @08:55 Pt was trying to pull arm away when receiving second injection. Pt was placed on continual observation at this time due to not being able to follow redirection.

## 2025-02-27 NOTE — PLAN OF CARE
Problem: SELF HARM/SUICIDALITY  Goal: Will have no self-injury during hospital stay  Description: INTERVENTIONS:  - Q 15 MINUTES: Routine safety checks  - Q WAKING SHIFT & PRN: Assess risk to determine if routine checks are adequate to maintain patient safety  - Encourage patient to participate actively in care by formulating a plan to combat response to suicidal ideation, identify supports and resources  Outcome: Not Progressing     Problem: DEPRESSION  Goal: Will be euthymic at discharge  Description: INTERVENTIONS:  - Administer medication as ordered  - Provide emotional support via 1:1 interaction with staff  - Encourage involvement in milieu/groups/activities  - Monitor for social isolation  Outcome: Not Progressing     Problem: SUBSTANCE USE/ABUSE  Goal: Will have no detox symptoms and will verbalize plan for changing substance-related behavior  Description: INTERVENTIONS:  - Monitor physical status and assess for symptoms of withdrawal  - Administer medication as ordered  - Provide emotional support with 1 on 1 interaction with staff  - Encourage recovery focused program/ addiction education  - Assess for verbalization of changing behaviors related to substance abuse  - Initiate consults and referrals as appropriate (Case Management, Spiritual Care, etc.)  Outcome: Not Progressing  Goal: By discharge, will develop insight into their chemical dependency and sustain motivation to continue in recovery  Description: INTERVENTIONS:  - Attends all daily group sessions and scheduled AA groups  - Actively practices coping skills through participation in the therapeutic community and adherence to program rules  - Reviews and completes assignments from individual treatment plan  - Assist patient development of understanding of their personal cycle of addiction and relapse triggers  Outcome: Not Progressing  Goal: By discharge, patient will have ongoing treatment plan addressing chemical dependency  Description:  INTERVENTIONS:  - Assist patient with resources and/or appointments for ongoing recovery based living  Outcome: Not Progressing     Problem: Ineffective Coping  Goal: Participates in unit activities  Description: Interventions:  - Provide therapeutic environment   - Provide required programming   - Redirect inappropriate behaviors   2/27/2025 0315 by Carey Pandey RN  Outcome: Not Progressing  2/26/2025 2257 by Carey Pandey RN  Outcome: Not Progressing     Problem: Potential for Falls  Goal: Patient will remain free of falls  Description: INTERVENTIONS:  - Educate patient/family on patient safety including physical limitations  - Instruct patient to call for assistance with activity   - Consult OT/PT to assist with strengthening/mobility   - Keep Call bell within reach  - Keep bed low and locked with side rails adjusted as appropriate  - Keep care items and personal belongings within reach  - Initiate and maintain comfort rounds  - Make Fall Risk Sign visible to staff  Outcome: Not Progressing

## 2025-02-27 NOTE — NURSING NOTE
PRN of Haldol 5 mg IM, Cogentin 1 mg IM and Ativan 2 mg IM has been effective. Patient has been in control, No further anxiety or agitation.

## 2025-02-27 NOTE — DISCHARGE INSTR - APPOINTMENTS
Behavioral Health Nurse Navigator, Laureen or Mili will be calling you after your discharge, on the phone number that you provided.  They will be available as an additional support, if needed.   If you wish to speak with Laureen, you may contact her at [phone].

## 2025-02-27 NOTE — NURSING NOTE
PTA medications verified with patients pharmacy. [facility] secure chat sent to Dr. Koenig.

## 2025-02-27 NOTE — ASSESSMENT & PLAN NOTE
Admission labs: CBC, CMP, lipid panel, TSH pending   Folate, B12, Vitamin D pending  Vitals stable   UA with trace ketones, will encourage hydration    UDS negative   EKG from 11/2024 noted  Patient is medically cleared for admission to U and treatment of underlying psychiatric illness based on available results  Please contact [facility] with any questions or concerns

## 2025-02-27 NOTE — TREATMENT PLAN
TREATMENT PLAN REVIEW - Behavioral Health Gabby Mares 34 y.o. 1990 female MRN: [id number]    [facility] 3B BEHAVIORAL [facility] Room / Bed: [facility] 344/[facility] 344-02 Encounter: [id number]        Admit Date/Time:  2/26/2025 10:54 PM    Treatment Team:   MD Bri Bruno CRNP Ruby Serraty, RN Stephanie L Ditmer, RN Sean Washburn Kristina Merced Karissa Marie Kormandy, CRNP Rebecca Sussman Brooke Rickert    Diagnosis: Principal Problem:    Schizoaffective disorder (HCC)  Active Problems:    Anxiety disorder    Medical clearance for psychiatric admission    Obesity      Patient Strengths/Assets: family ties, patient is on a voluntary commitment     Patient Barriers/Limitations: chronic mental illness, financial instability, lack of financial means, lack of stable employment, poor insight, poor reasoning ability, poor self-care, unstable housing situation    Short Term Goals: decrease in anxiety symptoms, decrease in paranoid thoughts, decrease in psychotic symptoms, ability to stay safe on the unit, improvement in insight, improvement in self care, sleep improvement, improvement in appetite, tolerate medications, mood stabilization, acceptance of need for psychiatric treatment, acceptance of psychiatric medications    Long Term Goals: improvement in anxiety, stabilization of mood, improved reality testing, improved insight, acceptance of need for psychiatric follow up after discharge, acceptance of psychiatric diagnosis, adequate self care, adequate sleep, adequate appetite, stable living arrangements upon discharge, establishment of family support upon discharge    Progress Towards Goals: starting psychiatric medications as prescribed, progressing slowly    Recommended Treatment: medication management, patient medication education, group therapy, milieu therapy, continued Behavioral Health psychiatric evaluation/assessment process     Treatment Frequency:  daily medication monitoring, group and milieu therapy daily, monitoring through interdisciplinary rounds, monitoring through weekly patient care conferences    Expected Discharge Date: 7 days - 3/6/2025    Discharge Plan: return to previous living arrangement    Treatment Plan Created/Updated By: Ronan Stewart MD 02/27/25

## 2025-02-27 NOTE — PROGRESS NOTES
02/27/25 1454   Team Meeting   Meeting Type Tx Team Meeting   Team Members Present   Team Members Present Physician;Nurse;   Physician Team Member Arya   [facility]   Care Management Team Member Sharon   Patient/Family Present   Patient Present Yes   Patient's Family Present No     Tx plan was reviewed and discussed with Pt. Pt was encouraged to attend groups. Medication was discussed with Pt. Pt was unable to sign tx plan due to sedation from IM PRN.

## 2025-02-27 NOTE — NURSING NOTE
Patient maintained on constant observation; close proximity for safety. Has spent the majority of the shift in bed and resting. Disorganized, paranoid. No further attempts to leave the unit.

## 2025-02-27 NOTE — ASSESSMENT & PLAN NOTE
Patient presenting with a longstanding history of both mood and psychotic symptoms. Current presentation appears to be psychotic in nature in the absence of a mood component.    Plan:  Initiate Risperdal 1 mg BID  Initiate Lexapro 10 mg qAM

## 2025-02-27 NOTE — ED NOTES
Patient is accepted at [facility] 3B  Patient is accepted by Dr. Dorothy Oates     Transportation is arranged with [employer].     Transportation is scheduled for . 2230 CTS  Patient may go to the floor at . 2230         Nurse report is to be called to [phone] prior to patient transfer.

## 2025-02-27 NOTE — PLAN OF CARE
Problem: SELF HARM/SUICIDALITY  Goal: Will have no self-injury during hospital stay  Description: INTERVENTIONS:  - Q 15 MINUTES: Routine safety checks  - Q WAKING SHIFT & PRN: Assess risk to determine if routine checks are adequate to maintain patient safety  - Encourage patient to participate actively in care by formulating a plan to combat response to suicidal ideation, identify supports and resources  Outcome: Not Progressing     Problem: DEPRESSION  Goal: Will be euthymic at discharge  Description: INTERVENTIONS:  - Administer medication as ordered  - Provide emotional support via 1:1 interaction with staff  - Encourage involvement in milieu/groups/activities  - Monitor for social isolation  Outcome: Not Progressing     Problem: SUBSTANCE USE/ABUSE  Goal: Will have no detox symptoms and will verbalize plan for changing substance-related behavior  Description: INTERVENTIONS:  - Monitor physical status and assess for symptoms of withdrawal  - Administer medication as ordered  - Provide emotional support with 1 on 1 interaction with staff  - Encourage recovery focused program/ addiction education  - Assess for verbalization of changing behaviors related to substance abuse  - Initiate consults and referrals as appropriate (Case Management, Spiritual Care, etc.)  Outcome: Not Progressing  Goal: By discharge, will develop insight into their chemical dependency and sustain motivation to continue in recovery  Description: INTERVENTIONS:  - Attends all daily group sessions and scheduled AA groups  - Actively practices coping skills through participation in the therapeutic community and adherence to program rules  - Reviews and completes assignments from individual treatment plan  - Assist patient development of understanding of their personal cycle of addiction and relapse triggers  Outcome: Not Progressing  Goal: By discharge, patient will have ongoing treatment plan addressing chemical dependency  Description:  INTERVENTIONS:  - Assist patient with resources and/or appointments for ongoing recovery based living  Outcome: Not Progressing     Problem: Ineffective Coping  Goal: Participates in unit activities  Description: Interventions:  - Provide therapeutic environment   - Provide required programming   - Redirect inappropriate behaviors   Outcome: Not Progressing

## 2025-02-27 NOTE — NURSING NOTE
Main pharmacy is closed at this time. On-call attending made aware, Dr. De La Cruz, via rover. No new orders at this time

## 2025-02-27 NOTE — DISCHARGE INSTR - OTHER ORDERS
Mobile Crisis    [facility] Mobile Crisis provides immediate support for crisis situations, as well as assistance with managing recurring or future crises.    Support is available 24 hours a day, 7 days a week at [phone]). This service is available to anyone in [facility], including children, teens, adults, and families. There is no fee and everyone, regardless of insurance, is eligible for assistance.    STAGES OF CRISIS MANAGEMENT      Before a crisis…  When you start to recognize the stressors that you or a loved one have felt during previous crises, please call [facility]’s Peer Support Talk Line at [phone]. It is available free of charge, 7 days a week, 1:00pm to 9:00pm.  [facility] also has a Teen Talk Line that can be reached by calling [phone] or texting [phone]. It is available Monday through Friday, 3:00pm to 9:00pm.    During a crisis…  When you or a loved one are experiencing a crisis, [employer] is available to help. Just call [phone]). The line is open 24 hours per day, 7 days per week.    After a crisis…  Mobile Crisis would like to help you develop ways to help reduce future crisis situations and create a crisis plan as part of your (or your child's, or your family's) recovery and wellness goals.    WHAT TO EXPECT FROM THE MOBILE CRISIS TEAM    [facility] Mobile Crisis Support is provided by [employer], and includes the following services:  24 hour telephone counseling  In-person support in a home or community setting  Assistance with developing strategies for reducing recurring crisis  Support for drug/alcohol use or addiction  Help coping with past traumatic experiences  Emergency respite  Connection to Peer support  Assistance connecting to local community resources   Support navigating referrals to appropriate levels of care (including outpatient and inpatient treatment)    From the [facility]  [place] website [place]guides[place]opioid-epidemic/#GetNaloxone    How do I get naloxone?  Family members and friends can access naloxone by:    Obtaining a prescription from their family doctor  Using the standing order issued by Acting Physician General Cyn Meredith. A standing order is a prescription written for the general public, rather than specifically for an individual.  To use the standing order, print it and take it with you to the pharmacy or have the digital version on your phone. Download the standing order from the Department of [facility] (PDF).    If you are unable to print it or use the digital version, the standing order is kept on file at many pharmacies. If a pharmacy does not have it on file, they may have the ability to look it up.    Naloxone prescriptions can be filled at most pharmacies. Although the medication might not be available for same-day pickup, it often can be ordered and available within a day or two.

## 2025-02-28 PROBLEM — F31.9 BIPOLAR DISORDER (HCC): Status: ACTIVE | Noted: 2025-02-27

## 2025-02-28 PROCEDURE — 99232 SBSQ HOSP IP/OBS MODERATE 35: CPT | Performed by: PSYCHIATRY & NEUROLOGY

## 2025-02-28 RX ORDER — RISPERIDONE 2 MG/1
2 TABLET ORAL 2 TIMES DAILY
Status: DISCONTINUED | OUTPATIENT
Start: 2025-02-28 | End: 2025-03-04

## 2025-02-28 RX ADMIN — RISPERIDONE 1 MG: 1 TABLET, FILM COATED ORAL at 08:56

## 2025-02-28 RX ADMIN — ACETAMINOPHEN 975 MG: 325 TABLET, FILM COATED ORAL at 15:18

## 2025-02-28 RX ADMIN — Medication 3 MG: at 21:15

## 2025-02-28 RX ADMIN — ESCITALOPRAM OXALATE 10 MG: 10 TABLET, FILM COATED ORAL at 08:56

## 2025-02-28 RX ADMIN — RISPERIDONE 2 MG: 2 TABLET, FILM COATED ORAL at 21:15

## 2025-02-28 NOTE — NURSING NOTE
Pt remains on continual observation. Denies SI/HI/AH/VH but does appear internally preoccupied and paranoid. Med and meal compliant. Unable to draw ordered labs, physician aware and labs moved to 3/1/25. Attending groups for small periods of time. Denies any unmet needs or complaints.

## 2025-02-28 NOTE — ASSESSMENT & PLAN NOTE
"Patient presenting with a longstanding history of both mood and psychotic symptoms. Current presentation appears to be psychotic in nature.  After one day of antipsychotics and a full night of sleep on 2/28 patient became more organized and was able to give a coherent history  She describes a manic episode prior to coming into the hospital. She states \"I didn't sleep for 12 days\". Endorses increases in goal directed activity, irritability, increased spending, pressured speech, decreased need for sleep, and grandiosity. States this has occurred 'multiple times' throughout her life.    Plan:  Increase Risperdal to 2 mg BID  Continue Lexapro 10 mg qAM  Encourage lab compliance  "

## 2025-02-28 NOTE — PROGRESS NOTES
02/28/25 0855   Team Meeting   Meeting Type Daily Rounds   Team Members Present   Team Members Present Physician;Nurse;   Physician Team Member Arya   [facility]   Care Management Team Member Sharon   Patient/Family Present   Patient Present No   Patient's Family Present No     Pt remains on the 1:1. Pt remains disorganized and irritable at times. Pt is medication and meal compliant. Pt is malodorous. Pt reported feeling confused. Pt has shown slight improvement. Pt reported not sleeping for 12 days prior to coming to the hospital and other several symptoms of casandra.

## 2025-02-28 NOTE — NURSING NOTE
Pt's labs deferred at this time r/t paranoia and disorganized thoughts. Please reattempt when pt is awake.

## 2025-02-28 NOTE — NURSING NOTE
Patient 1:1 maintained no current attempts to leave the unit. Patient appears to be disorganized and irritable at times. Patient makes needs known. Denies SI/HI at this time although appears to be internally preoccupied. Compliant with medications and routine vitals.

## 2025-02-28 NOTE — PROGRESS NOTES
"Progress Note - Behavioral Health   Name: Gabby Mares 34 y.o. female I MRN: [id number]  Unit/Bed#: -02 I Date of Admission: 2/26/2025   Date of Service: 2/28/2025 I Hospital Day: 2    Assessment & Plan  Bipolar disorder, current episode of casandra with psychotic features, rule out schizoaffective disorder  Patient presenting with a longstanding history of both mood and psychotic symptoms. Current presentation appears to be psychotic in nature.  After one day of antipsychotics and a full night of sleep on 2/28 patient became more organized and was able to give a coherent history  She describes a manic episode prior to coming into the hospital. She states \"I didn't sleep for 12 days\". Endorses increases in goal directed activity, irritability, increased spending, pressured speech, decreased need for sleep, and grandiosity. States this has occurred 'multiple times' throughout her life.    Plan:  Increase Risperdal to 2 mg BID  Continue Lexapro 10 mg qAM  Encourage lab compliance  Anxiety disorder    Medical clearance for psychiatric admission    Obesity      Current medications:  Current Facility-Administered Medications   Medication Dose Route Frequency Provider Last Rate    acetaminophen  650 mg Oral Q6H PRN Ronan Stewart MD      acetaminophen  650 mg Oral Q4H PRN Ronan Stewart MD      acetaminophen  975 mg Oral Q6H PRN Ronan Stewart MD      aluminum-magnesium hydroxide-simethicone  30 mL Oral Q4H PRN Margot De La Cruz MD      haloperidol lactate  2.5 mg Intramuscular Q4H PRN Max 4/day Margot De La Cruz MD      And    LORazepam  1 mg Intramuscular Q4H PRN Max 4/day Margot De La Cruz MD      And    benztropine  0.5 mg Intramuscular Q4H PRN Max 4/day Margot De La Cruz MD      haloperidol lactate  5 mg Intramuscular Q4H PRN Max 4/day Margot De La Cruz MD      And    LORazepam  2 mg Intramuscular Q4H PRN Max 4/day Margot De La Cruz MD      And    benztropine  1 mg Intramuscular Q4H PRN Max 4/day Margot STODDARD" MD Dorothy      benztropine  1 mg Intramuscular Q4H PRN Max 6/day Margot De La Cruz MD      benztropine  1 mg Oral Q4H PRN Max 6/day Margot De La Cruz MD      bisacodyl  10 mg Rectal Daily PRN Margot De La Cruz MD      hydrOXYzine HCL  50 mg Oral Q6H PRN Max 4/day Margot De La Cruz MD      Or    diphenhydrAMINE  50 mg Intramuscular Q6H PRN Margot De La Cruz MD      escitalopram  10 mg Oral Daily Maicol Koenig MD      haloperidol  1 mg Oral Q6H PRN Margot De La Cruz MD      haloperidol  2.5 mg Oral Q4H PRN Max 4/day Margot De La Cruz MD      haloperidol  5 mg Oral Q4H PRN Max 4/day Margot De La Cruz MD      hydrOXYzine HCL  100 mg Oral Q6H PRN Max 4/day Margot De La Cruz MD      Or    LORazepam  2 mg Intramuscular Q6H PRN Margot De La Cruz MD      hydrOXYzine HCL  25 mg Oral Q6H PRN Max 4/day Margot De La Cruz MD      melatonin  3 mg Oral HS Margot De La Cruz MD      polyethylene glycol  17 g Oral Daily PRN Margot De La Cruz MD      propranolol  10 mg Oral Q8H PRN Margot De La Cruz MD      risperiDONE  1 mg Oral BID Ronan Stewart MD      senna-docusate sodium  1 tablet Oral Daily PRN Margot De La Cruz MD      traZODone  50 mg Oral HS PRN Margot De La Cruz MD          Risks/Benefits of Treatment:     Risks, benefits, and possible side effects of medications explained to patient and patient verbalizes understanding and agreement for treatment.    Progress Toward Goals: improving    Treatment Planning:     All current active medications have been reviewed.  Continue to monitor response to treatment and assess for potential side effects of medications.  Encourage group therapy, milieu therapy and occupational therapy  Collaboration with medical service for medical comorbidities as indicated.  Behavioral Health checks for safety monitoring.  Long Stay Certification : Not Applicable  Estimated Discharge Day: 3/6/2025 7 days (3/7/2025)  Legal Status : Voluntary 201 commitment.    Subjective     Behavior over the last  "24 hours: improving.    Per nursing: Patient 1:1 maintained no current attempts to leave the unit. Patient appears to be disorganized and irritable at times. Patient makes needs known. Denies SI/HI at this time although appears to be internally preoccupied. Compliant with medications and routine vitals.     Patient was seen and evaluated in the milieu today. On initial approach, Gabby appears significantly more organized and logical in terms of her thoughts and behaviors. She is able to provide a more coherent story as to her admission and psychiatric history. She describes a manic episode prior to presenting to the hospital, with symptoms including: increase in goal-directed activity, irritability, grandiosity, pressured speech, increased spending, and decreased need for sleep. She states she feels better on her current psychiatric medications. She reports that she was unable to sleep for 12 days, however she was able to sleep more than 9 hours overnight.     Sleep: improved  Appetite: fair  Medication side effects: No  ROS: review of systems as noted above in HPI/Subjective report, all other systems are negative    Objective :  Temp:  [96.9 °F (36.1 °C)-97.8 °F (36.6 °C)] 96.9 °F (36.1 °C)  HR:  [89-97] 91  BP: (118-139)/(70-77) 118/70  Resp:  [16-18] 16  SpO2:  [98 %-100 %] 98 %  O2 Device: None (Room air)    Temp:  [96.9 °F (36.1 °C)-97.8 °F (36.6 °C)] 96.9 °F (36.1 °C)  HR:  [89-97] 91  BP: (118-139)/(70-77) 118/70  Resp:  [16-18] 16  SpO2:  [98 %-100 %] 98 %  O2 Device: None (Room air)    Mental Status Evaluation:  Appearance:  adequate hygiene, dressed in hospital attire, looks older than stated age   Behavior:  Calm, cooperative   Speech:  normal rate, normal volume, normal pitch, spontaneous   Mood:  \"better\"   Affect:  Guarded but less anxious than previous   Thought Process:  Logical, linear, goal-directed, circumferential at times   Thought Content:  No evidence of paranoia or delusions   Perceptual " Disturbances: Denies auditory and visual hallucinations. Appears to be intermittently internally preoccupied throughout interview.   Risk Potential: Suicidal Ideation - denies  Homicidal Ideations - denies  Potential for Aggression - no   Sensorium:  oriented to person   Memory:  recent and remote memory: unable to assess due to lack of cooperation   Consciousness:  alert and awake   Attention/Concentration: attention span and concentration appear shorter than expected for age   Intellect: Average   Fund of Knowledge: Average   Insight:  improving   Judgment: improving   Muscle Strength:  Muscle Tone: normal  normal   Gait/Station: normal gait/station, normal balance   Motor Activity: no abnormal movements       Lab Results: I have reviewed the following results:  Results from the past 24 hours: No results found for this or any previous visit (from the past 24 hours).    Administrative Statements     Counseling / Coordination of Care:   I have spent a total time of 20 minutes in caring for this patient on the day of the visit/encounter including:  Patient's progress discussed with staff in treatment team meeting.  Medication changes reviewed with staff in treatment team meeting.  Medications, treatment progress and treatment plan reviewed with patient..    Ronan Stewart MD 02/28/25

## 2025-02-28 NOTE — PLAN OF CARE
Problem: SELF HARM/SUICIDALITY  Goal: Will have no self-injury during hospital stay  Description: INTERVENTIONS:  - Q 15 MINUTES: Routine safety checks  - Q WAKING SHIFT & PRN: Assess risk to determine if routine checks are adequate to maintain patient safety  - Encourage patient to participate actively in care by formulating a plan to combat response to suicidal ideation, identify supports and resources  Outcome: Progressing     Problem: DEPRESSION  Goal: Will be euthymic at discharge  Description: INTERVENTIONS:  - Administer medication as ordered  - Provide emotional support via 1:1 interaction with staff  - Encourage involvement in milieu/groups/activities  - Monitor for social isolation  Outcome: Progressing     Problem: SUBSTANCE USE/ABUSE  Goal: Will have no detox symptoms and will verbalize plan for changing substance-related behavior  Description: INTERVENTIONS:  - Monitor physical status and assess for symptoms of withdrawal  - Administer medication as ordered  - Provide emotional support with 1 on 1 interaction with staff  - Encourage recovery focused program/ addiction education  - Assess for verbalization of changing behaviors related to substance abuse  - Initiate consults and referrals as appropriate (Case Management, Spiritual Care, etc.)  Outcome: Progressing     Problem: Potential for Falls  Goal: Patient will remain free of falls  Description: INTERVENTIONS:  - Educate patient/family on patient safety including physical limitations  - Instruct patient to call for assistance with activity   - Consult OT/PT to assist with strengthening/mobility   - Keep Call bell within reach  - Keep bed low and locked with side rails adjusted as appropriate  - Keep care items and personal belongings within reach  - Initiate and maintain comfort rounds  - Make Fall Risk Sign visible to staff  Outcome: Progressing     Problem: SUBSTANCE USE/ABUSE  Goal: By discharge, will develop insight into their chemical  dependency and sustain motivation to continue in recovery  Description: INTERVENTIONS:  - Attends all daily group sessions and scheduled AA groups  - Actively practices coping skills through participation in the therapeutic community and adherence to program rules  - Reviews and completes assignments from individual treatment plan  - Assist patient development of understanding of their personal cycle of addiction and relapse triggers  Outcome: Not Progressing  Goal: By discharge, patient will have ongoing treatment plan addressing chemical dependency  Description: INTERVENTIONS:  - Assist patient with resources and/or appointments for ongoing recovery based living  Outcome: Not Progressing     Problem: DISCHARGE PLANNING - CARE MANAGEMENT  Goal: Discharge to post-acute care or home with appropriate resources  Description: INTERVENTIONS:  - Conduct assessment to determine patient/family and health care team treatment goals, and need for post-acute services based on payer coverage, community resources, and patient preferences, and barriers to discharge  - Address psychosocial, clinical, and financial barriers to discharge as identified in assessment in conjunction with the patient/family and health care team  - Arrange appropriate level of post-acute services according to patient’s   needs and preference and payer coverage in collaboration with the physician and health care team  - Communicate with and update the patient/family, physician, and health care team regarding progress on the discharge plan  - Arrange appropriate transportation to post-acute venues  Reactivated

## 2025-03-01 LAB
25(OH)D3 SERPL-MCNC: 23.2 NG/ML (ref 30–100)
ALBUMIN SERPL BCG-MCNC: 3.8 G/DL (ref 3.5–5)
ALP SERPL-CCNC: 80 U/L (ref 34–104)
ALT SERPL W P-5'-P-CCNC: 11 U/L (ref 7–52)
ANION GAP SERPL CALCULATED.3IONS-SCNC: 8 MMOL/L (ref 4–13)
AST SERPL W P-5'-P-CCNC: 17 U/L (ref 13–39)
BASOPHILS # BLD AUTO: 0.06 THOUSANDS/ÂΜL (ref 0–0.1)
BASOPHILS NFR BLD AUTO: 1 % (ref 0–1)
BILIRUB SERPL-MCNC: 0.34 MG/DL (ref 0.2–1)
BUN SERPL-MCNC: 19 MG/DL (ref 5–25)
CALCIUM SERPL-MCNC: 9 MG/DL (ref 8.4–10.2)
CHLORIDE SERPL-SCNC: 103 MMOL/L (ref 96–108)
CHOLEST SERPL-MCNC: 133 MG/DL (ref ?–200)
CO2 SERPL-SCNC: 27 MMOL/L (ref 21–32)
CREAT SERPL-MCNC: 1.23 MG/DL (ref 0.6–1.3)
EOSINOPHIL # BLD AUTO: 0.15 THOUSAND/ÂΜL (ref 0–0.61)
EOSINOPHIL NFR BLD AUTO: 2 % (ref 0–6)
ERYTHROCYTE [DISTWIDTH] IN BLOOD BY AUTOMATED COUNT: 14.6 % (ref 11.6–15.1)
FOLATE SERPL-MCNC: 8 NG/ML
GFR SERPL CREATININE-BSD FRML MDRD: 56 ML/MIN/1.73SQ M
GLUCOSE P FAST SERPL-MCNC: 98 MG/DL (ref 65–99)
GLUCOSE SERPL-MCNC: 98 MG/DL (ref 65–140)
HCG SERPL QL: NEGATIVE
HCT VFR BLD AUTO: 39.3 % (ref 34.8–46.1)
HDLC SERPL-MCNC: 50 MG/DL
HGB BLD-MCNC: 11.6 G/DL (ref 11.5–15.4)
IMM GRANULOCYTES # BLD AUTO: 0.01 THOUSAND/UL (ref 0–0.2)
IMM GRANULOCYTES NFR BLD AUTO: 0 % (ref 0–2)
LDLC SERPL CALC-MCNC: 65 MG/DL (ref 0–100)
LYMPHOCYTES # BLD AUTO: 2.08 THOUSANDS/ÂΜL (ref 0.6–4.47)
LYMPHOCYTES NFR BLD AUTO: 29 % (ref 14–44)
MCH RBC QN AUTO: 25.9 PG (ref 26.8–34.3)
MCHC RBC AUTO-ENTMCNC: 29.5 G/DL (ref 31.4–37.4)
MCV RBC AUTO: 88 FL (ref 82–98)
MONOCYTES # BLD AUTO: 0.6 THOUSAND/ÂΜL (ref 0.17–1.22)
MONOCYTES NFR BLD AUTO: 8 % (ref 4–12)
NEUTROPHILS # BLD AUTO: 4.22 THOUSANDS/ÂΜL (ref 1.85–7.62)
NEUTS SEG NFR BLD AUTO: 60 % (ref 43–75)
NONHDLC SERPL-MCNC: 83 MG/DL
NRBC BLD AUTO-RTO: 0 /100 WBCS
PLATELET # BLD AUTO: 279 THOUSANDS/UL (ref 149–390)
PMV BLD AUTO: 10.6 FL (ref 8.9–12.7)
POTASSIUM SERPL-SCNC: 4.3 MMOL/L (ref 3.5–5.3)
PROT SERPL-MCNC: 7.4 G/DL (ref 6.4–8.4)
RBC # BLD AUTO: 4.48 MILLION/UL (ref 3.81–5.12)
SODIUM SERPL-SCNC: 138 MMOL/L (ref 135–147)
TRIGL SERPL-MCNC: 92 MG/DL (ref ?–150)
TSH SERPL DL<=0.05 MIU/L-ACNC: 1.98 UIU/ML (ref 0.45–4.5)
VIT B12 SERPL-MCNC: 235 PG/ML (ref 180–914)
WBC # BLD AUTO: 7.12 THOUSAND/UL (ref 4.31–10.16)

## 2025-03-01 PROCEDURE — 85025 COMPLETE CBC W/AUTO DIFF WBC: CPT

## 2025-03-01 PROCEDURE — 80061 LIPID PANEL: CPT

## 2025-03-01 PROCEDURE — 82746 ASSAY OF FOLIC ACID SERUM: CPT

## 2025-03-01 PROCEDURE — 80053 COMPREHEN METABOLIC PANEL: CPT

## 2025-03-01 PROCEDURE — 82607 VITAMIN B-12: CPT

## 2025-03-01 PROCEDURE — 84703 CHORIONIC GONADOTROPIN ASSAY: CPT

## 2025-03-01 PROCEDURE — 82306 VITAMIN D 25 HYDROXY: CPT

## 2025-03-01 PROCEDURE — 99232 SBSQ HOSP IP/OBS MODERATE 35: CPT | Performed by: STUDENT IN AN ORGANIZED HEALTH CARE EDUCATION/TRAINING PROGRAM

## 2025-03-01 PROCEDURE — 86780 TREPONEMA PALLIDUM: CPT

## 2025-03-01 PROCEDURE — 84443 ASSAY THYROID STIM HORMONE: CPT

## 2025-03-01 RX ADMIN — RISPERIDONE 2 MG: 2 TABLET, FILM COATED ORAL at 08:38

## 2025-03-01 RX ADMIN — RISPERIDONE 2 MG: 2 TABLET, FILM COATED ORAL at 20:44

## 2025-03-01 RX ADMIN — ESCITALOPRAM OXALATE 10 MG: 10 TABLET, FILM COATED ORAL at 08:38

## 2025-03-01 RX ADMIN — Medication 3 MG: at 21:00

## 2025-03-01 NOTE — NURSING NOTE
Pt remains on continual observation. Pt has been primarily visible on the unit and participating in conversation and card games with peers. Denies SI/HI/AH/VH at this time. Medication and meal compliant. Denies any unmet needs or complaints at this time.

## 2025-03-01 NOTE — ASSESSMENT & PLAN NOTE
"Patient presenting with a longstanding history of both mood and psychotic symptoms. Current presentation appears to be psychotic in nature.  After one day of antipsychotics and a full night of sleep on 2/28 patient became more organized and was able to give a coherent history  She describes a manic episode prior to coming into the hospital. She states \"I didn't sleep for 12 days\". Endorses increases in goal directed activity, irritability, increased spending, pressured speech, decreased need for sleep, and grandiosity. States this has occurred 'multiple times' throughout her life.    Plan:  Continue Risperdal to 2 mg BID  Continue Lexapro 10 mg qAM  Encourage lab compliance  "

## 2025-03-01 NOTE — NURSING NOTE
Patient maintained on close proximity level of observation. She has been withdrawn to her room most of the evening. She was cooperative with HS medications. She denies S.I.H.I.A/H V/H

## 2025-03-01 NOTE — PROGRESS NOTES
"Progress Note - Behavioral Health   Name: Gabby Mares 34 y.o. female I MRN: [id number]  Unit/Bed#: -02 I Date of Admission: 2/26/2025   Date of Service: 3/1/2025 I Hospital Day: 3    Assessment & Plan  Bipolar disorder, current episode of casandra with psychotic features, rule out schizoaffective disorder  Patient presenting with a longstanding history of both mood and psychotic symptoms. Current presentation appears to be psychotic in nature.  After one day of antipsychotics and a full night of sleep on 2/28 patient became more organized and was able to give a coherent history  She describes a manic episode prior to coming into the hospital. She states \"I didn't sleep for 12 days\". Endorses increases in goal directed activity, irritability, increased spending, pressured speech, decreased need for sleep, and grandiosity. States this has occurred 'multiple times' throughout her life.    Plan:  Continue Risperdal to 2 mg BID  Continue Lexapro 10 mg qAM  Encourage lab compliance  Anxiety disorder    Medical clearance for psychiatric admission    Obesity        Current medications:  Current Facility-Administered Medications   Medication Dose Route Frequency Provider Last Rate    acetaminophen  650 mg Oral Q6H PRN Ronan Stewart MD      acetaminophen  650 mg Oral Q4H PRN Ronan Stewart MD      acetaminophen  975 mg Oral Q6H PRN Ronan Stewart MD      aluminum-magnesium hydroxide-simethicone  30 mL Oral Q4H PRN Margot De La Cruz MD      haloperidol lactate  2.5 mg Intramuscular Q4H PRN Max 4/day Margot De La Cruz MD      And    LORazepam  1 mg Intramuscular Q4H PRN Max 4/day Margot De La Cruz MD      And    benztropine  0.5 mg Intramuscular Q4H PRN Max 4/day Margot De La Cruz MD      haloperidol lactate  5 mg Intramuscular Q4H PRN Max 4/day Margot De La Cruz MD      And    LORazepam  2 mg Intramuscular Q4H PRN Max 4/day Margot De La Cruz MD      And    benztropine  1 mg Intramuscular Q4H PRN Max 4/day Margot" ARLYN De La Cruz MD      benztropine  1 mg Intramuscular Q4H PRN Max 6/day Margot De La Cruz MD      benztropine  1 mg Oral Q4H PRN Max 6/day Margot De La Cruz MD      bisacodyl  10 mg Rectal Daily PRN Margot De La Cruz MD      hydrOXYzine HCL  50 mg Oral Q6H PRN Max 4/day Margot De La Cruz MD      Or    diphenhydrAMINE  50 mg Intramuscular Q6H PRN Margot De La Cruz MD      escitalopram  10 mg Oral Daily Maicol Koenig MD      haloperidol  1 mg Oral Q6H PRN Margot De La Cruz MD      haloperidol  2.5 mg Oral Q4H PRN Max 4/day Margot De La Cruz MD      haloperidol  5 mg Oral Q4H PRN Max 4/day Margot De La Cruz MD      hydrOXYzine HCL  100 mg Oral Q6H PRN Max 4/day Margot De La Cruz MD      Or    LORazepam  2 mg Intramuscular Q6H PRN Margot De La Cruz MD      hydrOXYzine HCL  25 mg Oral Q6H PRN Max 4/day Margot De La Cruz MD      melatonin  3 mg Oral HS Margot De La Cruz MD      polyethylene glycol  17 g Oral Daily PRN Margot De La Cruz MD      propranolol  10 mg Oral Q8H PRN Margot De La Cruz MD      risperiDONE  2 mg Oral BID Ronan Stewart MD      senna-docusate sodium  1 tablet Oral Daily PRN Margot De La Cruz MD      traZODone  50 mg Oral HS PRN Margot De La Cruz MD          Risks/Benefits of Treatment:     Risks, benefits, and possible side effects of medications explained to patient and patient verbalizes understanding and agreement for treatment.    Progress Toward Goals: progressing    Treatment Planning:     All current active medications have been reviewed.  Continue to monitor response to treatment and assess for potential side effects of medications.  Encourage group therapy, milieu therapy and occupational therapy  Collaboration with medical service for medical comorbidities as indicated.  Behavioral Health checks for safety monitoring.  Estimated Discharge Day: 3/6/2025   Legal Status: Status of Admission  Status of Admission: 201  Release of Information Signed: Unable to obtain.    Subjective     Per  "nursing report patient remains on continual observation.  Noted to be interacting intermittently with peers playing card games.  No behavioral outbursts or issues.    She reports today that \"I am still depressed\".  She is concrete in her thought process, sometimes tangential and appears to be internally preoccupied at times.  She feels that she is tolerating medications without any significant side effects.  Denies any SI or HI presently.    Sleep: slept better  Appetite: increased  Medication side effects: No  ROS: review of systems as noted above in HPI/Subjective report, all other systems are negative    Objective :  Temp:  [97.3 °F (36.3 °C)-97.5 °F (36.4 °C)] 97.5 °F (36.4 °C)  HR:  [82-84] 84  BP: (121-135)/(67-75) 121/67  Resp:  [16] 16  SpO2:  [98 %] 98 %  O2 Device: None (Room air)    Temp:  [97.3 °F (36.3 °C)-97.5 °F (36.4 °C)] 97.5 °F (36.4 °C)  HR:  [82-84] 84  BP: (121-135)/(67-75) 121/67  Resp:  [16] 16  SpO2:  [98 %] 98 %  O2 Device: None (Room air)    Mental Status Evaluation:    Appearance:  adequate grooming   Behavior:  cooperative, calm   Speech:  normal rate and volume   Mood:  depressed   Affect:  blunted   Thought Process:  circumstantial   Thought Content:  negative thinking, ruminations   Perceptual Disturbances: appears preoccupied   Risk Potential: Suicidal Ideation -  None at present  Homicidal Ideation -  None at present  Potential for Aggression - No   Sensorium:  oriented to person, place, and time/date   Memory:  recent and remote memory grossly intact   Consciousness:  alert and awake   Attention/Concentration: attention span and concentration appear shorter than expected for age   Insight:  limited   Judgment: limited   Gait/Station: normal gait/station, normal balance   Motor Activity: no abnormal movements       Lab Results: I have reviewed the following results:  Results from the past 24 hours:   Recent Results (from the past 24 hours)   CBC and differential    Collection Time: " 03/01/25  6:27 AM   Result Value Ref Range    WBC 7.12 4.31 - 10.16 Thousand/uL    RBC 4.48 3.81 - 5.12 Million/uL    Hemoglobin 11.6 11.5 - 15.4 g/dL    Hematocrit 39.3 34.8 - 46.1 %    MCV 88 82 - 98 fL    MCH 25.9 (L) 26.8 - 34.3 pg    MCHC 29.5 (L) 31.4 - 37.4 g/dL    RDW 14.6 11.6 - 15.1 %    MPV 10.6 8.9 - 12.7 fL    Platelets 279 149 - 390 Thousands/uL    nRBC 0 /100 WBCs    Segmented % 60 43 - 75 %    Immature Grans % 0 0 - 2 %    Lymphocytes % 29 14 - 44 %    Monocytes % 8 4 - 12 %    Eosinophils Relative 2 0 - 6 %    Basophils Relative 1 0 - 1 %    Absolute Neutrophils 4.22 1.85 - 7.62 Thousands/µL    Absolute Immature Grans 0.01 0.00 - 0.20 Thousand/uL    Absolute Lymphocytes 2.08 0.60 - 4.47 Thousands/µL    Absolute Monocytes 0.60 0.17 - 1.22 Thousand/µL    Eosinophils Absolute 0.15 0.00 - 0.61 Thousand/µL    Basophils Absolute 0.06 0.00 - 0.10 Thousands/µL   Comprehensive metabolic panel    Collection Time: 03/01/25  6:27 AM   Result Value Ref Range    Sodium 138 135 - 147 mmol/L    Potassium 4.3 3.5 - 5.3 mmol/L    Chloride 103 96 - 108 mmol/L    CO2 27 21 - 32 mmol/L    ANION GAP 8 4 - 13 mmol/L    BUN 19 5 - 25 mg/dL    Creatinine 1.23 0.60 - 1.30 mg/dL    Glucose 98 65 - 140 mg/dL    Glucose, Fasting 98 65 - 99 mg/dL    Calcium 9.0 8.4 - 10.2 mg/dL    AST 17 13 - 39 U/L    ALT 11 7 - 52 U/L    Alkaline Phosphatase 80 34 - 104 U/L    Total Protein 7.4 6.4 - 8.4 g/dL    Albumin 3.8 3.5 - 5.0 g/dL    Total Bilirubin 0.34 0.20 - 1.00 mg/dL    eGFR 56 ml/min/1.73sq m   hCG, serum, qualitative    Collection Time: 03/01/25  6:27 AM   Result Value Ref Range    Preg, Serum Negative Negative   Lipid panel    Collection Time: 03/01/25  6:27 AM   Result Value Ref Range    Cholesterol 133 See Comment mg/dL    Triglycerides 92 See Comment mg/dL    HDL, Direct 50 >=50 mg/dL    LDL Calculated 65 0 - 100 mg/dL    Non-HDL-Chol (CHOL-HDL) 83 mg/dl   TSH, 3rd generation with Free T4 reflex    Collection Time: 03/01/25   6:27 AM   Result Value Ref Range    TSH 3RD [facility] 1.983 0.450 - 4.500 uIU/mL       Administrative Statements     Counseling / Coordination of Care:   Patient's progress discussed with staff in treatment team meeting.  Medication changes reviewed with staff in treatment team meeting..    Mynor Parr MD 03/01/25

## 2025-03-01 NOTE — PLAN OF CARE
Problem: SELF HARM/SUICIDALITY  Goal: Will have no self-injury during hospital stay  Description: INTERVENTIONS:  - Q 15 MINUTES: Routine safety checks  - Q WAKING SHIFT & PRN: Assess risk to determine if routine checks are adequate to maintain patient safety  - Encourage patient to participate actively in care by formulating a plan to combat response to suicidal ideation, identify supports and resources  Outcome: Progressing     Problem: DEPRESSION  Goal: Will be euthymic at discharge  Description: INTERVENTIONS:  - Administer medication as ordered  - Provide emotional support via 1:1 interaction with staff  - Encourage involvement in milieu/groups/activities  - Monitor for social isolation  Outcome: Progressing     Problem: SUBSTANCE USE/ABUSE  Goal: Will have no detox symptoms and will verbalize plan for changing substance-related behavior  Description: INTERVENTIONS:  - Monitor physical status and assess for symptoms of withdrawal  - Administer medication as ordered  - Provide emotional support with 1 on 1 interaction with staff  - Encourage recovery focused program/ addiction education  - Assess for verbalization of changing behaviors related to substance abuse  - Initiate consults and referrals as appropriate (Case Management, Spiritual Care, etc.)  Outcome: Progressing  Goal: By discharge, will develop insight into their chemical dependency and sustain motivation to continue in recovery  Description: INTERVENTIONS:  - Attends all daily group sessions and scheduled AA groups  - Actively practices coping skills through participation in the therapeutic community and adherence to program rules  - Reviews and completes assignments from individual treatment plan  - Assist patient development of understanding of their personal cycle of addiction and relapse triggers  Outcome: Progressing  Goal: By discharge, patient will have ongoing treatment plan addressing chemical dependency  Description: INTERVENTIONS:  -  Assist patient with resources and/or appointments for ongoing recovery based living  Outcome: Progressing     Problem: Ineffective Coping  Goal: Participates in unit activities  Description: Interventions:  - Provide therapeutic environment   - Provide required programming   - Redirect inappropriate behaviors   Outcome: Progressing     Problem: Potential for Falls  Goal: Patient will remain free of falls  Description: INTERVENTIONS:  - Educate patient/family on patient safety including physical limitations  - Instruct patient to call for assistance with activity   - Consult OT/PT to assist with strengthening/mobility   - Keep Call bell within reach  - Keep bed low and locked with side rails adjusted as appropriate  - Keep care items and personal belongings within reach  - Initiate and maintain comfort rounds  - Make Fall Risk Sign visible to staff  Outcome: Progressing     Problem: DISCHARGE PLANNING - CARE MANAGEMENT  Goal: Discharge to post-acute care or home with appropriate resources  Description: INTERVENTIONS:  - Conduct assessment to determine patient/family and health care team treatment goals, and need for post-acute services based on payer coverage, community resources, and patient preferences, and barriers to discharge  - Address psychosocial, clinical, and financial barriers to discharge as identified in assessment in conjunction with the patient/family and health care team  - Arrange appropriate level of post-acute services according to patient’s   needs and preference and payer coverage in collaboration with the physician and health care team  - Communicate with and update the patient/family, physician, and health care team regarding progress on the discharge plan  - Arrange appropriate transportation to post-acute venues  Outcome: Progressing

## 2025-03-02 LAB — TREPONEMA PALLIDUM IGG+IGM AB [PRESENCE] IN SERUM OR PLASMA BY IMMUNOASSAY: NORMAL

## 2025-03-02 PROCEDURE — 99232 SBSQ HOSP IP/OBS MODERATE 35: CPT | Performed by: STUDENT IN AN ORGANIZED HEALTH CARE EDUCATION/TRAINING PROGRAM

## 2025-03-02 RX ORDER — FOLIC ACID 1 MG/1
1 TABLET ORAL DAILY
Status: DISCONTINUED | OUTPATIENT
Start: 2025-03-02 | End: 2025-03-13 | Stop reason: HOSPADM

## 2025-03-02 RX ORDER — ERGOCALCIFEROL 1.25 MG/1
50000 CAPSULE, LIQUID FILLED ORAL WEEKLY
Status: DISCONTINUED | OUTPATIENT
Start: 2025-03-02 | End: 2025-03-13 | Stop reason: HOSPADM

## 2025-03-02 RX ORDER — CYANOCOBALAMIN 1000 UG/ML
1000 INJECTION, SOLUTION INTRAMUSCULAR; SUBCUTANEOUS ONCE
Status: COMPLETED | OUTPATIENT
Start: 2025-03-02 | End: 2025-03-02

## 2025-03-02 RX ADMIN — RISPERIDONE 2 MG: 2 TABLET, FILM COATED ORAL at 08:28

## 2025-03-02 RX ADMIN — ERGOCALCIFEROL 50000 UNITS: 1.25 CAPSULE ORAL at 11:16

## 2025-03-02 RX ADMIN — Medication 3 MG: at 21:00

## 2025-03-02 RX ADMIN — ESCITALOPRAM OXALATE 10 MG: 10 TABLET, FILM COATED ORAL at 08:28

## 2025-03-02 RX ADMIN — FOLIC ACID 1 MG: 1 TABLET ORAL at 11:16

## 2025-03-02 RX ADMIN — RISPERIDONE 2 MG: 2 TABLET, FILM COATED ORAL at 21:00

## 2025-03-02 RX ADMIN — CYANOCOBALAMIN 1000 MCG: 1000 INJECTION INTRAMUSCULAR; SUBCUTANEOUS at 11:16

## 2025-03-02 NOTE — PROGRESS NOTES
"Progress Note - Behavioral Health   Name: Gabby Mares 34 y.o. female I MRN: [id number]  Unit/Bed#: -01 I Date of Admission: 2/26/2025   Date of Service: 3/2/2025 I Hospital Day: 4    Assessment & Plan  Bipolar disorder, current episode of casandra with psychotic features, rule out schizoaffective disorder  Patient presenting with a longstanding history of both mood and psychotic symptoms. Current presentation appears to be psychotic in nature.  After one day of antipsychotics and a full night of sleep on 2/28 patient became more organized and was able to give a coherent history  She describes a manic episode prior to coming into the hospital. She states \"I didn't sleep for 12 days\". Endorses increases in goal directed activity, irritability, increased spending, pressured speech, decreased need for sleep, and grandiosity. States this has occurred 'multiple times' throughout her life.    Plan:  Continue Risperdal to 2 mg BID  Continue Lexapro 10 mg qAM  Encourage lab compliance  Anxiety disorder    Medical clearance for psychiatric admission    Obesity        Current medications:  Current Facility-Administered Medications   Medication Dose Route Frequency Provider Last Rate    acetaminophen  650 mg Oral Q6H PRN Ronan Stewart MD      acetaminophen  650 mg Oral Q4H PRN Ronan Stewart MD      acetaminophen  975 mg Oral Q6H PRN Ronan Stewart MD      aluminum-magnesium hydroxide-simethicone  30 mL Oral Q4H PRN Margot De La Cruz MD      haloperidol lactate  2.5 mg Intramuscular Q4H PRN Max 4/day Margot De La Cruz MD      And    LORazepam  1 mg Intramuscular Q4H PRN Max 4/day Margot De La Cruz MD      And    benztropine  0.5 mg Intramuscular Q4H PRN Max 4/day Margot De La Cruz MD      haloperidol lactate  5 mg Intramuscular Q4H PRN Max 4/day Margot De La Cruz MD      And    LORazepam  2 mg Intramuscular Q4H PRN Max 4/day Margot De La Cruz MD      And    benztropine  1 mg Intramuscular Q4H PRN Max 4/day Margot" ARLYN De La Cruz MD      benztropine  1 mg Intramuscular Q4H PRN Max 6/day Margot De La Cruz MD      benztropine  1 mg Oral Q4H PRN Max 6/day Margot De La Cruz MD      bisacodyl  10 mg Rectal Daily PRN Margot De La Cruz MD      [START ON 3/3/2025] cyanocobalamin  1,000 mcg Oral Daily ODILON Leonardo      hydrOXYzine HCL  50 mg Oral Q6H PRN Max 4/day Margot De La Cruz MD      Or    diphenhydrAMINE  50 mg Intramuscular Q6H PRN Margot De La Cruz MD      ergocalciferol  50,000 Units Oral Weekly ODILON Leonardo      escitalopram  10 mg Oral Daily Maicol Koenig MD      folic acid  1 mg Oral Daily ODILON Leonardo      haloperidol  1 mg Oral Q6H PRN Margot De La Cruz MD      haloperidol  2.5 mg Oral Q4H PRN Max 4/day Margot De La Cruz MD      haloperidol  5 mg Oral Q4H PRN Max 4/day Margot De La Cruz MD      hydrOXYzine HCL  100 mg Oral Q6H PRN Max 4/day Margot De La Cruz MD      Or    LORazepam  2 mg Intramuscular Q6H PRN Margot De La Cruz MD      hydrOXYzine HCL  25 mg Oral Q6H PRN Max 4/day Margot De La Cruz MD      melatonin  3 mg Oral HS Margot De La Cruz MD      polyethylene glycol  17 g Oral Daily PRN Margot De La Cruz MD      propranolol  10 mg Oral Q8H PRN Margot De La Cruz MD      risperiDONE  2 mg Oral BID Ronan Stewart MD      senna-docusate sodium  1 tablet Oral Daily PRN Margot De La Cruz MD      traZODone  50 mg Oral HS PRN Margot De La Cruz MD          Risks/Benefits of Treatment:     Risks, benefits, and possible side effects of medications explained to patient and patient verbalizes understanding and agreement for treatment.    Progress Toward Goals: progressing    Treatment Planning:     All current active medications have been reviewed.  Continue to monitor response to treatment and assess for potential side effects of medications.  Encourage group therapy, milieu therapy and occupational therapy  Collaboration with medical service for medical comorbidities as  "indicated.  Behavioral Health checks for safety monitoring.  Estimated Discharge Day: 3/6/2025   Legal Status: Status of Admission  Status of Admission: 201  Release of Information Signed: Unable to obtain.    Subjective     Patient remains on continual observation Per nursing report.  No behavioral issues or outburst.    She reports at this time that her mood is \"little better\".  She reports that yesterday she felt \"confused\".  When asked to better define this she had difficulty and gave a somewhat disorganized response.  She reports that she has been sleeping better, appetite is improving as well.  She feels that the medications are working better. Denies any A/VH presently. Contracts to safety.    Sleep: slept better  Appetite: normal  Medication side effects: No  ROS: review of systems as noted above in HPI/Subjective report, all other systems are negative    Objective :  Temp:  [96.9 °F (36.1 °C)-97.3 °F (36.3 °C)] 97.3 °F (36.3 °C)  HR:  [84-85] 84  BP: (128-137)/(69-90) 137/69  Resp:  [16] 16  SpO2:  [98 %] 98 %  O2 Device: None (Room air)    Temp:  [96.9 °F (36.1 °C)-97.3 °F (36.3 °C)] 97.3 °F (36.3 °C)  HR:  [84-85] 84  BP: (128-137)/(69-90) 137/69  Resp:  [16] 16  SpO2:  [98 %] 98 %  O2 Device: None (Room air)    Mental Status Evaluation:    Appearance:  marginal hygiene   Behavior:  pleasant, cooperative   Speech:  delayed   Mood:  depressed   Affect:  blunted   Thought Process:  disorganized   Thought Content:  negative thinking   Perceptual Disturbances: no auditory hallucinations, no visual hallucinations, appears preoccupied   Risk Potential: Suicidal Ideation -  None at present  Homicidal Ideation -  None  Potential for Aggression - No   Sensorium:  oriented to person, place, and time/date   Memory:  recent and remote memory grossly intact   Consciousness:  alert and awake   Attention/Concentration: poor attention span and poor concentration   Insight:  poor   Judgment: poor   Gait/Station: normal " gait/station, normal balance   Motor Activity: no abnormal movements       Lab Results: I have reviewed the following results:  Results from the past 24 hours: No results found for this or any previous visit (from the past 24 hours).    Administrative Statements     Counseling / Coordination of Care:   Patient's progress discussed with staff in treatment team meeting.  Medication changes reviewed with staff in treatment team meeting..    Mynor Parr MD 03/02/25

## 2025-03-02 NOTE — NURSING NOTE
Patient has been maintained on close proximity level of observation and she has maintained behavioral control. She was cooperative with HS scheduled medication and was showing more interest in the medications she is receiving. She denies S.I.H.I.A/H V/H

## 2025-03-02 NOTE — NURSING NOTE
Pt remains on continual observation. Remains in behavior control. Denies SI/HI/AH/VH at this time. Visible on the unit throughout the day. Med and meal compliant. Denies any unmet needs or complaints.

## 2025-03-02 NOTE — NURSING NOTE
"Pt requesting to come off her 1:1 stating \"I mean come on, I have been so good.\" Pt given reassurance and encouraged to continue her appropriate behaviors and it will be discussed with her treatment team in the morning. Pt pleasant and agreeable.   "

## 2025-03-03 PROCEDURE — 99232 SBSQ HOSP IP/OBS MODERATE 35: CPT | Performed by: PSYCHIATRY & NEUROLOGY

## 2025-03-03 RX ADMIN — HYDROXYZINE HYDROCHLORIDE 100 MG: 50 TABLET, FILM COATED ORAL at 16:57

## 2025-03-03 RX ADMIN — CYANOCOBALAMIN TAB 1000 MCG 1000 MCG: 1000 TAB at 08:36

## 2025-03-03 RX ADMIN — FOLIC ACID 1 MG: 1 TABLET ORAL at 08:36

## 2025-03-03 RX ADMIN — RISPERIDONE 2 MG: 2 TABLET, FILM COATED ORAL at 08:36

## 2025-03-03 RX ADMIN — Medication 3 MG: at 21:10

## 2025-03-03 RX ADMIN — ESCITALOPRAM OXALATE 10 MG: 10 TABLET, FILM COATED ORAL at 08:36

## 2025-03-03 RX ADMIN — RISPERIDONE 2 MG: 2 TABLET, FILM COATED ORAL at 21:10

## 2025-03-03 NOTE — NURSING NOTE
Pt continual observation discontinued. Pt in behavioral control and happy to be no longer on a 1:1. Denies SI/HI/AH/VH at this time. Denies SI/HI/AH/VH at this time. Med and meal compliant. Denies any unmet needs or complaints.

## 2025-03-03 NOTE — PROGRESS NOTES
"Progress Note - Behavioral Health   Name: Gabby Mares 34 y.o. female I MRN: [id number]  Unit/Bed#: -01 I Date of Admission: 2/26/2025   Date of Service: 3/3/2025 I Hospital Day: 5    Assessment & Plan  Bipolar disorder, current episode of casandra with psychotic features, rule out schizoaffective disorder  Patient presenting with a longstanding history of both mood and psychotic symptoms. Current presentation appears to be psychotic in nature.  After one day of antipsychotics and a full night of sleep on 2/28 patient became more organized and was able to give a coherent history  She describes a manic episode prior to coming into the hospital. She states \"I didn't sleep for 12 days\". Endorses increases in goal directed activity, irritability, increased spending, pressured speech, decreased need for sleep, and grandiosity. States this has occurred 'multiple times' throughout her life.    Plan: no medication adjustments as of 3/3/25  Continue Risperdal 2 mg BID  Continue Lexapro 10 mg qAM  Discontinue 1-1 observation and paper clothes  Anxiety disorder    Medical clearance for psychiatric admission    Obesity      Current medications:  Current Facility-Administered Medications   Medication Dose Route Frequency Provider Last Rate    acetaminophen  650 mg Oral Q6H PRN Ronan Stewart MD      acetaminophen  650 mg Oral Q4H PRN Ronan Stewart MD      acetaminophen  975 mg Oral Q6H PRN Ronan Stewart MD      aluminum-magnesium hydroxide-simethicone  30 mL Oral Q4H PRN Margot De La Cruz MD      haloperidol lactate  2.5 mg Intramuscular Q4H PRN Max 4/day Margot De La Cruz MD      And    LORazepam  1 mg Intramuscular Q4H PRN Max 4/day Margot De La Cruz MD      And    benztropine  0.5 mg Intramuscular Q4H PRN Max 4/day Margot De La Cruz MD      haloperidol lactate  5 mg Intramuscular Q4H PRN Max 4/day Margot De La Cruz MD      And    LORazepam  2 mg Intramuscular Q4H PRN Max 4/day Margot De La Cruz MD      And    " benztropine  1 mg Intramuscular Q4H PRN Max 4/day Margot De La Cruz MD      benztropine  1 mg Intramuscular Q4H PRN Max 6/day Margot De La Cruz MD      benztropine  1 mg Oral Q4H PRN Max 6/day Margot De La Cruz MD      bisacodyl  10 mg Rectal Daily PRN Margot De La Cruz MD      cyanocobalamin  1,000 mcg Oral Daily ODILON Leonardo      hydrOXYzine HCL  50 mg Oral Q6H PRN Max 4/day Margot De La Cruz MD      Or    diphenhydrAMINE  50 mg Intramuscular Q6H PRN Margot De La Cruz MD      ergocalciferol  50,000 Units Oral Weekly ODILON Leonardo      escitalopram  10 mg Oral Daily Maicol Koenig MD      folic acid  1 mg Oral Daily ODILON Leonardo      haloperidol  1 mg Oral Q6H PRN Margot De La Cruz MD      haloperidol  2.5 mg Oral Q4H PRN Max 4/day Margot De La Cruz MD      haloperidol  5 mg Oral Q4H PRN Max 4/day Margot De La Cruz MD      hydrOXYzine HCL  100 mg Oral Q6H PRN Max 4/day Margot De La Cruz MD      Or    LORazepam  2 mg Intramuscular Q6H PRN Margot De La Cruz MD      hydrOXYzine HCL  25 mg Oral Q6H PRN Max 4/day Margot De La Cruz MD      melatonin  3 mg Oral HS Margot De La Cruz MD      polyethylene glycol  17 g Oral Daily PRN Margot De La Cruz MD      propranolol  10 mg Oral Q8H PRN Margot De La Cruz MD      risperiDONE  2 mg Oral BID Ronan Stewart MD      senna-docusate sodium  1 tablet Oral Daily PRN Margot De La Cruz MD      traZODone  50 mg Oral HS PRN Margot De La Cruz MD          Risks/Benefits of Treatment:     Risks, benefits, and possible side effects of medications explained to patient and patient verbalizes understanding and agreement for treatment.    Progress Toward Goals: improving    Treatment Planning:     All current active medications have been reviewed.  Continue to monitor response to treatment and assess for potential side effects of medications.  Encourage group therapy, milieu therapy and occupational therapy  Collaboration with medical service for  "medical comorbidities as indicated.  Behavioral Health checks for safety monitoring.  Long Stay Certification : Not Applicable  Estimated Discharge Day: 3/10/2025 7 days (3/10/2025)  Legal Status : Voluntary 201 commitment.    Subjective     Behavior over the last 24 hours: improving.    Per nursing: Patient has been maintained on close proximity observation. She denies S.I.H.I.A/H V/H and she was cooperative with HS medication.     Patient was seen and evaluated in the milieu today. On initial approach, Gabby is organized and coherent in her thought process as she requests to have her 1-1 observation removed. She has maintained good behavioral control over the weekend with no incidents. She denies plan, ideation or intent to end her own life, harm herself, or harm others.  She states she feels better on her current psychiatric medications. She denies adverse effects from her medications. She denies auditory and visual hallucinations. Her request to discontinue 1-1 was granted and she was able to contract for safety on the unit.    Sleep: improved  Appetite: fair  Medication side effects: No  ROS: review of systems as noted above in HPI/Subjective report, all other systems are negative    Objective :  Temp:  [97.6 °F (36.4 °C)] 97.6 °F (36.4 °C)  HR:  [74] 74  BP: (133)/(68) 133/68  Resp:  [16] 16  SpO2:  [98 %] 98 %  O2 Device: None (Room air)    Temp:  [97.6 °F (36.4 °C)] 97.6 °F (36.4 °C)  HR:  [74] 74  BP: (133)/(68) 133/68  Resp:  [16] 16  SpO2:  [98 %] 98 %  O2 Device: None (Room air)    Mental Status Evaluation:  Appearance:  adequate hygiene, dressed in hospital attire, looks older than stated age   Behavior:  Calm, cooperative   Speech:  normal rate, normal volume, normal pitch, spontaneous   Mood:  \"better\"   Affect:  euthymic   Thought Process:  Logical, linear, goal-directed   Thought Content:  No evidence of paranoia or delusions   Perceptual Disturbances: Denies auditory and visual hallucinations. No " evidence of internal preoccupation   Risk Potential: Suicidal Ideation - denies during interview  Homicidal Ideations - denies during interview  Potential for Aggression - no   Sensorium:  oriented to person, place, time, situation   Memory:  recent and remote memory: appears grossly average   Consciousness:  alert and awake   Attention/Concentration: attention span and concentration appear shorter than expected for age   Intellect: Average   Fund of Knowledge: Average   Insight:  improving   Judgment: improving   Muscle Strength:  Muscle Tone: normal  normal   Gait/Station: normal gait/station, normal balance   Motor Activity: no abnormal movements       Lab Results: I have reviewed the following results:  Results from the past 24 hours: No results found for this or any previous visit (from the past 24 hours).    Administrative Statements     Counseling / Coordination of Care:   I have spent a total time of 20 minutes in caring for this patient on the day of the visit/encounter including:  Patient's progress discussed with staff in treatment team meeting.  Medication changes reviewed with staff in treatment team meeting.  Medications, treatment progress and treatment plan reviewed with patient..    Ronan Stewart MD 03/03/25

## 2025-03-03 NOTE — NURSING NOTE
Patient has been maintained on close proximity observation. She denies S.I.H.I.A/H V/H and she was cooperative with HS medication.

## 2025-03-03 NOTE — PROGRESS NOTES
03/03/25 0919   Team Meeting   Meeting Type Daily Rounds   Team Members Present   Team Members Present Physician;Nurse;   Physician Team Member Arya   Nursing Team Member Denissejusitce   [facility] Management Team Member Randa   Patient/Family Present   Patient Present No   Patient's Family Present No     Pt med/meal compliant. Visible on the unit. Pleasant, calm, cooperative. Remains on 1:1, focused on discontinuing 1:1. Discussed possibility of d/c 1:1 today.

## 2025-03-03 NOTE — ASSESSMENT & PLAN NOTE
"Patient presenting with a longstanding history of both mood and psychotic symptoms. Current presentation appears to be psychotic in nature.  After one day of antipsychotics and a full night of sleep on 2/28 patient became more organized and was able to give a coherent history  She describes a manic episode prior to coming into the hospital. She states \"I didn't sleep for 12 days\". Endorses increases in goal directed activity, irritability, increased spending, pressured speech, decreased need for sleep, and grandiosity. States this has occurred 'multiple times' throughout her life.    Plan: no medication adjustments as of 3/3/25  Continue Risperdal 2 mg BID  Continue Lexapro 10 mg qAM  Discontinue 1-1 observation and paper clothes  "

## 2025-03-04 PROCEDURE — 99232 SBSQ HOSP IP/OBS MODERATE 35: CPT | Performed by: PSYCHIATRY & NEUROLOGY

## 2025-03-04 RX ADMIN — CYANOCOBALAMIN TAB 1000 MCG 1000 MCG: 1000 TAB at 08:23

## 2025-03-04 RX ADMIN — RISPERIDONE 3 MG: 2 TABLET, FILM COATED ORAL at 21:30

## 2025-03-04 RX ADMIN — Medication 3 MG: at 21:30

## 2025-03-04 RX ADMIN — FOLIC ACID 1 MG: 1 TABLET ORAL at 08:23

## 2025-03-04 RX ADMIN — ESCITALOPRAM OXALATE 10 MG: 10 TABLET, FILM COATED ORAL at 08:23

## 2025-03-04 RX ADMIN — HYDROXYZINE HYDROCHLORIDE 100 MG: 50 TABLET, FILM COATED ORAL at 16:55

## 2025-03-04 RX ADMIN — RISPERIDONE 2 MG: 2 TABLET, FILM COATED ORAL at 08:23

## 2025-03-04 NOTE — NURSING NOTE
Pt received atarax 100mg @ 1655 for severe anxiety related to her roommate telling her about androids coming to get her. Reassessed @ 1755 and found to be effective. Denies SI/HI/AH/VH at this time. Medication and meal compliant. Denies any unmet needs or complaints.

## 2025-03-04 NOTE — ASSESSMENT & PLAN NOTE
"Patient presenting with a longstanding history of both mood and psychotic symptoms. Current presentation appears to be psychotic in nature.  After one day of antipsychotics and a full night of sleep on 2/28 patient became more organized and was able to give a coherent history  She describes a manic episode prior to coming into the hospital. She states \"I didn't sleep for 12 days\". Endorses increases in goal directed activity, irritability, increased spending, pressured speech, decreased need for sleep, and grandiosity. States this has occurred 'multiple times' throughout her life.    Plan:   Increase Risperdal to 3 mg BID starting evening of 3/4/25  Continue Lexapro 10 mg qAM  "

## 2025-03-04 NOTE — PROGRESS NOTES
03/04/25 0902   Team Meeting   Meeting Type Daily Rounds   Team Members Present   Team Members Present Physician;Nurse;   Physician Team Member Arya   Nursing Team Member Denissejustice   [facility] Management Team Member Randa   Patient/Family Present   Patient Present No   Patient's Family Present No     Pt med/meal compliant. Denies SI/HI. Discussed potential increase in Risperdal. Pleasant, calm, cooperative. 1:1 discontinued yesterday. Discharge possible Tuesday or Wednesday next week.

## 2025-03-04 NOTE — PROGRESS NOTES
"Progress Note - Behavioral Health   Name: Gabby Mares 34 y.o. female I MRN: [id number]  Unit/Bed#: -01 I Date of Admission: 2/26/2025   Date of Service: 3/4/2025 I Hospital Day: 6    Assessment & Plan  Bipolar disorder, current episode of casandra with psychotic features, rule out schizoaffective disorder  Patient presenting with a longstanding history of both mood and psychotic symptoms. Current presentation appears to be psychotic in nature.  After one day of antipsychotics and a full night of sleep on 2/28 patient became more organized and was able to give a coherent history  She describes a manic episode prior to coming into the hospital. She states \"I didn't sleep for 12 days\". Endorses increases in goal directed activity, irritability, increased spending, pressured speech, decreased need for sleep, and grandiosity. States this has occurred 'multiple times' throughout her life.    Plan:   Increase Risperdal to 3 mg BID starting evening of 3/4/25  Continue Lexapro 10 mg qAM  Anxiety disorder    Medical clearance for psychiatric admission    Obesity      Current medications:  Current Facility-Administered Medications   Medication Dose Route Frequency Provider Last Rate    acetaminophen  650 mg Oral Q6H PRN Ronan Stewart MD      acetaminophen  650 mg Oral Q4H PRN Ronan Stewart MD      acetaminophen  975 mg Oral Q6H PRN Ronan Stewart MD      aluminum-magnesium hydroxide-simethicone  30 mL Oral Q4H PRN Margot De La Cruz MD      haloperidol lactate  2.5 mg Intramuscular Q4H PRN Max 4/day Margot De La Cruz MD      And    LORazepam  1 mg Intramuscular Q4H PRN Max 4/day Margot De La Cruz MD      And    benztropine  0.5 mg Intramuscular Q4H PRN Max 4/day Margot De La Cruz MD      haloperidol lactate  5 mg Intramuscular Q4H PRN Max 4/day Margot De La Cruz MD      And    LORazepam  2 mg Intramuscular Q4H PRN Max 4/day Margot De La Cruz MD      And    benztropine  1 mg Intramuscular Q4H PRN Max 4/day Margot" ARLYN De La Cruz MD      benztropine  1 mg Intramuscular Q4H PRN Max 6/day Margot De La Cruz MD      benztropine  1 mg Oral Q4H PRN Max 6/day Margot De La Cruz MD      bisacodyl  10 mg Rectal Daily PRN Margot De La Cruz MD      cyanocobalamin  1,000 mcg Oral Daily Bri Fuentes, ODILON      hydrOXYzine HCL  50 mg Oral Q6H PRN Max 4/day Margot De La Cruz MD      Or    diphenhydrAMINE  50 mg Intramuscular Q6H PRN Margot De La Cruz MD      ergocalciferol  50,000 Units Oral Weekly ODILON Leonardo      escitalopram  10 mg Oral Daily Maicol Koenig MD      folic acid  1 mg Oral Daily Bri Fuentes, PAULANP      haloperidol  1 mg Oral Q6H PRN Margot De La Cruz MD      haloperidol  2.5 mg Oral Q4H PRN Max 4/day Margot De La Cruz MD      haloperidol  5 mg Oral Q4H PRN Max 4/day Margot De La Cruz MD      hydrOXYzine HCL  100 mg Oral Q6H PRN Max 4/day Margot De La Cruz MD      Or    LORazepam  2 mg Intramuscular Q6H PRN Margot De La Cruz MD      hydrOXYzine HCL  25 mg Oral Q6H PRN Max 4/day Margot De La Cruz MD      melatonin  3 mg Oral HS Margot De La Cruz MD      polyethylene glycol  17 g Oral Daily PRN Margot De La Cruz MD      propranolol  10 mg Oral Q8H PRN Margot De La Cruz MD      risperiDONE  2 mg Oral BID Ronan Stewart MD      senna-docusate sodium  1 tablet Oral Daily PRN Margot De La Cruz MD      traZODone  50 mg Oral HS PRN Margot De La Cruz MD          Risks/Benefits of Treatment:     Risks, benefits, and possible side effects of medications explained to patient and patient verbalizes understanding and agreement for treatment.    Progress Toward Goals: improving    Treatment Planning:     All current active medications have been reviewed.  Continue to monitor response to treatment and assess for potential side effects of medications.  Encourage group therapy, milieu therapy and occupational therapy  Collaboration with medical service for medical comorbidities as indicated.  Behavioral Health checks  "for safety monitoring.  Long Stay Certification : Not Applicable  Estimated Discharge Day: 3/10/2025 7 days (3/11/2025)  Legal Status : Voluntary 201 commitment.    Subjective     Behavior over the last 24 hours: improving.    Per nursing: Patient visible on the unit, appears to preoccupied and talking to self at times. Patient denies SI/HI/AH/VH ,compliant with medications and routine vitals. Currently resting in bed no distress noted     Patient was seen and evaluated in the milieu today. On initial approach, Gabby is demonstrating thought blocking and is scant in conversation. She has prolonged intense eye contact and replies to questions with one word answers. Denies suicidal and homicidal ideation. She denies auditory and visual hallucinations.She appears internally distracted at times. She states she feels better on her current psychiatric medications. She is having bowel movements and reports her appetite has increased. She reports sleeping four hour last night. She denies adverse effects from her medications.     Sleep: improved  Appetite: fair  Medication side effects: No  ROS: review of systems as noted above in HPI/Subjective report, all other systems are negative    Objective :  Temp:  [96.9 °F (36.1 °C)-97.1 °F (36.2 °C)] 97.1 °F (36.2 °C)  HR:  [79-81] 81  BP: (127-128)/(82-84) 128/84  Resp:  [16] 16  SpO2:  [96 %-98 %] 98 %  O2 Device: None (Room air)    Temp:  [96.9 °F (36.1 °C)-97.1 °F (36.2 °C)] 97.1 °F (36.2 °C)  HR:  [79-81] 81  BP: (127-128)/(82-84) 128/84  Resp:  [16] 16  SpO2:  [96 %-98 %] 98 %  O2 Device: None (Room air)    Mental Status Evaluation:  Appearance:  adequate hygiene, dressed in hospital attire, looks older than stated age   Behavior:  Calm, cooperative   Speech:  Scant, delayed initiation   Mood:  \"good\"   Affect:  euthymic   Thought Process:  Logical, linear, goal-directed   Thought Content:  No evidence of paranoia or delusions   Perceptual Disturbances: Denies auditory and " visual hallucinations. Internally distracted at times during interview   Risk Potential: Suicidal Ideation - denies during interview  Homicidal Ideations - denies during interview  Potential for Aggression - no   Sensorium:  oriented to person, place, time, situation   Memory:  recent and remote memory: appears grossly average   Consciousness:  alert and awake   Attention/Concentration: attention span and concentration appear shorter than expected for age   Intellect: Average   Fund of Knowledge: Average   Insight:  improving   Judgment: improving   Muscle Strength:  Muscle Tone: normal  normal   Gait/Station: normal gait/station, normal balance   Motor Activity: no abnormal movements       Lab Results: I have reviewed the following results:  Results from the past 24 hours: No results found for this or any previous visit (from the past 24 hours).    Administrative Statements     Counseling / Coordination of Care:   I have spent a total time of 20 minutes in caring for this patient on the day of the visit/encounter including:  Patient's progress discussed with staff in treatment team meeting.  Medication changes reviewed with staff in treatment team meeting.  Medications, treatment progress and treatment plan reviewed with patient..    Ronan Stewart MD 03/04/25

## 2025-03-04 NOTE — NURSING NOTE
Patient offers no further complaints of anxiety at this time visible on the unit. Prn atarax 100 mg po effective.

## 2025-03-04 NOTE — NURSING NOTE
Patient visible on the unit, appears to preoccupied and talking to self at times. Patient denies SI/HI/AH/VH ,compliant with medications and routine vitals. Currently resting in bed no distress noted.

## 2025-03-05 PROCEDURE — 99232 SBSQ HOSP IP/OBS MODERATE 35: CPT | Performed by: PSYCHIATRY & NEUROLOGY

## 2025-03-05 RX ORDER — ESCITALOPRAM OXALATE 10 MG/1
20 TABLET ORAL DAILY
Status: DISCONTINUED | OUTPATIENT
Start: 2025-03-06 | End: 2025-03-13 | Stop reason: HOSPADM

## 2025-03-05 RX ADMIN — RISPERIDONE 3 MG: 2 TABLET, FILM COATED ORAL at 08:29

## 2025-03-05 RX ADMIN — Medication 3 MG: at 21:34

## 2025-03-05 RX ADMIN — FOLIC ACID 1 MG: 1 TABLET ORAL at 08:28

## 2025-03-05 RX ADMIN — RISPERIDONE 3 MG: 2 TABLET, FILM COATED ORAL at 21:34

## 2025-03-05 RX ADMIN — CYANOCOBALAMIN TAB 1000 MCG 1000 MCG: 1000 TAB at 08:28

## 2025-03-05 RX ADMIN — ESCITALOPRAM OXALATE 10 MG: 10 TABLET, FILM COATED ORAL at 08:29

## 2025-03-05 NOTE — PROGRESS NOTES
03/05/25 0851   Team Meeting   Meeting Type Daily Rounds   Team Members Present   Team Members Present Physician;Nurse;   Physician Team Member Arya   [facility]   Care Management Team Member Sharon   Patient/Family Present   Patient Present No   Patient's Family Present No     Pt was reporting anxiety and given Atarax and that was affective. Pt denies SI/HI/AVH. Pt is calm and cooperative. Pt's discharge is pending next week. i

## 2025-03-05 NOTE — ASSESSMENT & PLAN NOTE
"Patient presenting with a longstanding history of both mood and psychotic symptoms. Current presentation appears to be psychotic in nature.  After one day of antipsychotics and a full night of sleep on 2/28 patient became more organized and was able to give a coherent history  She describes a manic episode prior to coming into the hospital. She states \"I didn't sleep for 12 days\". Endorses increases in goal directed activity, irritability, increased spending, pressured speech, decreased need for sleep, and grandiosity. States this has occurred 'multiple times' throughout her life.    Plan:   Continue Risperdal to 3 mg BID starting evening of 3/4/25  Increase Lexapro to 20 mg qAM starting 3/6  Patient agreeable to Invega long-acting injectable.  Will initiate once stabilized on therapeutic dose of oral Risperdal  "

## 2025-03-05 NOTE — PROGRESS NOTES
"Progress Note - Behavioral Health   Name: Gabby Mares 34 y.o. female I MRN: [id number]  Unit/Bed#: -01 I Date of Admission: 2/26/2025   Date of Service: 3/5/2025 I Hospital Day: 7    Assessment & Plan  Bipolar disorder, current episode of casandra with psychotic features, rule out schizoaffective disorder  Patient presenting with a longstanding history of both mood and psychotic symptoms. Current presentation appears to be psychotic in nature.  After one day of antipsychotics and a full night of sleep on 2/28 patient became more organized and was able to give a coherent history  She describes a manic episode prior to coming into the hospital. She states \"I didn't sleep for 12 days\". Endorses increases in goal directed activity, irritability, increased spending, pressured speech, decreased need for sleep, and grandiosity. States this has occurred 'multiple times' throughout her life.    Plan:   Continue Risperdal to 3 mg BID starting evening of 3/4/25  Increase Lexapro to 20 mg qAM starting 3/6  Patient agreeable to Invega long-acting injectable.  Will initiate once stabilized on therapeutic dose of oral Risperdal  Anxiety disorder    Medical clearance for psychiatric admission    Obesity      Current medications:  Current Facility-Administered Medications   Medication Dose Route Frequency Provider Last Rate    acetaminophen  650 mg Oral Q6H PRN Ronan Stewart MD      acetaminophen  650 mg Oral Q4H PRN Ronan Stewart MD      acetaminophen  975 mg Oral Q6H PRN Ronan Stewart MD      aluminum-magnesium hydroxide-simethicone  30 mL Oral Q4H PRN Margot De La Cruz MD      haloperidol lactate  2.5 mg Intramuscular Q4H PRN Max 4/day Margot De La Cruz MD      And    LORazepam  1 mg Intramuscular Q4H PRN Max 4/day Margot De La Cruz MD      And    benztropine  0.5 mg Intramuscular Q4H PRN Max 4/day Margot De La Cruz MD      haloperidol lactate  5 mg Intramuscular Q4H PRN Max 4/day Margot De La Cruz MD      And    " LORazepam  2 mg Intramuscular Q4H PRN Max 4/day Margot De La Cruz MD      And    benztropine  1 mg Intramuscular Q4H PRN Max 4/day Margot De La Cruz MD      benztropine  1 mg Intramuscular Q4H PRN Max 6/day Margot De La Cruz MD      benztropine  1 mg Oral Q4H PRN Max 6/day Margot De La Cruz MD      bisacodyl  10 mg Rectal Daily PRN Margot De La Cruz MD      cyanocobalamin  1,000 mcg Oral Daily ODILON Leonardo      hydrOXYzine HCL  50 mg Oral Q6H PRN Max 4/day Margot De La Cruz MD      Or    diphenhydrAMINE  50 mg Intramuscular Q6H PRN Margot De La Cruz MD      ergocalciferol  50,000 Units Oral Weekly ODILON Leonardo      escitalopram  10 mg Oral Daily Maicol Koenig MD      folic acid  1 mg Oral Daily ODILON Leonardo      haloperidol  1 mg Oral Q6H PRN Margot De La Cruz MD      haloperidol  2.5 mg Oral Q4H PRN Max 4/day Margot De La Cruz MD      haloperidol  5 mg Oral Q4H PRN Max 4/day Margot De La Cruz MD      hydrOXYzine HCL  100 mg Oral Q6H PRN Max 4/day Margot De La Cruz MD      Or    LORazepam  2 mg Intramuscular Q6H PRN Margot De La Cruz MD      hydrOXYzine HCL  25 mg Oral Q6H PRN Max 4/day Margot De La Cruz MD      melatonin  3 mg Oral HS Margot De La Cruz MD      polyethylene glycol  17 g Oral Daily PRN Margot De La Cruz MD      propranolol  10 mg Oral Q8H PRN Margot De La Cruz MD      risperiDONE  3 mg Oral BID Ronan Stewart MD      senna-docusate sodium  1 tablet Oral Daily PRN Margot De La Cruz MD      traZODone  50 mg Oral HS PRN Margot De La Cruz MD          Risks/Benefits of Treatment:     Risks, benefits, and possible side effects of medications explained to patient and patient verbalizes understanding and agreement for treatment.    Progress Toward Goals: improving    Treatment Planning:     All current active medications have been reviewed.  Continue to monitor response to treatment and assess for potential side effects of medications.  Encourage group therapy,  "milieu therapy and occupational therapy  Collaboration with medical service for medical comorbidities as indicated.  Behavioral Health checks for safety monitoring.  Long Stay Certification : Not Applicable  Estimated Discharge Day: 3/10/2025 7 days (3/12/2025)  Legal Status : Voluntary 201 commitment.    Subjective     Behavior over the last 24 hours: improving    Per nursing: Pt received atarax 100mg @ 1655 for severe anxiety related to her roommate telling her about androids coming to get her. Reassessed @ 1755 and found to be effective. Denies SI/HI/AH/VH at this time. Medication and meal compliant. Denies any unmet needs or complaints.     Patient was seen and evaluated in her bed today. On initial approach, Gabby is somnolent.  She appears more organized and less paranoid than yesterday.  States her mood is \"comes and goes but overall positive\".  She denies suicidal and homicidal ideation. She denies auditory and visual hallucinations.She appears less internally distracted. She states she \"had a bad day yesterday\" and that she feels \"better\" now.  Invega long-acting injectable administration was discussed with the patient who is in agreement with starting long-acting injectable once appropriate dose of her medication is reached.  She is having bowel movements and reports her appetite is good. She reports sleeping through the night last night.  She denies adverse effects from her medications.     Sleep: improved  Appetite: fair  Medication side effects: No  ROS: review of systems as noted above in HPI/Subjective report, all other systems are negative    Objective :  Temp:  [97.2 °F (36.2 °C)-97.5 °F (36.4 °C)] 97.2 °F (36.2 °C)  HR:  [80-84] 80  BP: (119-133)/(65-67) 119/65  Resp:  [17] 17  SpO2:  [97 %-100 %] 97 %  O2 Device: None (Room air)    Temp:  [97.2 °F (36.2 °C)-97.5 °F (36.4 °C)] 97.2 °F (36.2 °C)  HR:  [80-84] 80  BP: (119-133)/(65-67) 119/65  Resp:  [17] 17  SpO2:  [97 %-100 %] 97 %  O2 Device: None " "(Room air)    Mental Status Evaluation:  Appearance:  adequate hygiene, dressed in hospital attire, looks older than stated age   Behavior:  Calm, cooperative   Speech:  Normal rate normal rhythm, spontaneous, delayed initiation   Mood:  \"Comes and goes but overall positive\"   Affect:  euthymic, guarded at times   Thought Process:  Logical, linear, goal-directed   Thought Content:  No evidence of paranoia or delusions   Perceptual Disturbances: Denies auditory and visual hallucinations. Internally distracted at times during interview   Risk Potential: Suicidal Ideation - denies during interview  Homicidal Ideations - denies during interview  Potential for Aggression - no   Sensorium:  oriented to person, place, time, situation   Memory:  recent and remote memory: appears grossly average   Consciousness:  alert and awake   Attention/Concentration: attention span and concentration appear shorter than expected for age   Intellect: Average   Fund of Knowledge: Average   Insight:  improving   Judgment: improving   Muscle Strength:  Muscle Tone: normal  normal   Gait/Station: normal gait/station, normal balance   Motor Activity: no abnormal movements       Lab Results: I have reviewed the following results:  Results from the past 24 hours: No results found for this or any previous visit (from the past 24 hours).    Administrative Statements     Counseling / Coordination of Care:   I have spent a total time of 20 minutes in caring for this patient on the day of the visit/encounter including:  Patient's progress discussed with staff in treatment team meeting.  Medication changes reviewed with staff in treatment team meeting.  Medications, treatment progress and treatment plan reviewed with patient..    Ronan Stewart MD 03/05/25  "

## 2025-03-05 NOTE — PLAN OF CARE
Problem: SELF HARM/SUICIDALITY  Goal: Will have no self-injury during hospital stay  Description: INTERVENTIONS:  - Q 15 MINUTES: Routine safety checks  - Q WAKING SHIFT & PRN: Assess risk to determine if routine checks are adequate to maintain patient safety  - Encourage patient to participate actively in care by formulating a plan to combat response to suicidal ideation, identify supports and resources  Outcome: Progressing     Problem: DEPRESSION  Goal: Will be euthymic at discharge  Description: INTERVENTIONS:  - Administer medication as ordered  - Provide emotional support via 1:1 interaction with staff  - Encourage involvement in milieu/groups/activities  - Monitor for social isolation  Outcome: Progressing     Problem: SUBSTANCE USE/ABUSE  Goal: Will have no detox symptoms and will verbalize plan for changing substance-related behavior  Description: INTERVENTIONS:  - Monitor physical status and assess for symptoms of withdrawal  - Administer medication as ordered  - Provide emotional support with 1 on 1 interaction with staff  - Encourage recovery focused program/ addiction education  - Assess for verbalization of changing behaviors related to substance abuse  - Initiate consults and referrals as appropriate (Case Management, Spiritual Care, etc.)  Outcome: Progressing  Goal: By discharge, will develop insight into their chemical dependency and sustain motivation to continue in recovery  Description: INTERVENTIONS:  - Attends all daily group sessions and scheduled AA groups  - Actively practices coping skills through participation in the therapeutic community and adherence to program rules  - Reviews and completes assignments from individual treatment plan  - Assist patient development of understanding of their personal cycle of addiction and relapse triggers  Outcome: Progressing  Goal: By discharge, patient will have ongoing treatment plan addressing chemical dependency  Description: INTERVENTIONS:  -  Assist patient with resources and/or appointments for ongoing recovery based living  Outcome: Progressing     Problem: Potential for Falls  Goal: Patient will remain free of falls  Description: INTERVENTIONS:  - Educate patient/family on patient safety including physical limitations  - Instruct patient to call for assistance with activity   - Consult OT/PT to assist with strengthening/mobility   - Keep Call bell within reach  - Keep bed low and locked with side rails adjusted as appropriate  - Keep care items and personal belongings within reach  - Initiate and maintain comfort rounds  - Make Fall Risk Sign visible to staff  Outcome: Progressing     Problem: DISCHARGE PLANNING - CARE MANAGEMENT  Goal: Discharge to post-acute care or home with appropriate resources  Description: INTERVENTIONS:  - Conduct assessment to determine patient/family and health care team treatment goals, and need for post-acute services based on payer coverage, community resources, and patient preferences, and barriers to discharge  - Address psychosocial, clinical, and financial barriers to discharge as identified in assessment in conjunction with the patient/family and health care team  - Arrange appropriate level of post-acute services according to patient’s   needs and preference and payer coverage in collaboration with the physician and health care team  - Communicate with and update the patient/family, physician, and health care team regarding progress on the discharge plan  - Arrange appropriate transportation to post-acute venues  Outcome: Progressing

## 2025-03-05 NOTE — NURSING NOTE
Called and spoke to grandfather, Maycol.  Maycol did not receive e-mail with new patient information.  Verified e-mail - [email or web address].  New patient information sent to Maycol.    Also, Maycol requested a faxed request be sent to [phone] to obtain records for endocrinologist.  Maycol reports that he had DNA markers for obesity tested at endocrinologist.  Faxed request for notes, growth charts, labs, and genetic testing to fax number.    Maycol also discussed that Kishor's father  last year and his mother is no longer in [place].  Maycol does not have legal guardianship for Kishor, but has been taking him to all of his appointments since father past away.  Will let Dr. Muhammad know.      Maycol had no other questions about the appointment at this time.   Pt. calm and cooperative. Denies SI/HI, A/V hallucinations, depression and anxiety.  Pt. reports feeling drowsy upon waking up this morning. Visible on unit, wearing personal attire by choice. Social with select peers. Pt. is care and medication compliant. Denies any needs at this time.

## 2025-03-06 PROCEDURE — 99232 SBSQ HOSP IP/OBS MODERATE 35: CPT | Performed by: PSYCHIATRY & NEUROLOGY

## 2025-03-06 RX ORDER — ONDANSETRON 4 MG/1
4 TABLET, ORALLY DISINTEGRATING ORAL EVERY 6 HOURS PRN
Status: DISCONTINUED | OUTPATIENT
Start: 2025-03-06 | End: 2025-03-13 | Stop reason: HOSPADM

## 2025-03-06 RX ADMIN — ONDANSETRON 4 MG: 4 TABLET, ORALLY DISINTEGRATING ORAL at 11:15

## 2025-03-06 RX ADMIN — FOLIC ACID 1 MG: 1 TABLET ORAL at 08:25

## 2025-03-06 RX ADMIN — RISPERIDONE 3 MG: 2 TABLET, FILM COATED ORAL at 08:25

## 2025-03-06 RX ADMIN — Medication 6 MG: at 21:30

## 2025-03-06 RX ADMIN — ESCITALOPRAM OXALATE 20 MG: 10 TABLET, FILM COATED ORAL at 08:25

## 2025-03-06 RX ADMIN — CYANOCOBALAMIN TAB 1000 MCG 1000 MCG: 1000 TAB at 08:25

## 2025-03-06 RX ADMIN — RISPERIDONE 3 MG: 2 TABLET, FILM COATED ORAL at 21:30
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
## 2025-03-06 NOTE — ASSESSMENT & PLAN NOTE
Patient presenting with a longstanding history of both mood and psychotic symptoms. Current presentation appears to be psychotic in nature.    Plan:   Continue Risperdal to 3 mg BID  Continue Lexapro to 20 mg qAM   Patient agreeable to Invega long-acting injectable.  Will initiate once stabilized on therapeutic dose of oral Risperdal  Increase melatonin from 3mg to 6mg for sleep regulation purposes

## 2025-03-06 NOTE — PROGRESS NOTES
03/06/25 0844   Team Meeting   Meeting Type Daily Rounds   Team Members Present   Team Members Present Physician;Nurse;   Physician Team Member Arya   [facility] Sharon   Patient/Family Present   Patient Present No   Patient's Family Present No     Pt is medication and meal compliant. Pt denies SI/HI/AVH. Pt is cooperative and calm. Pt's discharge is pending for next week.

## 2025-03-06 NOTE — CMS CERTIFICATION NOTE
Certification: Based upon physical, mental and social evaluations, I certify that inpatient psychiatric services are medically necessary for this patient for a duration of 10 midnights for the treatment of bipolar disorder with psychotic features versus schizoaffective disorder.  Available alternative community resources do not meet the patient's mental health care needs.  I further attest that an established written individualized plan of care has been implemented and is outlined in the patient's medical records.

## 2025-03-06 NOTE — PROGRESS NOTES
Progress Note - Behavioral Health   Name: Gabby Mares 34 y.o. female I MRN: [id number]  Unit/Bed#: -01 I Date of Admission: 2/26/2025   Date of Service: 3/6/2025 I Hospital Day: 8    Assessment & Plan  Bipolar disorder, current episode of casandra with psychotic features, rule out schizoaffective disorder  Patient presenting with a longstanding history of both mood and psychotic symptoms. Current presentation appears to be psychotic in nature.    Plan:   Continue Risperdal to 3 mg BID  Continue Lexapro to 20 mg qAM   Patient agreeable to Invega long-acting injectable.  Will initiate once stabilized on therapeutic dose of oral Risperdal  Increase melatonin from 3mg to 6mg for sleep regulation purposes  Anxiety disorder    Medical clearance for psychiatric admission    Obesity      Current medications:  Current Facility-Administered Medications   Medication Dose Route Frequency Provider Last Rate    acetaminophen  650 mg Oral Q6H PRN Ronan Stewart MD      acetaminophen  650 mg Oral Q4H PRN Ronan Stewart MD      acetaminophen  975 mg Oral Q6H PRN Ronan Stewart MD      aluminum-magnesium hydroxide-simethicone  30 mL Oral Q4H PRN Margot De La Cruz MD      haloperidol lactate  2.5 mg Intramuscular Q4H PRN Max 4/day Margot De La Cruz MD      And    LORazepam  1 mg Intramuscular Q4H PRN Max 4/day Margot De La Cruz MD      And    benztropine  0.5 mg Intramuscular Q4H PRN Max 4/day Margot De La Cruz MD      haloperidol lactate  5 mg Intramuscular Q4H PRN Max 4/day Margot De La Cruz MD      And    LORazepam  2 mg Intramuscular Q4H PRN Max 4/day Margot De La Cruz MD      And    benztropine  1 mg Intramuscular Q4H PRN Max 4/day Margot De La Cruz MD      benztropine  1 mg Intramuscular Q4H PRN Max 6/day Margot De La Cruz MD      benztropine  1 mg Oral Q4H PRN Max 6/day Margot De La Cruz MD      bisacodyl  10 mg Rectal Daily PRN Margot De La Cruz MD      cyanocobalamin  1,000 mcg Oral Daily Bri Fuentes  CRNP      hydrOXYzine HCL  50 mg Oral Q6H PRN Max 4/day Margot De La Cruz MD      Or    diphenhydrAMINE  50 mg Intramuscular Q6H PRN Margot De La Cruz MD      ergocalciferol  50,000 Units Oral Weekly Bri Fuentes, CRNP      escitalopram  20 mg Oral Daily Ronan Stewart MD      folic acid  1 mg Oral Daily Bri Fuentes, ODILON      haloperidol  1 mg Oral Q6H PRN Margot De La Cruz MD      haloperidol  2.5 mg Oral Q4H PRN Max 4/day Margot De La Cruz MD      haloperidol  5 mg Oral Q4H PRN Max 4/day Margot De La Cruz MD      hydrOXYzine HCL  100 mg Oral Q6H PRN Max 4/day Margot De La Cruz MD      Or    LORazepam  2 mg Intramuscular Q6H PRN Margot De La Cruz MD      hydrOXYzine HCL  25 mg Oral Q6H PRN Max 4/day Margot De La Cruz MD      melatonin  6 mg Oral HS Maxime Larkin MD      ondansetron  4 mg Oral Q6H PRN Maxime Larkin MD      polyethylene glycol  17 g Oral Daily PRN Margot De La Cruz MD      propranolol  10 mg Oral Q8H PRN Margot De La Cruz MD      risperiDONE  3 mg Oral BID Ronan Stewart MD      senna-docusate sodium  1 tablet Oral Daily PRN Margot De La Cruz MD      traZODone  50 mg Oral HS PRN Margot De La Cruz MD          Risks/Benefits of Treatment:     Risks, benefits, and possible side effects of medications explained to patient and patient verbalizes understanding and agreement for treatment.    Progress Toward Goals: improving    Treatment Planning:     All current active medications have been reviewed.  Continue to monitor response to treatment and assess for potential side effects of medications.  Encourage group therapy, milieu therapy and occupational therapy  Collaboration with medical service for medical comorbidities as indicated.  Behavioral Health checks for safety monitoring.  Long Stay Certification : Not Applicable  Estimated Discharge Day: 3/10/2025 7 days (3/13/2025)  Legal Status : Voluntary 201 commitment.    Subjective     Behavior over the last 24  "hours: improving.    Per nursing: Pt is doing well with no issues. She is medication and meal compliant and denies any SI/SI/AH/VH. She is amenable to LORA once she is stabilized on her meds.    Patient was seen and evaluated in her bed today. She reports only getting 4 hours of sleep last night and appears somnolent during our conversation. She also reports feeling headache and nausea after her medications today and got a dose of Zofran. She denied any issues with eating or going to the bathroom. She denies suicidal and homicidal ideation. She denies auditory and visual hallucinations. She reports that she has plans to call her sister today. She has tried to read, but reports being unable to concentrate that well. She still endorses some struggle with her memory and keeping track of her medications.    Sleep: slept off and on, frequent awakenings  Appetite: fair  Medication side effects: Yes - headache and nausea  ROS: review of systems as noted above in HPI/Subjective report, all other systems are negative    Objective :  Temp:  [97.5 °F (36.4 °C)-97.7 °F (36.5 °C)] 97.5 °F (36.4 °C)  HR:  [86-92] 86  BP: (131)/(66-91) 131/91  Resp:  [17] 17  SpO2:  [96 %-97 %] 96 %  O2 Device: None (Room air)    Temp:  [97.5 °F (36.4 °C)-97.7 °F (36.5 °C)] 97.5 °F (36.4 °C)  HR:  [86-92] 86  BP: (131)/(66-91) 131/91  Resp:  [17] 17  SpO2:  [96 %-97 %] 96 %  O2 Device: None (Room air)    Mental Status Evaluation:    Appearance:  Overtly appearing  female, overweight, adequate hygiene, casually dressed   Behavior:  Calm, cooperative, appropriate eye contact   Speech:  Spontaneous, normal rate and rhythm   Mood:  \"Okay\"   Affect:  Constricted, mood-congruent   Thought Process:  Linear, goal-directed   Thought Content:  Denies paranoia or delusions   Perceptual Disturbances: Denies AH/VH. Not distracted by internal stimuli   Risk Potential: Suicidal Ideation -  denies during interview  Homicidal Ideation -  denies during " interview  Potential for Aggression - denies during interview   Sensorium:  Oriented to person, place, and person   Memory:  recent and remote memory grossly intact   Consciousness:  alert and awake   Attention/Concentration: attention span and concentration appear shorter than expected for age   Insight:  improving   Judgment: improving   Gait/Station: normal gait/station, normal balance   Motor Activity: no abnormal movements       Lab Results: I have reviewed the following results:    Results from the past 24 hours: No results found for this or any previous visit (from the past 24 hours).    Administrative Statements     Counseling / Coordination of Care:   Patient's progress discussed with staff in treatment team meeting.  Medication changes reviewed with staff in treatment team meeting..    Dariusz Yu, MS3 03/06/25

## 2025-03-06 NOTE — NURSING NOTE
Pt withdrawn to room and bed majority of evening, napping on and off but easily arousable. Pt denies current unmet needs, denies SI/HI/AH/VH. Pt has flat affect and concrete thought process. Pt is adherent with medications.

## 2025-03-06 NOTE — PLAN OF CARE
Problem: SELF HARM/SUICIDALITY  Goal: Will have no self-injury during hospital stay  Description: INTERVENTIONS:  - Q 15 MINUTES: Routine safety checks  - Q WAKING SHIFT & PRN: Assess risk to determine if routine checks are adequate to maintain patient safety  - Encourage patient to participate actively in care by formulating a plan to combat response to suicidal ideation, identify supports and resources  Outcome: Progressing     Problem: DEPRESSION  Goal: Will be euthymic at discharge  Description: INTERVENTIONS:  - Administer medication as ordered  - Provide emotional support via 1:1 interaction with staff  - Encourage involvement in milieu/groups/activities  - Monitor for social isolation  Outcome: Progressing     Problem: SUBSTANCE USE/ABUSE  Goal: Will have no detox symptoms and will verbalize plan for changing substance-related behavior  Description: INTERVENTIONS:  - Monitor physical status and assess for symptoms of withdrawal  - Administer medication as ordered  - Provide emotional support with 1 on 1 interaction with staff  - Encourage recovery focused program/ addiction education  - Assess for verbalization of changing behaviors related to substance abuse  - Initiate consults and referrals as appropriate (Case Management, Spiritual Care, etc.)  Outcome: Progressing  Goal: By discharge, will develop insight into their chemical dependency and sustain motivation to continue in recovery  Description: INTERVENTIONS:  - Attends all daily group sessions and scheduled AA groups  - Actively practices coping skills through participation in the therapeutic community and adherence to program rules  - Reviews and completes assignments from individual treatment plan  - Assist patient development of understanding of their personal cycle of addiction and relapse triggers  Outcome: Progressing  Goal: By discharge, patient will have ongoing treatment plan addressing chemical dependency  Description: INTERVENTIONS:  -  Assist patient with resources and/or appointments for ongoing recovery based living  Outcome: Progressing     Problem: Potential for Falls  Goal: Patient will remain free of falls  Description: INTERVENTIONS:  - Educate patient/family on patient safety including physical limitations  - Instruct patient to call for assistance with activity   - Consult OT/PT to assist with strengthening/mobility   - Keep Call bell within reach  - Keep bed low and locked with side rails adjusted as appropriate  - Keep care items and personal belongings within reach  - Initiate and maintain comfort rounds  - Make Fall Risk Sign visible to staff  Outcome: Progressing     Problem: Ineffective Coping  Goal: Participates in unit activities  Description: Interventions:  - Provide therapeutic environment   - Provide required programming   - Redirect inappropriate behaviors   Outcome: Not Progressing

## 2025-03-06 NOTE — NURSING NOTE
Pt c/o nausea. Reached out to provider to get PRN ordered. Pt given zofran 4mg po. Will monitor for effectiveness.

## 2025-03-06 NOTE — NURSING NOTE
Pt calm and cooperative. Scant and flat in conversation. Visible in the milieu. Denies SI/HI/AH/VH. Medication and meal compliant. Pt no longer c/o nausea. PRN zofran 4mg po effective. No further needs at this time.

## 2025-03-07 PROCEDURE — 99232 SBSQ HOSP IP/OBS MODERATE 35: CPT | Performed by: PSYCHIATRY & NEUROLOGY

## 2025-03-07 RX ADMIN — RISPERIDONE 3 MG: 2 TABLET, FILM COATED ORAL at 21:16

## 2025-03-07 RX ADMIN — FOLIC ACID 1 MG: 1 TABLET ORAL at 08:46

## 2025-03-07 RX ADMIN — RISPERIDONE 3 MG: 2 TABLET, FILM COATED ORAL at 08:46

## 2025-03-07 RX ADMIN — ESCITALOPRAM OXALATE 20 MG: 10 TABLET, FILM COATED ORAL at 08:46

## 2025-03-07 RX ADMIN — CYANOCOBALAMIN TAB 1000 MCG 1000 MCG: 1000 TAB at 08:46

## 2025-03-07 RX ADMIN — Medication 6 MG: at 21:16

## 2025-03-07 NOTE — ASSESSMENT & PLAN NOTE
Patient presenting with a longstanding history of both mood and psychotic symptoms. Current presentation appears to be psychotic in nature.    Plan:   Continue Risperdal to 3 mg BID for psychosis  Continue Lexapro to 20 mg qAM for mood and anxiety  Patient agreeable to Invega long-acting injectable.  Patient seems stabilized on current dose of oral Risperdal. Will plan to transition to Invega Sustenna on Monday  Continue melatonin 6mg for sleep regulation purposes

## 2025-03-07 NOTE — PLAN OF CARE
Problem: SELF HARM/SUICIDALITY  Goal: Will have no self-injury during hospital stay  Description: INTERVENTIONS:  - Q 15 MINUTES: Routine safety checks  - Q WAKING SHIFT & PRN: Assess risk to determine if routine checks are adequate to maintain patient safety  - Encourage patient to participate actively in care by formulating a plan to combat response to suicidal ideation, identify supports and resources  Outcome: Progressing     Problem: DEPRESSION  Goal: Will be euthymic at discharge  Description: INTERVENTIONS:  - Administer medication as ordered  - Provide emotional support via 1:1 interaction with staff  - Encourage involvement in milieu/groups/activities  - Monitor for social isolation  Outcome: Progressing     Problem: SUBSTANCE USE/ABUSE  Goal: Will have no detox symptoms and will verbalize plan for changing substance-related behavior  Description: INTERVENTIONS:  - Monitor physical status and assess for symptoms of withdrawal  - Administer medication as ordered  - Provide emotional support with 1 on 1 interaction with staff  - Encourage recovery focused program/ addiction education  - Assess for verbalization of changing behaviors related to substance abuse  - Initiate consults and referrals as appropriate (Case Management, Spiritual Care, etc.)  Outcome: Progressing  Goal: By discharge, will develop insight into their chemical dependency and sustain motivation to continue in recovery  Description: INTERVENTIONS:  - Attends all daily group sessions and scheduled AA groups  - Actively practices coping skills through participation in the therapeutic community and adherence to program rules  - Reviews and completes assignments from individual treatment plan  - Assist patient development of understanding of their personal cycle of addiction and relapse triggers  Outcome: Progressing  Goal: By discharge, patient will have ongoing treatment plan addressing chemical dependency  Description: INTERVENTIONS:  -  Assist patient with resources and/or appointments for ongoing recovery based living  Outcome: Progressing     Problem: Ineffective Coping  Goal: Participates in unit activities  Description: Interventions:  - Provide therapeutic environment   - Provide required programming   - Redirect inappropriate behaviors   Outcome: Progressing     Problem: Potential for Falls  Goal: Patient will remain free of falls  Description: INTERVENTIONS:  - Educate patient/family on patient safety including physical limitations  - Instruct patient to call for assistance with activity   - Consult OT/PT to assist with strengthening/mobility   - Keep Call bell within reach  - Keep bed low and locked with side rails adjusted as appropriate  - Keep care items and personal belongings within reach  - Initiate and maintain comfort rounds  - Make Fall Risk Sign visible to staff  Outcome: Progressing     Problem: DISCHARGE PLANNING - CARE MANAGEMENT  Goal: Discharge to post-acute care or home with appropriate resources  Description: INTERVENTIONS:  - Conduct assessment to determine patient/family and health care team treatment goals, and need for post-acute services based on payer coverage, community resources, and patient preferences, and barriers to discharge  - Address psychosocial, clinical, and financial barriers to discharge as identified in assessment in conjunction with the patient/family and health care team  - Arrange appropriate level of post-acute services according to patient’s   needs and preference and payer coverage in collaboration with the physician and health care team  - Communicate with and update the patient/family, physician, and health care team regarding progress on the discharge plan  - Arrange appropriate transportation to post-acute venues  Outcome: Progressing     Problem: DISCHARGE PLANNING - CARE MANAGEMENT  Goal: Discharge to post-acute care or home with appropriate resources  Description: INTERVENTIONS:  - Conduct  assessment to determine patient/family and health care team treatment goals, and need for post-acute services based on payer coverage, community resources, and patient preferences, and barriers to discharge  - Address psychosocial, clinical, and financial barriers to discharge as identified in assessment in conjunction with the patient/family and health care team  - Arrange appropriate level of post-acute services according to patient’s   needs and preference and payer coverage in collaboration with the physician and health care team  - Communicate with and update the patient/family, physician, and health care team regarding progress on the discharge plan  - Arrange appropriate transportation to post-acute venues  Outcome: Progressing

## 2025-03-07 NOTE — PROGRESS NOTES
Progress Note - Behavioral Health   Name: Gabby Mares 34 y.o. female I MRN: [id number]  Unit/Bed#: -01 I Date of Admission: 2/26/2025   Date of Service: 3/7/2025 I Hospital Day: 9    Assessment & Plan  Bipolar disorder, current episode of casandra with psychotic features, rule out schizoaffective disorder  Patient presenting with a longstanding history of both mood and psychotic symptoms. Current presentation appears to be psychotic in nature.    Plan:   Continue Risperdal to 3 mg BID for psychosis  Continue Lexapro to 20 mg qAM for mood and anxiety  Patient agreeable to Invega long-acting injectable.  Patient seems stabilized on current dose of oral Risperdal. Will plan to transition to Invega Sustenna on Monday  Continue melatonin 6mg for sleep regulation purposes  Anxiety disorder    Medical clearance for psychiatric admission    Obesity      Current medications:  Current Facility-Administered Medications   Medication Dose Route Frequency Provider Last Rate    acetaminophen  650 mg Oral Q6H PRN Ronan Stewart MD      acetaminophen  650 mg Oral Q4H PRN Ronan Stewart MD      acetaminophen  975 mg Oral Q6H PRN Ronan Stewart MD      aluminum-magnesium hydroxide-simethicone  30 mL Oral Q4H PRN Margot De La Cruz MD      haloperidol lactate  2.5 mg Intramuscular Q4H PRN Max 4/day Margot De La Cruz MD      And    LORazepam  1 mg Intramuscular Q4H PRN Max 4/day Margot De La Cruz MD      And    benztropine  0.5 mg Intramuscular Q4H PRN Max 4/day Margot De La Cruz MD      haloperidol lactate  5 mg Intramuscular Q4H PRN Max 4/day Margot De La Cruz MD      And    LORazepam  2 mg Intramuscular Q4H PRN Max 4/day Margot De La Cruz MD      And    benztropine  1 mg Intramuscular Q4H PRN Max 4/day Margot De La Cruz MD      benztropine  1 mg Intramuscular Q4H PRN Max 6/day Margot De La Cruz MD      benztropine  1 mg Oral Q4H PRN Max 6/day Margot De La Cruz MD      bisacodyl  10 mg Rectal Daily PRN Margot De La Cruz MD       cyanocobalamin  1,000 mcg Oral Daily ODILON Leonardo      hydrOXYzine HCL  50 mg Oral Q6H PRN Max 4/day Margot De La Cruz MD      Or    diphenhydrAMINE  50 mg Intramuscular Q6H PRN Margot De La Cruz MD      ergocalciferol  50,000 Units Oral Weekly ODILON Leonardo      escitalopram  20 mg Oral Daily Ronan Stewart MD      folic acid  1 mg Oral Daily ODILON Leonardo      haloperidol  1 mg Oral Q6H PRN Margot De La Cruz MD      haloperidol  2.5 mg Oral Q4H PRN Max 4/day Margot De La Cruz MD      haloperidol  5 mg Oral Q4H PRN Max 4/day Margot De La Cruz MD      hydrOXYzine HCL  100 mg Oral Q6H PRN Max 4/day Margot De La Cruz MD      Or    LORazepam  2 mg Intramuscular Q6H PRN Margot De La Cruz MD      hydrOXYzine HCL  25 mg Oral Q6H PRN Max 4/day Margot De La Cruz MD      melatonin  6 mg Oral HS Maxime Larkin MD      ondansetron  4 mg Oral Q6H PRN Maxime Larkin MD      polyethylene glycol  17 g Oral Daily PRN Margot De La Cruz MD      propranolol  10 mg Oral Q8H PRN Margot De La Cruz MD      risperiDONE  3 mg Oral BID Ronan Stewart MD      senna-docusate sodium  1 tablet Oral Daily PRN Margot De La Cruz MD      traZODone  50 mg Oral HS PRN Margot De La Cruz MD          Risks/Benefits of Treatment:     Risks, benefits, and possible side effects of medications explained to patient and patient verbalizes understanding and agreement for treatment.    Progress Toward Goals: improving    Treatment Planning:     All current active medications have been reviewed.  Continue to monitor response to treatment and assess for potential side effects of medications.  Encourage group therapy, milieu therapy and occupational therapy  Collaboration with medical service for medical comorbidities as indicated.  Behavioral Health checks for safety monitoring.  Long Stay Certification : Not Applicable  Estimated Discharge Day: 3/14/2025 7 days (3/14/2025)  Legal Status :  Voluntary 201 commitment.    Subjective     Behavior over the last 24 hours: improving.    Per nursing: Pt doing well overall. Still has constricted affect and was in bed most of the day yesterday. Hygiene was encouraged because she appeared disheveled and malodorous. She is medication and meal compliant and denies any SI/SI/AH/VH.    Per case management: Patient is unable to stay at home with her sister. Patient's sister wants her to go to a group home because she believes that the patient's apartment is filthy and unfit for her. There is also question on whether patient is currently facing or at risk of eviction, as she has not received a notice on her door as of now. Patient would most likely need some form of support for medical care if patient goes back to her apartment.     Patient was seen and evaluated in her bed today. She reports doing well and feeling better than yesterday. Patient reports still getting only 3-4 hours of sleep and says that is normal for her. Denies any issues with eating or going to the bathroom. She denies suicidal and homicidal ideation. She denies auditory and visual hallucinations. She talked to her sister yesterday and reports wanting to return to apartment upon discharge. She plans to read today. No medication side effects today - yesterday's nausea improved with Zofran. No other complaints or concerns.    Sleep: slept off and on, frequent awakenings  Appetite: fair  Medication side effects: No  ROS: review of systems as noted above in HPI/Subjective report, all other systems are negative    Objective :  Temp:  [97.5 °F (36.4 °C)-97.8 °F (36.6 °C)] 97.5 °F (36.4 °C)  HR:  [75-77] 75  BP: (111-124)/(58-81) 124/81  Resp:  [17-18] 18  SpO2:  [97 %] 97 %  O2 Device: None (Room air)    Temp:  [97.5 °F (36.4 °C)-97.8 °F (36.6 °C)] 97.5 °F (36.4 °C)  HR:  [75-77] 75  BP: (111-124)/(58-81) 124/81  Resp:  [17-18] 18  SpO2:  [97 %] 97 %  O2 Device: None (Room air)    Mental Status  "Evaluation:    Appearance:  Overtly  female, overweight, inadequate hygiene, in pajamas, looks older than stated age   Behavior:  Calm, cooperative, appropriate eye contact   Speech:  Normal rate and rhythm, soft   Mood:  \"Pretty good\"   Affect:  Constricted   Thought Process:  Linear, goal-directed, concrete   Thought Content:  Denies paranoia or delusions   Perceptual Disturbances: denies auditory hallucinations when asked, denies visual hallucinations when asked, does not appear responding to internal stimuli   Risk Potential: Suicidal Ideation -  denies during interview  Homicidal Ideation -  denies during interview  Potential for Aggression - denies during interview   Sensorium:  Oriented to person, place and time   Memory:  recent and remote memory grossly intact   Consciousness:  alert and awake   Attention/Concentration: attention span and concentration appear shorter than expected for age   Insight:  improving   Judgment: improving   Gait/Station: normal gait/station, normal balance   Motor Activity: no abnormal movements       Lab Results: I have reviewed the following results:  Results from the past 24 hours: No results found for this or any previous visit (from the past 24 hours).    Administrative Statements     Counseling / Coordination of Care:   Patient's progress discussed with staff in treatment team meeting.  Medication changes reviewed with staff in treatment team meeting.  Encouraged participation in milieu and group therapy on the unit..    Dariusz Yu, MS3 03/07/25  "

## 2025-03-07 NOTE — NURSING NOTE
Pt visible on unit in milieu and dayroom. No interactions observed with peers. No groups attended today, staff encouraged. Medication and meal compliant. She denies SI/HI/AH/VH. Pt appears less paranoid. No PRN's requested or required. No unmet needs.

## 2025-03-07 NOTE — NURSING NOTE
Pt occasionally visible on unit, withdrawn socially but cooperative when approached. Pt presents with constricted affect and concrete thought process. Pt In bed most of evening; hygiene encouraged by RN as pt is disheveled and malodorous. Pt politely declines for tonight. Pt is medication and treatment adherent. Pt denies SI/HI/AH/VH and current unmet needs.

## 2025-03-07 NOTE — PROGRESS NOTES
03/07/25 0851   Team Meeting   Meeting Type Daily Rounds   Team Members Present   Team Members Present Physician;Nurse;   Physician Team Member Arya   Nursing Team Member [facility] Management Team Member Sharon   Patient/Family Present   Patient Present No   Patient's Family Present No     Pt is medication and meal compliant. Pt denies SI/HI/AVH. Pt was occasionally on the unit. Hygiene was encouraged to Pt, Pt is disheveled and malodorous. Pt's discharge is pending further stabilization.

## 2025-03-08 PROCEDURE — 99232 SBSQ HOSP IP/OBS MODERATE 35: CPT | Performed by: PSYCHIATRY & NEUROLOGY

## 2025-03-08 RX ADMIN — CYANOCOBALAMIN TAB 1000 MCG 1000 MCG: 1000 TAB at 08:12

## 2025-03-08 RX ADMIN — ESCITALOPRAM OXALATE 20 MG: 10 TABLET, FILM COATED ORAL at 08:12

## 2025-03-08 RX ADMIN — FOLIC ACID 1 MG: 1 TABLET ORAL at 08:12

## 2025-03-08 RX ADMIN — Medication 6 MG: at 21:42

## 2025-03-08 RX ADMIN — RISPERIDONE 3 MG: 2 TABLET, FILM COATED ORAL at 21:42

## 2025-03-08 RX ADMIN — RISPERIDONE 3 MG: 2 TABLET, FILM COATED ORAL at 08:12

## 2025-03-08 NOTE — NURSING NOTE
Pt is visible on the unit but withdrawn to self. Able to make her needs known. Pt is scant in conversation. Denies SI/HI/AH/VH at this time. Med and meal compliant. Denies any unmet needs or complaints at this time.

## 2025-03-08 NOTE — NURSING NOTE
Patient visible on the unit, withdrawn to self.Patient encouraged to shower with no success. Patient denies SI/HI/AH/VH at this time. Compliant with medications and routine vitals.

## 2025-03-08 NOTE — PLAN OF CARE
Problem: SELF HARM/SUICIDALITY  Goal: Will have no self-injury during hospital stay  Description: INTERVENTIONS:  - Q 15 MINUTES: Routine safety checks  - Q WAKING SHIFT & PRN: Assess risk to determine if routine checks are adequate to maintain patient safety  - Encourage patient to participate actively in care by formulating a plan to combat response to suicidal ideation, identify supports and resources  Outcome: Progressing     Problem: DEPRESSION  Goal: Will be euthymic at discharge  Description: INTERVENTIONS:  - Administer medication as ordered  - Provide emotional support via 1:1 interaction with staff  - Encourage involvement in milieu/groups/activities  - Monitor for social isolation  Outcome: Progressing     Problem: SUBSTANCE USE/ABUSE  Goal: Will have no detox symptoms and will verbalize plan for changing substance-related behavior  Description: INTERVENTIONS:  - Monitor physical status and assess for symptoms of withdrawal  - Administer medication as ordered  - Provide emotional support with 1 on 1 interaction with staff  - Encourage recovery focused program/ addiction education  - Assess for verbalization of changing behaviors related to substance abuse  - Initiate consults and referrals as appropriate (Case Management, Spiritual Care, etc.)  Outcome: Progressing  Goal: By discharge, will develop insight into their chemical dependency and sustain motivation to continue in recovery  Description: INTERVENTIONS:  - Attends all daily group sessions and scheduled AA groups  - Actively practices coping skills through participation in the therapeutic community and adherence to program rules  - Reviews and completes assignments from individual treatment plan  - Assist patient development of understanding of their personal cycle of addiction and relapse triggers  Outcome: Progressing  Goal: By discharge, patient will have ongoing treatment plan addressing chemical dependency  Description: INTERVENTIONS:  -  Assist patient with resources and/or appointments for ongoing recovery based living  Outcome: Progressing     Problem: Ineffective Coping  Goal: Participates in unit activities  Description: Interventions:  - Provide therapeutic environment   - Provide required programming   - Redirect inappropriate behaviors   Outcome: Progressing     Problem: Potential for Falls  Goal: Patient will remain free of falls  Description: INTERVENTIONS:  - Educate patient/family on patient safety including physical limitations  - Instruct patient to call for assistance with activity   - Consult OT/PT to assist with strengthening/mobility   - Keep Call bell within reach  - Keep bed low and locked with side rails adjusted as appropriate  - Keep care items and personal belongings within reach  - Initiate and maintain comfort rounds  - Make Fall Risk Sign visible to staff  Outcome: Progressing

## 2025-03-08 NOTE — PROGRESS NOTES
Progress Note - Behavioral Health   Name: Gabby Mares 34 y.o. female I MRN: [id number]  Unit/Bed#: -01 I Date of Admission: 2/26/2025   Date of Service: 3/8/2025 I Hospital Day: 10    Assessment & Plan  Bipolar disorder, current episode of casandra with psychotic features, rule out schizoaffective disorder  Patient presenting with a longstanding history of both mood and psychotic symptoms. Current presentation appears to be psychotic in nature.    Plan:   Continue Risperdal to 3 mg BID for psychosis  Continue Lexapro to 20 mg qAM for mood and anxiety  Patient agreeable to Invega long-acting injectable.  Patient seems stabilized on current dose of oral Risperdal. Will plan to transition to Invega Sustenna on Monday  Continue melatonin 6mg for sleep regulation purposes  Anxiety disorder    Medical clearance for psychiatric admission    Obesity        Current medications:  Current Facility-Administered Medications   Medication Dose Route Frequency Provider Last Rate    acetaminophen  650 mg Oral Q6H PRN Ronan Stewart MD      acetaminophen  650 mg Oral Q4H PRN Ronan Stewart MD      acetaminophen  975 mg Oral Q6H PRN Ronan Stewart MD      aluminum-magnesium hydroxide-simethicone  30 mL Oral Q4H PRN Margot De La Cruz MD      haloperidol lactate  2.5 mg Intramuscular Q4H PRN Max 4/day Margot De La Cruz MD      And    LORazepam  1 mg Intramuscular Q4H PRN Max 4/day Margot De La Cruz MD      And    benztropine  0.5 mg Intramuscular Q4H PRN Max 4/day Margot De La Cruz MD      haloperidol lactate  5 mg Intramuscular Q4H PRN Max 4/day Margot De La Cruz MD      And    LORazepam  2 mg Intramuscular Q4H PRN Max 4/day Margot De La Cruz MD      And    benztropine  1 mg Intramuscular Q4H PRN Max 4/day Margot De La Cruz MD      benztropine  1 mg Intramuscular Q4H PRN Max 6/day Margot De La Cruz MD      benztropine  1 mg Oral Q4H PRN Max 6/day Margot De La Cruz MD      bisacodyl  10 mg Rectal Daily PRN Margot De La Cruz  MD      cyanocobalamin  1,000 mcg Oral Daily ODILON Leonardo      hydrOXYzine HCL  50 mg Oral Q6H PRN Max 4/day Margot De La Cruz MD      Or    diphenhydrAMINE  50 mg Intramuscular Q6H PRN Margot De La Cruz MD      ergocalciferol  50,000 Units Oral Weekly ODILON Leonardo      escitalopram  20 mg Oral Daily Ronan Stewart MD      folic acid  1 mg Oral Daily ODILON Leonardo      haloperidol  1 mg Oral Q6H PRN Margot De La Cruz MD      haloperidol  2.5 mg Oral Q4H PRN Max 4/day Margot De La Cruz MD      haloperidol  5 mg Oral Q4H PRN Max 4/day Margot De La Cruz MD      hydrOXYzine HCL  100 mg Oral Q6H PRN Max 4/day Margot De La Cruz MD      Or    LORazepam  2 mg Intramuscular Q6H PRN Margot De La Cruz MD      hydrOXYzine HCL  25 mg Oral Q6H PRN Max 4/day Margot De La Cruz MD      melatonin  6 mg Oral HS Maxime Larkin MD      ondansetron  4 mg Oral Q6H PRN Maxime Larkin MD      polyethylene glycol  17 g Oral Daily PRN Margot De La Cruz MD      propranolol  10 mg Oral Q8H PRN Margot De La Cruz MD      risperiDONE  3 mg Oral BID Ronan Stewart MD      senna-docusate sodium  1 tablet Oral Daily PRN Margot De La Cruz MD      traZODone  50 mg Oral HS PRN Margot De La Cruz MD          Risks/Benefits of Treatment:     Risks, benefits, and possible side effects of medications explained to patient and patient verbalizes understanding and agreement for treatment.    Progress Toward Goals: progressing    Treatment Planning:     All current active medications have been reviewed.  Continue to monitor response to treatment and assess for potential side effects of medications.  Encourage group therapy, milieu therapy and occupational therapy  Collaboration with medical service for medical comorbidities as indicated.  Behavioral Health checks for safety monitoring.  Estimated Discharge Day: 3/14/2025   Legal Status: Status of Admission  Status of Admission: 201  Release of  Information Signed: Yes (Sister-Rosemary Fonseca).    Subjective     Patient reports that she had poor sleep overnight, stating that melatonin is not fully effective.  She was counseled on asking for trazodone as this is a insomnia medication and she has not yet trialed this as needed medication for insomnia symptoms and she is agreeable for requesting it tonight.  She has been on her Lexapro for 3 days at a dose of 20 mg and Risperdal recently increased her dose 4 days ago to 2 mg twice a day and she feels improved in terms of anxiety and depression.  Denies any symptoms of anxiety, depression, auditory hallucinations, visual hallucinations, SI, HI, or medication side effects.  She is agreeable with the plan to continue medications as prescribed and reports having a fair appetite today.    Sleep: insomnia  Appetite: fair  Medication side effects: No  ROS: review of systems as noted above in HPI/Subjective report, all other systems are negative    Objective :  Temp:  [97 °F (36.1 °C)-97.5 °F (36.4 °C)] 97.5 °F (36.4 °C)  HR:  [72-89] 72  BP: (124-136)/(79-83) 136/79  Resp:  [17-18] 17  SpO2:  [97 %] 97 %  O2 Device: None (Room air)    Temp:  [97 °F (36.1 °C)-97.5 °F (36.4 °C)] 97.5 °F (36.4 °C)  HR:  [72-89] 72  BP: (124-136)/(79-83) 136/79  Resp:  [17-18] 17  SpO2:  [97 %] 97 %  O2 Device: None (Room air)    Mental Status Evaluation:    Appearance:  age appropriate, casually dressed, adequate grooming, looks older than stated age   Behavior:  normal, cooperative   Speech:  normal rate, normal pitch, soft   Mood:  normal, euthymic   Affect:  constricted   Thought Process:  organized, logical, goal directed   Associations: concrete associations   Thought Content:  no overt delusions   Perceptual Disturbances: denies auditory hallucinations when asked, does not appear responding to internal stimuli   Risk Potential: Suicidal ideation - None at present  Homicidal ideation - None at present  Potential for aggression - No    Sensorium:  oriented to person, place, and time/date   Memory:  recent and remote memory grossly intact   Consciousness:  alert and awake   Attention/Concentration: attention span and concentration are age appropriate   Insight:  improving   Judgment: improving   Gait/Station: normal gait/station, normal balance   Motor Activity: no abnormal movements          Lab Results: I have reviewed the following results:  Most Recent Labs:   Lab Results   Component Value Date    WBC 7.12 03/01/2025    RBC 4.48 03/01/2025    HGB 11.6 03/01/2025    HCT 39.3 03/01/2025     03/01/2025    RDW 14.6 03/01/2025    NEUTROABS 4.22 03/01/2025    SODIUM 138 03/01/2025    K 4.3 03/01/2025     03/01/2025    CO2 27 03/01/2025    BUN 19 03/01/2025    CREATININE 1.23 03/01/2025    GLUC 98 03/01/2025    CALCIUM 9.0 03/01/2025    AST 17 03/01/2025    ALT 11 03/01/2025    ALKPHOS 80 03/01/2025    TP 7.4 03/01/2025    ALB 3.8 03/01/2025    TBILI 0.34 03/01/2025    CHOLESTEROL 133 03/01/2025    HDL 50 03/01/2025    TRIG 92 03/01/2025    LDLCALC 65 03/01/2025    NONHDLC 83 03/01/2025    AMMONIA 32 10/17/2022    [id number] 1.983 03/01/2025    FREET4 0.86 05/11/2018    PREGUR Negative 10/18/2022    PREGSERUM Negative 03/01/2025    HCGQUANT <2 08/07/2018    SYPHILISAB Non-reactive 11/14/2024       Administrative Statements     Counseling / Coordination of Care:   Patient's progress discussed with staff in treatment team meeting.  Medication changes reviewed with staff in treatment team meeting..    Ronan Hackett MD 03/08/25

## 2025-03-09 PROCEDURE — 99232 SBSQ HOSP IP/OBS MODERATE 35: CPT | Performed by: PSYCHIATRY & NEUROLOGY

## 2025-03-09 RX ADMIN — FOLIC ACID 1 MG: 1 TABLET ORAL at 08:59

## 2025-03-09 RX ADMIN — HYDROXYZINE HYDROCHLORIDE 100 MG: 50 TABLET, FILM COATED ORAL at 16:25

## 2025-03-09 RX ADMIN — ESCITALOPRAM OXALATE 20 MG: 10 TABLET, FILM COATED ORAL at 08:59

## 2025-03-09 RX ADMIN — Medication 6 MG: at 21:44

## 2025-03-09 RX ADMIN — RISPERIDONE 3 MG: 2 TABLET, FILM COATED ORAL at 08:59

## 2025-03-09 RX ADMIN — CYANOCOBALAMIN TAB 1000 MCG 1000 MCG: 1000 TAB at 08:59

## 2025-03-09 RX ADMIN — ERGOCALCIFEROL 50000 UNITS: 1.25 CAPSULE ORAL at 08:59

## 2025-03-09 RX ADMIN — RISPERIDONE 3 MG: 2 TABLET, FILM COATED ORAL at 21:44

## 2025-03-09 NOTE — NURSING NOTE
Patient is visible at times in the milieu. She is dishevelled and unkempt in appearance. She is minimally verbal although cooperative with staff and with unit routine. She was cooperative with HS scheduled medications also.  She denies S.I.H.I.A/H V/H.

## 2025-03-09 NOTE — NURSING NOTE
"Pt c/o severe anxiety, states, \"I just feel really, really anxious\" given 100mgs of atarax PO PRN.    17:25: Patient observed participating in milieu calmly, not appearing in distress. 100mgs of prn PO atarax effective for severe anxiety.      "

## 2025-03-09 NOTE — PROGRESS NOTES
Progress Note - Behavioral Health   Name: Gabby Mares 34 y.o. female I MRN: [id number]  Unit/Bed#: -01 I Date of Admission: 2/26/2025   Date of Service: 3/9/2025 I Hospital Day: 11    Assessment & Plan  Bipolar disorder, current episode of casandra with psychotic features, rule out schizoaffective disorder  Patient presenting with a longstanding history of both mood and psychotic symptoms. Current presentation appears to be psychotic in nature.    Plan:   Continue Risperdal to 3 mg BID for psychosis  Continue Lexapro to 20 mg qAM for mood and anxiety  Patient agreeable to Invega long-acting injectable.  Patient seems stabilized on current dose of oral Risperdal. Will plan to transition to Invega Sustenna on Monday  Continue melatonin 6mg for sleep regulation purposes  Anxiety disorder    Medical clearance for psychiatric admission    Obesity        Current medications:  Current Facility-Administered Medications   Medication Dose Route Frequency Provider Last Rate    acetaminophen  650 mg Oral Q6H PRN Ronan Stewart MD      acetaminophen  650 mg Oral Q4H PRN Ronan Stewart MD      acetaminophen  975 mg Oral Q6H PRN Ronan Stewart MD      aluminum-magnesium hydroxide-simethicone  30 mL Oral Q4H PRN Margot De La Cruz MD      haloperidol lactate  2.5 mg Intramuscular Q4H PRN Max 4/day Margot De La Cruz MD      And    LORazepam  1 mg Intramuscular Q4H PRN Max 4/day Margot De La Cruz MD      And    benztropine  0.5 mg Intramuscular Q4H PRN Max 4/day Margot De La Cruz MD      haloperidol lactate  5 mg Intramuscular Q4H PRN Max 4/day Margot De La Cruz MD      And    LORazepam  2 mg Intramuscular Q4H PRN Max 4/day Margot De La Cruz MD      And    benztropine  1 mg Intramuscular Q4H PRN Max 4/day Margot De La Cruz MD      benztropine  1 mg Intramuscular Q4H PRN Max 6/day Margot De La Cruz MD      benztropine  1 mg Oral Q4H PRN Max 6/day Margot De La Cruz MD      bisacodyl  10 mg Rectal Daily PRN Margot De La Cruz  MD      cyanocobalamin  1,000 mcg Oral Daily ODILON Leonardo      hydrOXYzine HCL  50 mg Oral Q6H PRN Max 4/day Margto De La Cruz MD      Or    diphenhydrAMINE  50 mg Intramuscular Q6H PRN Margot De La Cruz MD      ergocalciferol  50,000 Units Oral Weekly ODILON Leonardo      escitalopram  20 mg Oral Daily Ronan Stewart MD      folic acid  1 mg Oral Daily ODILON Leonardo      haloperidol  1 mg Oral Q6H PRN Margot De La Cruz MD      haloperidol  2.5 mg Oral Q4H PRN Max 4/day Margot De La Cruz MD      haloperidol  5 mg Oral Q4H PRN Max 4/day Margot De La Cruz MD      hydrOXYzine HCL  100 mg Oral Q6H PRN Max 4/day Margot De La Cruz MD      Or    LORazepam  2 mg Intramuscular Q6H PRN Margot De La Cruz MD      hydrOXYzine HCL  25 mg Oral Q6H PRN Max 4/day Margot De La Cruz MD      melatonin  6 mg Oral HS Maxime Larkin MD      ondansetron  4 mg Oral Q6H PRN Maxime Larkin MD      polyethylene glycol  17 g Oral Daily PRN Margot De La Cruz MD      propranolol  10 mg Oral Q8H PRN Margot De La Cruz MD      risperiDONE  3 mg Oral BID Ronan Stewart MD      senna-docusate sodium  1 tablet Oral Daily PRN Margot De La Cruz MD      traZODone  50 mg Oral HS PRN Margot De La Cruz MD          Risks/Benefits of Treatment:     Risks, benefits, and possible side effects of medications explained to patient and patient verbalizes understanding and agreement for treatment.    Progress Toward Goals: progressing    Treatment Planning:     All current active medications have been reviewed.  Continue to monitor response to treatment and assess for potential side effects of medications.  Encourage group therapy, milieu therapy and occupational therapy  Collaboration with medical service for medical comorbidities as indicated.  Behavioral Health checks for safety monitoring.  Estimated Discharge Day: 3/14/2025   Legal Status: Status of Admission  Status of Admission: 201  Release of  "Information Signed: Yes (Sister-Rosemary Fonseca).    Subjective     Patient reports she is doing well today, stabilizing on her medications of Lexapro and Risperdal, and understands that they do take some time to take full effect.  Denies any medication side effects and denies feeling manic at this point in time.  Feels her energy levels are stable, and denies any difficulties with SI, HI, AVH, anxiety, or depression.  Her main concern is difficulties with sleep, stating she only got around 4 hours last night but states this is improving and she will consider requesting trazodone tonight should she have continued difficulties.  She did not request any as needed medications for sleep last night and will request tonight and assess how well she sleeps moving forward, if effective is agreeable with making it a standing medication.    Sleep: insomnia improving  Appetite: fair  Medication side effects: No  ROS: review of systems as noted above in HPI/Subjective report, all other systems are negative    Objective :  Temp:  [97 °F (36.1 °C)-97.1 °F (36.2 °C)] 97.1 °F (36.2 °C)  HR:  [70-89] 70  BP: (138-143)/(70-81) 138/81  Resp:  [16-17] 16  SpO2:  [96 %-97 %] 97 %  O2 Device: None (Room air)    Temp:  [97 °F (36.1 °C)-97.1 °F (36.2 °C)] 97.1 °F (36.2 °C)  HR:  [70-89] 70  BP: (138-143)/(70-81) 138/81  Resp:  [16-17] 16  SpO2:  [96 %-97 %] 97 %  O2 Device: None (Room air)    Mental Status Evaluation:    Appearance:  age appropriate, adequate grooming, looks older than stated age   Behavior:  normal, pleasant, cooperative, requires redirection   Speech:  normal rate, normal pitch, soft   Mood:  \"Slightly better\"   Affect:  constricted   Thought Process:  organized, logical, goal directed   Associations: concrete associations   Thought Content:  normal, no overt delusions   Perceptual Disturbances: no auditory hallucinations, no visual hallucinations, appears distracted, appears preoccupied   Risk Potential: Suicidal " ideation - None  Homicidal ideation - None  Potential for aggression - No   Sensorium:  oriented to person, place, and time/date   Memory:  recent and remote memory grossly intact   Consciousness:  alert and awake   Attention/Concentration: attention span and concentration are age appropriate   Insight:  improving   Judgment: improving   Gait/Station: normal gait/station, normal balance   Motor Activity: no abnormal movements          Lab Results: I have reviewed the following results:  Most Recent Labs:   Lab Results   Component Value Date    WBC 7.12 03/01/2025    RBC 4.48 03/01/2025    HGB 11.6 03/01/2025    HCT 39.3 03/01/2025     03/01/2025    RDW 14.6 03/01/2025    NEUTROABS 4.22 03/01/2025    SODIUM 138 03/01/2025    K 4.3 03/01/2025     03/01/2025    CO2 27 03/01/2025    BUN 19 03/01/2025    CREATININE 1.23 03/01/2025    GLUC 98 03/01/2025    CALCIUM 9.0 03/01/2025    AST 17 03/01/2025    ALT 11 03/01/2025    ALKPHOS 80 03/01/2025    TP 7.4 03/01/2025    ALB 3.8 03/01/2025    TBILI 0.34 03/01/2025    CHOLESTEROL 133 03/01/2025    HDL 50 03/01/2025    TRIG 92 03/01/2025    LDLCALC 65 03/01/2025    NONHDLC 83 03/01/2025    AMMONIA 32 10/17/2022    [id number] 1.983 03/01/2025    FREET4 0.86 05/11/2018    PREGUR Negative 10/18/2022    PREGSERUM Negative 03/01/2025    HCGQUANT <2 08/07/2018    SYPHILISAB Non-reactive 11/14/2024       Administrative Statements     Counseling / Coordination of Care:   Patient's progress discussed with staff in treatment team meeting.  Medication changes reviewed with staff in treatment team meeting..    Ronan Hackett MD 03/09/25

## 2025-03-09 NOTE — PLAN OF CARE
Problem: SELF HARM/SUICIDALITY  Goal: Will have no self-injury during hospital stay  Description: INTERVENTIONS:  - Q 15 MINUTES: Routine safety checks  - Q WAKING SHIFT & PRN: Assess risk to determine if routine checks are adequate to maintain patient safety  - Encourage patient to participate actively in care by formulating a plan to combat response to suicidal ideation, identify supports and resources  Outcome: Progressing     Problem: DEPRESSION  Goal: Will be euthymic at discharge  Description: INTERVENTIONS:  - Administer medication as ordered  - Provide emotional support via 1:1 interaction with staff  - Encourage involvement in milieu/groups/activities  - Monitor for social isolation  Outcome: Progressing     Problem: SUBSTANCE USE/ABUSE  Goal: Will have no detox symptoms and will verbalize plan for changing substance-related behavior  Description: INTERVENTIONS:  - Monitor physical status and assess for symptoms of withdrawal  - Administer medication as ordered  - Provide emotional support with 1 on 1 interaction with staff  - Encourage recovery focused program/ addiction education  - Assess for verbalization of changing behaviors related to substance abuse  - Initiate consults and referrals as appropriate (Case Management, Spiritual Care, etc.)  Outcome: Progressing  Goal: By discharge, will develop insight into their chemical dependency and sustain motivation to continue in recovery  Description: INTERVENTIONS:  - Attends all daily group sessions and scheduled AA groups  - Actively practices coping skills through participation in the therapeutic community and adherence to program rules  - Reviews and completes assignments from individual treatment plan  - Assist patient development of understanding of their personal cycle of addiction and relapse triggers  Outcome: Progressing  Goal: By discharge, patient will have ongoing treatment plan addressing chemical dependency  Description: INTERVENTIONS:  -  Assist patient with resources and/or appointments for ongoing recovery based living  Outcome: Progressing     Problem: Potential for Falls  Goal: Patient will remain free of falls  Description: INTERVENTIONS:  - Educate patient/family on patient safety including physical limitations  - Instruct patient to call for assistance with activity   - Consult OT/PT to assist with strengthening/mobility   - Keep Call bell within reach  - Keep bed low and locked with side rails adjusted as appropriate  - Keep care items and personal belongings within reach  - Initiate and maintain comfort rounds  - Make Fall Risk Sign visible to staff  Outcome: Progressing     Problem: DISCHARGE PLANNING - CARE MANAGEMENT  Goal: Discharge to post-acute care or home with appropriate resources  Description: INTERVENTIONS:  - Conduct assessment to determine patient/family and health care team treatment goals, and need for post-acute services based on payer coverage, community resources, and patient preferences, and barriers to discharge  - Address psychosocial, clinical, and financial barriers to discharge as identified in assessment in conjunction with the patient/family and health care team  - Arrange appropriate level of post-acute services according to patient’s   needs and preference and payer coverage in collaboration with the physician and health care team  - Communicate with and update the patient/family, physician, and health care team regarding progress on the discharge plan  - Arrange appropriate transportation to post-acute venues  Outcome: Progressing     Problem: Ineffective Coping  Goal: Participates in unit activities  Description: Interventions:  - Provide therapeutic environment   - Provide required programming   - Redirect inappropriate behaviors   Outcome: Not Progressing

## 2025-03-10 PROCEDURE — 99232 SBSQ HOSP IP/OBS MODERATE 35: CPT | Performed by: PSYCHIATRY & NEUROLOGY

## 2025-03-10 RX ADMIN — PALIPERIDONE PALMITATE 234 MG: 234 INJECTION INTRAMUSCULAR at 14:58

## 2025-03-10 RX ADMIN — FOLIC ACID 1 MG: 1 TABLET ORAL at 08:45

## 2025-03-10 RX ADMIN — RISPERIDONE 3 MG: 2 TABLET, FILM COATED ORAL at 08:45

## 2025-03-10 RX ADMIN — RISPERIDONE 3 MG: 2 TABLET, FILM COATED ORAL at 21:35

## 2025-03-10 RX ADMIN — Medication 6 MG: at 21:35

## 2025-03-10 RX ADMIN — ESCITALOPRAM OXALATE 20 MG: 10 TABLET, FILM COATED ORAL at 08:45

## 2025-03-10 RX ADMIN — CYANOCOBALAMIN TAB 1000 MCG 1000 MCG: 1000 TAB at 08:45

## 2025-03-10 NOTE — PROGRESS NOTES
03/10/25 0840   Team Meeting   Meeting Type Daily Rounds   Team Members Present   Team Members Present Physician;;Nurse   Physician Team Member Arya   [facility]   Care Management Team Member Sharon   Patient/Family Present   Patient Present No   Patient's Family Present No     Pt is visible on the unit. Pt is medication and meal compliant. Pt denies SI/HI/AVH. Pt has shown some improvement. Pt is going to be initiated on Invega Sustenna today with maintenance dose of 234.

## 2025-03-10 NOTE — NURSING NOTE
Pt visible on unit in milieu and dayroom. She has little to no interactions with peers but has been out of her room more than previous, and sitting with her peers. Medication and meal compliant. She denies SI/HI/AH/VH, endorsed anxiety and was able to make her needs known. PRN received for anxiety. No unmet needs.

## 2025-03-10 NOTE — PROGRESS NOTES
Progress Note - Behavioral Health   Name: Gabby Mares 34 y.o. female I MRN: [id number]  Unit/Bed#: -01 I Date of Admission: 2/26/2025   Date of Service: 3/10/2025 I Hospital Day: 12    Assessment & Plan  Bipolar disorder, current episode of casandra with psychotic features, rule out schizoaffective disorder  Patient presenting with a longstanding history of both mood and psychotic symptoms. Current presentation appears to be psychotic in nature.    Plan:   Transition to Invega Sustenna with starting dose at 234 mg today (day 1). Booster shot will be on day 4 (3/13/25) at a dose of 156 mg. Maintenance dose of 234 mg will be administered every 4 weeks thereafter. Approximately on 4/10/45  Continue Lexapro to 20 mg qAM for mood and anxiety  Continue melatonin 6mg for sleep regulation purposes  Anxiety disorder  See above  Medical clearance for psychiatric admission  Per medical  Obesity  Per medical    Current medications:  Current Facility-Administered Medications   Medication Dose Route Frequency Provider Last Rate    acetaminophen  650 mg Oral Q6H PRN Ronan Stewart MD      acetaminophen  650 mg Oral Q4H PRN Ronan Stewart MD      acetaminophen  975 mg Oral Q6H PRN Ronan Stewart MD      aluminum-magnesium hydroxide-simethicone  30 mL Oral Q4H PRN Margot De La Cruz MD      haloperidol lactate  2.5 mg Intramuscular Q4H PRN Max 4/day Margot De La Cruz MD      And    LORazepam  1 mg Intramuscular Q4H PRN Max 4/day Margot De La Cruz MD      And    benztropine  0.5 mg Intramuscular Q4H PRN Max 4/day Margot De La Cruz MD      haloperidol lactate  5 mg Intramuscular Q4H PRN Max 4/day Margot De La Cruz MD      And    LORazepam  2 mg Intramuscular Q4H PRN Max 4/day Margot De La Cruz MD      And    benztropine  1 mg Intramuscular Q4H PRN Max 4/day Margot De La Cruz MD      benztropine  1 mg Intramuscular Q4H PRN Max 6/day Margot De La Cruz MD      benztropine  1 mg Oral Q4H PRN Max 6/day Margot De La Cruz MD       bisacodyl  10 mg Rectal Daily PRN Margot De La Cruz MD      cyanocobalamin  1,000 mcg Oral Daily ODILON Leonardo      hydrOXYzine HCL  50 mg Oral Q6H PRN Max 4/day Margot De La Cruz MD      Or    diphenhydrAMINE  50 mg Intramuscular Q6H PRN Margot De La Cruz MD      ergocalciferol  50,000 Units Oral Weekly ODILON Leonardo      escitalopram  20 mg Oral Daily Ronan Stewart MD      folic acid  1 mg Oral Daily ODILON Leonardo      haloperidol  1 mg Oral Q6H PRN Margot De La Cruz MD      haloperidol  2.5 mg Oral Q4H PRN Max 4/day Margot De La Cruz MD      haloperidol  5 mg Oral Q4H PRN Max 4/day Margot De La Cruz MD      hydrOXYzine HCL  100 mg Oral Q6H PRN Max 4/day Margot De La Cruz MD      Or    LORazepam  2 mg Intramuscular Q6H PRN Margot De La Cruz MD      hydrOXYzine HCL  25 mg Oral Q6H PRN Max 4/day Margot De La Cruz MD      melatonin  6 mg Oral HS Maxime Larkin MD      ondansetron  4 mg Oral Q6H PRN Maxime Larkin MD      [START ON 3/13/2025] paliperidone  156 mg Intramuscular (deltoid only) Once Antoni Stanley MD      paliperidone  234 mg Intramuscular (deltoid only) Once Antoni Stanley MD      [START ON 4/14/2025] paliperidone  234 mg Intramuscular (deltoid only) Q30 Days Antoni Stanley MD      polyethylene glycol  17 g Oral Daily PRN Margot De La Cruz MD      propranolol  10 mg Oral Q8H PRN Margot De La Cruz MD      risperiDONE  3 mg Oral BID Ronan Stewart MD      senna-docusate sodium  1 tablet Oral Daily PRN Margot De La Cruz MD      traZODone  50 mg Oral HS PRN Margot De La Cruz MD          Risks/Benefits of Treatment:     Risks, benefits, and possible side effects of medications explained to patient and patient verbalizes understanding and agreement for treatment.    Progress Toward Goals: improving    Treatment Planning:     All current active medications have been reviewed.  Continue to monitor response to treatment and assess for  potential side effects of medications.  Encourage group therapy, milieu therapy and occupational therapy  Collaboration with medical service for medical comorbidities as indicated.  Behavioral Health checks for safety monitoring.  Continue discussion with Case Management to assist with obtaining collateral information as indicated, disposition planning and the implementation of patient-centered individualized plan of care.  Discharge planning.  Long Stay Certification : Not Applicable  Legal Status : Voluntary 201 commitment.    Subjective     Behavior over the last 24 hours: improving.    Per nursing: Over the weekend, pt was scant in conversation and did not attend group meetings regularly. Pt also has not showered regularly. Pt had a prn dose of Atarax yesterday for anxiety. Denies SI/HI/AH/VH. Medication and meal compliant.    Patient was in a group session and was pulled into the milieu for examination. She reports that she was well over the weekend and having improvements in her sleep patterns compared to last week. She reports 5-6 hours of sleep last night and has not needed a prn trazodone. She is doing well with her current medication regimen and denies any side effects. She has been eating and denies any issues related to bowel movements. She denies any visual or auditory hallucinations. She denies any suicidal or homicidal ideations. She has continued to try reading in her free time and has noticed some improvements in her concentration. She says she got to talk with her sister over the weekend about plans upon discharge and she is adamant on returning to her apartment. She denies any other complaints or concerns at this time.    Sleep:  insomnia improving, slept better  Appetite: fair  Medication side effects: No  ROS: review of systems as noted above in HPI/Subjective report, all other systems are negative    Objective :  Temp:  [97.3 °F (36.3 °C)] 97.3 °F (36.3 °C)  HR:  [74-90] 74  BP:  "(124-144)/(71-74) 124/71  Resp:  [16] 16  SpO2:  [96 %-97 %] 97 %  O2 Device: None (Room air)    Temp:  [97.3 °F (36.3 °C)] 97.3 °F (36.3 °C)  HR:  [74-90] 74  BP: (124-144)/(71-74) 124/71  Resp:  [16] 16  SpO2:  [96 %-97 %] 97 %  O2 Device: None (Room air)    Mental Status Evaluation:    Appearance:  Overtly  female, obese, unkempt, looks older than stated age, dressed in paHousekeep   Behavior:  Calm, cooperative, appropriate eye contact   Speech:  Normal rate, rhythm, and volume   Mood:  \"Stable\"   Affect:  Constricted, congruent with mood, more reactive   Thought Process:  Organized, goal-directed, concrete   Thought Content:  No overt paranoia or delusions.   Perceptual Disturbances: no auditory hallucinations, no visual hallucinations, does not appear responding to internal stimuli   Risk Potential: Suicidal Ideation -  denies during interview  Homicidal Ideation -  denies during interview  Potential for Aggression - denies during interview   Sensorium:  oriented to person, place, and time/date   Memory:  recent and remote memory grossly intact   Consciousness:  alert and awake   Attention/Concentration: attention span and concentration are age appropriate   Insight:  improving   Judgment: improving   Gait/Station: normal gait/station, normal balance   Motor Activity: no abnormal movements       Lab Results: I have reviewed the following results:  Results from the past 24 hours: No results found for this or any previous visit (from the past 24 hours).    Administrative Statements     Counseling / Coordination of Care:   Patient's progress discussed with staff in treatment team meeting.  Medication changes reviewed with staff in treatment team meeting.  Treatment Plan was updated today with patient in Treatment Team Planning Meeting..    Dariusz Yu, MS3 03/10/25  "

## 2025-03-10 NOTE — NURSING NOTE
Patient is calm and cooperative. Denies SI/HI/AVH. Patient received her first dose of Invega IM in her left deltoid. No s/s of adverse effect. Patient is isolative to self but visible on unit for needs. Q 15 min safety checks maintained.

## 2025-03-11 PROCEDURE — 99232 SBSQ HOSP IP/OBS MODERATE 35: CPT | Performed by: PSYCHIATRY & NEUROLOGY

## 2025-03-11 RX ADMIN — CYANOCOBALAMIN TAB 1000 MCG 1000 MCG: 1000 TAB at 08:29

## 2025-03-11 RX ADMIN — FOLIC ACID 1 MG: 1 TABLET ORAL at 08:29

## 2025-03-11 RX ADMIN — Medication 6 MG: at 21:18

## 2025-03-11 RX ADMIN — RISPERIDONE 3 MG: 2 TABLET, FILM COATED ORAL at 08:29

## 2025-03-11 RX ADMIN — RISPERIDONE 3 MG: 2 TABLET, FILM COATED ORAL at 21:18

## 2025-03-11 RX ADMIN — ESCITALOPRAM OXALATE 20 MG: 10 TABLET, FILM COATED ORAL at 08:30

## 2025-03-11 NOTE — NURSING NOTE
Pt is visible on the unit but withdrawn to herself. Able to appropriately make her needs known. Hopeful for discharge. Denies SI/HI/AH/VH. Med and meal compliant.

## 2025-03-11 NOTE — ASSESSMENT & PLAN NOTE
Patient presenting with a longstanding history of both mood and psychotic symptoms. Current presentation appears to be psychotic in nature.    Plan:   S/p day 1 (3/10/25) of Invega Sustenna shot. Booster shot will be on day 4 (3/13/25) at a dose of 156 mg. Maintenance dose of 234 mg will be administered every 4 weeks thereafter. Approximately on 4/10/45  Continue Risperdal 3 mg BID. Taper to be done in the outpatient setting.  Continue Lexapro to 20 mg qAM for mood and anxiety  Continue melatonin 6mg for sleep regulation purposes

## 2025-03-11 NOTE — PROGRESS NOTES
03/11/25 0844   Team Meeting   Meeting Type Daily Rounds   Team Members Present   Team Members Present Physician;Nurse;   Physician Team Member Arya   [facility]   Care Management Team Member Sharon   Patient/Family Present   Patient Present No   Patient's Family Present No     Pt is medication and meal compliant. Pt denies SI/HI/AVH. Pt is visible walking on the unit. Pt is calm and cooperative. Pt's discharge is pending for Thursday or Friday.

## 2025-03-11 NOTE — SOCIAL WORK
Sister called Cm reported that Pt will return to her apartment. Sister reported she arranged everything with the landlord. Sister reported that she wants to  Pt ASAP. Cm explained that Pt was initiated on LORA yesterday and will get the second dose Thursday. Cm reported that Pt is able to d/c Thursday or Friday. Sister requested to pick Pt up on Thursday at 5pm. Sister asked questions regarding medication and after care.       Cm met with Pt and explained D/C plan. Cm offered to to make referrals for ICM and OP therapy. Pt declined and reported she is not interested. Cm asked Pt how she is going to stay health and maintain once she is discharge. Pt reported she is going to go to her virtual psych appointments and take her medications. Pt is aware she is on an LORA and reported she will continue receiving it. Pt reported she is not interested in going to a different OP provider.   Pt reported she goes to [facility] and their number is [phone].   Cm called and left voicemail with call back information.

## 2025-03-11 NOTE — NURSING NOTE
Pt visible intermittently on unit, calm, blunted affect, cooperative. Pt is socially withdrawn, brief in conversation but pleasant. Hygiene encouraged, pt is malodorous and disheveled. Pt denies SI/HI/AH/VH and current unmet needs. Pt is medication and treatment adherent.

## 2025-03-11 NOTE — PROGRESS NOTES
Progress Note - Behavioral Health   Name: Gabby Maers 34 y.o. female I MRN: [id number]  Unit/Bed#: -01 I Date of Admission: 2/26/2025   Date of Service: 3/11/2025 I Hospital Day: 13    Assessment & Plan  Bipolar disorder, current episode of casandra with psychotic features, rule out schizoaffective disorder  Patient presenting with a longstanding history of both mood and psychotic symptoms. Current presentation appears to be psychotic in nature.    Plan:   S/p day 1 (3/10/25) of Invega Sustenna shot. Booster shot will be on day 4 (3/13/25) at a dose of 156 mg. Maintenance dose of 234 mg will be administered every 4 weeks thereafter. Approximately on 4/10/45  Continue Risperdal 3 mg BID. Taper to be done in the outpatient setting.  Continue Lexapro to 20 mg qAM for mood and anxiety  Continue melatonin 6mg for sleep regulation purposes  Anxiety disorder  See above  Medical clearance for psychiatric admission  Per medical  Obesity  Per medical    Current medications:  Current Facility-Administered Medications   Medication Dose Route Frequency Provider Last Rate    acetaminophen  650 mg Oral Q6H PRN Ronan Stewart MD      acetaminophen  650 mg Oral Q4H PRN Ronan Stewart MD      acetaminophen  975 mg Oral Q6H PRN Ronan Stewart MD      aluminum-magnesium hydroxide-simethicone  30 mL Oral Q4H PRN Margot De La Cruz MD      haloperidol lactate  2.5 mg Intramuscular Q4H PRN Max 4/day Margot De La Cruz MD      And    LORazepam  1 mg Intramuscular Q4H PRN Max 4/day Margot De La Cruz MD      And    benztropine  0.5 mg Intramuscular Q4H PRN Max 4/day Margot De La Cruz MD      haloperidol lactate  5 mg Intramuscular Q4H PRN Max 4/day Margot De La Cruz MD      And    LORazepam  2 mg Intramuscular Q4H PRN Max 4/day Margot De La Cruz MD      And    benztropine  1 mg Intramuscular Q4H PRN Max 4/day Margot De La Cruz MD      benztropine  1 mg Intramuscular Q4H PRN Max 6/day Margot De La Cruz MD      benztropine  1 mg  Oral Q4H PRN Max 6/day Margot De La Cruz MD      bisacodyl  10 mg Rectal Daily PRN Margot De La Cruz MD      cyanocobalamin  1,000 mcg Oral Daily ODILON Leonardo      hydrOXYzine HCL  50 mg Oral Q6H PRN Max 4/day Margot De La Cruz MD      Or    diphenhydrAMINE  50 mg Intramuscular Q6H PRN Margot De La Cruz MD      ergocalciferol  50,000 Units Oral Weekly ODILON Leonardo      escitalopram  20 mg Oral Daily Ronan Stewart MD      folic acid  1 mg Oral Daily ODILON Leonardo      haloperidol  1 mg Oral Q6H PRN Margot De La Cruz MD      haloperidol  2.5 mg Oral Q4H PRN Max 4/day Margot De La Cruz MD      haloperidol  5 mg Oral Q4H PRN Max 4/day Margot De La Cruz MD      hydrOXYzine HCL  100 mg Oral Q6H PRN Max 4/day Margot De La Cruz MD      Or    LORazepam  2 mg Intramuscular Q6H PRN Margot De La Cruz MD      hydrOXYzine HCL  25 mg Oral Q6H PRN Max 4/day Margot De La Cruz MD      melatonin  6 mg Oral HS Maxime Larkin MD      ondansetron  4 mg Oral Q6H PRN Maxime Larkin MD      [START ON 3/13/2025] paliperidone  156 mg Intramuscular (deltoid only) Once Antoni Stanley MD      [START ON 4/14/2025] paliperidone  234 mg Intramuscular (deltoid only) Q30 Days Antoni Stanley MD      polyethylene glycol  17 g Oral Daily PRN Margot De La Cruz MD      propranolol  10 mg Oral Q8H PRN Margot De La Cruz MD      risperiDONE  3 mg Oral BID Ronan Stewart MD      senna-docusate sodium  1 tablet Oral Daily PRN Margot De La Cruz MD      traZODone  50 mg Oral HS PRN Margot De La Cruz MD          Risks/Benefits of Treatment:     Risks, benefits, and possible side effects of medications explained to patient and patient verbalizes understanding and agreement for treatment.    Progress Toward Goals: improving    Treatment Planning:     All current active medications have been reviewed.  Continue to monitor response to treatment and assess for potential side effects of  medications.  Encourage group therapy, milieu therapy and occupational therapy  Collaboration with medical service for medical comorbidities as indicated.  Behavioral Health checks for safety monitoring.  Continue discussion with Case Management to assist with obtaining collateral information as indicated, disposition planning and the implementation of patient-centered individualized plan of care.  Discharge planning.  Long Stay Certification : Not Applicable  Legal Status : Voluntary 201 commitment.    Subjective     Behavior over the last 24 hours: unchanged.    Per nursing: Patient was visible intermittently and kept to herself. She denies any symptoms. Denies SI/HI/AH/VH. Medication and meal compliant.     Patient was seen in the [facility] this morning. She reports doing well and no changes since yesterday. She says that she got her first does of Invega Sustenna yesterday and denies any reaction to the medication or at this injection site. She reports 5-6 hours of sleep. Denies any issues eating and has been having regular bowel movements. She denies any visual or auditory hallucinations. She denies any suicidal or homicidal ideations. She reports reading some more and has had increased ability to concentrate. She also joined another group session where they got to color flowers. She talked to her sister yesterday and is looking forward to being discharge back to her apartment. No other concerns or complaints at this time.    Sleep:  unchanged from yesterday, 5-6 hours  Appetite: fair  Medication side effects: No  ROS: review of systems as noted above in HPI/Subjective report, all other systems are negative    Objective :  Temp:  [97.6 °F (36.4 °C)-98 °F (36.7 °C)] 97.6 °F (36.4 °C)  HR:  [77-78] 78  BP: (123-134)/(64-75) 134/75  Resp:  [16] 16  SpO2:  [97 %] 97 %  O2 Device: None (Room air)    Temp:  [97.6 °F (36.4 °C)-98 °F (36.7 °C)] 97.6 °F (36.4 °C)  HR:  [77-78] 78  BP: (123-134)/(64-75) 134/75  Resp:  [16]  "16  SpO2:  [97 %] 97 %  O2 Device: None (Room air)    Mental Status Evaluation:    Appearance:  Overtly  female, obese, unkempt, looks older than stated age, dressed in pajamas   Behavior:  Calm, cooperative, appropriate eye contact   Speech:  Normal rate, rhythm, and volume   Mood:  \"Stable\"   Affect:  Constricted, brighter, congruent with mood   Thought Process:  Organized, goal-directed, concrete   Thought Content:  No overt paranoia or delusions   Perceptual Disturbances: denies auditory hallucinations when asked, does not appear responding to internal stimuli, denies visual hallucinations when asked   Risk Potential: Suicidal Ideation -  denies during interview  Homicidal Ideation -  denies during interview  Potential for Aggression - denies during interview   Sensorium:  oriented to person, place, and time/date   Memory:  recent and remote memory grossly intact   Consciousness:  alert and awake   Attention/Concentration: attention span and concentration are age appropriate   Insight:  improving   Judgment: improving   Gait/Station: normal gait/station, normal balance   Motor Activity: no abnormal movements       Lab Results: I have reviewed the following results:  Results from the past 24 hours: No results found for this or any previous visit (from the past 24 hours).    Administrative Statements     Counseling / Coordination of Care:   Patient's progress discussed with staff in treatment team meeting.  Medication changes reviewed with staff in treatment team meeting..    Dariusz Yu, MS3 03/11/25  "

## 2025-03-12 PROCEDURE — 99232 SBSQ HOSP IP/OBS MODERATE 35: CPT | Performed by: PSYCHIATRY & NEUROLOGY

## 2025-03-12 RX ORDER — ESCITALOPRAM OXALATE 20 MG/1
20 TABLET ORAL DAILY
Qty: 30 TABLET | Refills: 1 | Status: SHIPPED | OUTPATIENT
Start: 2025-03-13 | End: 2025-05-12

## 2025-03-12 RX ORDER — FOLIC ACID 1 MG/1
1 TABLET ORAL DAILY
Qty: 30 TABLET | Refills: 1 | Status: SHIPPED | OUTPATIENT
Start: 2025-03-13 | End: 2025-05-12

## 2025-03-12 RX ORDER — RISPERIDONE 3 MG/1
3 TABLET ORAL 2 TIMES DAILY
Qty: 60 TABLET | Refills: 1 | Status: SHIPPED | OUTPATIENT
Start: 2025-03-12 | End: 2025-05-11

## 2025-03-12 RX ORDER — ERGOCALCIFEROL 1.25 MG/1
50000 CAPSULE, LIQUID FILLED ORAL WEEKLY
Qty: 4 CAPSULE | Refills: 0 | Status: SHIPPED | OUTPATIENT
Start: 2025-03-12 | End: 2025-04-03

## 2025-03-12 RX ADMIN — ESCITALOPRAM OXALATE 20 MG: 10 TABLET, FILM COATED ORAL at 08:02

## 2025-03-12 RX ADMIN — Medication 6 MG: at 21:28

## 2025-03-12 RX ADMIN — CYANOCOBALAMIN TAB 1000 MCG 1000 MCG: 1000 TAB at 08:02

## 2025-03-12 RX ADMIN — RISPERIDONE 3 MG: 2 TABLET, FILM COATED ORAL at 21:28

## 2025-03-12 RX ADMIN — RISPERIDONE 3 MG: 2 TABLET, FILM COATED ORAL at 08:02

## 2025-03-12 RX ADMIN — FOLIC ACID 1 MG: 1 TABLET ORAL at 08:02

## 2025-03-12 NOTE — PLAN OF CARE
Pt does not regularly attend groups. Pt will attend a group sporadically. Pt interacts appropriately when in attendance.

## 2025-03-12 NOTE — NURSING NOTE
About the unit. Quiet and to herself. Attending group. Pleasant, calm and cooperative. Compliant. Denies symptoms. No complaints.

## 2025-03-12 NOTE — PROGRESS NOTES
03/12/25 0848   Team Meeting   Meeting Type Daily Rounds   Team Members Present   Team Members Present Physician;Nurse;   Physician Team Member Arya   [facility] Sharon   Patient/Family Present   Patient Present No   Patient's Family Present No     Pt is medication and meal compliant. Pt denies SI/HI/AVH. Pt is calm and cooperative. Pt's discharge is pending for tomorrow.

## 2025-03-12 NOTE — ASSESSMENT & PLAN NOTE
Patient presenting with a longstanding history of both mood and psychotic symptoms. Current presentation appears to be psychotic in nature.    Plan:   S/p initial Invega Sustenna shot (3/10/25). Booster shot will be on day 4 (3/13/25) at a dose of 156 mg. Maintenance dose of 234 mg will be administered every 4 weeks thereafter. Approximately on 4/10/45  Continue Risperdal 3 mg BID. Taper to be done in the outpatient setting.  Continue Lexapro to 20 mg qAM for mood and anxiety  Continue melatonin 6mg for sleep regulation purposes  Discharge pending for tomorrow around 5 pm

## 2025-03-12 NOTE — PROGRESS NOTES
03/12/25 1000   Activity/Group Checklist   Group Other (Comment)  (Group Art Therapy/Psychodynamic, Open Choice followed by Process Discussion)   Attendance Attended   Attendance Duration (min) Greater than 60  (90 min)   Interactions Interacted appropriately   Affect/Mood Appropriate   Goals Achieved Discussed coping strategies;Able to listen to others;Able to engage in interactions;Identified feelings;Able to manage/cope with feelings;Able to recieve feedback;Able to give feedback to another

## 2025-03-12 NOTE — PROGRESS NOTES
Progress Note - Behavioral Health   Name: Gabby Mares 34 y.o. female I MRN: [id number]  Unit/Bed#: -01 I Date of Admission: 2/26/2025   Date of Service: 3/12/2025 I Hospital Day: 14    Assessment & Plan  Bipolar disorder, current episode of casandra with psychotic features, rule out schizoaffective disorder  Patient presenting with a longstanding history of both mood and psychotic symptoms. Current presentation appears to be psychotic in nature.    Plan:   S/p initial Invega Sustenna shot (3/10/25). Booster shot will be on day 4 (3/13/25) at a dose of 156 mg. Maintenance dose of 234 mg will be administered every 4 weeks thereafter. Approximately on 4/10/45  Continue Risperdal 3 mg BID. Taper to be done in the outpatient setting.  Continue Lexapro to 20 mg qAM for mood and anxiety  Continue melatonin 6mg for sleep regulation purposes  Discharge pending for tomorrow around 5 pm  Anxiety disorder  See above  Medical clearance for psychiatric admission  Per medical  Obesity  Per medical    Current medications:  Current Facility-Administered Medications   Medication Dose Route Frequency Provider Last Rate    acetaminophen  650 mg Oral Q6H PRN Ronan Stewart MD      acetaminophen  650 mg Oral Q4H PRN Ronan Stewart MD      acetaminophen  975 mg Oral Q6H PRN Ronan Stewart MD      aluminum-magnesium hydroxide-simethicone  30 mL Oral Q4H PRN Margot De La Cruz MD      haloperidol lactate  2.5 mg Intramuscular Q4H PRN Max 4/day Margot De La Cruz MD      And    LORazepam  1 mg Intramuscular Q4H PRN Max 4/day Margot De La Cruz MD      And    benztropine  0.5 mg Intramuscular Q4H PRN Max 4/day Margot De La Cruz MD      haloperidol lactate  5 mg Intramuscular Q4H PRN Max 4/day Margot De La Cruz MD      And    LORazepam  2 mg Intramuscular Q4H PRN Max 4/day Margot De La Cruz MD      And    benztropine  1 mg Intramuscular Q4H PRN Max 4/day Margot De La Cruz MD      benztropine  1 mg Intramuscular Q4H PRN Max 6/day  Margot De La Cruz MD      benztropine  1 mg Oral Q4H PRN Max 6/day Margot De La Cruz MD      bisacodyl  10 mg Rectal Daily PRN Margot De La Cruz MD      cyanocobalamin  1,000 mcg Oral Daily ODILON Leonardo      hydrOXYzine HCL  50 mg Oral Q6H PRN Max 4/day Margot De La Cruz MD      Or    diphenhydrAMINE  50 mg Intramuscular Q6H PRN Margot De La Cruz MD      ergocalciferol  50,000 Units Oral Weekly ODILON Leonardo      escitalopram  20 mg Oral Daily Ronan Stewart MD      folic acid  1 mg Oral Daily ODILON Leonardo      haloperidol  1 mg Oral Q6H PRN Margot De La Cruz MD      haloperidol  2.5 mg Oral Q4H PRN Max 4/day Margot De La Cruz MD      haloperidol  5 mg Oral Q4H PRN Max 4/day Margot De La Cruz MD      hydrOXYzine HCL  100 mg Oral Q6H PRN Max 4/day Margot De La Cruz MD      Or    LORazepam  2 mg Intramuscular Q6H PRN Margot De La Cruz MD      hydrOXYzine HCL  25 mg Oral Q6H PRN Max 4/day Margot De La Cruz MD      melatonin  6 mg Oral HS Maxime Larkin MD      ondansetron  4 mg Oral Q6H PRN Maxime Larkin MD      [START ON 3/13/2025] paliperidone  156 mg Intramuscular (deltoid only) Once Antoni Stanley MD      [START ON 4/14/2025] paliperidone  234 mg Intramuscular (deltoid only) Q30 Days Antoni Stanley MD      polyethylene glycol  17 g Oral Daily PRN Margot De La Cruz MD      propranolol  10 mg Oral Q8H PRN Margot De La Cruz MD      risperiDONE  3 mg Oral BID Ronan Stewart MD      senna-docusate sodium  1 tablet Oral Daily PRN Margot De La Cruz MD      traZODone  50 mg Oral HS PRN Margot De La Cruz MD          Risks/Benefits of Treatment:     Risks, benefits, and possible side effects of medications explained to patient and patient verbalizes understanding and agreement for treatment.    Progress Toward Goals: improving    Treatment Planning:     All current active medications have been reviewed.  Continue to monitor response to treatment and  assess for potential side effects of medications.  Encourage group therapy, milieu therapy and occupational therapy  Collaboration with medical service for medical comorbidities as indicated.  Behavioral Health checks for safety monitoring.  Discharge planned for tomorrow.  Long Stay Certification : Not Applicable  Estimated Discharge Day: 3/13/2025   Legal Status : Voluntary 201 commitment.    Subjective     Behavior over the last 24 hours: improving.    Per nursing: Patient is calm, cooperative, and pleasant. Medication and meal compliant. Denies SI/HI/AH/VH.    Per case management: Spoke with patient's sister and she wants patient to move back to her apartment. She plans to get her sister around 5 pm tomorrow. Patient will get monthly LORA at infusion center at [facility]. Patient not interested in seeing a therapist and plans to see her usual OP psychiatrist.    Patient was pulled from group and examined in the milieu this morning. She reports feeling well and denies any concerns. She is aware of her discharge and looks forward to leaving tomorrow. She reports that the first thing she will do upon discharge is clean her apartment and pay her rent. She reports getting 5-6 hours of sleep. Denies problems with eating and is having regular bowel movements.  She denies any visual or auditory hallucinations. She denies any suicidal or homicidal ideations. Denies any medication side effects.    Sleep:  unchanged from yesterday, 5-6 hours  Appetite: fair  Medication side effects: No  ROS: review of systems as noted above in HPI/Subjective report, all other systems are negative    Objective :  Temp:  [98.3 °F (36.8 °C)] 98.3 °F (36.8 °C)  HR:  [72-93] 72  BP: (132-136)/(60-78) 136/78  Resp:  [16] 16  SpO2:  [96 %-99 %] 99 %  O2 Device: None (Room air)    Temp:  [98.3 °F (36.8 °C)] 98.3 °F (36.8 °C)  HR:  [72-93] 72  BP: (132-136)/(60-78) 136/78  Resp:  [16] 16  SpO2:  [96 %-99 %] 99 %  O2 Device: None (Room  "air)    Mental Status Evaluation:    Appearance:  Overtly  female, obese, unkempt, looks older than stated age, dressed in pajamas   Behavior:  Calm, cooperative, appropriate eye contact   Speech:  Normal rate, rhythm, volume   Mood:  \"Stable\"   Affect:  Less constricted, brighter, congruent with mood   Thought Process:  Organized, goal-directed, concrete   Thought Content:  No overt paranoia or delusions   Perceptual Disturbances: denies auditory hallucinations when asked, does not appear responding to internal stimuli, denies visual hallucinations when asked, distracted at times   Risk Potential: Suicidal Ideation -  denies during interview  Homicidal Ideation -  denies during interview  Potential for Aggression - denies during interview   Sensorium:  oriented to person, place, and time/date   Memory:  recent and remote memory grossly intact   Consciousness:  alert and awake   Attention/Concentration: attention span and concentration are age appropriate   Insight:  improving   Judgment: improving   Gait/Station: normal gait/station, normal balance   Motor Activity: no abnormal movements       Lab Results: I have reviewed the following results:  Results from the past 24 hours: No results found for this or any previous visit (from the past 24 hours).    Administrative Statements     Counseling / Coordination of Care:   Patient's progress discussed with staff in treatment team meeting.  Medication changes reviewed with staff in treatment team meeting.  Discussed with patient today in Treatment Team Planning Meeting: treatment progress, treatment goals and goals for discharge..    Dariusz Yu, MS3 03/12/25  "

## 2025-03-12 NOTE — NURSING NOTE
"Calm and cooperative. Quiet and withdrawn to self. Describes their mood as \"good\". Denies depression, anxiety, SI, HI, and hallucinations of any kind.    Medication compliant.    Retreated to bed on their own without any issues.    Staff availability reinforced.   "

## 2025-03-13 VITALS
TEMPERATURE: 98.6 F | BODY MASS INDEX: 47.09 KG/M2 | WEIGHT: 293 LBS | HEIGHT: 66 IN | DIASTOLIC BLOOD PRESSURE: 58 MMHG | RESPIRATION RATE: 17 BRPM | OXYGEN SATURATION: 98 % | HEART RATE: 86 BPM | SYSTOLIC BLOOD PRESSURE: 146 MMHG

## 2025-03-13 PROBLEM — Z00.8 MEDICAL CLEARANCE FOR PSYCHIATRIC ADMISSION: Status: RESOLVED | Noted: 2024-11-14 | Resolved: 2025-03-13

## 2025-03-13 PROCEDURE — 99238 HOSP IP/OBS DSCHRG MGMT 30/<: CPT | Performed by: PSYCHIATRY & NEUROLOGY

## 2025-03-13 RX ADMIN — RISPERIDONE 3 MG: 2 TABLET, FILM COATED ORAL at 08:02

## 2025-03-13 RX ADMIN — PALIPERIDONE PALMITATE 156 MG: 156 INJECTION INTRAMUSCULAR at 11:21

## 2025-03-13 RX ADMIN — ESCITALOPRAM OXALATE 20 MG: 10 TABLET, FILM COATED ORAL at 08:02

## 2025-03-13 RX ADMIN — FOLIC ACID 1 MG: 1 TABLET ORAL at 08:02

## 2025-03-13 RX ADMIN — CYANOCOBALAMIN TAB 1000 MCG 1000 MCG: 1000 TAB at 08:02

## 2025-03-13 NOTE — PROGRESS NOTES
03/13/25 0920   Activity/Group Checklist   Group Admission/Discharge   Attendance Attended   Attendance Duration (min) 0-15   Interactions Interacted appropriately   Affect/Mood Appropriate   Goals Achieved Identified feelings;Identified triggers;Identified relapse prevention strategies;Identified medication adherence strategies;Discussed safety plan;Discussed coping strategies;Discussed discharge plans;Identified resources and support systems;Able to listen to others;Able to reflect/comment on own behavior;Able to self-disclose;Able to recieve feedback     Relapse prevention plan completed with pt. Pt appropriate and cooperative. Pt completed plan appropriately but needed some prompting to add additional ideas to things worth living for and supports. Pt looking forward to discharge.

## 2025-03-13 NOTE — PLAN OF CARE
Problem: SELF HARM/SUICIDALITY  Goal: Will have no self-injury during hospital stay  Description: INTERVENTIONS:  - Q 15 MINUTES: Routine safety checks  - Q WAKING SHIFT & PRN: Assess risk to determine if routine checks are adequate to maintain patient safety  - Encourage patient to participate actively in care by formulating a plan to combat response to suicidal ideation, identify supports and resources  Outcome: Completed     Problem: DEPRESSION  Goal: Will be euthymic at discharge  Description: INTERVENTIONS:  - Administer medication as ordered  - Provide emotional support via 1:1 interaction with staff  - Encourage involvement in milieu/groups/activities  - Monitor for social isolation  Outcome: Completed     Problem: SUBSTANCE USE/ABUSE  Goal: Will have no detox symptoms and will verbalize plan for changing substance-related behavior  Description: INTERVENTIONS:  - Monitor physical status and assess for symptoms of withdrawal  - Administer medication as ordered  - Provide emotional support with 1 on 1 interaction with staff  - Encourage recovery focused program/ addiction education  - Assess for verbalization of changing behaviors related to substance abuse  - Initiate consults and referrals as appropriate (Case Management, Spiritual Care, etc.)  Outcome: Completed  Goal: By discharge, will develop insight into their chemical dependency and sustain motivation to continue in recovery  Description: INTERVENTIONS:  - Attends all daily group sessions and scheduled AA groups  - Actively practices coping skills through participation in the therapeutic community and adherence to program rules  - Reviews and completes assignments from individual treatment plan  - Assist patient development of understanding of their personal cycle of addiction and relapse triggers  Outcome: Completed  Goal: By discharge, patient will have ongoing treatment plan addressing chemical dependency  Description: INTERVENTIONS:  - Assist  patient with resources and/or appointments for ongoing recovery based living  Outcome: Completed     Problem: Ineffective Coping  Goal: Participates in unit activities  Description: Interventions:  - Provide therapeutic environment   - Provide required programming   - Redirect inappropriate behaviors   Outcome: Completed     Problem: Potential for Falls  Goal: Patient will remain free of falls  Description: INTERVENTIONS:  - Educate patient/family on patient safety including physical limitations  - Instruct patient to call for assistance with activity   - Consult OT/PT to assist with strengthening/mobility   - Keep Call bell within reach  - Keep bed low and locked with side rails adjusted as appropriate  - Keep care items and personal belongings within reach  - Initiate and maintain comfort rounds  - Make Fall Risk Sign visible to staff  Outcome: Completed     Problem: DISCHARGE PLANNING - CARE MANAGEMENT  Goal: Discharge to post-acute care or home with appropriate resources  Description: INTERVENTIONS:  - Conduct assessment to determine patient/family and health care team treatment goals, and need for post-acute services based on payer coverage, community resources, and patient preferences, and barriers to discharge  - Address psychosocial, clinical, and financial barriers to discharge as identified in assessment in conjunction with the patient/family and health care team  - Arrange appropriate level of post-acute services according to patient’s   needs and preference and payer coverage in collaboration with the physician and health care team  - Communicate with and update the patient/family, physician, and health care team regarding progress on the discharge plan  - Arrange appropriate transportation to post-acute venues  Outcome: Completed

## 2025-03-13 NOTE — BH TRANSITION RECORD
Contact Information: If you have any questions, concerns, pended studies, tests and/or procedures, or emergencies regarding your inpatient behavioral health visit. Please contact [facility] [phone]  and ask to speak to a , nurse or physician. A contact is available 24 hours/ 7 days a week at this number.     Summary of Procedures Performed During your Stay:  Below is a list of major procedures performed during your hospital stay and a summary of results:  None   Pending Studies (From admission, onward)      None          Please follow up on the above pending studies with your PCP and/or referring provider.

## 2025-03-13 NOTE — PROGRESS NOTES
03/13/25 0844   Team Meeting   Meeting Type Daily Rounds   Team Members Present   Team Members Present Physician;Nurse;   Physician Team Member Arya   [facility]   Patient/Family Present   Patient Present No   Patient's Family Present No     Pt is scheduled for discharge today.
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
## 2025-03-13 NOTE — NURSING NOTE
Patient is calm and cooperative upon approach. Patient is visible on unit. Patient denies SI/HI/AH/VH. Patient is compliant with meds and meals,

## 2025-03-13 NOTE — NURSING NOTE
Pt awoke and completed all morning routines.  AVS printed and reviewed with Pt.  Pt verbalized understanding of discharge instructions and agreed to be compliant with their medication regimen. When discussing  Pt discharged to home at 1620.  Pt was discharged with all of their belongings at time of discharge.

## 2025-03-13 NOTE — SOCIAL WORK
Pt to D/C today. Pt denies SI/HI/AVH. Pt oriented x3. Pt to d/c to her apartment and her sister will  upon discharge. Pt to follow up with [facility] on 3/17/25, and [facility] for 4/11/25. Pt discharge with medication in hand.    Discharge Address: [address] [place] [address], [facility] [address] [place], [place], [address]  Phone: [phone]

## 2025-04-11 ENCOUNTER — HOSPITAL ENCOUNTER (EMERGENCY)
Facility: HOSPITAL | Age: 35
DRG: 885 | End: 2025-04-11
Attending: EMERGENCY MEDICINE
Payer: MEDICARE

## 2025-04-11 ENCOUNTER — HOSPITAL ENCOUNTER (INPATIENT)
Facility: HOSPITAL | Age: 35
LOS: 6 days | Discharge: HOME | DRG: 885 | End: 2025-04-17
Attending: STUDENT IN AN ORGANIZED HEALTH CARE EDUCATION/TRAINING PROGRAM | Admitting: STUDENT IN AN ORGANIZED HEALTH CARE EDUCATION/TRAINING PROGRAM
Payer: MEDICARE

## 2025-04-11 ENCOUNTER — HOSPITAL ENCOUNTER (OUTPATIENT)
Dept: INFUSION CENTER | Facility: HOSPITAL | Age: 35
Discharge: HOME | End: 2025-04-11

## 2025-04-11 VITALS
RESPIRATION RATE: 20 BRPM | HEART RATE: 95 BPM | TEMPERATURE: 97 F | OXYGEN SATURATION: 100 % | SYSTOLIC BLOOD PRESSURE: 136 MMHG | DIASTOLIC BLOOD PRESSURE: 98 MMHG

## 2025-04-11 DIAGNOSIS — Z00.8 MEDICAL CLEARANCE FOR PSYCHIATRIC ADMISSION: ICD-10-CM

## 2025-04-11 DIAGNOSIS — F31.9 BIPOLAR AFFECTIVE DISORDER, REMISSION STATUS UNSPECIFIED (HCC): ICD-10-CM

## 2025-04-11 DIAGNOSIS — R79.89 ELEVATED SERUM CREATININE: ICD-10-CM

## 2025-04-11 DIAGNOSIS — F32.3: Primary | ICD-10-CM

## 2025-04-11 DIAGNOSIS — R79.89 ELEVATED LFTS: ICD-10-CM

## 2025-04-11 DIAGNOSIS — R45.851 SUICIDAL IDEATION: Primary | ICD-10-CM

## 2025-04-11 LAB
AMPHETAMINES SERPL QL SCN: NEGATIVE
BARBITURATES UR QL: NEGATIVE
BENZODIAZ UR QL: NEGATIVE
COCAINE UR QL: NEGATIVE
ETHANOL EXG-MCNC: 0 MG/DL
EXT PREGNANCY TEST URINE: NEGATIVE
EXT. CONTROL: NORMAL
FENTANYL UR QL SCN: NEGATIVE
HYDROCODONE UR QL SCN: NEGATIVE
METHADONE UR QL: NEGATIVE
OPIATES UR QL SCN: NEGATIVE
OXYCODONE+OXYMORPHONE UR QL SCN: NEGATIVE
PCP UR QL: NEGATIVE
THC UR QL: NEGATIVE

## 2025-04-11 PROCEDURE — 82075 ASSAY OF BREATH ETHANOL: CPT | Performed by: EMERGENCY MEDICINE

## 2025-04-11 PROCEDURE — 99285 EMERGENCY DEPT VISIT HI MDM: CPT

## 2025-04-11 PROCEDURE — 81025 URINE PREGNANCY TEST: CPT | Performed by: EMERGENCY MEDICINE

## 2025-04-11 PROCEDURE — 99285 EMERGENCY DEPT VISIT HI MDM: CPT | Performed by: EMERGENCY MEDICINE

## 2025-04-11 PROCEDURE — 80307 DRUG TEST PRSMV CHEM ANLYZR: CPT | Performed by: EMERGENCY MEDICINE

## 2025-04-11 RX ORDER — ACETAMINOPHEN 325 MG/1
650 TABLET ORAL EVERY 6 HOURS PRN
Status: DISCONTINUED | OUTPATIENT
Start: 2025-04-11 | End: 2025-04-17 | Stop reason: HOSPADM

## 2025-04-11 RX ORDER — BENZTROPINE MESYLATE 1 MG/ML
1 INJECTION, SOLUTION INTRAMUSCULAR; INTRAVENOUS
Status: CANCELLED | OUTPATIENT
Start: 2025-04-11

## 2025-04-11 RX ORDER — MINERAL OIL AND PETROLATUM 150; 830 MG/G; MG/G
OINTMENT OPHTHALMIC 4 TIMES DAILY PRN
Status: CANCELLED | OUTPATIENT
Start: 2025-04-11

## 2025-04-11 RX ORDER — BENZTROPINE MESYLATE 1 MG/1
1 TABLET ORAL 2 TIMES DAILY PRN
Status: CANCELLED | OUTPATIENT
Start: 2025-04-11

## 2025-04-11 RX ORDER — ACETAMINOPHEN 325 MG/1
975 TABLET ORAL EVERY 6 HOURS PRN
Status: CANCELLED | OUTPATIENT
Start: 2025-04-11

## 2025-04-11 RX ORDER — HYDROXYZINE HYDROCHLORIDE 25 MG/1
25 TABLET, FILM COATED ORAL
Status: DISCONTINUED | OUTPATIENT
Start: 2025-04-11 | End: 2025-04-17 | Stop reason: HOSPADM

## 2025-04-11 RX ORDER — ACETAMINOPHEN 325 MG/1
650 TABLET ORAL EVERY 6 HOURS PRN
Status: CANCELLED | OUTPATIENT
Start: 2025-04-11

## 2025-04-11 RX ORDER — HALOPERIDOL 5 MG/1
5 TABLET ORAL
Status: DISCONTINUED | OUTPATIENT
Start: 2025-04-11 | End: 2025-04-17 | Stop reason: HOSPADM

## 2025-04-11 RX ORDER — BISACODYL 10 MG
10 SUPPOSITORY, RECTAL RECTAL DAILY PRN
Status: DISCONTINUED | OUTPATIENT
Start: 2025-04-11 | End: 2025-04-17 | Stop reason: HOSPADM

## 2025-04-11 RX ORDER — LORAZEPAM 2 MG/ML
2 INJECTION INTRAMUSCULAR
Status: CANCELLED | OUTPATIENT
Start: 2025-04-11

## 2025-04-11 RX ORDER — PROPRANOLOL HCL 20 MG
10 TABLET ORAL EVERY 8 HOURS PRN
Status: CANCELLED | OUTPATIENT
Start: 2025-04-11

## 2025-04-11 RX ORDER — HALOPERIDOL 2 MG/1
2 TABLET ORAL
Status: DISCONTINUED | OUTPATIENT
Start: 2025-04-11 | End: 2025-04-17 | Stop reason: HOSPADM

## 2025-04-11 RX ORDER — MAGNESIUM HYDROXIDE/ALUMINUM HYDROXICE/SIMETHICONE 120; 1200; 1200 MG/30ML; MG/30ML; MG/30ML
30 SUSPENSION ORAL EVERY 4 HOURS PRN
Status: DISCONTINUED | OUTPATIENT
Start: 2025-04-11 | End: 2025-04-17 | Stop reason: HOSPADM

## 2025-04-11 RX ORDER — MINERAL OIL AND PETROLATUM 150; 830 MG/G; MG/G
OINTMENT OPHTHALMIC 4 TIMES DAILY PRN
Status: DISCONTINUED | OUTPATIENT
Start: 2025-04-11 | End: 2025-04-17 | Stop reason: HOSPADM

## 2025-04-11 RX ORDER — HYDROXYZINE HYDROCHLORIDE 50 MG/1
50 TABLET, FILM COATED ORAL
Status: DISCONTINUED | OUTPATIENT
Start: 2025-04-11 | End: 2025-04-17 | Stop reason: HOSPADM

## 2025-04-11 RX ORDER — AMOXICILLIN 250 MG
1 CAPSULE ORAL DAILY PRN
Status: DISCONTINUED | OUTPATIENT
Start: 2025-04-11 | End: 2025-04-17 | Stop reason: HOSPADM

## 2025-04-11 RX ORDER — BENZTROPINE MESYLATE 1 MG/1
1 TABLET ORAL 2 TIMES DAILY PRN
Status: DISCONTINUED | OUTPATIENT
Start: 2025-04-11 | End: 2025-04-17 | Stop reason: HOSPADM

## 2025-04-11 RX ORDER — LORAZEPAM 1 MG/1
1 TABLET ORAL
Status: CANCELLED | OUTPATIENT
Start: 2025-04-11

## 2025-04-11 RX ORDER — HYDROXYZINE HYDROCHLORIDE 25 MG/1
50 TABLET, FILM COATED ORAL
Status: CANCELLED | OUTPATIENT
Start: 2025-04-11

## 2025-04-11 RX ORDER — HALOPERIDOL 5 MG/ML
2.5 INJECTION INTRAMUSCULAR
Status: DISCONTINUED | OUTPATIENT
Start: 2025-04-11 | End: 2025-04-17 | Stop reason: HOSPADM

## 2025-04-11 RX ORDER — ESCITALOPRAM OXALATE 10 MG/1
20 TABLET ORAL DAILY
Status: CANCELLED | OUTPATIENT
Start: 2025-04-12

## 2025-04-11 RX ORDER — HALOPERIDOL 2 MG/1
2 TABLET ORAL
Status: CANCELLED | OUTPATIENT
Start: 2025-04-11

## 2025-04-11 RX ORDER — HALOPERIDOL 10 MG/1
5 TABLET ORAL
Status: CANCELLED | OUTPATIENT
Start: 2025-04-11

## 2025-04-11 RX ORDER — BENZTROPINE MESYLATE 1 MG/ML
1 INJECTION, SOLUTION INTRAMUSCULAR; INTRAVENOUS
Status: DISCONTINUED | OUTPATIENT
Start: 2025-04-11 | End: 2025-04-17 | Stop reason: HOSPADM

## 2025-04-11 RX ORDER — PROPRANOLOL HYDROCHLORIDE 10 MG/1
10 TABLET ORAL EVERY 8 HOURS PRN
Status: DISCONTINUED | OUTPATIENT
Start: 2025-04-11 | End: 2025-04-17 | Stop reason: HOSPADM

## 2025-04-11 RX ORDER — LORAZEPAM 0.5 MG/1
0.5 TABLET ORAL EVERY 8 HOURS PRN
Status: DISCONTINUED | OUTPATIENT
Start: 2025-04-11 | End: 2025-04-11 | Stop reason: HOSPADM

## 2025-04-11 RX ORDER — ESCITALOPRAM OXALATE 20 MG/1
20 TABLET ORAL DAILY
Status: DISCONTINUED | OUTPATIENT
Start: 2025-04-12 | End: 2025-04-17 | Stop reason: HOSPADM

## 2025-04-11 RX ORDER — ACETAMINOPHEN 325 MG/1
975 TABLET ORAL EVERY 6 HOURS PRN
Status: DISCONTINUED | OUTPATIENT
Start: 2025-04-11 | End: 2025-04-17 | Stop reason: HOSPADM

## 2025-04-11 RX ORDER — ESCITALOPRAM OXALATE 10 MG/1
20 TABLET ORAL DAILY
Status: DISCONTINUED | OUTPATIENT
Start: 2025-04-11 | End: 2025-04-11 | Stop reason: HOSPADM

## 2025-04-11 RX ORDER — HALOPERIDOL 5 MG/ML
2.5 INJECTION INTRAMUSCULAR
Status: CANCELLED | OUTPATIENT
Start: 2025-04-11

## 2025-04-11 RX ORDER — BENZTROPINE MESYLATE 1 MG/ML
1 INJECTION, SOLUTION INTRAMUSCULAR; INTRAVENOUS 2 TIMES DAILY PRN
Status: CANCELLED | OUTPATIENT
Start: 2025-04-11

## 2025-04-11 RX ORDER — LORAZEPAM 2 MG/ML
2 INJECTION INTRAMUSCULAR
Status: DISCONTINUED | OUTPATIENT
Start: 2025-04-11 | End: 2025-04-15

## 2025-04-11 RX ORDER — LORAZEPAM 2 MG/ML
1 INJECTION INTRAMUSCULAR EVERY 4 HOURS PRN
Status: CANCELLED | OUTPATIENT
Start: 2025-04-11

## 2025-04-11 RX ORDER — BENZTROPINE MESYLATE 1 MG/ML
1 INJECTION, SOLUTION INTRAMUSCULAR; INTRAVENOUS 2 TIMES DAILY PRN
Status: DISCONTINUED | OUTPATIENT
Start: 2025-04-11 | End: 2025-04-17 | Stop reason: HOSPADM

## 2025-04-11 RX ORDER — LORAZEPAM 2 MG/ML
1 INJECTION INTRAMUSCULAR
Status: CANCELLED | OUTPATIENT
Start: 2025-04-11

## 2025-04-11 RX ORDER — ACETAMINOPHEN 325 MG/1
650 TABLET ORAL EVERY 4 HOURS PRN
Status: CANCELLED | OUTPATIENT
Start: 2025-04-11

## 2025-04-11 RX ORDER — TRAZODONE HYDROCHLORIDE 50 MG/1
50 TABLET ORAL
Status: DISCONTINUED | OUTPATIENT
Start: 2025-04-11 | End: 2025-04-17 | Stop reason: HOSPADM

## 2025-04-11 RX ORDER — BENZTROPINE MESYLATE 1 MG/ML
0.5 INJECTION, SOLUTION INTRAMUSCULAR; INTRAVENOUS
Status: DISCONTINUED | OUTPATIENT
Start: 2025-04-11 | End: 2025-04-17 | Stop reason: HOSPADM

## 2025-04-11 RX ORDER — MAGNESIUM HYDROXIDE/ALUMINUM HYDROXICE/SIMETHICONE 120; 1200; 1200 MG/30ML; MG/30ML; MG/30ML
30 SUSPENSION ORAL EVERY 4 HOURS PRN
Status: CANCELLED | OUTPATIENT
Start: 2025-04-11

## 2025-04-11 RX ORDER — BISACODYL 10 MG
10 SUPPOSITORY, RECTAL RECTAL DAILY PRN
Status: CANCELLED | OUTPATIENT
Start: 2025-04-11

## 2025-04-11 RX ORDER — AMOXICILLIN 250 MG
1 CAPSULE ORAL DAILY PRN
Status: CANCELLED | OUTPATIENT
Start: 2025-04-11

## 2025-04-11 RX ORDER — LORAZEPAM 2 MG/ML
1 INJECTION INTRAMUSCULAR EVERY 4 HOURS PRN
Status: DISCONTINUED | OUTPATIENT
Start: 2025-04-11 | End: 2025-04-17 | Stop reason: HOSPADM

## 2025-04-11 RX ORDER — LORAZEPAM 2 MG/ML
2 INJECTION INTRAMUSCULAR
Status: DISCONTINUED | OUTPATIENT
Start: 2025-04-11 | End: 2025-04-17 | Stop reason: HOSPADM

## 2025-04-11 RX ORDER — ACETAMINOPHEN 325 MG/1
650 TABLET ORAL EVERY 4 HOURS PRN
Status: DISCONTINUED | OUTPATIENT
Start: 2025-04-11 | End: 2025-04-17 | Stop reason: HOSPADM

## 2025-04-11 RX ORDER — LORAZEPAM 1 MG/1
1 TABLET ORAL
Status: DISCONTINUED | OUTPATIENT
Start: 2025-04-11 | End: 2025-04-15

## 2025-04-11 RX ORDER — TRAZODONE HYDROCHLORIDE 50 MG/1
50 TABLET ORAL
Status: CANCELLED | OUTPATIENT
Start: 2025-04-11

## 2025-04-11 RX ORDER — HALOPERIDOL 5 MG/ML
5 INJECTION INTRAMUSCULAR
Status: DISCONTINUED | OUTPATIENT
Start: 2025-04-11 | End: 2025-04-17 | Stop reason: HOSPADM

## 2025-04-11 RX ORDER — BENZTROPINE MESYLATE 1 MG/ML
0.5 INJECTION, SOLUTION INTRAMUSCULAR; INTRAVENOUS
Status: CANCELLED | OUTPATIENT
Start: 2025-04-11

## 2025-04-11 RX ORDER — HYDROXYZINE HYDROCHLORIDE 25 MG/1
25 TABLET, FILM COATED ORAL
Status: CANCELLED | OUTPATIENT
Start: 2025-04-11

## 2025-04-11 RX ORDER — HALOPERIDOL 5 MG/ML
5 INJECTION INTRAMUSCULAR
Status: CANCELLED | OUTPATIENT
Start: 2025-04-11

## 2025-04-11 RX ORDER — LORAZEPAM 2 MG/ML
1 INJECTION INTRAMUSCULAR
Status: DISCONTINUED | OUTPATIENT
Start: 2025-04-11 | End: 2025-04-17 | Stop reason: HOSPADM

## 2025-04-11 RX ADMIN — LORAZEPAM 0.5 MG: 0.5 TABLET ORAL at 13:24

## 2025-04-11 RX ADMIN — RISPERIDONE 3 MG: 2 TABLET, FILM COATED ORAL at 13:24

## 2025-04-11 RX ADMIN — RISPERIDONE 3 MG: 2 TABLET, FILM COATED ORAL at 21:05

## 2025-04-11 RX ADMIN — ESCITALOPRAM OXALATE 20 MG: 10 TABLET ORAL at 13:24

## 2025-04-11 NOTE — ED NOTES
Patient appears to be anxious noted by pacing within the secure hold area. Tech offered support. Patient declined needing anything at this time.

## 2025-04-11 NOTE — NURSING NOTE
Pt is a 201 from [facility] with SI to OD on medications, denies HI, AVH. Pt states sleep and appetite fluctuate. Reports med compliance, but does not think meds are helping anymore. Per Pt, unable to make appt today for LORA Invega. Per PTA meds, LORA Invega due 4/14/25. Pt denies SI, HI, AVH upon admission. Reports financial struggles and housing instability. Denies drug, alcohol, tobacco use. Reviewed admission process, unit expectations, labs/EKG.       PTA meds completed, C-SSRS (Moderate Risk). Pt denies SI upon admission, q15 checks continue for safety. Dr. De La Cruz made aware via EPIC chat, no new orders at this time.

## 2025-04-11 NOTE — NURSING NOTE
Bedside  - Red longsleeve t-shirt  - Black pants  - Mismatched socks (1 purple, 1 Black)    Bin  - Black Nike sneakers  - Iphone with blue case  - Keys on clip

## 2025-04-11 NOTE — ED PROVIDER NOTES
Time reflects when diagnosis was documented in both MDM as applicable and the Disposition within this note       Time User Action Codes Description Comment    4/11/2025 11:06 AM Jaz Rai Add [R45.851] Suicidal ideation           ED Disposition       ED Disposition   Transfer to Behavioral Health Condition   --    Date/Time   Fri Apr 11, 2025 11:06 AM    Comment   Gabby Mares should be transferred out to [facility] and has been medically cleared.               Assessment & Plan       Medical Decision Making  34 year old female presents for evaluation of suicidal ideation with plan to overdose on her risperidone.  Patient medically cleared for inpatient psychiatric admission.  Crisis consulted.  201 signed.    Amount and/or Complexity of Data Reviewed  Labs: ordered.    Risk  Prescription drug management.  Decision regarding hospitalization.             Medications   escitalopram (LEXAPRO) tablet 20 mg (20 mg Oral Given 4/11/25 1324)   risperiDONE (RisperDAL) tablet 3 mg (3 mg Oral Given 4/11/25 1324)   LORazepam (ATIVAN) tablet 0.5 mg (0.5 mg Oral Given 4/11/25 1324)       ED Risk Strat Scores                    No data recorded        SBIRT 22yo+      Flowsheet Row Most Recent Value   Initial Alcohol Screen: US AUDIT-C     1. How often do you have a drink containing alcohol? 0 Filed at: 04/11/2025 1046   2. How many drinks containing alcohol do you have on a typical day you are drinking?  0 Filed at: 04/11/2025 1046   3a. Male UNDER 65: How often do you have five or more drinks on one occasion? 0 Filed at: 04/11/2025 1046   3b. FEMALE Any Age, or MALE 65+: How often do you have 4 or more drinks on one occassion? 0 Filed at: 04/11/2025 1046   Audit-C Score 0 Filed at: 04/11/2025 1046   FRIDA: How many times in the past year have you...    Used an illegal drug or used a prescription medication for non-medical reasons? Never Filed at: 04/11/2025 1046                            History of Present Illness        Chief Complaint   Patient presents with    Suicidal     Thoughts to overdose on her meds.       Past Medical History:   Diagnosis Date    Acne     Agoraphobia     Anxiety     Bipolar disorder, current episode of casandra with psychotic features 2/27/2025    Depression     Drug abuse (HCC) 11/14/2024    Diphenhydramine abuse    Eating disorder     Heart disease     Hypertension     Impulse control disorder     Obesity     Panic attack     Panic disorder     Psychiatric disorder     depression, bipolar, schizophrenia    Psychiatric illness     Psychosis (HCC)     Schizoaffective disorder (HCC) 2/27/2025    Schizophrenia (HCC)     Schizophrenia (HCC)     Schizophrenia (HCC)     Seizures (HCC)     Self-injurious behavior     Sleep difficulties     Suicide attempt (HCC)       Past Surgical History:   Procedure Laterality Date    CYSTOSCOPY      INCISION AND DRAINAGE OF WOUND N/A 2/1/2019    Procedure: INCISION AND DRAINAGE (I&D) TRUNK;  Surgeon: Mathew Fatima DO;  Location:  MAIN OR;  Service: General    WISDOM TOOTH EXTRACTION        Family History   Problem Relation Age of Onset    No Known Problems Mother     Hypertension Father     Mental illness Father     Cancer Father     Glaucoma Paternal Grandfather       Social History     Tobacco Use    Smoking status: Never     Passive exposure: Never    Smokeless tobacco: Never   Vaping Use    Vaping status: Never Used   Substance Use Topics    Alcohol use: No    Drug use: No      E-Cigarette/Vaping    E-Cigarette Use Never User       E-Cigarette/Vaping Substances    Nicotine No     THC No     CBD No     Flavoring No     Other No     Unknown No       I have reviewed and agree with the history as documented.     34 year old female presents for evaluation of suicidal ideation.  Patient states she has had persistent suicidal thoughts for the past 2 months.  She reports that she currently has thoughts of overdosing on her risperidone.  Patient denies any recent self harm or  taking excessive doses of her medication.  She states she was supposed to get her Invega injection today, but did not have transport to her appointment.  Patient states that she feels the medication is not helping her.      Suicidal      Review of Systems        Objective       ED Triage Vitals [04/11/25 1044]   Temperature Pulse Blood Pressure Respirations SpO2 Patient Position - Orthostatic VS   (!) 97 °F (36.1 °C) 95 136/98 20 100 % Sitting      Temp src Heart Rate Source BP Location FiO2 (%) Pain Score    -- -- Left arm -- --      Vitals      Date and Time Temp Pulse SpO2 Resp BP Pain Score FACES Pain Rating User   04/11/25 1044 97 °F (36.1 °C) 95 100 % 20 136/98 -- -- KP            Physical Exam  Vitals and nursing note reviewed.   HENT:      Head: Normocephalic and atraumatic.   Cardiovascular:      Rate and Rhythm: Normal rate and regular rhythm.   Pulmonary:      Effort: Pulmonary effort is normal.   Abdominal:      General: There is no distension.   Musculoskeletal:         General: No deformity.   Skin:     General: Skin is warm and dry.   Neurological:      Mental Status: She is alert.   Psychiatric:         Mood and Affect: Affect is flat.         Thought Content: Thought content includes suicidal ideation. Thought content does not include homicidal ideation. Thought content includes suicidal plan.         Results Reviewed       Procedure Component Value Units Date/Time    Rapid drug screen, urine [[id number]]  (Normal) Collected: 04/11/25 1059    Lab Status: Final result Specimen: Urine, Clean Catch Updated: 04/11/25 1118     Amph/Meth UR Negative     Barbiturate Ur Negative     Benzodiazepine Urine Negative     Cocaine Urine Negative     Methadone Urine Negative     Opiate Urine Negative     PCP Ur Negative     THC Urine Negative     Oxycodone Urine Negative     Fentanyl Urine Negative     HYDROCODONE URINE Negative    Narrative:      FOR MEDICAL PURPOSES ONLY.   IF CONFIRMATION NEEDED PLEASE CONTACT  THE LAB WITHIN 5 DAYS.    Drug Screen Cutoff Levels:  AMPHETAMINE/METHAMPHETAMINES  1000 ng/mL  BARBITURATES     200 ng/mL  BENZODIAZEPINES     200 ng/mL  COCAINE      300 ng/mL  METHADONE      300 ng/mL  OPIATES      300 ng/mL  PHENCYCLIDINE     25 ng/mL  THC       50 ng/mL  OXYCODONE      100 ng/mL  FENTANYL      5 ng/mL  HYDROCODONE     300 ng/mL    POCT pregnancy, urine [[id number]]  (Normal) Collected: 25    Lab Status: Final result Specimen: Urine Updated: 25     EXT Preg Test, Ur Negative     Control Valid    POCT alcohol breath test [[id number]]  (Normal) Resulted: 25 105    Lab Status: Final result Updated: 25     EXTBreath Alcohol 0.000            No orders to display       Procedures    ED Medication and Procedure Management   Prior to Admission Medications   Prescriptions Last Dose Informant Patient Reported? Taking?   cyanocobalamin (VITAMIN B-12) 1000 MCG tablet   No No   Sig: Take 1 tablet (1,000 mcg total) by mouth daily   ergocalciferol (VITAMIN D2) 50,000 units   No No   Sig: Take 1 capsule (50,000 Units total) by mouth once a week for 4 doses   escitalopram (LEXAPRO) 20 mg tablet   No No   Sig: Take 1 tablet (20 mg total) by mouth daily   folic acid (FOLVITE) 1 mg tablet   No No   Sig: Take 1 tablet (1 mg total) by mouth daily   melatonin 3 mg   No No   Sig: Take 2 tablets (6 mg total) by mouth daily at bedtime   paliperidone palmitate ER (INVEGA) 234 mg/1.5 mL IM injection   No No   Si.5 mL (234 mg total) by Intramuscular (deltoid only) route every 30 (thirty) days Do not start before 2025.   risperiDONE (RisperDAL) 3 mg tablet   No No   Sig: Take 1 tablet (3 mg total) by mouth 2 (two) times a day      Facility-Administered Medications: None     Patient's Medications   Discharge Prescriptions    No medications on file     No discharge procedures on file.  ED SEPSIS DOCUMENTATION   Time reflects when diagnosis was documented in both MDM as  applicable and the Disposition within this note       Time User Action Codes Description Comment    4/11/2025 11:06 AM Jaz Rai Add [R45.851] Suicidal ideation                  Jaz Rai MD  04/11/25 150

## 2025-04-11 NOTE — PLAN OF CARE
Problem: SELF HARM/SUICIDALITY  Goal: Will have no self-injury during hospital stay  Description: INTERVENTIONS:- Q 15 MINUTES: Routine safety checks- Q WAKING SHIFT & PRN: Assess risk to determine if routine checks are adequate to maintain patient safety- Encourage patient to participate actively in care by formulating a plan to combat response to suicidal ideation, identify supports and resources  Outcome: Progressing     Problem: DEPRESSION  Goal: Will be euthymic at discharge  Description: INTERVENTIONS:- Administer medication as ordered- Provide emotional support via 1:1 interaction with staff- Encourage involvement in milieu/groups/activities- Monitor for social isolation  Outcome: Progressing     Problem: ANXIETY  Goal: Will report anxiety at manageable levels  Description: INTERVENTIONS:- Administer medication as ordered- Teach and encourage coping skills- Provide emotional support- Assess patient/family for anxiety and ability to cope  Outcome: Progressing  Goal: By discharge: Patient will verbalize 2 strategies to deal with anxiety  Description: Interventions:- Identify any obvious source/trigger to anxiety- Staff will assist patient in applying identified coping technique/skills- Encourage attendance of scheduled groups and activities  Outcome: Progressing     Problem: SLEEP DISTURBANCE  Goal: Will exhibit normal sleeping pattern  Description: Interventions:-  Assess the patients sleep pattern, noting recent changes- Administer medication as ordered- Decrease environmental stimuli, including noise, as appropriate during the night- Encourage the patient to actively participate in unit groups and or exercise during the day to enhance ability to achieve adequate sleep at night- Assess the patient, in the morning, encouraging a description of sleep experience  Outcome: Progressing     Problem: SELF CARE DEFICIT  Goal: Return ADL status to a safe level of function  Description: INTERVENTIONS:- Administer  medication as ordered- Assess ADL deficits and provide assistive devices as needed- Obtain PT/OT consults as needed- Assist and instruct patient to increase activity and self care as tolerated  Outcome: Progressing

## 2025-04-11 NOTE — LETTER
[facility] EMERGENCY DEPARTMENT  [address]  [place] [address]  Dept: [phone]      EMTALA TRANSFER CONSENT    NAME Gabby Mares                                         1990                              MRN [id number]    I have been informed of my rights regarding examination, treatment, and transfer   by Dr. Brandon Garland DO    Benefits: Continuity of care, Specialized equipment and/or services available at the receiving facility (Include comment)________________________    Risks: Potential for delay in receiving treatment      {[facility] EMTALA TRANSFER CHOICES:[id number]}    I authorize the performance of emergency medical procedures and treatments upon me in both transit and upon arrival at the receiving facility.  Additionally, I authorize the release of any and all medical records to the receiving facility and request they be transported with me, if possible.  I understand that the safest mode of transportation during a medical emergency is an ambulance and that the Hospital advocates the use of this mode of transport. Risks of traveling to the receiving facility by car, including absence of medical control, life sustaining equipment, such as oxygen, and medical personnel has been explained to me and I fully understand them.    (DAKOTA CORRECT BOX BELOW)  [  ]  I consent to the stated transfer and to be transported by ambulance/helicopter.  [  ]  I consent to the stated transfer, but refuse transportation by ambulance and accept full responsibility for my transportation by car.  I understand the risks of non-ambulance transfers and I exonerate the Hospital and its staff from any deterioration in my condition that results from this refusal.    X___________________________________________    DATE  25  TIME________  Signature of patient or legally responsible individual signing on patient behalf           RELATIONSHIP TO  PATIENT_________________________          Provider Certification    [facility] 1990                              MRN [id number]    A medical screening exam was performed on the above named patient.  Based on the examination:    Condition Necessitating Transfer The primary encounter diagnosis was Suicidal ideation. Diagnoses of Medical clearance for psychiatric admission, Elevated LFTs, and Elevated serum creatinine were also pertinent to this visit.    Patient Condition: The patient has been stabilized such that within reasonable medical probability, no material deterioration of the patient condition or the condition of the unborn child(lilia) is likely to result from the transfer    Reason for Transfer: Level of Care needed not available at this facility    Transfer Requirements: Facility [facility]   Space available and qualified personnel available for treatment as acknowledged by SHADY  Agreed to accept transfer and to provide appropriate medical treatment as acknowledged by       Dr. Ramirez  Appropriate medical records of the examination and treatment of the patient are provided at the time of transfer   STAFF INITIAL WHEN COMPLETED _______  Transfer will be performed by qualified personnel from [facility]  and appropriate transfer equipment as required, including the use of necessary and appropriate life support measures.    Provider Certification: I have examined the patient and explained the following risks and benefits of being transferred/refusing transfer to the patient/family:  General risk, such as traffic hazards, adverse weather conditions, rough terrain or turbulence, possible failure of equipment (including vehicle or aircraft), or consequences of actions of persons outside the control of the transport personnel      Based on these reasonable risks and benefits to the patient and/or the unborn child(lilia), and based upon the  information available at the time of the patient’s examination, I certify that the medical benefits reasonably to be expected from the provision of appropriate medical treatments at another medical facility outweigh the increasing risks, if any, to the individual’s medical condition, and in the case of labor to the unborn child, from effecting the transfer.    X____________________________________________ DATE 04/11/25        TIME_______      ORIGINAL - SEND TO MEDICAL RECORDS   COPY - SEND WITH PATIENT DURING TRANSFER   5

## 2025-04-11 NOTE — ED NOTES
Crisis completed a crisis intake and safety risk assessment.     Patient presenting into the ER reporting SI with a plan to overdose on medication. She currently denies SIB/HI/AVH. Patient reports a hx of SIB last occurring before her last IP admission which occurred in Feburary of this year. Patient was admitted to [facility] in Feb 2025 and [facility] [place] [facility]"" in Nov 2024. Per history there she reported AVH during her last presentation, however, is denying current psychosis. Patient reports disruption to biological functioning including dysregulation so sleep and appetite. Patient was suppose to have an appointment today to receive an Invega injection and reports that due to lack of transpiration and mean to go to the appointment, she is unable to obtain her medication. Patient reports that she does not believe this medication is helpful. Patient has a psychiatrist through [employer] and her next appointment is not for another 2 weeks. She denies current substance use and legal issues. Patient reports that she has an upcoming eviction hearing on April 16th or April 23rd. Patient reports the eviction is a result of overspending leading to being behind on rent. Patient currently received SSDI and is unemployed, which has caused and financial insecurity. Patient also does not have transportation. Patient seeking treatment with a secondary gain to delay eviction hearing and remained housing as she does not have another play to reside should the eviction occur. Patient would benefit from community based case management services to assist with housing, transportation, and treatment compliance.     Patient is agreeable to the recommendation of IP treatment for stabilization. Patient explained rights and restrictions under a 201. Patient and provider signed a 201.

## 2025-04-12 PROBLEM — E66.01 MORBID (SEVERE) OBESITY DUE TO EXCESS CALORIES (HCC): Status: ACTIVE | Noted: 2024-11-14

## 2025-04-12 LAB
25(OH)D3 SERPL-MCNC: 28.4 NG/ML (ref 30–100)
ALBUMIN SERPL BCG-MCNC: 3.9 G/DL (ref 3.5–5)
ALP SERPL-CCNC: 87 U/L (ref 34–104)
ALT SERPL W P-5'-P-CCNC: 14 U/L (ref 7–52)
ANION GAP SERPL CALCULATED.3IONS-SCNC: 6 MMOL/L (ref 4–13)
AST SERPL W P-5'-P-CCNC: 17 U/L (ref 13–39)
BASOPHILS # BLD AUTO: 0.05 THOUSANDS/ÂΜL (ref 0–0.1)
BASOPHILS NFR BLD AUTO: 1 % (ref 0–1)
BILIRUB SERPL-MCNC: 0.52 MG/DL (ref 0.2–1)
BUN SERPL-MCNC: 14 MG/DL (ref 5–25)
CALCIUM SERPL-MCNC: 9.2 MG/DL (ref 8.4–10.2)
CHLORIDE SERPL-SCNC: 102 MMOL/L (ref 96–108)
CHOLEST SERPL-MCNC: 179 MG/DL (ref ?–200)
CO2 SERPL-SCNC: 28 MMOL/L (ref 21–32)
CREAT SERPL-MCNC: 1.08 MG/DL (ref 0.6–1.3)
EOSINOPHIL # BLD AUTO: 0.12 THOUSAND/ÂΜL (ref 0–0.61)
EOSINOPHIL NFR BLD AUTO: 1 % (ref 0–6)
ERYTHROCYTE [DISTWIDTH] IN BLOOD BY AUTOMATED COUNT: 15.3 % (ref 11.6–15.1)
EST. AVERAGE GLUCOSE BLD GHB EST-MCNC: 114 MG/DL
FOLATE SERPL-MCNC: 16.5 NG/ML
GFR SERPL CREATININE-BSD FRML MDRD: 66 ML/MIN/1.73SQ M
GLUCOSE P FAST SERPL-MCNC: 96 MG/DL (ref 65–99)
GLUCOSE SERPL-MCNC: 96 MG/DL (ref 65–140)
HBA1C MFR BLD: 5.6 %
HCG SERPL QL: NEGATIVE
HCT VFR BLD AUTO: 38.9 % (ref 34.8–46.1)
HDLC SERPL-MCNC: 53 MG/DL
HGB BLD-MCNC: 12.2 G/DL (ref 11.5–15.4)
IMM GRANULOCYTES # BLD AUTO: 0.02 THOUSAND/UL (ref 0–0.2)
IMM GRANULOCYTES NFR BLD AUTO: 0 % (ref 0–2)
LDLC SERPL CALC-MCNC: 107 MG/DL (ref 0–100)
LYMPHOCYTES # BLD AUTO: 2.47 THOUSANDS/ÂΜL (ref 0.6–4.47)
LYMPHOCYTES NFR BLD AUTO: 29 % (ref 14–44)
MCH RBC QN AUTO: 26 PG (ref 26.8–34.3)
MCHC RBC AUTO-ENTMCNC: 31.4 G/DL (ref 31.4–37.4)
MCV RBC AUTO: 83 FL (ref 82–98)
MONOCYTES # BLD AUTO: 0.57 THOUSAND/ÂΜL (ref 0.17–1.22)
MONOCYTES NFR BLD AUTO: 7 % (ref 4–12)
NEUTROPHILS # BLD AUTO: 5.31 THOUSANDS/ÂΜL (ref 1.85–7.62)
NEUTS SEG NFR BLD AUTO: 62 % (ref 43–75)
NONHDLC SERPL-MCNC: 126 MG/DL
NRBC BLD AUTO-RTO: 0 /100 WBCS
PLATELET # BLD AUTO: 296 THOUSANDS/UL (ref 149–390)
PMV BLD AUTO: 10.5 FL (ref 8.9–12.7)
POTASSIUM SERPL-SCNC: 3.8 MMOL/L (ref 3.5–5.3)
PROT SERPL-MCNC: 7.5 G/DL (ref 6.4–8.4)
RBC # BLD AUTO: 4.69 MILLION/UL (ref 3.81–5.12)
SODIUM SERPL-SCNC: 136 MMOL/L (ref 135–147)
TRIGL SERPL-MCNC: 93 MG/DL (ref ?–150)
TSH SERPL DL<=0.05 MIU/L-ACNC: 2.79 UIU/ML (ref 0.45–4.5)
VIT B12 SERPL-MCNC: 316 PG/ML (ref 180–914)
WBC # BLD AUTO: 8.54 THOUSAND/UL (ref 4.31–10.16)

## 2025-04-12 PROCEDURE — 85025 COMPLETE CBC W/AUTO DIFF WBC: CPT | Performed by: PHYSICIAN ASSISTANT

## 2025-04-12 PROCEDURE — 82306 VITAMIN D 25 HYDROXY: CPT | Performed by: PHYSICIAN ASSISTANT

## 2025-04-12 PROCEDURE — 83036 HEMOGLOBIN GLYCOSYLATED A1C: CPT | Performed by: PHYSICIAN ASSISTANT

## 2025-04-12 PROCEDURE — 99222 1ST HOSP IP/OBS MODERATE 55: CPT | Performed by: PHYSICIAN ASSISTANT

## 2025-04-12 PROCEDURE — 80061 LIPID PANEL: CPT | Performed by: PHYSICIAN ASSISTANT

## 2025-04-12 PROCEDURE — 99223 1ST HOSP IP/OBS HIGH 75: CPT

## 2025-04-12 PROCEDURE — 82746 ASSAY OF FOLIC ACID SERUM: CPT | Performed by: PHYSICIAN ASSISTANT

## 2025-04-12 PROCEDURE — 84703 CHORIONIC GONADOTROPIN ASSAY: CPT | Performed by: PHYSICIAN ASSISTANT

## 2025-04-12 PROCEDURE — 82607 VITAMIN B-12: CPT | Performed by: PHYSICIAN ASSISTANT

## 2025-04-12 PROCEDURE — 93005 ELECTROCARDIOGRAM TRACING: CPT

## 2025-04-12 PROCEDURE — 84443 ASSAY THYROID STIM HORMONE: CPT | Performed by: PHYSICIAN ASSISTANT

## 2025-04-12 PROCEDURE — 80053 COMPREHEN METABOLIC PANEL: CPT | Performed by: PHYSICIAN ASSISTANT

## 2025-04-12 PROCEDURE — 86780 TREPONEMA PALLIDUM: CPT | Performed by: PHYSICIAN ASSISTANT

## 2025-04-12 RX ADMIN — RISPERIDONE 3 MG: 2 TABLET, FILM COATED ORAL at 17:00

## 2025-04-12 RX ADMIN — LORAZEPAM 1 MG: 1 TABLET ORAL at 14:07

## 2025-04-12 RX ADMIN — ESCITALOPRAM OXALATE 20 MG: 20 TABLET ORAL at 08:07

## 2025-04-12 RX ADMIN — RISPERIDONE 3 MG: 2 TABLET, FILM COATED ORAL at 08:07

## 2025-04-12 NOTE — CONSULTS
Consultation - Hospitalist   Name: Gabby Mares 34 y.o. female I MRN: [id number]  Unit/Bed#: -02 I Date of Admission: 4/11/2025   Date of Service: 4/12/2025 I Hospital Day: 1   Inpatient consult for Medical Clearance for  patient  Consult performed by: Antoni Flower PA-C  Consult ordered by: Dariusz Gonzales PA-C        Physician Requesting Evaluation: Titus Alston*   Reason for Evaluation / Principal Problem:     Assessment & Plan  Current severe episode of major depressive disorder with psychotic features (HCC)  With worsening depression, under 2 1 status  Management per psychiatry team  Medical clearance for psychiatric admission  Patient is medically stable to continue inpatient psychiatric treatment.  Contact [facility] with any questions or concerns.  Morbid (severe) obesity due to excess calories (HCC)  Body mass index is 50.16 kg/m².  Dietary and exercise modifications  Anxiety disorder  Management per psychiatry team    Counseling / Coordination of Care Time: 30 minutes.  Greater than 50% of total time spent on patient counseling and coordination of care.      History of Present Illness:    Gabby Mares is a 34 y.o. female who is originally admitted to the Psychiatry service due to depression. We are consulted for medical clearance. Patient should continue all prior to admission medications as prescribed by primary care provider/outpatient specialists.   Available admission lab work and vitals are acceptable.  Patient feels a baseline physical health.  Patient appears medically stable for inpatient psychiatric treatment at this time. Please contact [facility] with any medical questions or concerns if issues should arise.     Review of Systems:    ROS unable to be preformed due to psychiatric disorder.    Past Medical and Surgical History:     Past Medical History:   Diagnosis Date    Acne     Agoraphobia     Anxiety     Bipolar disorder, current episode of casandra with psychotic  "features 2/27/2025    Depression     Drug abuse ([facility]) 11/14/2024    Diphenhydramine abuse    Eating disorder     Heart disease     Hypertension     Impulse control disorder     Obesity     Panic attack     Panic disorder     Psychiatric disorder     depression, bipolar, schizophrenia    Psychiatric illness     Psychosis (HCC)     Schizoaffective disorder (HCC) 2/27/2025    Schizophrenia (HCC)     Schizophrenia (HCC)     Schizophrenia (HCC)     Seizures (HCC)     Self-injurious behavior     Sleep difficulties     Suicide attempt ([facility])        Past Surgical History:   Procedure Laterality Date    CYSTOSCOPY      INCISION AND DRAINAGE OF WOUND N/A 2/1/2019    Procedure: INCISION AND DRAINAGE (I&D) TRUNK;  Surgeon: Mathew Fatima DO;  Location:  MAIN OR;  Service: General    WISDOM TOOTH EXTRACTION         Meds/Allergies:    all medications and allergies reviewed    Allergies:   Allergies   Allergen Reactions    Adhesive [Medical Tape]      rash    Benazepril Cough       Social History:     Marital Status: Single    Substance Use History:   Social History     Substance and Sexual Activity   Alcohol Use No     Social History     Tobacco Use   Smoking Status Never    Passive exposure: Never   Smokeless Tobacco Never     Social History     Substance and Sexual Activity   Drug Use No       Family History:    non-contributory    Physical Exam:     Vitals:   Blood Pressure: 142/100 (04/12/25 1116)  Pulse: 83 (04/12/25 1116)  Temperature: 99.1 °F (37.3 °C) (04/12/25 1116)  Temp Source: Tympanic (04/12/25 1116)  Respirations: 17 (04/12/25 1116)  Height: 5' 4\" (162.6 cm) (04/11/25 1755)  Weight - Scale: 133 kg (292 lb 3.2 oz) (04/11/25 1755)  SpO2: 99 % (04/12/25 1116)    Physical Exam  Constitutional:       General: She is not in acute distress.     Appearance: Normal appearance.   HENT:      Head: Normocephalic.      Nose: Nose normal.      Mouth/Throat:      Mouth: Mucous membranes are moist.      Pharynx: Oropharynx is clear. " "  Eyes:      General: No scleral icterus.     Extraocular Movements: Extraocular movements intact.      Conjunctiva/sclera: Conjunctivae normal.      Pupils: Pupils are equal, round, and reactive to light.   Cardiovascular:      Rate and Rhythm: Normal rate.      Heart sounds: No murmur heard.     No friction rub. No gallop.   Pulmonary:      Effort: Pulmonary effort is normal. No respiratory distress.      Breath sounds: Normal breath sounds. No stridor. No wheezing, rhonchi or rales.   Abdominal:      General: Bowel sounds are normal. There is no distension.      Palpations: Abdomen is soft.      Tenderness: There is no abdominal tenderness. There is no guarding.   Musculoskeletal:         General: No deformity. Normal range of motion.      Cervical back: Neck supple.      Right lower leg: No edema.      Left lower leg: No edema.   Skin:     General: Skin is warm and dry.      Coloration: Skin is not jaundiced.   Neurological:      General: No focal deficit present.      Mental Status: She is alert and oriented to person, place, and time. Mental status is at baseline.      Cranial Nerves: Cranial nerves 2-12 are intact. No cranial nerve deficit.         Additional Data:     Lab Results: I have personally reviewed pertinent reports.      Results from last 7 days   Lab Units 04/12/25  0606   WBC Thousand/uL 8.54   HEMOGLOBIN g/dL 12.2   HEMATOCRIT % 38.9   PLATELETS Thousands/uL 296   SEGS PCT % 62   LYMPHO PCT % 29   MONO PCT % 7   EOS PCT % 1     Results from last 7 days   Lab Units 04/12/25  0606   SODIUM mmol/L 136   POTASSIUM mmol/L 3.8   CHLORIDE mmol/L 102   CO2 mmol/L 28   BUN mg/dL 14   CREATININE mg/dL 1.08   ANION GAP mmol/L 6   CALCIUM mg/dL 9.2   ALBUMIN g/dL 3.9   TOTAL BILIRUBIN mg/dL 0.52   ALK PHOS U/L 87   ALT U/L 14   AST U/L 17   GLUCOSE RANDOM mg/dL 96             No results found for: \"HGBA1C\"            Imaging: I have personally reviewed pertinent reports.      No orders to display       EKG, " Pathology, and Other Studies Reviewed on Admission:   Prior pertinent studies and records reviewed in [facility]/Care Everywhere.    ** Please Note: This note has been constructed using a voice recognition system. **

## 2025-04-12 NOTE — ASSESSMENT & PLAN NOTE
Patient is medically stable to continue inpatient psychiatric treatment.  Contact [facility] with any questions or concerns.

## 2025-04-12 NOTE — NURSING NOTE
Patient reported severe anxiety. Unable to identify a trigger. Patient appeared restless and was pacing the hallway. Patient received Ativan 1mg PO at 1407.

## 2025-04-12 NOTE — NURSING NOTE
Upon reassessment of PRN Ativan given at 1407, patient reports medication brought her anxiety from severe to moderate. Patient reports feeling similar to this morning. Reports ongoing depression. Denies SI/HI/AVH. Denies any unmet needs at this time.

## 2025-04-12 NOTE — TREATMENT PLAN
TREATMENT PLAN REVIEW - Behavioral Health Gabby Mares 34 y.o. 1990 female MRN: [id number]    [facility] QU IP BEHAVIORAL HLTH Room / Bed: [facility] 210/[facility] 210-02 Encounter: [id number]          Admit Date/Time:  4/11/2025  5:51 PM    Treatment Team:   MD Trinidad Gilliland [facility]  JOHN Corbett, JOHN Woodard, RN    Diagnosis: Active Problems:  There are no active Hospital Problems.      Patient Strengths/Assets: capable of independent living, cooperative, negotiates basic needs, patient is on a voluntary commitment    Patient Barriers/Limitations: chronic mental illness, financial instability, lack of financial means, lack of social/family support    Short Term Goals: decrease in depressive symptoms, decrease in anxiety symptoms, ability to stay safe on the unit, ability to stay free of restraints, improvement in insight, sleep improvement, acceptance of need for psychiatric treatment, acceptance of psychiatric medications    Long Term Goals: improvement in anxiety, stabilization of mood, resolution of psychotic symptoms, acceptance of need for psychiatric medications, acceptance of need for psychiatric treatment, acceptance of need for psychiatric follow up after discharge, adequate self care, adequate sleep, adequate appetite    Progress Towards Goals: starting psychiatric medications as prescribed, less anxious, discharge planning    Recommended Treatment: medication management, patient medication education, group therapy, milieu therapy, continued Behavioral Health psychiatric evaluation/assessment process    Treatment Frequency: daily medication monitoring, group and milieu therapy daily, monitoring through interdisciplinary rounds, monitoring through weekly patient care conferences    Expected Discharge Date:  04/18/25    Discharge Plan: referral for  outpatient medication management with a psychiatrist, referrals as indicated    Treatment Plan Created/Updated By: Anamika Mai DO

## 2025-04-12 NOTE — NURSING NOTE
Patient visible this morning, walking in the hallway and using the patient phone. Patient reports anxiety and depression are worse today than on admission. Patient unable to cite reason for increased anxiety and depression, declined the need for PRN medication at this time. Denies SI/HI/AVH. Patient denies any unmet needs at this time.

## 2025-04-12 NOTE — ASSESSMENT & PLAN NOTE
"Patient endorses low mood in setting of housing insecurity and states she is unsure if her medications are working. She identifies her biggest stressor is eviction hearing upcoming next week and that she is, \"looking to get into a group home.\"    Patient reports adherence to oral medications of Risperdal and Lexapro. She notes transportation issues trying to get to appointment for Invega Sustenna injection on 04/10/25. The patient is unsure if the LORA was helpful, and is unsure if she wants to proceed with the LORA this month. Discussed indications for medications at this time and patient agreeable to discuss LORA vs medication adjustments further tomorrow.    Continue Risperdal 3 mg BID for mood stabilization and psychotic related symptoms  Continue Lexapro 20 mg for mood and anxiety  Admissions labs reviewed: WNL  Most recent EKG is from 11/2024. Repeat EKG in setting of psychotropic medications.    "

## 2025-04-12 NOTE — PLAN OF CARE
Problem: SELF HARM/SUICIDALITY  Goal: Will have no self-injury during hospital stay  Description: INTERVENTIONS:- Q 15 MINUTES: Routine safety checks- Q WAKING SHIFT & PRN: Assess risk to determine if routine checks are adequate to maintain patient safety- Encourage patient to participate actively in care by formulating a plan to combat response to suicidal ideation, identify supports and resources  Outcome: Progressing     Problem: DEPRESSION  Goal: Will be euthymic at discharge  Description: INTERVENTIONS:- Administer medication as ordered- Provide emotional support via 1:1 interaction with staff- Encourage involvement in milieu/groups/activities- Monitor for social isolation  Outcome: Progressing     Problem: ANXIETY  Goal: Will report anxiety at manageable levels  Description: INTERVENTIONS:- Administer medication as ordered- Teach and encourage coping skills- Provide emotional support- Assess patient/family for anxiety and ability to cope  Outcome: Progressing  Goal: By discharge: Patient will verbalize 2 strategies to deal with anxiety  Description: Interventions:- Identify any obvious source/trigger to anxiety- Staff will assist patient in applying identified coping technique/skills- Encourage attendance of scheduled groups and activities  Outcome: Progressing     Problem: SLEEP DISTURBANCE  Goal: Will exhibit normal sleeping pattern  Description: Interventions:-  Assess the patients sleep pattern, noting recent changes- Administer medication as ordered- Decrease environmental stimuli, including noise, as appropriate during the night- Encourage the patient to actively participate in unit groups and or exercise during the day to enhance ability to achieve adequate sleep at night- Assess the patient, in the morning, encouraging a description of sleep experience  Outcome: Progressing     Problem: SELF CARE DEFICIT  Goal: Return ADL status to a safe level of function  Description: INTERVENTIONS:- Administer  medication as ordered- Assess ADL deficits and provide assistive devices as needed- Obtain PT/OT consults as needed- Assist and instruct patient to increase activity and self care as tolerated  Outcome: Progressing     Problem: DISCHARGE PLANNING  Goal: Discharge to home or other facility with appropriate resources  Description: INTERVENTIONS:- Identify barriers to discharge w/patient and caregiver- Arrange for needed discharge resources and transportation as appropriate- Identify discharge learning needs (meds, wound care, etc.)- Arrange for interpretive services to assist at discharge as needed- Refer to Case Management Department for coordinating discharge planning if the patient needs post-hospital services based on physician/advanced practitioner order or complex needs related to functional status, cognitive ability, or social support system  Outcome: Progressing     Problem: Ineffective Coping  Goal: Identifies ineffective coping skills  Outcome: Progressing  Goal: Identifies healthy coping skills  Outcome: Progressing  Goal: Demonstrates healthy coping skills  Outcome: Progressing  Goal: Participates in unit activities  Description: Interventions:- Provide therapeutic environment - Provide required programming - Redirect inappropriate behaviors   Outcome: Progressing     Problem: Ineffective Coping  Goal: Cooperates with admission process  Description: Interventions: - Complete admission process  Outcome: Adequate for Discharge
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
## 2025-04-13 LAB — TREPONEMA PALLIDUM IGG+IGM AB [PRESENCE] IN SERUM OR PLASMA BY IMMUNOASSAY: NORMAL

## 2025-04-13 PROCEDURE — 99232 SBSQ HOSP IP/OBS MODERATE 35: CPT

## 2025-04-13 RX ADMIN — HYDROXYZINE HYDROCHLORIDE 50 MG: 50 TABLET, FILM COATED ORAL at 14:01

## 2025-04-13 RX ADMIN — ESCITALOPRAM OXALATE 20 MG: 20 TABLET ORAL at 08:07

## 2025-04-13 RX ADMIN — RISPERIDONE 3 MG: 2 TABLET, FILM COATED ORAL at 17:44

## 2025-04-13 RX ADMIN — LORAZEPAM 1 MG: 1 TABLET ORAL at 15:03

## 2025-04-13 RX ADMIN — RISPERIDONE 3 MG: 2 TABLET, FILM COATED ORAL at 08:07

## 2025-04-13 RX ADMIN — LORAZEPAM 1 MG: 1 TABLET ORAL at 09:31

## 2025-04-13 NOTE — NURSING NOTE
Patient pleasant cooperative, denies SI HI AVH at present, endorses anxiety and depression patient reports sore throat at present time, patient has fever

## 2025-04-13 NOTE — NURSING NOTE
Patient reported increased anxiety and received Atarax 50mg PO at 1401. Upon reassessment patient reported medication was ineffective and her anxiety was severe. Patient received Ativan 1mg PO at 1503.

## 2025-04-13 NOTE — ASSESSMENT & PLAN NOTE
Reviewed 04/13/25.    Patient endorses improvement in anxiety and depression today compared to day of admission. She still feels 8/10 anxiety and depression overall.    Patient remains unsure about continuation of LORA and was encouraged to discuss further with primary team in setting of being in window to receive next dose of LORA this week.    Continue Risperdal 3 mg BID for mood stabilization and psychotic related symptoms  Continue Lexapro 20 mg for mood and anxiety  Remaining admission labs reviewed. Vitamin D mildly decreased to 28.4, other labs WNL.  Repeat EKG pending in setting of psychotropic medications and last EKG in 11/2024.

## 2025-04-13 NOTE — PROGRESS NOTES
Progress Note - Behavioral Health   Name: Gabby Mares 34 y.o. female I MRN: [id number]  Unit/Bed#: U 210-02 I Date of Admission: 4/11/2025   Date of Service: 4/13/2025 I Hospital Day: 2     Assessment & Plan  Current severe episode of major depressive disorder with psychotic features (HCC)  Reviewed 04/13/25.    Patient endorses improvement in anxiety and depression today compared to day of admission. She still feels 8/10 anxiety and depression overall.    Patient remains unsure about continuation of LORA and was encouraged to discuss further with primary team in setting of being in window to receive next dose of LORA this week.    Continue Risperdal 3 mg BID for mood stabilization and psychotic related symptoms  Continue Lexapro 20 mg for mood and anxiety  Remaining admission labs reviewed. Vitamin D mildly decreased to 28.4, other labs WNL.  Repeat EKG pending in setting of psychotropic medications and last EKG in 11/2024.    Anxiety disorder  As above.  Medical clearance for psychiatric admission  Per medical team.  Morbid (severe) obesity due to excess calories (HCC)  Per medical team.    Current medications:  Current Facility-Administered Medications   Medication Dose Route Frequency Provider Last Rate    acetaminophen  650 mg Oral Q6H PRN Dariusz Gonzales PA-C      acetaminophen  650 mg Oral Q4H PRN AINSLEY Ojeda-HERNAN      acetaminophen  975 mg Oral Q6H PRN AINSLEY Ojeda-HERNAN      aluminum-magnesium hydroxide-simethicone  30 mL Oral Q4H PRN AINSLEY Ojeda-HERNAN      artificial tear   Both Eyes 4x Daily PRN Dariusz Gonzales PA-C      haloperidol lactate  2.5 mg Intramuscular Q6H PRN Max 4/day Dariusz Gonzales PA-C      And    LORazepam  1 mg Intramuscular Q6H PRN Max 4/day Dariusz Gonzales PA-C      And    benztropine  0.5 mg Intramuscular Q6H PRN Max 4/day Dariusz Gonzales PA-C      benztropine  1 mg Intramuscular BID PRN Dariusz Gonzales PA-C      haloperidol lactate  5 mg Intramuscular Q4H PRN  Max 4/day [facility], PA-HERNAN      And    LORazepam  2 mg Intramuscular Q4H PRN Max 4/day [facility], PA-HERNAN      And    benztropine  1 mg Intramuscular Q4H PRN Max 4/day [facility]      benztropine  1 mg Oral BID PRN [facility], PA-C      bisacodyl  10 mg Rectal Daily PRN [facility], PA-C      escitalopram  20 mg Oral Daily [facility], PA-C      haloperidol  2 mg Oral Q4H PRN Max 6/day [facility], PA-C      haloperidol  5 mg Oral Q6H PRN Max 4/day [facility], PA-C      haloperidol  5 mg Oral Q4H PRN Max 4/day [facility], PA-C      hydrOXYzine HCL  25 mg Oral Q6H PRN Max 4/day [facility], PA-C      hydrOXYzine HCL  50 mg Oral Q4H PRN Max 4/day [facility], PA-HERNAN      Or    LORazepam  1 mg Intramuscular Q4H PRN [facility], PA-C      LORazepam  1 mg Oral Q4H PRN Max 6/day [facility], PA-C      Or    LORazepam  2 mg Intramuscular Q6H PRN Max 3/day [facility], PA-C      magnesium hydroxide  30 mL Oral Daily PRN [facility], PA-C      propranolol  10 mg Oral Q8H PRN [facility], PA-C      risperiDONE  3 mg Oral BID [facility], PA-C      senna-docusate sodium  1 tablet Oral Daily PRN [facility], PA-C      traZODone  50 mg Oral HS PRN [facility], PA-C          Risks/Benefits of Treatment:     Risks, benefits, and possible side effects of medications previously explained to patient and patient verbalized understanding and agreement for treatment.  No new medication changes.    Progress Toward Goals: progressing    Treatment Planning:     All current active medications have been reviewed.  Continue to monitor response to treatment and assess for potential side effects of medications.  Encourage group therapy, milieu therapy and occupational therapy  Collaboration with medical service for medical comorbidities as indicated.  Behavioral Health checks for safety monitoring.  Estimated Discharge Day:   TBD  Legal Status : Voluntary 201  "commitment.    Subjective     Behavior over the last 24 hours: improving.    Patient seen and evaluated for continuity of care. Per nursing, patient with increases in anxiety yesterday, though overall in good behavioral control. Patient required PRN Ativan 1 mg at 1407 yesterday.    Today, the patient was seen and evaluated at bedside. She endorses 8/10 anxiety and depression, which is improved compared to yesterday, though still present. The patient endorses feeling, \"a little better.\" The patient denies changes to sleep or appetite. She denies SI, HI, passive death wishes, or thoughts to harm self or others at time of evaluation.  The patient denies adverse effects of medication. She is unsure about continuing with LORA medication - encouraged her to discuss further with primary team tomorrow while she is in the window of time for continuation if indicated.    Sleep: improved  Appetite: normal  Medication side effects:  denied  ROS: review of systems as noted above in HPI/Subjective report, all other systems are negative    Objective :  Temp:  [98.4 °F (36.9 °C)-98.5 °F (36.9 °C)] 98.5 °F (36.9 °C)  HR:  [85-96] 96  BP: (110-139)/() 110/92  Resp:  [17] 17  SpO2:  [95 %-99 %] 95 %  O2 Device: None (Room air)    Temp:  [98.4 °F (36.9 °C)-98.5 °F (36.9 °C)] 98.5 °F (36.9 °C)  HR:  [85-96] 96  BP: (110-139)/() 110/92  Resp:  [17] 17  SpO2:  [95 %-99 %] 95 %  O2 Device: None (Room air)    Mental Status Evaluation:    Appearance:  age appropriate, dressed in hospital attire, looks stated age, overweight   Behavior:  cooperative, good eye contact, lying in bed   Speech:  normal rate and volume, clear, coherent, not pressured, not hyperverbal   Mood:  \"A little better.\"   Affect:  slightly brighter, anxious   Thought Process:  organized, logical, coherent, linear   Thought Content:  no overt delusions, negative thoughts, ruminating thoughts   Perceptual Disturbances: denies auditory hallucinations when asked, " denies visual hallucinations when asked, does not appear responding to internal stimuli   Risk Potential: Suicidal Ideation -  None at present  Homicidal Ideation -  None at present  Potential for Aggression - Not at present   Sensorium:  oriented to person, place, time/date, and situation   Memory:  recent and remote memory grossly intact   Consciousness:  alert and awake   Attention/Concentration: attention span and concentration appear shorter than expected for age   Insight:  fair   Judgment: fair   Gait/Station: Unable to assess as patient remained in hospital bed   Motor Activity: no abnormal movements       Lab Results: I have reviewed the following results:  Results from the past 24 hours: No results found for this or any previous visit (from the past 24 hours).    Administrative Statements     Counseling / Coordination of Care:   Patient's progress reviewed with nursing staff.  Medication changes reviewed with nursing staff..    Anamika Mai DO 04/13/25  Psychiatry Resident, PGY-IV

## 2025-04-13 NOTE — NURSING NOTE
Patient reported severe anxiety related to being inpatient and requested medication. Patient received Ativan 1mg PO at 0931. Upon reassessment patient reports medication was effective.

## 2025-04-13 NOTE — PLAN OF CARE
Problem: SELF HARM/SUICIDALITY  Goal: Will have no self-injury during hospital stay  Description: INTERVENTIONS:- Q 15 MINUTES: Routine safety checks- Q WAKING SHIFT & PRN: Assess risk to determine if routine checks are adequate to maintain patient safety- Encourage patient to participate actively in care by formulating a plan to combat response to suicidal ideation, identify supports and resources  Outcome: Progressing     Problem: DEPRESSION  Goal: Will be euthymic at discharge  Description: INTERVENTIONS:- Administer medication as ordered- Provide emotional support via 1:1 interaction with staff- Encourage involvement in milieu/groups/activities- Monitor for social isolation  Outcome: Progressing     Problem: ANXIETY  Goal: Will report anxiety at manageable levels  Description: INTERVENTIONS:- Administer medication as ordered- Teach and encourage coping skills- Provide emotional support- Assess patient/family for anxiety and ability to cope  Outcome: Progressing  Goal: By discharge: Patient will verbalize 2 strategies to deal with anxiety  Description: Interventions:- Identify any obvious source/trigger to anxiety- Staff will assist patient in applying identified coping technique/skills- Encourage attendance of scheduled groups and activities  Outcome: Progressing     Problem: SLEEP DISTURBANCE  Goal: Will exhibit normal sleeping pattern  Description: Interventions:-  Assess the patients sleep pattern, noting recent changes- Administer medication as ordered- Decrease environmental stimuli, including noise, as appropriate during the night- Encourage the patient to actively participate in unit groups and or exercise during the day to enhance ability to achieve adequate sleep at night- Assess the patient, in the morning, encouraging a description of sleep experience  Outcome: Progressing     Problem: SELF CARE DEFICIT  Goal: Return ADL status to a safe level of function  Description: INTERVENTIONS:- Administer  medication as ordered- Assess ADL deficits and provide assistive devices as needed- Obtain PT/OT consults as needed- Assist and instruct patient to increase activity and self care as tolerated  Outcome: Progressing     Problem: DISCHARGE PLANNING  Goal: Discharge to home or other facility with appropriate resources  Description: INTERVENTIONS:- Identify barriers to discharge w/patient and caregiver- Arrange for needed discharge resources and transportation as appropriate- Identify discharge learning needs (meds, wound care, etc.)- Arrange for interpretive services to assist at discharge as needed- Refer to Case Management Department for coordinating discharge planning if the patient needs post-hospital services based on physician/advanced practitioner order or complex needs related to functional status, cognitive ability, or social support system  Outcome: Progressing     Problem: Ineffective Coping  Goal: Cooperates with admission process  Description: Interventions: - Complete admission process  Outcome: Progressing  Goal: Identifies ineffective coping skills  Outcome: Progressing  Goal: Identifies healthy coping skills  Outcome: Progressing  Goal: Demonstrates healthy coping skills  Outcome: Progressing  Goal: Participates in unit activities  Description: Interventions:- Provide therapeutic environment - Provide required programming - Redirect inappropriate behaviors   Outcome: Progressing

## 2025-04-13 NOTE — NURSING NOTE
Patient pleasant endorses anxiety related to being here, depression, denies SI HI AVH, scant and flat in conversation. Physical assessment WNL

## 2025-04-13 NOTE — NURSING NOTE
Pt was calm and cooperative when approached by staff for evening assessment. Pt denies SI/AV/HI. Pt states appetite is good and sleep has also been good with no issues. Pt stated she had no questions for staff besides wanting to know why she was getting an EKG done and pt was educated by staff. Pt will continue to be monitored throughout shift.

## 2025-04-14 LAB
ATRIAL RATE: 82 BPM
P AXIS: 55 DEGREES
PR INTERVAL: 146 MS
QRS AXIS: 33 DEGREES
QRSD INTERVAL: 96 MS
QT INTERVAL: 384 MS
QTC INTERVAL: 448 MS
T WAVE AXIS: -19 DEGREES
VENTRICULAR RATE: 82 BPM

## 2025-04-14 PROCEDURE — 93010 ELECTROCARDIOGRAM REPORT: CPT | Performed by: INTERNAL MEDICINE

## 2025-04-14 PROCEDURE — 99232 SBSQ HOSP IP/OBS MODERATE 35: CPT | Performed by: STUDENT IN AN ORGANIZED HEALTH CARE EDUCATION/TRAINING PROGRAM

## 2025-04-14 RX ADMIN — ESCITALOPRAM OXALATE 20 MG: 20 TABLET ORAL at 08:14

## 2025-04-14 RX ADMIN — RISPERIDONE 3 MG: 2 TABLET, FILM COATED ORAL at 17:09

## 2025-04-14 RX ADMIN — RISPERIDONE 3 MG: 2 TABLET, FILM COATED ORAL at 08:14

## 2025-04-14 RX ADMIN — LORAZEPAM 1 MG: 1 TABLET ORAL at 07:53

## 2025-04-14 RX ADMIN — LORAZEPAM 1 MG: 1 TABLET ORAL at 18:56

## 2025-04-14 RX ADMIN — LORAZEPAM 1 MG: 1 TABLET ORAL at 13:47

## 2025-04-14 RX ADMIN — PALIPERIDONE PALMITATE 234 MG: 234 INJECTION INTRAMUSCULAR at 14:34

## 2025-04-14 NOTE — NURSING NOTE
Patient reported severe anxiety and requested medication. Patient received Ativan 1mg PO at 0753.

## 2025-04-14 NOTE — NURSING NOTE
Patient reported severe anxiety related to being inpatient and requested medication. Patient received Ativan 1mg PO at 1347. Upon reassessment patient reported medication was effective.

## 2025-04-14 NOTE — PLAN OF CARE
Patient attends select groups and other unit activities.     Problem: Ineffective Coping  Goal: Identifies healthy coping skills  Outcome: Progressing  Goal: Participates in unit activities  Description: Interventions:- Provide therapeutic environment - Provide required programming - Redirect inappropriate behaviors   Outcome: Progressing

## 2025-04-14 NOTE — ASSESSMENT & PLAN NOTE
Patient to receive Invega Sustenna 234mg LORA maintenance dose today 4/14/2025, last received 3/13/2025   Continue Risperdal 3 mg BID for mood stabilization and psychotic related symptoms  Continue Lexapro 20 mg for mood and anxiety  Remaining admission labs reviewed. Vitamin D mildly decreased to 28.4, other labs WNL.  Repeat EKG pending in setting of psychotropic medications and last EKG in 11/2024.

## 2025-04-14 NOTE — NURSING NOTE
"Patient visible on unit, and attended nursing group. Denies SI/HI/AVH, but stated she was feeling anxious and depressed. Requested and received Ativan 1 mg PO for c/o anxiety \"9 out of 10\", at 1856. Stated \"my anxiety spikes in the morning and at night\". Dorado anxiety scale score was 27 at that time. Upon follow up, patient stated medication was effective.  "

## 2025-04-14 NOTE — PROGRESS NOTES
04/14/25 0753   Team Meeting   Meeting Type Daily Rounds   Team Members Present   Team Members Present Physician;Nurse;;Other (Discipline and Name)   Physician Team Member Dr. Ramirez / MARY JO Gonzales / PA Student / ODILON Cook   Nursing Team Member Ole / Guanakito   Care Management Team Member Serafin / Suly / Rajiv   Other (Discipline and Name) Sigmund - Group Facilitator   Patient/Family Present   Patient Present No   Patient's Family Present No     New admission - 201 from [facility] ED. SI to OD on medications. Pt reported poor sleep, poor appetite. Was compliant with medications but reported she does not feel they are working. Pt reported extreme anxiety, given Atarax and Ativan. Pt was due to LORA the day she was admitted.

## 2025-04-14 NOTE — NURSING NOTE
"Gabby reports \"feeling depressed.\" Reports sleeping well last night but still feeling tired today. She states she feels improvement with her anxiety after taking ativan. States she wakes up with anxiety everyday. Uses reading as a coping mechanism at home. Denies SI/HI/AVH. Encouraged to come to staff with questions or concerns.   "

## 2025-04-14 NOTE — DISCHARGE INSTR - OTHER ORDERS
[facility] Crisis Information   [facility] Mobile Crisis provides immediate support for crisis situations, as well as assistance with managing recurring or future crises.    Support is available 24 hours a day, 7 days a week at [phone] ([phone]). This service is available to anyone in [facility], including children, teens, adults, and families. There is no fee and everyone, regardless of insurance, is eligible for assistance.    STAGES OF CRISIS MANAGEMENT  Before a crisis…  When you start to recognize the stressors that you or a loved one have felt during previous crises, please call [facility]'s Peer Support Talk Line at [phone]. It is available free of charge, 7 days a week, 1:00pm to 9:00pm.  [facility] also has a Teen Talk Line that can be reached by calling [phone] or texting [phone]. It is available Monday through Friday, 3:00pm to 9:00pm.    During a crisis…  When you or a loved one are experiencing a crisis, [facility] is available to help. Just call [phone]). The line is open 24 hours per day, 7 days per week.    After a crisis…  Mobile Crisis would like to help you develop ways to help reduce future crisis situations and create a crisis plan as part of your (or your child's, or your family's) recovery and wellness goals.  WHAT TO EXPECT FROM THE MOBILE CRISIS TEAM    [facility] Mobile Crisis Support is provided by [employer] [facility], and includes the following services:  24 hour telephone counseling  In-person support in a home or community setting  Assistance with developing strategies for reducing recurring crisis  Support for drug/alcohol use or addiction  Help coping with past traumatic experiences  Emergency respite  Connection to Peer support  Assistance connecting to local community resources   Support navigating referrals to appropriate levels of care (including outpatient and inpatient treatment)      Crisis  Services  [facility] Mobile Crisis                                            [phone]  [facility]                                               [phone]  [facility]  - Mental Health & Substance Use          [phone]  [facility]                                                                      [phone]  [facility] Emergency [facility]                               [phone]    Housing Emergency   [facility]                                                       [phone]  If you are homeless or are at risk of becoming homeless, please contact the [facility]:  Visit [email or web address]  Dial 2-1-1 or [phone]  Text [phone]

## 2025-04-14 NOTE — PROGRESS NOTES
Progress Note - Behavioral Health   Name: Gabby Mares 34 y.o. female I MRN: [id number]  Unit/Bed#: -02 I Date of Admission: 4/11/2025   Date of Service: 4/14/2025 I Hospital Day: 3     Assessment & Plan  Current severe episode of major depressive disorder with psychotic features (HCC)    Patient to receive Invega Sustenna 234mg LORA maintenance dose today 4/14/2025, last received 3/13/2025   Continue Risperdal 3 mg BID for mood stabilization and psychotic related symptoms  Continue Lexapro 20 mg for mood and anxiety  Remaining admission labs reviewed. Vitamin D mildly decreased to 28.4, other labs WNL.  Repeat EKG pending in setting of psychotropic medications and last EKG in 11/2024.    Anxiety disorder  As above.  Medical clearance for psychiatric admission  Per medical team.  Morbid (severe) obesity due to excess calories (HCC)  Per medical team.    Current medications:  Current Facility-Administered Medications   Medication Dose Route Frequency Provider Last Rate    acetaminophen  650 mg Oral Q6H PRN Dariusz Gonzales PA-C      acetaminophen  650 mg Oral Q4H PRN Dariusz Gonzales PA-C      acetaminophen  975 mg Oral Q6H PRN Dariusz Gonzales PA-C      aluminum-magnesium hydroxide-simethicone  30 mL Oral Q4H PRN Dariusz Gonzales PA-C      artificial tear   Both Eyes 4x Daily PRN Dariusz Gonzales PA-C      haloperidol lactate  2.5 mg Intramuscular Q6H PRN Max 4/day Dariusz Gonzales PA-C      And    LORazepam  1 mg Intramuscular Q6H PRN Max 4/day Dariusz Gonzales PA-C      And    benztropine  0.5 mg Intramuscular Q6H PRN Max 4/day AINSLEY Ojeda-HERNAN      benztropine  1 mg Intramuscular BID PRN Dariusz Gonzales PA-C      haloperidol lactate  5 mg Intramuscular Q4H PRN Max 4/day Dariusz Gonzales PA-C      And    LORazepam  2 mg Intramuscular Q4H PRN Max 4/day Dariusz Gonzales PA-C      And    benztropine  1 mg Intramuscular Q4H PRN Max 4/day Dariuzs Gonzales PA-C      benztropine  1 mg Oral BID PRN Dariusz  S Christian, PA-C      bisacodyl  10 mg Rectal Daily PRN [facility], PA-C      escitalopram  20 mg Oral Daily [facility], PA-C      haloperidol  2 mg Oral Q4H PRN Max 6/day [facility], PA-C      haloperidol  5 mg Oral Q6H PRN Max 4/day [facility], PA-C      haloperidol  5 mg Oral Q4H PRN Max 4/day [facility], PA-C      hydrOXYzine HCL  25 mg Oral Q6H PRN Max 4/day [facility], PA-C      hydrOXYzine HCL  50 mg Oral Q4H PRN Max 4/day [facility], PA-C      Or    LORazepam  1 mg Intramuscular Q4H PRN [facility], PA-C      LORazepam  1 mg Oral Q4H PRN Max 6/day [facility], PA-C      Or    LORazepam  2 mg Intramuscular Q6H PRN Max 3/day [facility], PA-C      magnesium hydroxide  30 mL Oral Daily PRN [facility], PA-C      paliperidone  234 mg Intramuscular (deltoid only) Q30 Days ODILON Nunez      propranolol  10 mg Oral Q8H PRN [facility], PA-C      risperiDONE  3 mg Oral BID [facility], PA-C      senna-docusate sodium  1 tablet Oral Daily PRN [facility], PA-C      traZODone  50 mg Oral HS PRN [facility], PA-C          Risks/Benefits of Treatment:     Risks, benefits, and possible side effects of medications explained to patient and patient verbalizes understanding and agreement for treatment.    Progress Toward Goals: progressing    Treatment Planning:     All current active medications have been reviewed.  Continue to monitor response to treatment and assess for potential side effects of medications.  Encourage group therapy, milieu therapy and occupational therapy  Collaboration with medical service for medical comorbidities as indicated.  Behavioral Health checks for safety monitoring.  Long Stay Certification : Not Applicable  Estimated Discharge Day:  5 days (4/19/2025)  Legal Status : Voluntary 201 commitment.    Subjective     Behavior over the last 24 hours: unchanged.    Per staff, Gabby was 201 admitted to the unit over the  weekend.  She is reporting increased depression and anxiety.  Patient reports recent eviction notice increasing her depression symptoms.  She has been calm and cooperative on the unit.     Gabby was seen in her room today for psychiatric follow up.  Gabby reports ongoing depression symptoms related to her housing situation.  She is hoping that case management can help her with housing.  She is also reporting ongoing anxiety symptoms related to discharge disposition.  She is agreeable today to receiving her maintenance Invega LORA dosing to help with her mood symptoms.  She is denying any suicidal or homicidal ideation intent or plan today.  She is denying any auditory or visual hallucinations and does not appear to be internally stimulated.      Sleep: normal  Appetite: normal  Medication side effects: No  ROS: review of systems as noted above in HPI/Subjective report, all other systems are negative    Objective :  Temp:  [97.4 °F (36.3 °C)-97.9 °F (36.6 °C)] 97.9 °F (36.6 °C)  HR:  [80-81] 80  BP: (139-145)/(83-90) 145/90  Resp:  [16] 16  SpO2:  [99 %-100 %] 99 %  O2 Device: None (Room air)    Temp:  [97.4 °F (36.3 °C)-97.9 °F (36.6 °C)] 97.9 °F (36.6 °C)  HR:  [80-81] 80  BP: (139-145)/(83-90) 145/90  Resp:  [16] 16  SpO2:  [99 %-100 %] 99 %  O2 Device: None (Room air)    Mental Status Evaluation:    Appearance:  disheveled, marginal hygiene, wearing hospital clothes   Behavior:  cooperative, calm   Speech:  normal rate, soft   Mood:  depressed   Affect:  constricted   Thought Process:  concrete   Thought Content:  negative thinking   Perceptual Disturbances: denies auditory hallucinations when asked, denies visual hallucinations when asked, does not appear responding to internal stimuli   Risk Potential: Suicidal Ideation -  None  Homicidal Ideation -  None  Potential for Aggression - No   Sensorium:  oriented to person, place, and time/date   Memory:  recent and remote memory grossly intact   Consciousness:   alert and awake   Attention/Concentration: attention span and concentration are age appropriate   Insight:  limited   Judgment: limited   Gait/Station: normal gait/station, normal balance   Motor Activity: no abnormal movements       Lab Results: I have reviewed the following results:  Results from the past 24 hours: No results found for this or any previous visit (from the past 24 hours).  Most Recent Labs:   Lab Results   Component Value Date    WBC 8.54 04/12/2025    RBC 4.69 04/12/2025    HGB 12.2 04/12/2025    HCT 38.9 04/12/2025     04/12/2025    RDW 15.3 (H) 04/12/2025    NEUTROABS 5.31 04/12/2025    SODIUM 136 04/12/2025    K 3.8 04/12/2025     04/12/2025    CO2 28 04/12/2025    BUN 14 04/12/2025    CREATININE 1.08 04/12/2025    GLUC 96 04/12/2025    CALCIUM 9.2 04/12/2025    AST 17 04/12/2025    ALT 14 04/12/2025    ALKPHOS 87 04/12/2025    TP 7.5 04/12/2025    ALB 3.9 04/12/2025    TBILI 0.52 04/12/2025    CHOLESTEROL 179 04/12/2025    HDL 53 04/12/2025    TRIG 93 04/12/2025    LDLCALC 107 (H) 04/12/2025    NONHDLC 126 04/12/2025    AMMONIA 32 10/17/2022    TQC8CAPEDTOX 2.795 04/12/2025    FREET4 0.86 05/11/2018    PREGUR Negative 10/18/2022    PREGSERUM Negative 04/12/2025    HCGQUANT <2 08/07/2018    SYPHILISAB Non-reactive 11/14/2024    HGBA1C 5.6 04/12/2025     04/12/2025       Administrative Statements     Counseling / Coordination of Care:   I have spent a total time of 30 minutes in caring for this patient on the day of the visit/encounter including:  Patient's progress discussed with staff in treatment team meeting.  Medication changes reviewed with staff in treatment team meeting..    ODILON Nunez 04/14/25

## 2025-04-14 NOTE — PROGRESS NOTES
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  Short term goals for  decrease in depressive symptoms, decrease in anxiety symptoms, ability to stay safe on the unit, ability to stay free of restraints, improvement in insight, sleep improvement, acceptance of need for psychiatric treatment, acceptance of psychiatric medications discussed.  All parties in agreement and treatment plan signed.   04/14/25 1415   Team Meeting   Meeting Type Tx Team Meeting   Team Members Present   Team Members Present Physician;Nurse;   Physician Team Member Dr. Ramirez   Nursing Team Member Quincy   Care Management Team Member Serafin   Patient/Family Present   Patient Present No   Patient's Family Present No

## 2025-04-14 NOTE — CASE MANAGEMENT
"INTAKE    Readmit score:  30 Red   Confirmed Address   [address]   [address]   [facility] [address]    County: [place]      Resides in the home with/can return?:    Pt rents her own apartment.   Pt currently being evicted. Pt has eviction hearing coming up and unsure of when she will need to get out of her apartment.      Confirmed Phone Number: [phone]   Commitment Status/Admitted from: [address] [place] [address] ED    Presenting C/O:             ED Note   \"  Suicidal        Thoughts to overdose on her meds.   \"34 year old female presents for evaluation of suicidal ideation. Patient states she has had persistent suicidal thoughts for the past 2 months. She reports that she currently has thoughts of overdosing on her risperidone. Patient denies any recent self harm or taking excessive doses of her medication. She states she was supposed to get her Invega injection today, but did not have transport to her appointment. Patient states that she feels the medication is not helping her. \"      Outpatient:    [facility] (Last appt on Monday 3/17/25 at 8:40 am.)    Pt reported her appts have been going well.      ACT/ICM/CPS/WRT/SC: Pt in agreement with [facility] to assist her with [facility]. CM will refer pt to [facility]. (Pt was referred in prior hospitalization 11/2024 but did not follow up)    PCP:    N/A   Work/Income:      Pt receives SSI $1962 monthly   $150 [employer]    Legal/  Probation/[place] Ofc:    Denies     Pt has upcoming Eviction hearing for her apartment.      Access to Firearms:    Denies   Referrals Needed: -Follow up with [facility].   -ICM referral at [facility]   - Housing Referral with [facility]   Transport at Discharge:    Pt will need transportation    IMM:   Arthur Text JENNIFER:    Emergency Contact:     Rosemary Fonseca (Sister) [phone]    ROIs obtained:       Rosemary Fonseca " (Sister) [phone]   [employer]   [facility]   [facility]    Insurance:     Medicare A&B   Audit:        PAWSS:  BAT:  UDS: Negative

## 2025-04-15 PROCEDURE — 99232 SBSQ HOSP IP/OBS MODERATE 35: CPT | Performed by: STUDENT IN AN ORGANIZED HEALTH CARE EDUCATION/TRAINING PROGRAM

## 2025-04-15 RX ORDER — CLONAZEPAM 1 MG/1
1 TABLET ORAL 2 TIMES DAILY PRN
Status: DISCONTINUED | OUTPATIENT
Start: 2025-04-15 | End: 2025-04-17 | Stop reason: HOSPADM

## 2025-04-15 RX ORDER — LORAZEPAM 2 MG/ML
2 INJECTION INTRAMUSCULAR 2 TIMES DAILY PRN
Status: DISCONTINUED | OUTPATIENT
Start: 2025-04-15 | End: 2025-04-17 | Stop reason: HOSPADM

## 2025-04-15 RX ADMIN — RISPERIDONE 3 MG: 2 TABLET, FILM COATED ORAL at 08:03

## 2025-04-15 RX ADMIN — CLONAZEPAM 1 MG: 1 TABLET ORAL at 10:11

## 2025-04-15 RX ADMIN — ESCITALOPRAM OXALATE 20 MG: 20 TABLET ORAL at 08:03

## 2025-04-15 RX ADMIN — CLONAZEPAM 1 MG: 1 TABLET ORAL at 17:12

## 2025-04-15 NOTE — NURSING NOTE
Pt requested and received Klonopin 1 mg tablet PO per PRN orders for severe anxiety. H- 25. Upon follow-up pt reports medication effective and visibly appears calmer.

## 2025-04-15 NOTE — NURSING NOTE
Patient awake this AM and asked for ativan, Spoke with patient about ativan and effects and potential for addiction, patient agreeable to take atarax, she states she is anxious about her living situation. Patient advised to talk to MD about her anxiety. Patient denies SI HI AVH at present, patient plans to shower this AM

## 2025-04-15 NOTE — NURSING NOTE
Patient appears flat but was pleasant during conversation. Denies SI/HI/AVH. Reports that Klonopin seems to work longer for anxiety than Ativan. Patient reports she is feeling better overall, and the anxiety and depression are improving. Patient feels she is ready to go home, and signed a 72 hour withdraw from treatment form today at 1700. Patient cooperative with meals and medication, and has been visible on the unit.

## 2025-04-15 NOTE — PROGRESS NOTES
Progress Note - Behavioral Health   Name: Gabby Mares 34 y.o. female I MRN: [id number]  Unit/Bed#: -02 I Date of Admission: 4/11/2025   Date of Service: 4/15/2025 I Hospital Day: 4        Assessment & Plan  Current severe episode of major depressive disorder with psychotic features (HCC)    Received Invega Sustenna maintenance dose 234 mg IM 4/14/2025, next dose due 5/12/2025.  Decrease Risperdal to 3 mg daily with plan to gradually taper now that patient is stable on LORA  Continue Lexapro 20 mg for mood and anxiety  Can use Klonopin 1 mg BID PRN for anxiety, consider scheduled Inderal if anxiety continues to impair functioning  Anxiety disorder  As above.  Medical clearance for psychiatric admission  Per medical team.  Morbid (severe) obesity due to excess calories (HCC)  Per medical team.     Current medications:  Current Facility-Administered Medications   Medication Dose Route Frequency Provider Last Rate    acetaminophen  650 mg Oral Q6H PRN Dariusz Gonzales PA-C      acetaminophen  650 mg Oral Q4H PRN Dariusz Gonzales PA-C      acetaminophen  975 mg Oral Q6H PRN AINSLEY Ojeda-HERNAN      aluminum-magnesium hydroxide-simethicone  30 mL Oral Q4H PRN AINSLEY Ojeda-HERNAN      artificial tear   Both Eyes 4x Daily PRN AINSLEY Ojeda-HERNAN      haloperidol lactate  2.5 mg Intramuscular Q6H PRN Max 4/day AINSLEY Ojeda-HERNAN      And    LORazepam  1 mg Intramuscular Q6H PRN Max 4/day Dariusz Gonzales PA-C      And    benztropine  0.5 mg Intramuscular Q6H PRN Max 4/day AINSLEY Ojeda-HERNAN      benztropine  1 mg Intramuscular BID PRN AINSLEY Ojeda-HERNAN      haloperidol lactate  5 mg Intramuscular Q4H PRN Max 4/day Dariusz Gonzales PA-C      And    LORazepam  2 mg Intramuscular Q4H PRN Max 4/day Dariusz Gonzales PA-C      And    benztropine  1 mg Intramuscular Q4H PRN Max 4/day AINSLEY Ojeda-HERNAN      benztropine  1 mg Oral BID PRN Dariusz Gonzales PA-C      bisacodyl  10 mg Rectal Daily PRN  Dariusz Gonzales, MARY JO      clonazePAM  1 mg Oral BID PRN Dariusz TOÑA Gonzales, MARY JO      Or    LORazepam  2 mg Intramuscular BID PRN Dariusz S Gonzales, PA-HERNAN      escitalopram  20 mg Oral Daily Dariusz S Gonzales, PA-HERNAN      haloperidol  2 mg Oral Q4H PRN Max 6/day Dariusz S Gonzales, PA-C      haloperidol  5 mg Oral Q6H PRN Max 4/day Dariusz S Gonzales, PA-C      haloperidol  5 mg Oral Q4H PRN Max 4/day [facility] Gonzales, PA-HERNAN      hydrOXYzine HCL  25 mg Oral Q6H PRN Max 4/day [facility], [facility]-HERNAN      hydrOXYzine HCL  50 mg Oral Q4H PRN Max 4/day [facility] Gonzales, MARY JO      Or    LORazepam  1 mg Intramuscular Q4H PRN [facility], MARY JO      magnesium hydroxide  30 mL Oral Daily PRN [facility], MARY JO      paliperidone  234 mg Intramuscular (deltoid only) Q30 Days ODILON Nunez      propranolol  10 mg Oral Q8H PRN [facility] Christian, MARY JO      [START ON 4/16/2025] risperiDONE  3 mg Oral Daily [facility] Gonzales, MARY JO      senna-docusate sodium  1 tablet Oral Daily PRN [facility] Gonzales, MARY JO      traZODone  50 mg Oral HS PRN [facility] MARY JO Gonzales         Risks / Benefits of Treatment:    Risks, benefits, and possible side effects of medications explained to patient and patient verbalizes understanding and agreement for treatment.    Progress Toward Goals: progressing    Treatment Planning:  All current active medications have been reviewed.  Continue to monitor response to treatment and assess for potential side effects of medications.  Encourage group therapy, milieu therapy and occupational therapy  Collaboration with medical service for medical comorbidities as indicated.  Behavioral Health checks for safety monitoring.  Estimated Discharge Day:     Subjective:    Behavior over the last 24 hours: unchanged    No acute behavioral events over the past 24 hours. Today, patient was seen and examined at bedside for continuation of care.     This is a 34-year-old female with a history of MDD with psychotic features who  presents for psychiatric follow-up.  Staff reports no behavioral issues overnight.  She remains focused on Ativan, per nursing report.  Upon approach, the patient appears disheveled, delayed, psychomotor retarded, depressed, anxious and somewhat limited.  She tells me that she has depressive and anxious symptoms related to being evicted from her apartment due to being unable to pay her rent.  She says that Ativan has been helping with her anxiety, we discussed risk of abuse and dependence.  She says that she has tried and failed appropriate nonbenzodiazepine alternatives including gabapentin, BuSpar and Atarax.  We also discussed that, per chart review, the intention was not for her to continue Risperdal 3 mg BID indefinitely following discharge from [facility] last month.  Rather, the plan was to gradually taper the oral antipsychotic in the outpatient setting as she stabilized on the LORA.  Now that she has received another maintenance dose of her Invega Sustenna 234 mg IM, we discussed gradually tapering her Risperdal to limit polypharmacy, patient was amenable.  Denies SI and HI.  Denies AH and VH.    Patient denied other new or worsening psychiatric symptoms/complaints at this time.    Sleep: unchanged  Appetite: unchanged  Medication side effects: none reported  ROS: review of systems as noted in HPI, all other systems are negative    Objective:    Vital signs in last 24 hours:    Temp:  [97.2 °F (36.2 °C)-97.7 °F (36.5 °C)] 97.7 °F (36.5 °C)  HR:  [79-85] 79  BP: (127-133)/(79-85) 127/79  Resp:  [16-17] 17  SpO2:  [99 %-100 %] 100 %  O2 Device: None (Room air)    Mental Status Evaluation:    Appearance: Disheveled, marginal hygiene, paper scrub top, consistent with stated age  Motor: +psychomotor retardation, no gait abnormalities  Behavior: Calm, cooperative, answers questions appropriately  Speech: soft, delayed, normal rhythm  Mood: Depressed and anxious  Affect: Blunted, depressed-appearing, mood  congruent  Thought Process: Organized, linear and goal-oriented  Thought Content: denies auditory hallucinations, denies visual hallucinations, denies delusions  Risk Potential: denies suicidal ideation, plan, or intent. Denies homicidal ideation  Sensorium: Oriented to person, place, time, and situation  Cognition: cognitive ability appears intact but was not quantitatively tested  Consciousness: alert and awake  Attention: intact, able to focus without difficulty  Insight: fair  Judgement: limited    Lab Results: I have reviewed the following results:  No results found for this or any previous visit (from the past 48 hours).     Administrative Statements    Counseling / Coordination of Care:     Patients progress discussed with staff in treatment team meeting. Medication changes reviewed with staff in treatment team meeting.    Dariusz Gonzales PA-C 04/15/25    This note has been constructed using a voice recognition system. There may be translation, syntax, or grammatical errors. If you have any questions, please contact the dictating provider.

## 2025-04-15 NOTE — PLAN OF CARE
Problem: SELF HARM/SUICIDALITY  Goal: Will have no self-injury during hospital stay  Description: INTERVENTIONS:- Q 15 MINUTES: Routine safety checks- Q WAKING SHIFT & PRN: Assess risk to determine if routine checks are adequate to maintain patient safety- Encourage patient to participate actively in care by formulating a plan to combat response to suicidal ideation, identify supports and resources  Outcome: Progressing     Problem: DEPRESSION  Goal: Will be euthymic at discharge  Description: INTERVENTIONS:- Administer medication as ordered- Provide emotional support via 1:1 interaction with staff- Encourage involvement in milieu/groups/activities- Monitor for social isolation  Outcome: Progressing     Problem: ANXIETY  Goal: Will report anxiety at manageable levels  Description: INTERVENTIONS:- Administer medication as ordered- Teach and encourage coping skills- Provide emotional support- Assess patient/family for anxiety and ability to cope  Outcome: Progressing  Goal: By discharge: Patient will verbalize 2 strategies to deal with anxiety  Description: Interventions:- Identify any obvious source/trigger to anxiety- Staff will assist patient in applying identified coping technique/skills- Encourage attendance of scheduled groups and activities  Outcome: Progressing     Problem: SLEEP DISTURBANCE  Goal: Will exhibit normal sleeping pattern  Description: Interventions:-  Assess the patients sleep pattern, noting recent changes- Administer medication as ordered- Decrease environmental stimuli, including noise, as appropriate during the night- Encourage the patient to actively participate in unit groups and or exercise during the day to enhance ability to achieve adequate sleep at night- Assess the patient, in the morning, encouraging a description of sleep experience  Outcome: Progressing     Problem: SELF CARE DEFICIT  Goal: Return ADL status to a safe level of function  Description: INTERVENTIONS:- Administer  medication as ordered- Assess ADL deficits and provide assistive devices as needed- Obtain PT/OT consults as needed- Assist and instruct patient to increase activity and self care as tolerated  Outcome: Progressing     Problem: Ineffective Coping  Goal: Identifies ineffective coping skills  Outcome: Progressing  Goal: Identifies healthy coping skills  Outcome: Progressing  Goal: Demonstrates healthy coping skills  Outcome: Progressing  Goal: Participates in unit activities  Description: Interventions:- Provide therapeutic environment - Provide required programming - Redirect inappropriate behaviors   Outcome: Progressing     Problem: Ineffective Coping  Goal: Cooperates with admission process  Description: Interventions: - Complete admission process  Outcome: Completed

## 2025-04-15 NOTE — ASSESSMENT & PLAN NOTE
Received Invega Sustenna maintenance dose 234 mg IM 4/14/2025, next dose due 5/12/2025.  Decrease Risperdal to 3 mg daily with plan to gradually taper now that patient is stable on OLRA  Continue Lexapro 20 mg for mood and anxiety  Can use Klonopin 1 mg BID PRN for anxiety, consider scheduled Inderal if anxiety continues to impair functioning

## 2025-04-15 NOTE — CASE MANAGEMENT
"CM met with pt to check in and discuss her sister needing a letter for eviction court that shows pt is hospitalized. Pt reported her eviction hearing is scheduled for tomorrow. Pt in agreement with CM calling her sister.       11:45am   CM called pt's sister Rosemary ([phone]) to gather background information. She confirmed that pt is being evicted from her apartment as she is $4,000 behind on her rent. She reported she put pt on her phone plan but pt does not always pay her and has not been paying her rent. Sister reported she has access to her bank account so she can assist pt and she sees multiple purchases from "[employer] and other [employer] buys. Pt and sister both identify that pt does overspend her money. Sister reported that \"when she was at the other hospital they talked about a group home but knew she would have to wait a while for it and I felt bad that she would be sitting in the hospital for so long. But now she told me she agrees with a group home.\"     CM explained that CM can start referral process for [facility] housing but that it does take a while to get into one. Sister verbalized understanding and reported \"that would be great. I would be comfortable with her discharging to her apartment and I would check in on her until she gets the notification she has to leave apartment from the eviction.\"     Pt unable to stay with sister as she is a single mom of young children and pt has hx of suicide attempts and thoughts. Pt's parents are  and she does not have any other supports than sister.     Sister provided email address to send letter to:   peewee@"[email or web address]    CM will continue to provide sister with updates and dc plans as they occur. Sister thankful and call ended mutually.     12pm   CM emailed pt's sister hospitalization letter that she can provide to court for pt's eviction hearing. ([employer]wee@"[email or web address]  "

## 2025-04-15 NOTE — NURSING NOTE
Pt approached nurses' station with c/o anxiety (H=26). Pt requested PRN medication; Received Klonopin 1 mg PO as ordered. Upon follow up, Pt observed resting calmly in her room; PRN medication appears effective.

## 2025-04-16 PROBLEM — Z00.8 MEDICAL CLEARANCE FOR PSYCHIATRIC ADMISSION: Status: RESOLVED | Noted: 2024-11-14 | Resolved: 2025-04-16

## 2025-04-16 PROCEDURE — 99232 SBSQ HOSP IP/OBS MODERATE 35: CPT | Performed by: STUDENT IN AN ORGANIZED HEALTH CARE EDUCATION/TRAINING PROGRAM

## 2025-04-16 RX ORDER — RISPERIDONE 2 MG/1
2 TABLET ORAL DAILY
Status: DISCONTINUED | OUTPATIENT
Start: 2025-04-17 | End: 2025-04-17 | Stop reason: HOSPADM

## 2025-04-16 RX ADMIN — CLONAZEPAM 1 MG: 1 TABLET ORAL at 08:00

## 2025-04-16 RX ADMIN — RISPERIDONE 3 MG: 2 TABLET, FILM COATED ORAL at 08:00

## 2025-04-16 RX ADMIN — ESCITALOPRAM OXALATE 20 MG: 20 TABLET ORAL at 08:00

## 2025-04-16 RX ADMIN — CLONAZEPAM 1 MG: 1 TABLET ORAL at 17:13

## 2025-04-16 NOTE — ASSESSMENT & PLAN NOTE
Received Invega Sustenna maintenance dose 234 mg IM 4/14/2025, next dose due 5/12/2025.  Decrease Risperdal to 2 mg daily with plan to split dose to 1 mg BID as an outpatient. Can gradually taper off as an outpatient.  Continue Lexapro 20 mg for mood and anxiety  Can use Klonopin 1 mg BID PRN for anxiety, consider scheduled Inderal if anxiety continues to impair functioning    Patient signed a 72 hour notice on 4/14/25 and will discharge tomorrow. No grounds for 302.

## 2025-04-16 NOTE — CASE MANAGEMENT
"MARKO called [facility] ([phone]) and was transferred to ICM department and left  for Feli Elizabeth asking for a call back to see if they are accepting new ICM referrals.     1pm   MARKO received call back from Feli at [facility]. CM provided her with MARKO's email and asked her to send ICM referral for CM to complete.     MARKO received email from Feli with  referral for [facility]. ([email or web address])     MARKO contacted [facility] ([email or web address]) to see if pt has any upcoming medication management appointments.     They responded and reported:   \"she has an appt Monday at 11:40 am, can you either fax, [phone] or email me her discharge summary?\"     MARKO informed them that MARKO will send dc summary tomorrow.      MARKO called pt's sister Rosemary ([phone]) to provide her with an update on pt and dc plans for tomorrow. She is in agreement with dc plans. MARKO informed her that MARKO is working on ICM and  housing referrals.      Pt will be due to her next Invega LORA Invega 234 on 5/12/25. MARKO called [facility] ([phone]) CM left  asking for a call back to get pt scheduled.     Maria Esther called back and can schedule pt for her next Invega for:   Monday May 12, 2025 at 3:30pm   [address], [address], [place] [address]      "

## 2025-04-16 NOTE — DISCHARGE SUMMARY
"Discharge Summary - Behavioral Health   Gabby ERWIN [facility] 34 y.o. female MRN: [id number]  Unit/Bed#: -02 Encounter: [id number]     Admission Date: 4/11/2025         Discharge Date: 4/17/25    Attending Psychiatrist: Titus Alston*    Assessment & Plan  Current severe episode of major depressive disorder with psychotic features (HCC)    Received Invega Sustenna maintenance dose 234 mg IM 4/14/2025, next dose due 5/12/2025.  Decrease Risperdal to 2 mg daily with plan to split dose to 1 mg BID as an outpatient. Can gradually taper off as an outpatient.  Continue Lexapro 20 mg for mood and anxiety  Can use Klonopin 1 mg BID PRN for anxiety, consider scheduled Inderal if anxiety continues to impair functioning    Patient signed a 72 hour notice on 4/14/25 and will discharge tomorrow. No grounds for 302.  Anxiety disorder  As above.  Morbid (severe) obesity due to excess calories (HCC)  Per medical team.       Reason for Admission/HPI:     Per HPI from admission H&P obtained by Dr. Mai on 4/12/25:    \"Gabby is a 34 y.o. female with past medical history significant for eczema, obesity, and past psychiatric history significant for depression with history of psychotic features, anxiety, who presents voluntarily for worsening depressive symptoms.     Per ED provider Dr. Rai on 04/11/25: \"34 year old female presents for evaluation of suicidal ideation.  Patient states she has had persistent suicidal thoughts for the past 2 months.  She reports that she currently has thoughts of overdosing on her risperidone.  Patient denies any recent self harm or taking excessive doses of her medication.  She states she was supposed to get her Invega injection today, but did not have transport to her appointment.  Patient states that she feels the medication is not helping her.\"     Per Crisis worker Sanjuanita Casas on 04/11/25: \"Patient presenting into the ER reporting SI with a plan to overdose on medication. " "She currently denies SIB/HI/AVH. Patient reports a hx of SIB last occurring before her last IP admission which occurred in Feburary of this year. Patient was [facility] and [facility] in Nov 2024. Per history there she reported AVH during her last presentation, however, is denying current psychosis. Patient reports disruption to biological functioning including dysregulation so sleep and appetite. Patient was suppose to have an appointment today to receive an Invega injection and reports that due to lack of transpiration and mean to go to the appointment, she is unable to obtain her medication. Patient reports that she does not believe this medication is helpful. Patient has a psychiatrist through [facility] and her next appointment is not for another 2 weeks. She denies current substance use and legal issues. Patient reports that she has an upcoming eviction hearing on April 16th or April 23rd. Patient reports the eviction is a result of overspending leading to being behind on rent. Patient currently received SSDI and is unemployed, which has caused and financial insecurity. Patient also does not have transportation. Patient seeking treatment with a secondary gain to delay eviction hearing and remained housing as she does not have another play to reside should the eviction occur. Patient would benefit from community based case management services to assist with housing, transportation, and treatment compliance. \"     Stressors prior to admission are housing insecurity and upcoming eviction hearing. The patient endorses \"a lot of issues at my apartment, about to be evicted, and I'm looking to get into a group home.\" Because of her housing insecurity and the stress of possible eviction, she notes becoming, \"very, very, depressed.\"     The patient endorses down, depressed mood, low energy and motivation, decreased sleep, and states she is losing weight not because of low appetite, but " "because she does not have money for regular food purchases at this time.  The patient endorses SI on presentation, denies active or passive SI, passive death wishes, or thoughts to harm self or others at time of evaluation. The patient endorses anxiety is a \"10/10,\" at this time, with main trigger being housing issues and feeling her medications are not working as well as previously. The patient endorses restlessness, nervousness, and shakiness.     The patient denies psychosis symptoms at time of evaluation, states AVH have not recurred in recent days. Endorses history of AH and states these did improve with oral Risperdal and Invega Sustenna. She does not make paranoid or delusional statements at time of evaluation.     The patient denies current manic related symptoms including decreased need for sleep, increased energy and mood, irritability, distractibility, increased goal directed behaviors, rapid speech or thoughts, she does not demonstrate grandiosity, or impulsivity.     Patient reports adherence to oral medications of Risperdal and Lexapro. She notes transportation issues trying to get to appointment for Invega Sustenna injection on 04/10/25.\"      Social History       Tobacco History       Smoking Status  Never      Passive Exposure  Never      Smokeless Tobacco Use  Never              Alcohol History       Alcohol Use Status  No              Drug Use       Drug Use Status  No              Sexual Activity       Sexually Active  Never              Other Factors    Not Asked                 Additional Substance Use Detail       Questions Responses    Problems Due to Past Use of Alcohol? No    Problems Due to Past Use of Substances? No    Substance Use Assessment Denies substance use within the past 12 months    Alcohol Use Frequency Denies use in past 12 months    Cannabis frequency Never used    Comment: Never used on 1/2/2019     Heroin Frequency Denies use in past 12 months    Cocaine frequency Never " used    Comment: Never used on 1/2/2019     Crack Cocaine Frequency Denies use in past 12 months    Methamphetamine Frequency Denies use in past 12 months    Narcotic Frequency Denies use in past 12 months    Benzodiazepine Frequency Denies use in past 12 months    Amphetamine frequency Prior dependence    Benzodiazepine Method Pill    Amphetamine method Pill    Comment: Pill on 1/2/2019     Benzodiazepine 1st Use Ativan and Ambien    Amphetamine 1st Use diet pills from PCP    Benzodiazepine Last Use & Amount unknown    Barbituate Frequency Denies use use in past 12 months    Inhalant frequency Never used    Comment: Never used on 1/2/2019     Hallucinogen frequency Never used    Comment: Never used on 1/2/2019     Ecstasy frequency Never used    Comment: Never used on 1/2/2019     Other drug frequency Never used    Comment: Never used on 1/2/2019     Opiate frequency Denies use in past 12 months    Last reviewed by Trinidad Woodard RN on 4/11/2025            Past Medical History:   Diagnosis Date    Acne     Agoraphobia     Anxiety     Bipolar disorder, current episode of casandra with psychotic features 2/27/2025    Depression     Drug abuse (HCC) 11/14/2024    Diphenhydramine abuse    Eating disorder     Heart disease     Hypertension     Impulse control disorder     Obesity     Panic attack     Panic disorder     Psychiatric disorder     depression, bipolar, schizophrenia    Psychiatric illness     Psychosis ([facility])     Schizoaffective disorder (HCC) 2/27/2025    Schizophrenia ([facility])     Schizophrenia (HCC)     Schizophrenia (HCC)     Seizures ([facility])     Self-injurious behavior     Sleep difficulties     Suicide attempt ([facility])      Past Surgical History:   Procedure Laterality Date    CYSTOSCOPY      INCISION AND DRAINAGE OF WOUND N/A 2/1/2019    Procedure: INCISION AND DRAINAGE (I&D) TRUNK;  Surgeon: Mathew Fatima DO;  Location:  MAIN OR;  Service: General    WISDOM TOOTH EXTRACTION         Medications:    All  current active medications have been reviewed.  Medications prior to admission:    Prior to Admission Medications   Prescriptions Last Dose Informant Patient Reported? Taking?   cyanocobalamin (VITAMIN B-12) 1000 MCG tablet Not Taking  No No   Sig: Take 1 tablet (1,000 mcg total) by mouth daily   Patient not taking: Reported on 2025   ergocalciferol (VITAMIN D2) 50,000 units   No No   Sig: Take 1 capsule (50,000 Units total) by mouth once a week for 4 doses   escitalopram (LEXAPRO) 20 mg tablet 2025  No Yes   Sig: Take 1 tablet (20 mg total) by mouth daily   folic acid (FOLVITE) 1 mg tablet Not Taking  No No   Sig: Take 1 tablet (1 mg total) by mouth daily   Patient not taking: Reported on 2025   melatonin 3 mg Not Taking  No No   Sig: Take 2 tablets (6 mg total) by mouth daily at bedtime   Patient not taking: Reported on 2025   paliperidone palmitate ER (INVEGA) 234 mg/1.5 mL IM injection Past Month  No Yes   Si.5 mL (234 mg total) by Intramuscular (deltoid only) route every 30 (thirty) days Do not start before 2025.   risperiDONE (RisperDAL) 3 mg tablet 2025  No Yes   Sig: Take 1 tablet (3 mg total) by mouth 2 (two) times a day      Facility-Administered Medications: None       Allergies:     Allergies   Allergen Reactions    Adhesive [Medical Tape]      rash    Benazepril Cough       Objective     Vital signs in last 24 hours:    Temp:  [97.7 °F (36.5 °C)-98.3 °F (36.8 °C)] 97.7 °F (36.5 °C)  HR:  [77-93] 77  BP: (118-126)/(85-96) 118/85  Resp:  [18] 18  SpO2:  [98 %] 98 %  O2 Device: None (Room air)    No intake or output data in the 24 hours ending 25 2661    Hospital Course:     Gabby was admitted to the inpatient psychiatric unit and started on Behavioral Health checks for safety monitoring. During the hospitalization she was encouraged to attend individual therapy, group therapy, milieu therapy and occupational therapy.    Psychiatric medications were adjusted  over the hospital stay. To address depressive symptoms, Gabby was treated with antidepressant Lexapro and antipsychotic medication Risperdal and Invega Sustenna. Medication doses were adjusted during the hospital course. Lexapro was continued at 20mg daily .  Invega Sustenna  was continued at 234 mg IM q28 days administered without any issues or complications on 4/14/25; next dose due 5/12/25 . Risperdal was  decreased from 3 mg BID to 1 mg BID; Gabby can continue to gradually taper off the Risperdal under supervision of her outpatient provider . Prior to beginning of treatment medications risks and benefits and possible side effects including risk of parkinsonian symptoms, Tardive Dyskinesia and metabolic syndrome related to treatment with antipsychotic medications and risk of suicidality and serotonin syndrome related to treatment with antidepressants were reviewed with Gabby. She verbalized understanding and agreement for treatment. Upon admission Gabby was seen by medical service for medical clearance for inpatient treatment and medical follow up.    Gabby's symptoms gradually improved over the hospital course. Initially after admission she was still feeling depressed and overwhelmed. Patient signed a 72 hour notice voluntarily withdrawing herself from treatment. There were no medicolegal grounds for involuntary commitment. With adjustment of medications and therapeutic milieu her symptoms gradually improved. At the end of treatment Gabby was doing much better. Her mood was significantly improved at the time of discharge. Gabby denied suicidal ideation, intent or plan at the time of discharge and denied homicidal ideation, intent or plan at the time of discharge. There was no overt psychosis at the time of discharge. Gabby was participating appropriately in milieu at the time of discharge. Behavior was appropriate on the unit at the time of discharge. Sleep and appetite were improved. Gabby was tolerating  "medications and was not reporting any significant side effects at the time of discharge.    Gabby signed 72 hour notice and requested discharge. At the time of the 72 hour notice expiration Gabby had no criteria for involuntary commitment and denied any suicidal or homicidal ideation. Patient was attending to all ADLs, taking medications appropriately, eating meals, and actively participating in inpatient programming and the therapeutic milieu.  At the time of discharge, they were goal oriented, forward thinking and optimistic about the future.    The outpatient follow up with a psychiatrist at [facility]  was arranged by the unit  upon discharge.    Mental Status at Time of Discharge:     Appearance: casually dressed, appears consistent with stated age, disheveled, marginal hygiene  Motor: no psychomotor disturbances, no gait abnormalities  Behavior: Pleasant, calm, cooperative, interacts with this writer appropriately  Speech: normal rate, rhythm, and volume, not delayed  Mood: \"More stable\" less depressed, less anxious  Affect: Brighter, more appropriate, mood-congruent  Thought Process: organized, linear, and goal-oriented; intact associations  Thought Content: denies any delusional material, no preoccupation  Perception: denies any auditory or visual hallucinations, denies other perceptual disturbances  Risk Potential: denies suicidal ideation, plan, or intent. Denies homicidal ideation  Sensorium: Oriented to person, place, time, and situation  Cognition: cognitive ability appears mildly diminished but was not quantitatively tested  Consciousness: alert and awake  Attention/Concentration: shorter than expected for age  Insight: fair and improved  Judgement: fair and improved    Admission Diagnosis:    Principal Problem:    Current severe episode of major depressive disorder with psychotic features (HCC)  Active Problems:    Anxiety disorder    Morbid (severe) obesity due to excess " calories (HCC)      Discharge Diagnosis:     Principal Problem:    Current severe episode of major depressive disorder with psychotic features (HCC)  Active Problems:    Anxiety disorder    Morbid (severe) obesity due to excess calories (HCC)  Resolved Problems:    Medical clearance for psychiatric admission      Lab Results: I have personally reviewed all pertinent laboratory/tests results.  Most Recent Labs:   Lab Results   Component Value Date    WBC 8.54 04/12/2025    RBC 4.69 04/12/2025    HGB 12.2 04/12/2025    HCT 38.9 04/12/2025     04/12/2025    RDW 15.3 (H) 04/12/2025    NEUTROABS 5.31 04/12/2025    SODIUM 136 04/12/2025    K 3.8 04/12/2025     04/12/2025    CO2 28 04/12/2025    BUN 14 04/12/2025    CREATININE 1.08 04/12/2025    GLUC 96 04/12/2025    GLUF 96 04/12/2025    CALCIUM 9.2 04/12/2025    AST 17 04/12/2025    ALT 14 04/12/2025    ALKPHOS 87 04/12/2025    TP 7.5 04/12/2025    ALB 3.9 04/12/2025    TBILI 0.52 04/12/2025    CHOLESTEROL 179 04/12/2025    HDL 53 04/12/2025    TRIG 93 04/12/2025    LDLCALC 107 (H) 04/12/2025    NONHDLC 126 04/12/2025    AMMONIA 32 10/17/2022    YAE7CYMFLXMI 2.795 04/12/2025    FREET4 0.86 05/11/2018    PREGUR Negative 10/18/2022    PREGSERUM Negative 04/12/2025    HCGQUANT <2 08/07/2018    RPR Non-Reactive 01/06/2019    HGBA1C 5.6 04/12/2025     04/12/2025       Discharge Medications:    See after visit summary for all reconciled discharge medications provided to patient and family.      Discharge instructions/Information to patient and family:     See after visit summary for information provided to patient and family.      Provisions for Follow-Up Care:    See after visit summary for information related to follow-up care and any pertinent home health orders.      Discharge Statement:    I spent 38 minutes discharging the patient. This time was spent on the day of discharge. I had direct contact with the patient on the day of discharge.      Additional documentation is required if more than 30 minutes were spent on discharge:    I reviewed with Gabby importance of compliance with medications and outpatient treatment after discharge.  I discussed the medication regimen and possible side effects of the medications with Gabby prior to discharge. At the time of discharge she was tolerating psychiatric medications.  I discussed outpatient follow up with Gabby.  I reviewed with Gabby crisis plan and safety plan upon discharge.  Gabby was competent to understand risks and benefits of withholding information and risks and benefits of her actions.  Gabby signed 72 hour notice and requested discharge. At the time of the 72 hour notice expiration she had no criteria for involuntary commitment and denied any suicidal or homicidal ideation.  Next Invega Sustenna 234 mg IM dose due 5/12/25 at an interval of q28 days.    Discharge on Two Antipsychotic Medications: Yes - Gabby is being discharged on 2 antipsychotic agents with a plan to continue Invega Sustenna and taper off Risperdal on outpatient basis.    Dariusz Gonzales PA-C 04/17/25      This note was constructed with the assistance of network approved dictation software. Please excuse any minor errors of syntax or grammar as a result.

## 2025-04-16 NOTE — BH TRANSITION RECORD
Contact Information: If you have any questions, concerns, pended studies, tests and/or procedures, or emergencies regarding your inpatient behavioral health visit. Please contact [place] [facility] [phone] and ask to speak to a , nurse or physician. A contact is available 24 hours/ 7 days a week at this number.     Summary of Procedures Performed During your Stay:  Below is a list of major procedures performed during your hospital stay and a summary of results:  - Cardiac Procedures/Studies: EKG - NSR.    Pending Studies (From admission, onward)      None          Please follow up on the above pending studies with your PCP and/or referring provider.

## 2025-04-16 NOTE — NURSING NOTE
Pt requesting PRN Klonopin early this morning. Redirected to use coping skills first. Reports coping skills were ineffective. Pt remains focused on Klonopin. Received dose @0800. Denies SI/HI or hallucination. Pt disheveled and malodorous. Encouraged to shower. Upon follow up, pt reports feeling less anxious. Anxiety approved per pt.

## 2025-04-16 NOTE — DISCHARGE INSTR - APPOINTMENTS
You are being discharged to [address] [place] [id number]   You confirmed that your cell phone number is [phone]          Behavioral Health Nurse Navigator, Laureen or Mili will be calling you after your discharge, on the phone number that you provided.  They will be available as an additional support, if needed.   If you wish to speak with Laureen, you may contact her at [phone].

## 2025-04-16 NOTE — CASE MANAGEMENT
" emailed Laurence Escobedo and received response [email or web address]) regarding [facility] supportive Housing referral to see what the process is for referrals.    Clinical Liaison   [facility] [address] [place] [address]  Cell: [phone]  Office:[phone]    Laurence reported:   \"For [facility] residential referrals, the initial step is to meet with the appropriate Liaison to complete the initial RSTS assessment to determine appropriateness and level of care. Can you tell me is this individual a [facility] resident and if so, where was the individual last residing?\"     provided her with pt's current address in [place].     Laurence then reported:   \"I have attached Yo Zhao to this email as he is the Clinical Liaison at [facility] who oversees the [place] area. He would be the person you need to schedule an initial RSTS with to determine appropriateness for residential placement. Please let me know if I can be of any further assistance moving forward.\"     ([email or web address])     CM emailed Yo asking if they have a paper referral for residential that CM can complete and if they can schedule meeting with pt as she is being discharged on 4/17/25.     "

## 2025-04-16 NOTE — NURSING NOTE
"Pt alert and journaling in room when assessed by RN. Pt denies SI/HI/AH/VH. Pt describes her depression as \"a lot better\" rating it 6/10, rates anxiety 4/10. Pt reports she brushed her teeth today, but did not shower due to lack of clean clothes and appropriately fitting hospital attire, encouraged patient to wash up regardless. Pt has no concerns at this time, but asking about transportation for discharge tomorrow as her sister is unavailable to pick her up.   "

## 2025-04-16 NOTE — PLAN OF CARE
Problem: SELF HARM/SUICIDALITY  Goal: Will have no self-injury during hospital stay  Description: INTERVENTIONS:- Q 15 MINUTES: Routine safety checks- Q WAKING SHIFT & PRN: Assess risk to determine if routine checks are adequate to maintain patient safety- Encourage patient to participate actively in care by formulating a plan to combat response to suicidal ideation, identify supports and resources  Outcome: Progressing     Problem: DEPRESSION  Goal: Will be euthymic at discharge  Description: INTERVENTIONS:- Administer medication as ordered- Provide emotional support via 1:1 interaction with staff- Encourage involvement in milieu/groups/activities- Monitor for social isolation  Outcome: Progressing     Problem: ANXIETY  Goal: Will report anxiety at manageable levels  Description: INTERVENTIONS:- Administer medication as ordered- Teach and encourage coping skills- Provide emotional support- Assess patient/family for anxiety and ability to cope  Outcome: Progressing  Goal: By discharge: Patient will verbalize 2 strategies to deal with anxiety  Description: Interventions:- Identify any obvious source/trigger to anxiety- Staff will assist patient in applying identified coping technique/skills- Encourage attendance of scheduled groups and activities  Outcome: Progressing     Problem: SLEEP DISTURBANCE  Goal: Will exhibit normal sleeping pattern  Description: Interventions:-  Assess the patients sleep pattern, noting recent changes- Administer medication as ordered- Decrease environmental stimuli, including noise, as appropriate during the night- Encourage the patient to actively participate in unit groups and or exercise during the day to enhance ability to achieve adequate sleep at night- Assess the patient, in the morning, encouraging a description of sleep experience  Outcome: Progressing     Problem: DISCHARGE PLANNING  Goal: Discharge to home or other facility with appropriate resources  Description:  INTERVENTIONS:- Identify barriers to discharge w/patient and caregiver- Arrange for needed discharge resources and transportation as appropriate- Identify discharge learning needs (meds, wound care, etc.)- Arrange for interpretive services to assist at discharge as needed- Refer to Case Management Department for coordinating discharge planning if the patient needs post-hospital services based on physician/advanced practitioner order or complex needs related to functional status, cognitive ability, or social support system  Outcome: Progressing     Problem: Ineffective Coping  Goal: Identifies ineffective coping skills  Outcome: Progressing  Goal: Identifies healthy coping skills  Outcome: Progressing  Goal: Demonstrates healthy coping skills  Outcome: Progressing  Goal: Participates in unit activities  Description: Interventions:- Provide therapeutic environment - Provide required programming - Redirect inappropriate behaviors   Outcome: Progressing

## 2025-04-17 VITALS
HEIGHT: 64 IN | SYSTOLIC BLOOD PRESSURE: 118 MMHG | RESPIRATION RATE: 18 BRPM | BODY MASS INDEX: 50.02 KG/M2 | TEMPERATURE: 97.7 F | WEIGHT: 293 LBS | HEART RATE: 77 BPM | OXYGEN SATURATION: 98 % | DIASTOLIC BLOOD PRESSURE: 85 MMHG

## 2025-04-17 PROCEDURE — 99239 HOSP IP/OBS DSCHRG MGMT >30: CPT | Performed by: PHYSICIAN ASSISTANT

## 2025-04-17 RX ORDER — ESCITALOPRAM OXALATE 20 MG/1
20 TABLET ORAL DAILY
Qty: 30 TABLET | Refills: 1 | Status: SHIPPED | OUTPATIENT
Start: 2025-04-17 | End: 2025-06-16

## 2025-04-17 RX ORDER — RISPERIDONE 1 MG/1
1 TABLET ORAL 2 TIMES DAILY
Qty: 60 TABLET | Refills: 0 | Status: SHIPPED | OUTPATIENT
Start: 2025-04-17

## 2025-04-17 RX ADMIN — ESCITALOPRAM OXALATE 20 MG: 20 TABLET ORAL at 08:09

## 2025-04-17 RX ADMIN — RISPERIDONE 2 MG: 2 TABLET, FILM COATED ORAL at 08:09

## 2025-04-17 NOTE — CASE MANAGEMENT
CM completed [facility]  Referral and pt signed. CM submitted via email to [facility] - sent to Feli Elizabeth for review. ([email or web address]) CM made her aware that pt only has Medicare A&B and not medicaid so pt would need [facility] funding.     CM also made her aware that pt is interested in referral for MH supportive housing and CM initiated process with Yo Zhao ([email or web address]) Clinical Liaison at [facility]. CM emailed him on 4/16/25 to request copy of referral for MH Housing and what is their process. CM still awaiting response as of today 4/17/25. CM made them aware that pt is in process of being evicted from her apartment and is being discharged on 4/17/25.     CM met with pt to check in about dc plans for today. Pt reported feeling improved and ready to return home. Pt reported she will attend eviction hearing after dc. CM reviewed her upcoming medication management appt with [facility], referral for  at [facility] and that CM initiated MH housing referral to [facility] and that CM is waiting to hear back on the process. Pt also scheduled for next Invega LORA injection at [facility] on 5/12/25 at 3:30pm.     CM scheduled pt's transportation via [employer] for 11am to her home at:  [address]   [id number]   [place] [id number]

## 2025-04-17 NOTE — PLAN OF CARE
Problem: SELF HARM/SUICIDALITY  Goal: Will have no self-injury during hospital stay  Description: INTERVENTIONS:- Q 15 MINUTES: Routine safety checks- Q WAKING SHIFT & PRN: Assess risk to determine if routine checks are adequate to maintain patient safety- Encourage patient to participate actively in care by formulating a plan to combat response to suicidal ideation, identify supports and resources  Outcome: Progressing     Problem: DEPRESSION  Goal: Will be euthymic at discharge  Description: INTERVENTIONS:- Administer medication as ordered- Provide emotional support via 1:1 interaction with staff- Encourage involvement in milieu/groups/activities- Monitor for social isolation  Outcome: Progressing     Problem: ANXIETY  Goal: Will report anxiety at manageable levels  Description: INTERVENTIONS:- Administer medication as ordered- Teach and encourage coping skills- Provide emotional support- Assess patient/family for anxiety and ability to cope  Outcome: Progressing  Goal: By discharge: Patient will verbalize 2 strategies to deal with anxiety  Description: Interventions:- Identify any obvious source/trigger to anxiety- Staff will assist patient in applying identified coping technique/skills- Encourage attendance of scheduled groups and activities  Outcome: Progressing     Problem: SLEEP DISTURBANCE  Goal: Will exhibit normal sleeping pattern  Description: Interventions:-  Assess the patients sleep pattern, noting recent changes- Administer medication as ordered- Decrease environmental stimuli, including noise, as appropriate during the night- Encourage the patient to actively participate in unit groups and or exercise during the day to enhance ability to achieve adequate sleep at night- Assess the patient, in the morning, encouraging a description of sleep experience  Outcome: Progressing     Problem: SELF CARE DEFICIT  Goal: Return ADL status to a safe level of function  Description: INTERVENTIONS:- Administer  medication as ordered- Assess ADL deficits and provide assistive devices as needed- Obtain PT/OT consults as needed- Assist and instruct patient to increase activity and self care as tolerated  Outcome: Progressing     Problem: DISCHARGE PLANNING  Goal: Discharge to home or other facility with appropriate resources  Description: INTERVENTIONS:- Identify barriers to discharge w/patient and caregiver- Arrange for needed discharge resources and transportation as appropriate- Identify discharge learning needs (meds, wound care, etc.)- Arrange for interpretive services to assist at discharge as needed- Refer to Case Management Department for coordinating discharge planning if the patient needs post-hospital services based on physician/advanced practitioner order or complex needs related to functional status, cognitive ability, or social support system  Outcome: Progressing     Problem: Ineffective Coping  Goal: Identifies ineffective coping skills  Outcome: Progressing  Goal: Identifies healthy coping skills  Outcome: Progressing  Goal: Demonstrates healthy coping skills  Outcome: Progressing  Goal: Participates in unit activities  Description: Interventions:- Provide therapeutic environment - Provide required programming - Redirect inappropriate behaviors   Outcome: Progressing

## 2025-04-17 NOTE — NURSING NOTE
Patient requested and received Klonopin 1 mg PO for c/o severe anxiety at 1713. Dorado anxiety scale score at that time was 25. Upon follow up, patient stated medication was effective.

## 2025-04-17 NOTE — NURSING NOTE
Patient discharged, reviewed instructions, patient aware scripts are e scribed, patient understood

## 2025-04-30 NOTE — CASE MANAGEMENT
4/30/25 1pm  MARKO received a call from Preeti at [facility] ([phone]) and she reported that she received pt's ICM referral and asked CM to call back to discuss.     CM contacted Yo Zhao at [facility]

## 2025-05-22 ENCOUNTER — HOSPITAL ENCOUNTER (EMERGENCY)
Facility: HOSPITAL | Age: 35
DRG: 885 | End: 2025-05-22
Attending: EMERGENCY MEDICINE
Payer: MEDICARE

## 2025-05-22 ENCOUNTER — HOSPITAL ENCOUNTER (INPATIENT)
Facility: HOSPITAL | Age: 35
LOS: 29 days | Discharge: HOME | DRG: 885 | End: 2025-06-20
Attending: STUDENT IN AN ORGANIZED HEALTH CARE EDUCATION/TRAINING PROGRAM | Admitting: STUDENT IN AN ORGANIZED HEALTH CARE EDUCATION/TRAINING PROGRAM
Payer: MEDICARE

## 2025-05-22 VITALS
OXYGEN SATURATION: 100 % | HEART RATE: 76 BPM | SYSTOLIC BLOOD PRESSURE: 124 MMHG | RESPIRATION RATE: 18 BRPM | DIASTOLIC BLOOD PRESSURE: 83 MMHG | TEMPERATURE: 97.4 F

## 2025-05-22 DIAGNOSIS — R56.9 SEIZURE-LIKE ACTIVITY (HCC): ICD-10-CM

## 2025-05-22 DIAGNOSIS — F31.9 BIPOLAR AFFECTIVE DISORDER, REMISSION STATUS UNSPECIFIED (HCC): Primary | ICD-10-CM

## 2025-05-22 DIAGNOSIS — Z00.8 MEDICAL CLEARANCE FOR PSYCHIATRIC ADMISSION: ICD-10-CM

## 2025-05-22 DIAGNOSIS — R45.851 DEPRESSION WITH SUICIDAL IDEATION: Primary | ICD-10-CM

## 2025-05-22 DIAGNOSIS — F32.A DEPRESSION WITH SUICIDAL IDEATION: Primary | ICD-10-CM

## 2025-05-22 PROCEDURE — 82075 ASSAY OF BREATH ETHANOL: CPT | Performed by: PHYSICIAN ASSISTANT

## 2025-05-22 PROCEDURE — 81025 URINE PREGNANCY TEST: CPT | Performed by: PHYSICIAN ASSISTANT

## 2025-05-22 PROCEDURE — 99285 EMERGENCY DEPT VISIT HI MDM: CPT

## 2025-05-22 PROCEDURE — 80307 DRUG TEST PRSMV CHEM ANLYZR: CPT | Performed by: PHYSICIAN ASSISTANT

## 2025-05-22 PROCEDURE — 99285 EMERGENCY DEPT VISIT HI MDM: CPT | Performed by: PHYSICIAN ASSISTANT

## 2025-05-22 RX ORDER — IBUPROFEN 600 MG/1
600 TABLET, FILM COATED ORAL EVERY 6 HOURS PRN
Status: CANCELLED | OUTPATIENT
Start: 2025-05-22

## 2025-05-22 RX ORDER — HALOPERIDOL 2 MG/1
2 TABLET ORAL
Status: CANCELLED | OUTPATIENT
Start: 2025-05-22

## 2025-05-22 RX ORDER — IBUPROFEN 600 MG/1
600 TABLET, FILM COATED ORAL EVERY 6 HOURS PRN
Status: DISCONTINUED | OUTPATIENT
Start: 2025-05-22 | End: 2025-05-23

## 2025-05-22 RX ORDER — HALOPERIDOL 5 MG/ML
5 INJECTION INTRAMUSCULAR
Status: CANCELLED | OUTPATIENT
Start: 2025-05-22

## 2025-05-22 RX ORDER — IBUPROFEN 400 MG/1
800 TABLET, FILM COATED ORAL EVERY 8 HOURS PRN
Status: CANCELLED | OUTPATIENT
Start: 2025-05-22

## 2025-05-22 RX ORDER — LORAZEPAM 2 MG/ML
2 INJECTION INTRAMUSCULAR
Status: DISCONTINUED | OUTPATIENT
Start: 2025-05-22 | End: 2025-06-20 | Stop reason: HOSPADM

## 2025-05-22 RX ORDER — HYDROXYZINE HYDROCHLORIDE 25 MG/1
50 TABLET, FILM COATED ORAL
Status: CANCELLED | OUTPATIENT
Start: 2025-05-22

## 2025-05-22 RX ORDER — MINERAL OIL AND PETROLATUM 150; 830 MG/G; MG/G
OINTMENT OPHTHALMIC 4 TIMES DAILY PRN
Status: DISCONTINUED | OUTPATIENT
Start: 2025-05-22 | End: 2025-06-20 | Stop reason: HOSPADM

## 2025-05-22 RX ORDER — LORAZEPAM 2 MG/ML
1 INJECTION INTRAMUSCULAR EVERY 4 HOURS PRN
Status: DISCONTINUED | OUTPATIENT
Start: 2025-05-22 | End: 2025-06-20 | Stop reason: HOSPADM

## 2025-05-22 RX ORDER — AMOXICILLIN 250 MG
1 CAPSULE ORAL DAILY PRN
Status: DISCONTINUED | OUTPATIENT
Start: 2025-05-22 | End: 2025-06-20 | Stop reason: HOSPADM

## 2025-05-22 RX ORDER — BISACODYL 10 MG
10 SUPPOSITORY, RECTAL RECTAL DAILY PRN
Status: CANCELLED | OUTPATIENT
Start: 2025-05-22

## 2025-05-22 RX ORDER — PROPRANOLOL HCL 20 MG
10 TABLET ORAL EVERY 8 HOURS PRN
Status: CANCELLED | OUTPATIENT
Start: 2025-05-22

## 2025-05-22 RX ORDER — TRAZODONE HYDROCHLORIDE 50 MG/1
50 TABLET ORAL
Status: DISCONTINUED | OUTPATIENT
Start: 2025-05-22 | End: 2025-06-20 | Stop reason: HOSPADM

## 2025-05-22 RX ORDER — BENZTROPINE MESYLATE 1 MG/ML
1 INJECTION, SOLUTION INTRAMUSCULAR; INTRAVENOUS 2 TIMES DAILY PRN
Status: CANCELLED | OUTPATIENT
Start: 2025-05-22

## 2025-05-22 RX ORDER — BENZTROPINE MESYLATE 1 MG/ML
1 INJECTION, SOLUTION INTRAMUSCULAR; INTRAVENOUS 2 TIMES DAILY PRN
Status: DISCONTINUED | OUTPATIENT
Start: 2025-05-22 | End: 2025-06-20 | Stop reason: HOSPADM

## 2025-05-22 RX ORDER — TRAZODONE HYDROCHLORIDE 50 MG/1
50 TABLET ORAL
Status: CANCELLED | OUTPATIENT
Start: 2025-05-22

## 2025-05-22 RX ORDER — LORAZEPAM 2 MG/ML
2 INJECTION INTRAMUSCULAR
Status: CANCELLED | OUTPATIENT
Start: 2025-05-22

## 2025-05-22 RX ORDER — LORAZEPAM 1 MG/1
1 TABLET ORAL
Status: CANCELLED | OUTPATIENT
Start: 2025-05-22

## 2025-05-22 RX ORDER — BISACODYL 10 MG
10 SUPPOSITORY, RECTAL RECTAL DAILY PRN
Status: DISCONTINUED | OUTPATIENT
Start: 2025-05-22 | End: 2025-06-20 | Stop reason: HOSPADM

## 2025-05-22 RX ORDER — HALOPERIDOL 2 MG/1
2 TABLET ORAL
Status: DISCONTINUED | OUTPATIENT
Start: 2025-05-22 | End: 2025-06-20 | Stop reason: HOSPADM

## 2025-05-22 RX ORDER — HALOPERIDOL 5 MG/ML
2.5 INJECTION INTRAMUSCULAR
Status: DISCONTINUED | OUTPATIENT
Start: 2025-05-22 | End: 2025-06-20 | Stop reason: HOSPADM

## 2025-05-22 RX ORDER — BENZTROPINE MESYLATE 1 MG/ML
0.5 INJECTION, SOLUTION INTRAMUSCULAR; INTRAVENOUS
Status: CANCELLED | OUTPATIENT
Start: 2025-05-22

## 2025-05-22 RX ORDER — BENZTROPINE MESYLATE 1 MG/ML
1 INJECTION, SOLUTION INTRAMUSCULAR; INTRAVENOUS
Status: DISCONTINUED | OUTPATIENT
Start: 2025-05-22 | End: 2025-06-20 | Stop reason: HOSPADM

## 2025-05-22 RX ORDER — IBUPROFEN 800 MG/1
800 TABLET, FILM COATED ORAL EVERY 8 HOURS PRN
Status: DISCONTINUED | OUTPATIENT
Start: 2025-05-22 | End: 2025-05-23

## 2025-05-22 RX ORDER — LORAZEPAM 2 MG/ML
1 INJECTION INTRAMUSCULAR
Status: CANCELLED | OUTPATIENT
Start: 2025-05-22

## 2025-05-22 RX ORDER — HALOPERIDOL 5 MG/ML
2.5 INJECTION INTRAMUSCULAR
Status: CANCELLED | OUTPATIENT
Start: 2025-05-22

## 2025-05-22 RX ORDER — LORAZEPAM 2 MG/ML
1 INJECTION INTRAMUSCULAR
Status: DISCONTINUED | OUTPATIENT
Start: 2025-05-22 | End: 2025-06-20 | Stop reason: HOSPADM

## 2025-05-22 RX ORDER — IBUPROFEN 400 MG/1
400 TABLET, FILM COATED ORAL EVERY 4 HOURS PRN
Status: CANCELLED | OUTPATIENT
Start: 2025-05-22

## 2025-05-22 RX ORDER — MAGNESIUM HYDROXIDE/ALUMINUM HYDROXICE/SIMETHICONE 120; 1200; 1200 MG/30ML; MG/30ML; MG/30ML
30 SUSPENSION ORAL EVERY 4 HOURS PRN
Status: CANCELLED | OUTPATIENT
Start: 2025-05-22

## 2025-05-22 RX ORDER — LORAZEPAM 1 MG/1
1 TABLET ORAL
Status: DISCONTINUED | OUTPATIENT
Start: 2025-05-22 | End: 2025-06-20 | Stop reason: HOSPADM

## 2025-05-22 RX ORDER — MAGNESIUM HYDROXIDE/ALUMINUM HYDROXICE/SIMETHICONE 120; 1200; 1200 MG/30ML; MG/30ML; MG/30ML
30 SUSPENSION ORAL EVERY 4 HOURS PRN
Status: DISCONTINUED | OUTPATIENT
Start: 2025-05-22 | End: 2025-06-20 | Stop reason: HOSPADM

## 2025-05-22 RX ORDER — BENZTROPINE MESYLATE 1 MG/1
1 TABLET ORAL 2 TIMES DAILY PRN
Status: CANCELLED | OUTPATIENT
Start: 2025-05-22

## 2025-05-22 RX ORDER — HALOPERIDOL 10 MG/1
5 TABLET ORAL
Status: CANCELLED | OUTPATIENT
Start: 2025-05-22

## 2025-05-22 RX ORDER — AMOXICILLIN 250 MG
1 CAPSULE ORAL DAILY PRN
Status: CANCELLED | OUTPATIENT
Start: 2025-05-22

## 2025-05-22 RX ORDER — HYDROXYZINE HYDROCHLORIDE 50 MG/1
50 TABLET, FILM COATED ORAL
Status: DISCONTINUED | OUTPATIENT
Start: 2025-05-22 | End: 2025-06-20 | Stop reason: HOSPADM

## 2025-05-22 RX ORDER — MINERAL OIL AND PETROLATUM 150; 830 MG/G; MG/G
OINTMENT OPHTHALMIC 4 TIMES DAILY PRN
Status: CANCELLED | OUTPATIENT
Start: 2025-05-22

## 2025-05-22 RX ORDER — BENZTROPINE MESYLATE 1 MG/ML
0.5 INJECTION, SOLUTION INTRAMUSCULAR; INTRAVENOUS
Status: DISCONTINUED | OUTPATIENT
Start: 2025-05-22 | End: 2025-06-20 | Stop reason: HOSPADM

## 2025-05-22 RX ORDER — IBUPROFEN 400 MG/1
400 TABLET, FILM COATED ORAL EVERY 4 HOURS PRN
Status: DISCONTINUED | OUTPATIENT
Start: 2025-05-22 | End: 2025-05-23

## 2025-05-22 RX ORDER — PROPRANOLOL HYDROCHLORIDE 10 MG/1
10 TABLET ORAL EVERY 8 HOURS PRN
Status: DISCONTINUED | OUTPATIENT
Start: 2025-05-22 | End: 2025-06-20 | Stop reason: HOSPADM

## 2025-05-22 RX ORDER — HALOPERIDOL 5 MG/1
5 TABLET ORAL
Status: DISCONTINUED | OUTPATIENT
Start: 2025-05-22 | End: 2025-06-20 | Stop reason: HOSPADM

## 2025-05-22 RX ORDER — BENZTROPINE MESYLATE 1 MG/ML
1 INJECTION, SOLUTION INTRAMUSCULAR; INTRAVENOUS
Status: CANCELLED | OUTPATIENT
Start: 2025-05-22

## 2025-05-22 RX ORDER — LORAZEPAM 2 MG/ML
1 INJECTION INTRAMUSCULAR EVERY 4 HOURS PRN
Status: CANCELLED | OUTPATIENT
Start: 2025-05-22

## 2025-05-22 RX ORDER — BENZTROPINE MESYLATE 1 MG/1
1 TABLET ORAL 2 TIMES DAILY PRN
Status: DISCONTINUED | OUTPATIENT
Start: 2025-05-22 | End: 2025-06-20 | Stop reason: HOSPADM

## 2025-05-22 RX ORDER — HALOPERIDOL 5 MG/ML
5 INJECTION INTRAMUSCULAR
Status: DISCONTINUED | OUTPATIENT
Start: 2025-05-22 | End: 2025-06-20 | Stop reason: HOSPADM

## 2025-05-22 RX ORDER — HYDROXYZINE HYDROCHLORIDE 25 MG/1
25 TABLET, FILM COATED ORAL
Status: CANCELLED | OUTPATIENT
Start: 2025-05-22

## 2025-05-22 RX ORDER — HYDROXYZINE HYDROCHLORIDE 25 MG/1
25 TABLET, FILM COATED ORAL
Status: DISCONTINUED | OUTPATIENT
Start: 2025-05-22 | End: 2025-06-20 | Stop reason: HOSPADM

## 2025-05-22 NOTE — ED PROVIDER NOTES
Time reflects when diagnosis was documented in both MDM as applicable and the Disposition within this note       Time User Action Codes Description Comment    5/22/2025  9:54 AM PlummerSanjuanita Add [F32.A,  R45.851] Depression with suicidal ideation     5/22/2025 11:28 AM Dariusz Gonzales [Z00.8] Medical clearance for psychiatric admission     5/22/2025 11:28 AM Dariusz Gonzales [R56.9] Seizure-like activity (HCC)           ED Disposition       ED Disposition   Transfer to Behavioral Health Condition   --    Date/Time   u May 22, 2025  9:54 AM    Comment   Gabby Mares should be transferred out to [facility] and has been medically cleared.               Assessment & Plan       Medical Decision Making  Patient with SI, depression, hallucinations, will consult crisis for inpatient  treatment.     Amount and/or Complexity of Data Reviewed  Labs: ordered.  Discussion of management or test interpretation with external provider(s): Crisis worker    Risk  Decision regarding hospitalization.        ED Course as of 05/22/25 1304   u May 22, 2025   1013 Patient signed 201, bed search in progress.   1242 Patient accepted at [facility], by Dariusz Gonzales.       Medications - No data to display    ED Risk Strat Scores                    No data recorded        SBIRT 22yo+      Flowsheet Row Most Recent Value   Initial Alcohol Screen: US AUDIT-C     1. How often do you have a drink containing alcohol? 0 Filed at: 05/22/2025 0941   2. How many drinks containing alcohol do you have on a typical day you are drinking?  0 Filed at: 05/22/2025 0941   3b. FEMALE Any Age, or MALE 65+: How often do you have 4 or more drinks on one occassion? 0 Filed at: 05/22/2025 0941   Audit-C Score 0 Filed at: 05/22/2025 0941   FRIDA: How many times in the past year have you...    Used an illegal drug or used a prescription medication for non-medical reasons? Never Filed at: 05/22/2025 0941                            History of Present  "Illness       Chief Complaint   Patient presents with    Psychiatric Evaluation     Pt states \"I feel like I want to do myself in\". Pt sates \"I just got evicted from my apartment and I have been living with my sister and she said I started acting strange and suggested I come here\". Pt states that her plan is to jump out of a car door. Pt states that she hasn't done it because she doesn't want to put her sister thorough that. Pt denies HI. Pt states \"sometimes I hear voices telling me to do myself in\".        Past Medical History[1]   Past Surgical History[2]   Family History[3]   Social History[4]   E-Cigarette/Vaping    E-Cigarette Use Never User       E-Cigarette/Vaping Substances    Nicotine No     THC No     CBD No     Flavoring No     Other No     Unknown No       I have reviewed and agree with the history as documented.     Patient is a 33 y/o F with h/o schizophrenia presents to the ED with worsening depression and SI.  Patient states she was just evicted from her apartment and has been staying with her sister.  She has not been taking her meds for about 3 weeks and states she even missed her last injectable med.  She states her sister told her she needs to come in because she has kids at home and patient has not been acting right.  Patient states she is hearing voices to harm herself.  She has a plan to jump out of a moving car.  She states \"everything is changing,\" and she does not currently have a psychiatrist.       History provided by:  Patient  Psychiatric Evaluation  Presenting symptoms: hallucinations and suicidal thoughts    Associated symptoms: no abdominal pain and no chest pain        Review of Systems   Constitutional:  Negative for chills and fever.   HENT: Negative.     Respiratory:  Negative for cough and shortness of breath.    Cardiovascular:  Negative for chest pain.   Gastrointestinal:  Negative for abdominal pain, diarrhea, nausea and vomiting.   Genitourinary:  Negative for dysuria. "   Skin:  Negative for color change, pallor and rash.   Neurological:  Negative for dizziness, weakness and numbness.   Psychiatric/Behavioral:  Positive for hallucinations and suicidal ideas.    All other systems reviewed and are negative.          Objective       ED Triage Vitals [05/22/25 0939]   Temperature Pulse Blood Pressure Respirations SpO2 Patient Position - Orthostatic VS   (!) 97.4 °F (36.3 °C) 76 124/83 18 100 % Sitting      Temp Source Heart Rate Source BP Location FiO2 (%) Pain Score    Temporal Monitor Left arm -- No Pain      Vitals      Date and Time Temp Pulse SpO2 Resp BP Pain Score FACES Pain Rating User   05/22/25 0939 97.4 °F (36.3 °C) 76 100 % 18 124/83 No Pain -- RN            Physical Exam  Vitals and nursing note reviewed.   Constitutional:       General: She is not in acute distress.     Appearance: Normal appearance. She is well-groomed. She is not ill-appearing or diaphoretic.   HENT:      Head: Normocephalic and atraumatic.      Right Ear: Hearing normal.      Left Ear: Hearing normal.      Nose: Nose normal.     Eyes:      Conjunctiva/sclera: Conjunctivae normal.       Cardiovascular:      Rate and Rhythm: Normal rate and regular rhythm.      Heart sounds: Normal heart sounds.   Pulmonary:      Effort: Pulmonary effort is normal.      Breath sounds: Normal breath sounds. No wheezing, rhonchi or rales.   Abdominal:      General: Abdomen is flat. Bowel sounds are normal.      Palpations: Abdomen is soft.      Tenderness: There is no abdominal tenderness.     Musculoskeletal:         General: Normal range of motion.      Cervical back: Normal range of motion and neck supple.      Right lower leg: No edema.      Left lower leg: No edema.     Skin:     General: Skin is warm and dry.      Coloration: Skin is not jaundiced or pale.      Findings: No rash.     Neurological:      Mental Status: She is alert and oriented to person, place, and time.      GCS: GCS eye subscore is 4. GCS verbal  subscore is 5. GCS motor subscore is 6.      Cranial Nerves: Cranial nerves 2-12 are intact.      Sensory: Sensation is intact.      Motor: Motor function is intact.     Psychiatric:         Attention and Perception: She perceives auditory hallucinations.         Mood and Affect: Mood is depressed.         Speech: Speech normal.         Behavior: Behavior is actively hallucinating. Behavior is cooperative.         Thought Content: Thought content includes suicidal ideation. Thought content does not include homicidal ideation. Thought content includes suicidal plan. Thought content does not include homicidal plan.         Results Reviewed       Procedure Component Value Units Date/Time    Rapid drug screen, urine [[id number]]  (Normal) Collected: 05/22/25 1004    Lab Status: Final result Specimen: Urine, Other Updated: 05/22/25 1110     Amph/Meth UR Negative     Barbiturate Ur Negative     Benzodiazepine Urine Negative     Cocaine Urine Negative     Methadone Urine Negative     Opiate Urine Negative     PCP Ur Negative     THC Urine Negative     Oxycodone Urine Negative     Fentanyl Urine Negative     HYDROCODONE URINE Negative    Narrative:      FOR MEDICAL PURPOSES ONLY.   IF CONFIRMATION NEEDED PLEASE CONTACT THE LAB WITHIN 5 DAYS.    Drug Screen Cutoff Levels:  AMPHETAMINE/METHAMPHETAMINES  1000 ng/mL  BARBITURATES     200 ng/mL  BENZODIAZEPINES     200 ng/mL  COCAINE      300 ng/mL  METHADONE      300 ng/mL  OPIATES      300 ng/mL  PHENCYCLIDINE     25 ng/mL  THC       50 ng/mL  OXYCODONE      100 ng/mL  FENTANYL      5 ng/mL  HYDROCODONE     300 ng/mL    POCT pregnancy, urine [[id number]]  (Normal) Collected: 05/22/25 1007    Lab Status: Final result Specimen: Urine Updated: 05/22/25 1007     EXT Preg Test, Ur Negative     Control Valid    POCT alcohol breath test [[id number]]  (Normal) Collected: 05/22/25 0958    Lab Status: Final result Updated: 05/22/25 0958     EXTBreath Alcohol 0.000            No orders to  display       Procedures    ED Medication and Procedure Management   Prior to Admission Medications   Prescriptions Last Dose Informant Patient Reported? Taking?   escitalopram (LEXAPRO) 20 mg tablet   No No   Sig: Take 1 tablet (20 mg total) by mouth daily   paliperidone palmitate ER (INVEGA) 234 mg/1.5 mL IM injection   No No   Si.5 mL (234 mg total) by Intramuscular (deltoid only) route every 30 (thirty) days Do not start before 2025.   risperiDONE (RisperDAL) 1 mg tablet   No No   Sig: Take 1 tablet (1 mg total) by mouth 2 (two) times a day      Facility-Administered Medications: None     Patient's Medications   Discharge Prescriptions    No medications on file     No discharge procedures on file.  ED SEPSIS DOCUMENTATION   Time reflects when diagnosis was documented in both MDM as applicable and the Disposition within this note       Time User Action Codes Description Comment    2025  9:54 AM Sanjuanita Plummer [F32.A,  R45.851] Depression with suicidal ideation     2025 11:28 AM Dariusz Gonzales [Z00.8] Medical clearance for psychiatric admission     2025 11:28 AM Dariusz Gonzales [R56.9] Seizure-like activity (HCC)                    [1]   Past Medical History:  Diagnosis Date    Acne     Agoraphobia     Anxiety     Bipolar disorder, current episode of casandra with psychotic features 2025    Depression     Drug abuse (HCC) 2024    Diphenhydramine abuse    Eating disorder     Heart disease     Hypertension     Impulse control disorder     Obesity     Panic attack     Panic disorder     Psychiatric disorder     depression, bipolar, schizophrenia    Psychiatric illness     Psychosis (HCC)     Schizoaffective disorder (HCC) 2025    Schizophrenia (HCC)     Schizophrenia (HCC)     Schizophrenia (HCC)     Seizures (HCC)     Self-injurious behavior     Sleep difficulties     Suicide attempt (HCC)    [2]   Past Surgical History:  Procedure Laterality Date     CYSTOSCOPY      INCISION AND DRAINAGE OF WOUND N/A 2/1/2019    Procedure: INCISION AND DRAINAGE (I&D) TRUNK;  Surgeon: Mathew Fatima DO;  Location:  MAIN OR;  Service: General    WISDOM TOOTH EXTRACTION     [3]   Family History  Problem Relation Name Age of Onset    No Known Problems Mother      Hypertension Father      Mental illness Father      Cancer Father      Glaucoma Paternal Grandfather     [4]   Social History  Tobacco Use    Smoking status: Never     Passive exposure: Never    Smokeless tobacco: Never   Vaping Use    Vaping status: Never Used   Substance Use Topics    Alcohol use: No    Drug use: No        Sanjuanita Plummer PA-C  05/22/25 1078

## 2025-05-22 NOTE — ED NOTES
Patient and provider signed EMTALA. Transfer packet complete.     Patient provided with the phone to inform her sister.

## 2025-05-22 NOTE — NURSING NOTE
Bin  - black Johniee sneakers (w/ nichole)  - white charging cable and block  - iPhone w/ blue case  - black wallet (keys on caribiner, loose change, prescription drug card, EBT card, 2x insurance card, SSN card, [employer] WARE)      Bedside  - black shirt  - pj pants
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
## 2025-05-22 NOTE — ED NOTES
Patient is accepted at [facility]  Patient is accepted by Dariusz SCHMITZ, attending Dr. Ramirez  per intake (Ivonne)    Transportation is arranged with [employer].   Transportation is scheduled for TBD  Patient may go to the floor at 15:30      Nurse report is to be called to [phone] prior to patient transfer.

## 2025-05-22 NOTE — PLAN OF CARE
Problem: SELF HARM/SUICIDALITY  Goal: Will have no self-injury during hospital stay  Description: INTERVENTIONS:  - Q 15 MINUTES: Routine safety checks  - Q WAKING SHIFT & PRN: Assess risk to determine if routine checks are adequate to maintain patient safety  - Encourage patient to participate actively in care by formulating a plan to combat response to suicidal ideation, identify supports and resources  Outcome: Progressing     Problem: DEPRESSION  Goal: Will be euthymic at discharge  Description: INTERVENTIONS:  - Administer medication as ordered  - Provide emotional support via 1:1 interaction with staff  - Encourage involvement in milieu/groups/activities  - Monitor for social isolation  Outcome: Progressing     Problem: PSYCHOSIS  Goal: Will report no hallucinations or delusions  Description: Interventions:  - Administer medication as  ordered  - Every waking shifts and PRN assess for the presence of hallucinations and or delusions  - Assist with reality testing to support increasing orientation  - Assess if patient's hallucinations or delusions are encouraging self-harm or harm to others and intervene as appropriate  Outcome: Progressing     Problem: ANXIETY  Goal: Will report anxiety at manageable levels  Description: INTERVENTIONS:  - Administer medication as ordered  - Teach and encourage coping skills  - Provide emotional support  - Assess patient/family for anxiety and ability to cope  Outcome: Progressing  Goal: By discharge: Patient will verbalize 2 strategies to deal with anxiety  Description: Interventions:  - Identify any obvious source/trigger to anxiety  - Staff will assist patient in applying identified coping technique/skills  - Encourage attendance of scheduled groups and activities  Outcome: Progressing

## 2025-05-22 NOTE — MALNUTRITION/BMI
This medical record reflects one or more clinical indicators suggestive of malnutrition and/or morbid obesity.         BMI Findings:           Body mass index is 51.12 kg/m².   Treat with diet.     See Nutrition note dated 5/22/25  for additional details.  Completed nutrition assessment is viewable in the nutrition documentation.

## 2025-05-22 NOTE — NURSING NOTE
"Patient is a 201 from [facility] for SI. Patient reports being off meds x 1 month and reports being evicted. Stated her last Invega LORA was 2 months ago. Reports no sleep x 4 days PTA. Was riding in a car with her sister, and told her sister she wanted to jump out of the moving vehicle. 2 prior SA via OD and stabbing herself. Patient denies current SI/HI, endorses ongoing AH that are saying \"you're worthless\" and \"you need to do yourself in\". She reports she stayed with her sister last night, but her sister is nervous for her to be around children when she's off her meds. Patient reports that she is working with a CM with [employer] named Niurka to find housing, \"but it may take awhile\".   "

## 2025-05-22 NOTE — ED NOTES
Pt was brought to the ED via sister driving her. Pt met with provider and was medically cleared. Pt met with this writer, Clinical Coordinator of Crisis Intervention, who completed crisis assessment and safety risk assessment.    Pt reported suicidal ideation with plan to jump out of moving vehicle. Pt reported being on Invega but last injection was 2 months ago. Pt reported no current medication adherence. Pt reported currently being evicted and living with sister. Pt reported no access to firearms and pt is unemployed. Pt denies acute medical concerns. Pt reported poor sleep but good appetite. Pt reported no drug and alcohol use. Pt denies criminal charges. Pt denies nicotine use. Pt reported suicide attempt 2 years ago via Ambien. Pt denies self injurious behaviors. Pt denies HI. Pt denies psychosis. Pt denies trauma.     Pt signed 201 and understood rights and restrictions. 72 hour notice explained as well.

## 2025-05-22 NOTE — ED NOTES
[place] PROMISe    Eligibility Verification System checked - ([phone]).  Online system / automated system indicates: [facility] Funding Only - Non-Medic     ID # [id number]

## 2025-05-23 PROBLEM — F29 PSYCHOSIS (HCC): Status: ACTIVE | Noted: 2025-05-23

## 2025-05-23 PROBLEM — F39 MOOD DISORDER (HCC): Status: ACTIVE | Noted: 2025-05-23

## 2025-05-23 LAB
25(OH)D3 SERPL-MCNC: 20.7 NG/ML (ref 30–100)
ALBUMIN SERPL BCG-MCNC: 3.7 G/DL (ref 3.5–5)
ALP SERPL-CCNC: 82 U/L (ref 34–104)
ALT SERPL W P-5'-P-CCNC: 23 U/L (ref 7–52)
ANION GAP SERPL CALCULATED.3IONS-SCNC: 7 MMOL/L (ref 4–13)
AST SERPL W P-5'-P-CCNC: 35 U/L (ref 13–39)
ATRIAL RATE: 56 BPM
BASOPHILS # BLD AUTO: 0.03 THOUSANDS/ÂΜL (ref 0–0.1)
BASOPHILS NFR BLD AUTO: 0 % (ref 0–1)
BILIRUB SERPL-MCNC: 0.44 MG/DL (ref 0.2–1)
BUN SERPL-MCNC: 25 MG/DL (ref 5–25)
CALCIUM SERPL-MCNC: 9.2 MG/DL (ref 8.4–10.2)
CHLORIDE SERPL-SCNC: 101 MMOL/L (ref 96–108)
CHOLEST SERPL-MCNC: 162 MG/DL (ref ?–200)
CO2 SERPL-SCNC: 29 MMOL/L (ref 21–32)
CREAT SERPL-MCNC: 1.35 MG/DL (ref 0.6–1.3)
EOSINOPHIL # BLD AUTO: 0.19 THOUSAND/ÂΜL (ref 0–0.61)
EOSINOPHIL NFR BLD AUTO: 3 % (ref 0–6)
ERYTHROCYTE [DISTWIDTH] IN BLOOD BY AUTOMATED COUNT: 16.2 % (ref 11.6–15.1)
EST. AVERAGE GLUCOSE BLD GHB EST-MCNC: 114 MG/DL
FOLATE SERPL-MCNC: 8.4 NG/ML
GFR SERPL CREATININE-BSD FRML MDRD: 50 ML/MIN/1.73SQ M
GLUCOSE P FAST SERPL-MCNC: 88 MG/DL (ref 65–99)
GLUCOSE SERPL-MCNC: 88 MG/DL (ref 65–140)
HBA1C MFR BLD: 5.6 %
HCG SERPL QL: NEGATIVE
HCT VFR BLD AUTO: 38.9 % (ref 34.8–46.1)
HDLC SERPL-MCNC: 46 MG/DL
HGB BLD-MCNC: 11.8 G/DL (ref 11.5–15.4)
IMM GRANULOCYTES # BLD AUTO: 0.03 THOUSAND/UL (ref 0–0.2)
IMM GRANULOCYTES NFR BLD AUTO: 0 % (ref 0–2)
LDLC SERPL CALC-MCNC: 85 MG/DL (ref 0–100)
LYMPHOCYTES # BLD AUTO: 1.92 THOUSANDS/ÂΜL (ref 0.6–4.47)
LYMPHOCYTES NFR BLD AUTO: 28 % (ref 14–44)
MCH RBC QN AUTO: 26.3 PG (ref 26.8–34.3)
MCHC RBC AUTO-ENTMCNC: 30.3 G/DL (ref 31.4–37.4)
MCV RBC AUTO: 87 FL (ref 82–98)
MONOCYTES # BLD AUTO: 0.49 THOUSAND/ÂΜL (ref 0.17–1.22)
MONOCYTES NFR BLD AUTO: 7 % (ref 4–12)
NEUTROPHILS # BLD AUTO: 4.26 THOUSANDS/ÂΜL (ref 1.85–7.62)
NEUTS SEG NFR BLD AUTO: 62 % (ref 43–75)
NONHDLC SERPL-MCNC: 116 MG/DL
NRBC BLD AUTO-RTO: 0 /100 WBCS
P AXIS: 59 DEGREES
PLATELET # BLD AUTO: 310 THOUSANDS/UL (ref 149–390)
PMV BLD AUTO: 10.7 FL (ref 8.9–12.7)
POTASSIUM SERPL-SCNC: 4.4 MMOL/L (ref 3.5–5.3)
PR INTERVAL: 156 MS
PROT SERPL-MCNC: 7.3 G/DL (ref 6.4–8.4)
QRS AXIS: 27 DEGREES
QRSD INTERVAL: 82 MS
QT INTERVAL: 420 MS
QTC INTERVAL: 405 MS
RBC # BLD AUTO: 4.49 MILLION/UL (ref 3.81–5.12)
SODIUM SERPL-SCNC: 137 MMOL/L (ref 135–147)
T WAVE AXIS: 11 DEGREES
TRIGL SERPL-MCNC: 155 MG/DL (ref ?–150)
TSH SERPL DL<=0.05 MIU/L-ACNC: 1.85 UIU/ML (ref 0.45–4.5)
VENTRICULAR RATE: 56 BPM
VIT B12 SERPL-MCNC: 312 PG/ML (ref 180–914)
WBC # BLD AUTO: 6.92 THOUSAND/UL (ref 4.31–10.16)

## 2025-05-23 PROCEDURE — 99223 1ST HOSP IP/OBS HIGH 75: CPT | Performed by: PSYCHIATRY & NEUROLOGY

## 2025-05-23 PROCEDURE — 83036 HEMOGLOBIN GLYCOSYLATED A1C: CPT | Performed by: PHYSICIAN ASSISTANT

## 2025-05-23 PROCEDURE — 99221 1ST HOSP IP/OBS SF/LOW 40: CPT | Performed by: PHYSICIAN ASSISTANT

## 2025-05-23 PROCEDURE — 82306 VITAMIN D 25 HYDROXY: CPT | Performed by: PHYSICIAN ASSISTANT

## 2025-05-23 PROCEDURE — 80053 COMPREHEN METABOLIC PANEL: CPT | Performed by: PHYSICIAN ASSISTANT

## 2025-05-23 PROCEDURE — 86780 TREPONEMA PALLIDUM: CPT | Performed by: PHYSICIAN ASSISTANT

## 2025-05-23 PROCEDURE — 80061 LIPID PANEL: CPT | Performed by: PHYSICIAN ASSISTANT

## 2025-05-23 PROCEDURE — 84703 CHORIONIC GONADOTROPIN ASSAY: CPT | Performed by: PHYSICIAN ASSISTANT

## 2025-05-23 PROCEDURE — 93010 ELECTROCARDIOGRAM REPORT: CPT | Performed by: INTERNAL MEDICINE

## 2025-05-23 PROCEDURE — 84443 ASSAY THYROID STIM HORMONE: CPT | Performed by: PHYSICIAN ASSISTANT

## 2025-05-23 PROCEDURE — 85025 COMPLETE CBC W/AUTO DIFF WBC: CPT | Performed by: PHYSICIAN ASSISTANT

## 2025-05-23 PROCEDURE — 93005 ELECTROCARDIOGRAM TRACING: CPT

## 2025-05-23 PROCEDURE — 82746 ASSAY OF FOLIC ACID SERUM: CPT | Performed by: PHYSICIAN ASSISTANT

## 2025-05-23 PROCEDURE — 82607 VITAMIN B-12: CPT | Performed by: PHYSICIAN ASSISTANT

## 2025-05-23 RX ORDER — PALIPERIDONE 3 MG/1
3 TABLET, EXTENDED RELEASE ORAL DAILY
Status: DISCONTINUED | OUTPATIENT
Start: 2025-05-23 | End: 2025-05-24

## 2025-05-23 RX ORDER — MIRTAZAPINE 15 MG/1
15 TABLET, FILM COATED ORAL
Status: DISCONTINUED | OUTPATIENT
Start: 2025-05-23 | End: 2025-05-27

## 2025-05-23 RX ADMIN — HYDROXYZINE HYDROCHLORIDE 50 MG: 50 TABLET ORAL at 15:40

## 2025-05-23 RX ADMIN — MIRTAZAPINE 15 MG: 15 TABLET, FILM COATED ORAL at 21:20

## 2025-05-23 RX ADMIN — PALIPERIDONE 3 MG: 3 TABLET, EXTENDED RELEASE ORAL at 09:04

## 2025-05-23 NOTE — PROGRESS NOTES
05/23/25 0814   Team Meeting   Meeting Type Daily Rounds   Team Members Present   Team Members Present Physician;Nurse;;Other (Discipline and Name)   Physician Team Member Dr. Haddad / Dr. Newby / MARY JO Gonzales   Nursing Team Member Ole / Guanakito   Care Management Team Member Serafin / Suly / Rajiv   Patient/Family Present   Patient Present No   Patient's Family Present No     New admission - 201 from [facility] ED. Pt is known to [facility], last admission 4/11/25-4/17/25. Pt reported SI to jump out of a car. Reported AH that she is worthless. She reported that she is trying to ignore them. Reported she was off medications for 1 month. Denied SI upon admission. Will discuss getting her back on Invega.     CM referred pt to [facility] for CM services during admission, and they were to assist her with MH housing options. CM will follow up.

## 2025-05-23 NOTE — TREATMENT PLAN
TREATMENT PLAN REVIEW - Behavioral Health Gabby Mares 34 y.o. 1990 female MRN: [id number]    [facility] QU IP BEHAVIORAL HLTH Room / Bed: [facility] 204/[facility] 204-02 Encounter: [id number]          Admit Date/Time:  5/22/2025  3:40 PM    Treatment Team:   MD Viv Gilliland, JOHN Ernandez, JOHN Calabrese, JOHN Santos, JOHN Mathew, JOHN Jones, MARY JO Schumacher RN    Diagnosis: Principal Problem:    Mood disorder (HCC)  Active Problems:    Medical clearance for psychiatric admission    Drug abuse (HCC)    Agoraphobia    Morbid (severe) obesity due to excess calories (HCC)    Psychosis (HCC)      Patient Strengths/Assets: communication skills, family ties    Patient Barriers/Limitations: poor past treatment response, substance abuse    Short Term Goals: decrease in depressive symptoms, decrease in psychotic symptoms, decrease in suicidal thoughts    Long Term Goals: improvement in depression, free of suicidal thoughts, resolution of psychotic symptoms    Progress Towards Goals: starting psychiatric medications as prescribed, improving gradually    Recommended Treatment: medication management, patient medication education, group therapy, milieu therapy, continued Behavioral Health psychiatric evaluation/assessment process    Treatment Frequency: daily medication monitoring, group and milieu therapy daily, monitoring through interdisciplinary rounds, monitoring through weekly patient care conferences    Expected Discharge Date:  7-10 days    Discharge Plan: discharge to home    Treatment Plan Created/Updated By: Puma Newby DO

## 2025-05-23 NOTE — ASSESSMENT & PLAN NOTE
Vital signs stable at time of assessment  CBC, CMP, TSH pending  EKG: sinus bradycardia normal Qtc HR   Patient appears medically stable at this time for inpatient psychiatric treatment

## 2025-05-23 NOTE — DISCHARGE INSTR - OTHER ORDERS
Resources for you to go during the day while staying at [facility] Programs  [address], 1st floor  [place] [address]    Ph: [phone]  Fax: [phone]    Monday through Friday  7:00 am-3:00 pm    The [facility] has expanded its programs to address poverty and homelessness in these categories:    Basic Needs- The [facility] provides numerous services and supplies five days a week to alleviate the day-to-day stress of poverty and homelessness, including breakfast, showers, bathrooms, toiletries, and clothing.  Health & Wellness- In partnership with local health organizations and businesses, the [facility] offers free medical services on a regular basis.  Case Management & - The on-site  meet one-on-one with clients to address their mental and emotional health in order to address the root of their situations. The [facility]  also maintain relationships with local organizations to refer clients.  Job Preparedness- The [facility] Workforce Development Program prepares clients to get and maintain a job through a multi-day workshop led by local professionals. Those that complete the multi-day program also receive one-on-one professional support from a staff member up to a year after employment.        [facility] Crisis Information   [facility] Mobile Crisis provides immediate support for crisis situations, as well as assistance with managing recurring or future crises.    Support is available 24 hours a day, 7 days a week at 0-578-519[facility] ([phone]). This service is available to anyone in [facility], including children, teens, adults, and families. There is no fee and everyone, regardless of insurance, is eligible for assistance.    STAGES OF CRISIS MANAGEMENT  Before a crisis…  When you start to recognize the stressors that you or a loved one have  felt during previous crises, please call [facility]'s Peer Support Talk Line at [phone]) [phone]. It is available free of charge, 7 days a week, 1:00pm to 9:00pm.  [facility] also has a Teen Talk Line that can be reached by calling [phone] or texting [phone]. It is available Monday through Friday, 3:00pm to 9:00pm.    During a crisis…  When you or a loved one are experiencing a crisis, Mobile Crisis is available to help. Just call [phone] ([phone]). The line is open 24 hours per day, 7 days per week.    After a crisis…  Mobile Crisis would like to help you develop ways to help reduce future crisis situations and create a crisis plan as part of your (or your child's, or your family's) recovery and wellness goals.  WHAT TO EXPECT FROM THE MOBILE CRISIS TEAM    [facility] Mobile Crisis Support is provided by [facility], and includes the following services:  24 hour telephone counseling  In-person support in a home or community setting  Assistance with developing strategies for reducing recurring crisis  Support for drug/alcohol use or addiction  Help coping with past traumatic experiences  Emergency respite  Connection to Peer support  Assistance connecting to local community resources   Support navigating referrals to appropriate levels of care (including outpatient and inpatient treatment)      Crisis Services  [facility] Mobile Crisis                                            [phone]  [facility]                                               [phone]  [facility] [address] [facility]  - Mental Health & Substance Use          [phone]  [facility]                                                                      [phone]  [facility]                               [phone]    Housing Emergency   [facility]                                                       [phone]  If you are homeless or  are at risk of becoming homeless, please contact the [facility]:  Visit [email or web address]  Dial 2-1-1 or [phone]  Text [phone]

## 2025-05-23 NOTE — CONSULTS
Consultation - Hospitalist   Name: Gabby Mares 34 y.o. female I MRN: [id number]  Unit/Bed#: -02 I Date of Admission: 5/22/2025   Date of Service: 5/23/2025 I Hospital Day: 1   Inpatient consult for Medical Clearance for  patient  Consult performed by: Sarika Jones PA-C  Consult ordered by: Dariusz Gonzales PA-C        Physician Requesting Evaluation: Titus Alston*   Reason for Evaluation / Principal Problem: Medical clearance    Assessment & Plan  Medical clearance for psychiatric admission  Vital signs stable at time of assessment  CBC, CMP, TSH pending  EKG: sinus bradycardia normal Qtc HR   Patient appears medically stable at this time for inpatient psychiatric treatment  Mood disorder (HCC)  Further plan per psychiatry  Psychosis (HCC)  Further plan per psychiatry  Morbid (severe) obesity due to excess calories (HCC)  BMI 51.12  Lifestyle modifications  Please contact the SecureChat role for the Hospitalist service with any questions/concerns.    Recommendations for Discharge:  Follow-up with PCP after discharge      Collaboration of Care: Were Recommendations Directly Discussed with Primary Treatment Team? No    History of Present Illness   Chief Complaint: Auditory hallucinations    Gabby Mares is a 34 y.o. female with a PMH of obesity, mood disorder who presents with auditory hallucinations.    Patient presented to the emergency department due to reported negative auditory hallucinations.  Of note was recently evicted, unable to afford medications.  Denies chest pain/palpitations, shortness of breath, nausea/vomiting, abdominal pain, fever/chills.    Review of Systems   Constitutional:  Negative for chills, fatigue, fever and unexpected weight change.   HENT:  Negative for congestion, sore throat and trouble swallowing.    Eyes:  Negative for photophobia, pain and visual disturbance.   Respiratory:  Negative for cough, shortness of breath and wheezing.    Cardiovascular:   Negative for chest pain, palpitations and leg swelling.   Gastrointestinal:  Negative for abdominal pain, constipation, diarrhea, nausea and vomiting.   Endocrine: Negative for polyuria.   Genitourinary:  Negative for difficulty urinating, dysuria, flank pain, hematuria and urgency.   Musculoskeletal:  Negative for back pain, myalgias, neck pain and neck stiffness.   Skin:  Negative for pallor and rash.   Neurological:  Negative for dizziness, tremors, syncope, weakness, light-headedness, numbness and headaches.   Hematological:  Does not bruise/bleed easily.   Psychiatric/Behavioral:  Positive for dysphoric mood and hallucinations. Negative for agitation and confusion.        Historical Information   Past Medical History[1]  Past Surgical History[2]  Social History[3]  E-Cigarette/Vaping    E-Cigarette Use Never User      E-Cigarette/Vaping Substances    Nicotine No     THC No     CBD No     Flavoring No     Other No     Unknown No      Family History[4]  Social History:  Marital Status: Single   Occupation:   Patient Pre-hospital Living Situation: Home  Patient Pre-hospital Level of Mobility: walks  Patient Pre-hospital Diet Restrictions:     Meds/Allergies   I have reviewed home medications using recent Epic encounter.  Prior to Admission medications    Medication Sig Start Date End Date Taking? Authorizing Provider   escitalopram (LEXAPRO) 20 mg tablet Take 1 tablet (20 mg total) by mouth daily  Patient not taking: Reported on 5/22/2025 4/17/25 6/16/25  Dariusz Gonzales PA-C   paliperidone palmitate ER (INVEGA) 234 mg/1.5 mL IM injection 1.5 mL (234 mg total) by Intramuscular (deltoid only) route every 30 (thirty) days Do not start before April 14, 2025.  Patient not taking: Reported on 5/22/2025 4/14/25   Maicol Koenig MD   risperiDONE (RisperDAL) 1 mg tablet Take 1 tablet (1 mg total) by mouth 2 (two) times a day  Patient not taking: Reported on 5/22/2025 4/17/25   Dariusz Gonzales PA-C     Allergies    Allergen Reactions    Adhesive [Medical Tape]      rash    Benazepril Cough       Objective :  Temp:  [97.4 °F (36.3 °C)-97.9 °F (36.6 °C)] 97.9 °F (36.6 °C)  HR:  [76-89] 89  BP: (124)/(83-90) 124/90  Resp:  [17-18] 17  SpO2:  [98 %-100 %] 98 %  O2 Device: None (Room air)    Physical Exam  Vitals and nursing note reviewed.   Constitutional:       Appearance: Normal appearance.      Comments: No acute distress   HENT:      Head: Normocephalic.     Eyes:      General: No scleral icterus.     Extraocular Movements: Extraocular movements intact.      Conjunctiva/sclera: Conjunctivae normal.       Cardiovascular:      Rate and Rhythm: Normal rate and regular rhythm.   Pulmonary:      Effort: Pulmonary effort is normal.      Breath sounds: Normal breath sounds. No wheezing, rhonchi or rales.   Abdominal:      General: Bowel sounds are normal.      Palpations: Abdomen is soft.      Tenderness: There is no abdominal tenderness. There is no guarding or rebound.     Musculoskeletal:         General: No swelling, tenderness or deformity.      Cervical back: Normal range of motion.      Comments: Ambulating unit without difficulty, no edema     Skin:     General: Skin is warm and dry.     Neurological:      Mental Status: She is alert and oriented to person, place, and time.     Psychiatric:         Mood and Affect: Mood normal.         Speech: Speech normal.         Behavior: Behavior normal.            Lab Results: I have reviewed the following results:                   Lab Results   Component Value Date    HGBA1C 5.6 04/12/2025               Imaging Results Review: No pertinent imaging studies reviewed.  Other Study Results Review: EKG was reviewed.     Administrative Statements   Today, Patient Was Seen By: Sarika Jones PA-C    ** Please Note: This note may have been constructed using a voice recognition system.**         [1]   Past Medical History:  Diagnosis Date    Acne     Agoraphobia     Anxiety     Bipolar  disorder, current episode of casandra with psychotic features 2/27/2025    Depression     Drug abuse ([facility]) 11/14/2024    Diphenhydramine abuse    Eating disorder     Heart disease     Hypertension     Impulse control disorder     Obesity     Panic attack     Panic disorder     Psychiatric disorder     depression, bipolar, schizophrenia    Psychiatric illness     Psychosis ([facility])     Schizoaffective disorder ([facility]) 2/27/2025    Schizophrenia ([facility])     Schizophrenia (HCC)     Schizophrenia (HCC)     Seizures ([facility])     Self-injurious behavior     Sleep difficulties     Suicide attempt ([facility])    [2]   Past Surgical History:  Procedure Laterality Date    CYSTOSCOPY      INCISION AND DRAINAGE OF WOUND N/A 2/1/2019    Procedure: INCISION AND DRAINAGE (I&D) TRUNK;  Surgeon: Mathew Fatima DO;  Location:  MAIN OR;  Service: General    WISDOM TOOTH EXTRACTION     [3]   Social History  Tobacco Use    Smoking status: Never     Passive exposure: Never    Smokeless tobacco: Never   Vaping Use    Vaping status: Never Used   Substance and Sexual Activity    Alcohol use: No    Drug use: No    Sexual activity: Never   [4]   Family History  Problem Relation Name Age of Onset    No Known Problems Mother      Hypertension Father      Mental illness Father      Cancer Father      Glaucoma Paternal Grandfather

## 2025-05-23 NOTE — ASSESSMENT & PLAN NOTE
Start Invega 3mg po QD with plan to transition to Invega Sustenna LORA prior to discharge  For the weekend, titrate Invega as tolerated  Start Remeron 15mg po HS for dual benefit of depression and associated neurovegetative symptoms

## 2025-05-23 NOTE — NURSING NOTE
Patient visible on unit, walking hallways. Denies SI/HI/AVH but reports negative AH last night. PTA, patient reports being evicted from her home due to reckless spending habits. Patient reports moving in with her sister and was unable to pay for her medications, resulting in medication noncompliance. Patient hopeful to stabilize on her medication while inpatient and return to her sister's home. Reports sleeping well last night. Attended assigned morning group. Plan of care ongoing. Denies unmet needs.

## 2025-05-23 NOTE — PROGRESS NOTES
Diagnosis of Mood disorder reviewed.   Short term goals for decrease in depressive symptoms, decrease in psychotic symptoms, decrease in suicidal thoughts discussed.   All parties in agreement and treatment plan signed.    05/23/25 [id number]   Team Meeting   Meeting Type Tx Team Meeting   Team Members Present   Team Members Present Physician;Nurse;   Physician Team Member Dr. Ramirez   Nursing Team Member Winsome   Care Management Team Member Serafin   Patient/Family Present   Patient Present No   Patient's Family Present No

## 2025-05-23 NOTE — CASE MANAGEMENT
"INTAKE     Readmit score:  30 Red   Confirmed Address    [address]   [address]   [facility] [address]    County: [place]       Resides in the home with/can return?:    Pt was renting her own apartment at the address above but she was evicted due to \"reckless spending habits\" and not being able to pay for rent.     Pt has been staying with her sister for a few days but was off her medications due to not having the funds to pay for them and pt's MH declined and went to ED for help. Pt's sister supportive and willing to help her but also has her own children and needs pt to be stable on her medications for her to stay with her.       Confirmed Phone Number: [phone]   Commitment Status/Admitted from: [address] [place] [address] 5/22/25   Presenting C/O:             ED Note   \"  Psychiatric Evaluation        Pt states \"I feel like I want to do myself in\". Pt sates \"I just got evicted from my apartment and I have been living with my sister and she said I started acting strange and suggested I come here\". Pt states that her plan is to jump out of a car door. Pt states that she hasn't done it because she doesn't want to put her sister thorough that. Pt denies HI. Pt states \"sometimes I hear voices telling me to do myself in\"         \"Patient is a 35 y/o F with h/o schizophrenia presents to the ED with worsening depression and SI.  Patient states she was just evicted from her apartment and has been staying with her sister.  She has not been taking her meds for about 3 weeks and states she even missed her last injectable med.  She states her sister told her she needs to come in because she has kids at home and patient has not been acting right.  Patient states she is hearing voices to harm herself.  She has a plan to jump out of a moving car.  She states \"everything is changing,\" and she does not currently have a psychiatrist.\"      Outpatient:    [facility] (Last appt on Monday " 3/17/25 at 8:40 am.)     Pt reported her appts have been going well.     Pt reported that she will plan to switch her services over to [facility]       ACT/ICM/CPS/WRT/SC: Pt was referred to [facility] during last admission and has been working with MARKO Varghese ([phone]) and pt reported she has been assisting her with MH Housing/CRR application process.        PCP:    N/A   Work/Income:       Pt receives SSI $1962 monthly   $150 [employer]    Legal/  Probation/[place] Ofc:    Denies      Pt was evicted from her apartment prior to admission due to being behind on her rent.       Access to Firearms:    Denies   Referrals Needed: Follow up with:   -[facility].   -ICM at [facility]   -MH Housing Referral with [facility]   Transport at Discharge:    Pt will need transportation    IMM:   Arthur Text JENNIFER:    Emergency Contact:     Rosemary Fonseca (Sister) [phone]    ROIs obtained:       Rosemary Fonseca (Sister) [phone]   [facility] ICM Preeti Varghese ([phone])   Insurance:     Medicare A&B   Audit:        PAWSS:  BAT:  UDS: Negative

## 2025-05-23 NOTE — CMS CERTIFICATION NOTE
Recertification: Based upon physical, mental and social evaluations, I certify that inpatient psychiatric services continue to be medically necessary for this patient for a duration of 10 midnights for the treatment of  Mood disorder (HCC) Available alternative community resources still do not meet the patient's mental health care needs. I further attest that an established written individualized plan of care has been updated and is outlined in the patient's medical records.

## 2025-05-23 NOTE — H&P
Psychiatric Evaluation - Behavioral Health   Name: Gabby Mares 34 y.o. female I MRN: [id number]  Unit/Bed#: U 204-02 I Date of Admission: 5/22/2025   Date of Service: 5/23/2025 I Hospital Day: 1    Assessment & Plan  Mood disorder (HCC)  Start Invega 3mg po QD with plan to transition to Invega Sustenna LORA prior to discharge  For the weekend, titrate Invega as tolerated  Start Remeron 15mg po HS for dual benefit of depression and associated neurovegetative symptoms  Drug abuse (HCC)    Agoraphobia    Psychosis (HCC)          Current Facility-Administered Medications:     aluminum-magnesium hydroxide-simethicone (MAALOX) oral suspension 30 mL, Q4H PRN    artificial tear ophthalmic ointment, 4x Daily PRN    haloperidol lactate (HALDOL) injection 2.5 mg, Q6H PRN Max 4/day **AND** LORazepam (ATIVAN) injection 1 mg, Q6H PRN Max 4/day **AND** benztropine (COGENTIN) injection 0.5 mg, Q6H PRN Max 4/day    benztropine (COGENTIN) injection 1 mg, BID PRN    haloperidol lactate (HALDOL) injection 5 mg, Q4H PRN Max 4/day **AND** LORazepam (ATIVAN) injection 2 mg, Q4H PRN Max 4/day **AND** benztropine (COGENTIN) injection 1 mg, Q4H PRN Max 4/day    benztropine (COGENTIN) tablet 1 mg, BID PRN    bisacodyl (DULCOLAX) rectal suppository 10 mg, Daily PRN    haloperidol (HALDOL) tablet 2 mg, Q4H PRN Max 6/day    haloperidol (HALDOL) tablet 5 mg, Q6H PRN Max 4/day    haloperidol (HALDOL) tablet 5 mg, Q4H PRN Max 4/day    hydrOXYzine HCL (ATARAX) tablet 25 mg, Q6H PRN Max 4/day    hydrOXYzine HCL (ATARAX) tablet 50 mg, Q4H PRN Max 4/day **OR** LORazepam (ATIVAN) injection 1 mg, Q4H PRN    ibuprofen (MOTRIN) tablet 400 mg, Q4H PRN    ibuprofen (MOTRIN) tablet 600 mg, Q6H PRN    ibuprofen (MOTRIN) tablet 800 mg, Q8H PRN    LORazepam (ATIVAN) tablet 1 mg, Q4H PRN Max 6/day **OR** LORazepam (ATIVAN) injection 2 mg, Q6H PRN Max 3/day    magnesium hydroxide (MILK OF MAGNESIA) oral suspension 30 mL, Daily PRN    mirtazapine (REMERON)  "tablet 15 mg, HS    paliperidone (INVEGA) 24 hr tablet 3 mg, Daily    propranolol (INDERAL) tablet 10 mg, Q8H PRN    senna-docusate sodium (SENOKOT S) 8.6-50 mg per tablet 1 tablet, Daily PRN    traZODone (DESYREL) tablet 50 mg, HS PRN    Risks/Benefits of Treatment:     Risks, benefits, and possible side effects of medications explained to patient and patient verbalizes understanding and agreement for treatment.    Treatment Planning:     All current active medications have been reviewed.  Continue to monitor response to treatment and assess for potential side effects of medications.  Encourage group therapy, milieu therapy and occupational therapy  Collaboration with medical service for medical comorbidities as indicated.  Behavioral Health checks for safety monitoring.  Estimated Discharge Day:   Legal Status: 201    Psychiatric Evaluation    Chief Complaint: SI, AH    History of Present Illness     Per ED provider note:  Patient is a 35 y/o F with h/o schizophrenia presents to the ED with worsening depression and SI.  Patient states she was just evicted from her apartment and has been staying with her sister.  She has not been taking her meds for about 3 weeks and states she even missed her last injectable med.  She states her sister told her she needs to come in because she has kids at home and patient has not been acting right.  Patient states she is hearing voices to harm herself.  She has a plan to jump out of a moving car.  She states \"everything is changing,\" and she does not currently have a psychiatrist.     Per Crisis worker note:  Pt was brought to the ED via sister driving her. Pt met with provider and was medically cleared. Pt met with this writer, Clinical Coordinator of Crisis Intervention, who completed crisis assessment and safety risk assessment.     Pt reported suicidal ideation with plan to jump out of moving vehicle. Pt reported being on Invega but last injection was 2 months ago. Pt reported " no current medication adherence. Pt reported currently being evicted and living with sister. Pt reported no access to firearms and pt is unemployed. Pt denies acute medical concerns. Pt reported poor sleep but good appetite. Pt reported no drug and alcohol use. Pt denies criminal charges. Pt denies nicotine use. Pt reported suicide attempt 2 years ago via Ambien. Pt denies self injurious behaviors. Pt denies HI. Pt denies psychosis. Pt denies trauma.      Pt signed 201 and understood rights and restrictions. 72 hour notice explained as well    On admission to Inpatient Psychiatric Unit:  Patient is a 34-year-old white female with an unclear past psychiatric history (documented as bipolar disorder with psychotic features although I doubt this is her correct diagnosis based on the natural history of her symptomatology, and suspect more unipolar depression +/- underlying personality pathology with parapsychotic symptoms), and a history of Benadryl abuse and agoraphobia, who presents voluntarily to inpatient BHU complaining of suicidal ideation and auditory hallucinations.    Symptoms prior to hospitalization include: Suicidal ideation to jump out of a moving vehicle, command auditory hallucinations telling her to kill herself, depressed mood, decreased sleep, generalized anxiety, and Benadryl abuse.  Symptom severity is rated as severe.  Timeline is progressively worsening over the past few days.  Mitigating factors are sisters support.  Exacerbating stressors are Benadryl abuse and pending eviction.    On initial psychiatric evaluation today, the patient states that she has financial stressors and was unable to pay her rent so she is being evicted.  As a result, she was having suicidal ideation.  She says that she typically does not hear any voices but she has been hearing voices telling her to kill herself over the past few days because she has been so stressed out.  She has not taken any psychotropic medications in  approximately 2 months and previously was on Invega Sustenna long-acting injectable.  Patient feels depressed and anxious.  She admits to abusing Benadryl up to 700 mg daily which she states she last used approximately 2 weeks ago.  Her description of her symptomatology does not fit the natural history of true bipolar spectrum disease.  On exam, she is certainly not psychotic appearing nor manic appearing.  She states that she has a  through [employer] and is currently being lined up with housing.  She has a psychiatrist but states that she might need a new one.  She denies homicidal ideation, current manic symptoms, or visual hallucinations.  She is agreeable with plan to restart Invega with plan to convert to long-acting injectable as well as starting remeron.     Psychiatric Review Of Systems:  Medication side effects: none  Sleep: decreased  Appetite: no change  Hygiene: able to tend to instrumental and basic ADLs  Anxiety Symptoms: +  Psychotic Symptoms: +  Depression Symptoms: +  Manic Symptoms: denies  PTSD Symptoms: denies  Suicidal Thoughts: +  Homicidal Thoughts: denies    Historical Information     Past Psychiatric History:   Inpatient Treatment: SLQ April 2025; SL3B Feb 2025; SLQ Nov 2024; SL3P Jan 2019  Outpatient Treatment: Currently has an OP psychiatrist  Past Suicide Attempts: Pertinent hx documented as per HPI  Past Violent Behavior: Pertinent hx documented as per HPI  Past Psychiatric Medication Trials: Several; abilify, risperdal, seroquel, lexapro, trazodone, remeron, invega    Substance Abuse History:  Social History     Substance and Sexual Activity   Alcohol Use No     Social History     Substance and Sexual Activity   Drug Use No     Abuses Benadryl, up to 700mg/day, last use 2 weeks ago    I have assessed this patient for substance use within the past 12 months.    Family Psychiatric History:  Family History[1]    Social History:  Highest education: HS  Currently living:  With sister, evicted from apt 2 days ago  Relationships: Single  Children: none  Occupation: none; on disability  -Legal hx  - hx    Rest of social history as per below:    Social History     Socioeconomic History    Marital status: Single     Spouse name: Not on file    Number of children: Not on file    Years of education: 12    Highest education level: Not on file   Occupational History    Occupation: Disabled    Tobacco Use    Smoking status: Never     Passive exposure: Never    Smokeless tobacco: Never   Vaping Use    Vaping status: Never Used   Substance and Sexual Activity    Alcohol use: No    Drug use: No    Sexual activity: Never   Other Topics Concern    Not on file   Social History Narrative    ** Merged History Encounter **        Most recent tobacco use screenin2019    Do you currently or have you served in the  [employer]: No    Were you activated, into active duty, as a member of the [employer] or as a Reservist: No    Occupation: disabled    Education: 12    Marital status: Single    Exercise level: Occasional    Diet: Regular    General stress level: High    Alcohol intake: None    Caffeine intake: Occasional    Chewing tobacco: none    Illicit drugs: Denied    Guns present in home: No    Seat belts used routinely: Yes    Sunscreen used routinely: No    Smoke alarm in home: Yes    Advance directive: No    Salt Intake: Normal Use    Has the Patient had a mammogram to screen for breast cancer within 24 months: No    Would the patient like to schedule a Mammogram: No    Is the patient interested in a colorectal cancer screening: No          Social Drivers of Health     Financial Resource Strain: Medium Risk (2025)    Overall Financial Resource Strain (CARDIA)     Difficulty of Paying Living Expenses: Somewhat hard   Food Insecurity: Food Insecurity Present (2025)    Nursing - Inadequate Food Risk Classification     Worried About Running Out of Food in the Last Year:  Often true     Ran Out of Food in the Last Year: Often true     Ran Out of Food in the Last Year: Sometimes true   Transportation Needs: Unmet Transportation Needs (2025)    Nursing - Transportation Risk Classification     Lack of Transportation: Not on file     Lack of Transportation: Yes   Physical Activity: Not on file   Stress: Not on file   Social Connections: Feeling Socially Isolated (2024)    [facility]    OASIS : Social Isolation     How often do you feel lonely or isolated from those around you?: Always   Intimate Partner Violence: Unknown (2025)    Nursing IPS     Feels Physically and Emotionally Safe: Not on file     Physically Hurt by Someone: Not on file     Humiliated or Emotionally Abused by Someone: Not on file     Physically Hurt by Someone: No     Hurt or Threatened by Someone: No   Housing Stability: At Risk (2025)    Nursing: Inadequate Housing Risk Classification     Has Housing: Not on file     Worried About Losing Housing: Not on file     Unable to Get Utilities: Not on file     Unable to Pay for Housing in the Last Year: Yes     Has Housin       Traumatic History:  Pertinent hx documented as per HPI    Past Medical History:  Past Medical History[2]     Vital signs in last 24 hours:    Temp:  [97.4 °F (36.3 °C)-97.9 °F (36.6 °C)] 97.9 °F (36.6 °C)  HR:  [76-89] 89  BP: (124)/(83-90) 124/90  Resp:  [17-18] 17  SpO2:  [98 %-100 %] 98 %  O2 Device: None (Room air)    Mental Status Evaluation:    Appearance: casually dressed, consistent with stated age  Motor: +psychomotor retardation  Behavior: cooperative, answers questions appropriately  Speech: soft, normal rhythm  Mood: depressed  Affect: constricted, depressed-appearing  Thought Process: linear and goal-oriented  Thought Content: +command auditory hallucinations, denies visual hallucinations, denies delusions  Risk Potential: + suicidal ideation, plan to jump from a vehicle, no  intent. Denies homicidal ideation  Sensorium: Oriented to person, place, time, and situation  Cognition: cognitive ability appears intact but was not quantitatively tested  Consciousness: alert and awake  Attention: intact, able to focus without difficulty  Insight: fair  Judgement: limited      Patient Strengths/Assets: negotiates basic needs    Patient Barriers/Limitations: biopsychosocial stressors contributing to psychiatric decompensation    Lab Results: I have reviewed the following results:  Recent Results (from the past 48 hours)   POCT alcohol breath test    Collection Time: 05/22/25  9:58 AM   Result Value Ref Range    EXTBreath Alcohol 0.000    Rapid drug screen, urine    Collection Time: 05/22/25 10:04 AM   Result Value Ref Range    Amph/Meth UR Negative Negative    Barbiturate Ur Negative Negative    Benzodiazepine Urine Negative Negative    Cocaine Urine Negative Negative    Methadone Urine Negative Negative    Opiate Urine Negative Negative    PCP Ur Negative Negative    THC Urine Negative Negative    Oxycodone Urine Negative Negative    Fentanyl Urine Negative Negative    HYDROCODONE URINE Negative Negative   POCT pregnancy, urine    Collection Time: 05/22/25 10:07 AM   Result Value Ref Range    EXT Preg Test, Ur Negative Negative    Control Valid Valid        Code Status: Level 1 - Full Code  Advance Directive and Living Will: <no information>  Next of Kin: Extended Emergency Contact Information  Primary Emergency Contact: Rosemary Fonseca  Mobile Phone: [phone]  Relation: Sister    Counseling / Coordination of Care:   Patient's progress discussed with staff in treatment team meeting.  Medication changes reviewed with staff in treatment team meeting..    Inpatient Psychiatric Certification:  Based upon physical, mental, and social evaluations, I certify that inpatient psychiatric services are medically necessary for this patient for a duration of 5-7 midnights (exact duration TBD pending  patient's response to psychiatric treatment) for the diagnosis listed above.  Available alternative community resources do not meet the patient's mental health care needs.  I further attest that an established written individualized plan of care has been implemented and is outlined in the patient's medical records.    This note has been constructed using a voice recognition system. There may be translation, syntax, or grammatical errors. If you have any questions, please contact the dictating provider.         [1]   Family History  Problem Relation Name Age of Onset    No Known Problems Mother      Hypertension Father      Mental illness Father      Cancer Father      Glaucoma Paternal Grandfather     [2]   Past Medical History:  Diagnosis Date    Acne     Agoraphobia     Anxiety     Bipolar disorder, current episode of casandra with psychotic features 2/27/2025    Depression     Drug abuse (HCC) 11/14/2024    Diphenhydramine abuse    Eating disorder     Heart disease     Hypertension     Impulse control disorder     Obesity     Panic attack     Panic disorder     Psychiatric disorder     depression, bipolar, schizophrenia    Psychiatric illness     Psychosis (HCC)     Schizoaffective disorder (HCC) 2/27/2025    Schizophrenia (HCC)     Schizophrenia (HCC)     Schizophrenia (HCC)     Seizures (HCC)     Self-injurious behavior     Sleep difficulties     Suicide attempt (HCC)

## 2025-05-23 NOTE — NURSING NOTE
"Visible on unit, attends groups, and cooperative. Denies SI/HI/VH but endorses ongoing negative AH. States the AH are telling her to \"do myself in\", meaning to kill herself. Patient denies intent to act on voices. Reports AH and life stressors are causing her increased anxiety. Declined PRN AH medication. At 15:40 RN administered Atarax 50 mg PO for moderate anxiety (Dorado= 24). Upon follow-up, patient endorses medication effectiveness. Plan of care ongoing. Denies unmet needs.   " Telephone Encounter by Delmi Frye at 01/10/17 11:37 AM     Author:  Delmi Frye Service:  (none) Author Type:  Certified Nursing Assistant     Filed:  01/10/17 11:37 AM Encounter Date:  1/10/2017 Status:  Signed     :  Delmi Frye (Certified Medical Assistant)            Left message on answering machine to call back.[TD1.1T]  Colon letter mailed[TD1.1M]  Electronically Signed by:    Delmi Frye , 1/10/2017[TD1.1T]      Revision History        User Key Date/Time User Provider Type Action    > TD1.1 01/10/17 11:37 AM Delmi Frye Certified Medical Assistant Sign    M - Manual, T - Template

## 2025-05-24 LAB — TREPONEMA PALLIDUM IGG+IGM AB [PRESENCE] IN SERUM OR PLASMA BY IMMUNOASSAY: NORMAL

## 2025-05-24 PROCEDURE — 99232 SBSQ HOSP IP/OBS MODERATE 35: CPT

## 2025-05-24 RX ORDER — PALIPERIDONE 3 MG/1
6 TABLET, EXTENDED RELEASE ORAL DAILY
Status: DISCONTINUED | OUTPATIENT
Start: 2025-05-25 | End: 2025-05-27

## 2025-05-24 RX ADMIN — HYDROXYZINE HYDROCHLORIDE 50 MG: 50 TABLET ORAL at 18:25

## 2025-05-24 RX ADMIN — HALOPERIDOL 5 MG: 5 TABLET ORAL at 18:24

## 2025-05-24 RX ADMIN — PALIPERIDONE 3 MG: 3 TABLET, EXTENDED RELEASE ORAL at 08:28

## 2025-05-24 RX ADMIN — MIRTAZAPINE 15 MG: 15 TABLET, FILM COATED ORAL at 21:34

## 2025-05-24 NOTE — NURSING NOTE
Upon approach, patient reading book in bedroom. Denies SI/HI/AVH. Endorses calm mood. Consumed dinner entirety. RN educated patient on available AH PRN medication in the event patient experiences AH, as patient typically experiences them at night. Plan of care ongoing. Denies unmet needs.

## 2025-05-24 NOTE — NURSING NOTE
Gabby was previously given PRN Haldol 5 mg and Atarax 50 mg. Upon follow-up for reassessment, patient is observed resting in room with eyes open, verbalized medication effectiveness.

## 2025-05-24 NOTE — TREATMENT TEAM
05/24/25 1000   Team Meeting   Meeting Type Daily Rounds   Team Members Present   Team Members Present Physician;Nurse   Physician Team Member ODILON Roque   Nursing Team Member Vance   Patient/Family Present   Patient Present No   Patient's Family Present No     201, SZ, SIB ,  visible, social, attends groups. +CAH- to hurt self, in Evening. PRN Atarax-effective.

## 2025-05-24 NOTE — PLAN OF CARE
Problem: SELF HARM/SUICIDALITY  Goal: Will have no self-injury during hospital stay  Description: INTERVENTIONS:  - Q 15 MINUTES: Routine safety checks  - Q WAKING SHIFT & PRN: Assess risk to determine if routine checks are adequate to maintain patient safety  - Encourage patient to participate actively in care by formulating a plan to combat response to suicidal ideation, identify supports and resources  Outcome: Progressing     Problem: DEPRESSION  Goal: Will be euthymic at discharge  Description: INTERVENTIONS:  - Administer medication as ordered  - Provide emotional support via 1:1 interaction with staff  - Encourage involvement in milieu/groups/activities  - Monitor for social isolation  Outcome: Progressing     Problem: PSYCHOSIS  Goal: Will report no hallucinations or delusions  Description: Interventions:  - Administer medication as  ordered  - Every waking shifts and PRN assess for the presence of hallucinations and or delusions  - Assist with reality testing to support increasing orientation  - Assess if patient's hallucinations or delusions are encouraging self-harm or harm to others and intervene as appropriate  Outcome: Progressing     Problem: ANXIETY  Goal: Will report anxiety at manageable levels  Description: INTERVENTIONS:  - Administer medication as ordered  - Teach and encourage coping skills  - Provide emotional support  - Assess patient/family for anxiety and ability to cope  Outcome: Progressing  Goal: By discharge: Patient will verbalize 2 strategies to deal with anxiety  Description: Interventions:  - Identify any obvious source/trigger to anxiety  - Staff will assist patient in applying identified coping technique/skills  - Encourage attendance of scheduled groups and activities  Outcome: Progressing     Problem: DISCHARGE PLANNING - CARE MANAGEMENT  Goal: Discharge to post-acute care or home with appropriate resources  Description: INTERVENTIONS:  - Conduct assessment to determine  patient/family and health care team treatment goals, and need for post-acute services based on payer coverage, community resources, and patient preferences, and barriers to discharge  - Address psychosocial, clinical, and financial barriers to discharge as identified in assessment in conjunction with the patient/family and health care team  - Arrange appropriate level of post-acute services according to patient’s   needs and preference and payer coverage in collaboration with the physician and health care team  - Communicate with and update the patient/family, physician, and health care team regarding progress on the discharge plan  - Arrange appropriate transportation to post-acute venues  Outcome: Progressing     Problem: Ineffective Coping  Goal: Identifies ineffective coping skills  Outcome: Progressing  Goal: Identifies healthy coping skills  Outcome: Progressing  Goal: Demonstrates healthy coping skills  Outcome: Progressing  Goal: Participates in unit activities  Description: Interventions:  - Provide therapeutic environment   - Provide required programming   - Redirect inappropriate behaviors   Outcome: Progressing     Problem: Risk for Self Injury/Neglect  Goal: Treatment Goal: Remain safe during length of stay, learn and adopt new coping skills, and be free of self-injurious ideation, impulses and acts at the time of discharge  Outcome: Progressing  Goal: Verbalize thoughts and feelings  Description: Interventions:  - Assess and re-assess patient's lethality and potential for self-injury  - Engage patient in 1:1 interactions, daily, for a minimum of 15 minutes  - Encourage patient to express feelings, fears, frustrations, hopes  - Establish rapport/trust with patient   Outcome: Progressing  Goal: Refrain from harming self  Description: Interventions:  - Monitor patient closely, per order  - Develop a trusting relationship  - Supervise medication ingestion, monitor effects and side effects   Outcome:  Progressing     Problem: Ineffective Coping  Goal: Cooperates with admission process  Description: Interventions:   - Complete admission process  Outcome: Adequate for Discharge

## 2025-05-24 NOTE — NURSING NOTE
"Visible on unit, cooperative, and pleasant. Rates mood as \"a little better\", rates anxiety and depression 6/10. Confirms difficulty falling asleep last night due to command AH telling her to kill herself. Once asleep, patient reports sleeping approximately 6 hours. Today, denies SI/HI/AVH. Medication compliant. Verbalizes goal for the day to attend group. Plan of care ongoing. Denies unmet needs.  "

## 2025-05-24 NOTE — PROGRESS NOTES
Progress Note - Behavioral Health   Name: Gabby ERWIN sarahland 34 y.o. female I MRN: [id number]  Unit/Bed#: -02 I Date of Admission: 5/22/2025   Date of Service: 5/24/2025 I Hospital Day: 2    Assessment & Plan  Mood disorder (HCC)  Start Invega 3mg po QD with plan to transition to Invega Sustenna LORA prior to discharge  Invega increased to 6 mg daily- to start 5/25/2025 @ 0900  Start Remeron 15mg po HS for dual benefit of depression and associated neurovegetative symptoms  Drug abuse (HCC)    Agoraphobia    Psychosis (HCC)    Medical clearance for psychiatric admission    Morbid (severe) obesity due to excess calories (HCC)      Current Medications:    Current Facility-Administered Medications:     mirtazapine (REMERON) tablet 15 mg, Oral, HS    paliperidone (INVEGA) 24 hr tablet 3 mg, Oral, Daily      Risks/Benefits of Treatment:     Risks, benefits, and possible side effects of medications explained to patient and patient verbalizes understanding and agreement for treatment.    Progress Toward Goals: progressing    Treatment Planning:     All current active medications have been reviewed.  Continue to monitor response to treatment and assess for potential side effects of medications.  Encourage group therapy, milieu therapy and occupational therapy.  Collaboration with medical service for medical comorbidities as indicated.  Behavioral Health checks for safety monitoring.  Estimated Discharge Day: 6/1/2025         Subjective     Behavior over the last 24 hours: unchanged    Per nursing report, yesterday in the evening Gabby was reporting command hallucinations, however, did not act on them. Had PRN Atarax at 1540 due to restlessness . Had PRN Propanolol later for anxiety.     Per assessment today, Gabby presents lying in her bed. She denies anxiety or depression. She denies SI/HI, or VH. She reports AH last night of voices telling her to kill herself, but denies any current AH. Sleep and appetite are  adequate. She reports she feels tired form the Remeron. Increase Invega to 6 mg daily to start 5/25/2025 @ 0900. Offers no other concerns at this time.     Sleep: normal  Appetite: normal  Medication side effects: No  ROS: review of systems as noted above in HPI/Subjective report, all other systems are negative    Objective :  Temp:  [97.5 °F (36.4 °C)-99.1 °F (37.3 °C)] 97.5 °F (36.4 °C)  HR:  [] 69  BP: (118-167)/(77-94) 121/77  Resp:  [18] 18  SpO2:  [98 %-100 %] 98 %  O2 Device: None (Room air)    Mental Status Evaluation:    Appearance:  age appropriate, dressed appropriately   Behavior:  cooperative, calm   Speech:  normal rate and volume   Mood:  euthymic   Affect:  constricted   Thought Process:  circumstantial   Thought Content:  AH-Intermittent command hallucinations, Denies VH. No delusions   Perceptual Disturbances: denies auditory or visual hallucinations when asked   Risk Potential: Suicidal Ideation -  None at present  Homicidal Ideation -  None  Potential for Aggression - Not at present   Sensorium:  oriented to person, place, and time/date   Memory:  recent and remote memory grossly intact   Consciousness:  drowsy   Attention/Concentration: attention span and concentration appear shorter than expected for age   Insight:  fair   Judgment: fair   Gait/Station: Lying in bed    Motor Activity: no abnormal movements       Lab Results: I have reviewed the following results:  Most Recent Labs:   Lab Results   Component Value Date    WBC 6.92 05/23/2025    RBC 4.49 05/23/2025    HGB 11.8 05/23/2025    HCT 38.9 05/23/2025     05/23/2025    RDW 16.2 (H) 05/23/2025    NEUTROABS 4.26 05/23/2025    SODIUM 137 05/23/2025    K 4.4 05/23/2025     05/23/2025    CO2 29 05/23/2025    BUN 25 05/23/2025    CREATININE 1.35 (H) 05/23/2025    GLUC 88 05/23/2025    CALCIUM 9.2 05/23/2025    AST 35 05/23/2025    ALT 23 05/23/2025    ALKPHOS 82 05/23/2025    TP 7.3 05/23/2025    ALB 3.7 05/23/2025    TBILI  "0.44 05/23/2025    CHOLESTEROL 162 05/23/2025    HDL 46 (L) 05/23/2025    TRIG 155 (H) 05/23/2025    LDLCALC 85 05/23/2025    NONHDLC 116 05/23/2025    AMMONIA 32 10/17/2022    [id number] 1.847 05/23/2025    FREET4 0.86 05/11/2018    PREGUR Negative 10/18/2022    PREGSERUM Negative 05/23/2025    HCGQUANT <2 08/07/2018    SYPHILISAB Non-reactive 11/14/2024    TREPONEMAPA Non-reactive 05/23/2025    HGBA1C 5.6 05/23/2025     05/23/2025       Administrative Statements     Counseling / Coordination of Care:   Patient's progress discussed with staff in treatment team meeting.  Medication changes reviewed with staff in treatment team meeting.    Portions of the record may have been created with voice recognition software. Occasional wrong word or \"sound a like\" substitutions may have occurred due to the inherent limitations of voice recognition software. Read the chart carefully and recognize, using context, where substitutions have occurred.    ODILON Banks 05/24/25  "

## 2025-05-24 NOTE — ASSESSMENT & PLAN NOTE
Start Invega 3mg po QD with plan to transition to Invega Sustenna LORA prior to discharge  Invega increased to 6 mg daily- to start 5/25/2025 @ 0900  Start Remeron 15mg po HS for dual benefit of depression and associated neurovegetative symptoms

## 2025-05-25 PROCEDURE — 99232 SBSQ HOSP IP/OBS MODERATE 35: CPT

## 2025-05-25 RX ADMIN — HYDROXYZINE HYDROCHLORIDE 50 MG: 50 TABLET ORAL at 12:59

## 2025-05-25 RX ADMIN — PALIPERIDONE 6 MG: 3 TABLET, EXTENDED RELEASE ORAL at 09:43

## 2025-05-25 RX ADMIN — MIRTAZAPINE 15 MG: 15 TABLET, FILM COATED ORAL at 21:39

## 2025-05-25 RX ADMIN — PROPRANOLOL HYDROCHLORIDE 10 MG: 10 TABLET ORAL at 11:57

## 2025-05-25 NOTE — NURSING NOTE
Pt received Propanolol 10 mg PO per PRN orders for restlessness. Upon follow-up, pt reports medication ineffective and reports feeling anxious. Atarax 50 mg PO per PRN orders administered for moderate anxiety. Will continue to monitor.

## 2025-05-25 NOTE — NURSING NOTE
"Pt received Atarax 50 mg PO per PRN orders for moderate anxiety. Upon follow-up, pt reports feeling \"a little better.\" Medication somewhat effective.   "

## 2025-05-25 NOTE — NURSING NOTE
Pt visible on the unit walking this evening. Pleasant during assessment. Reports intermittent anxiety but denies at this time. Reports ongoing depression that has improved since admission. Denies SI/HI/AVH. Reports good sleep and appetite. Attends select groups. Denies unmet needs at this time.

## 2025-05-25 NOTE — NURSING NOTE
"Pt attempting to rest in bed, reports \"an inner restlessness\". Writer checked pt's VS and offered PRN propanolol for same. Pt received propanolol 10mg po at 1157.   "

## 2025-05-25 NOTE — PROGRESS NOTES
"Progress Note - Behavioral Health   Name: Gabby Ramosland 35 y.o. female I MRN: [id number]  Unit/Bed#: -02 I Date of Admission: 5/22/2025   Date of Service: 5/25/2025 I Hospital Day: 3    Assessment & Plan  Mood disorder (HCC)  Start Invega 3mg po QD with plan to transition to Invega Sustenna LORA prior to discharge  Invega increased to 6 mg daily- to start 5/25/2025 @ 0900  Start Remeron 15mg po HS for dual benefit of depression and associated neurovegetative symptoms  Drug abuse (HCC)    Agoraphobia    Psychosis (HCC)    Medical clearance for psychiatric admission    Morbid (severe) obesity due to excess calories (HCC)      Current Medications:    Current Facility-Administered Medications:     mirtazapine (REMERON) tablet 15 mg, Oral, HS    paliperidone (INVEGA) 24 hr tablet 6 mg, Oral, Daily      Risks/Benefits of Treatment:     Risks, benefits, and possible side effects of medications explained to patient and patient verbalizes understanding and agreement for treatment.    Progress Toward Goals: progressing    Treatment Planning:     All current active medications have been reviewed.  Continue to monitor response to treatment and assess for potential side effects of medications.  Encourage group therapy, milieu therapy and occupational therapy.  Collaboration with medical service for medical comorbidities as indicated.  Behavioral Health checks for safety monitoring.  Estimated Discharge Day: 6/1/2025         Subjective     Behavior over the last 24 hours: unchanged    Per nursing report, Gabby received as needed Haldol 5 mg p.o. and Atarax 50 mg p.o. 5/24/2025 at 1825 for auditory hallucinations.  As needed medication appeared effective.  Invega was increased to 6 mg starting this morning at 0900.  No behaviors.    On assessment, Gabby is lying in bed.  She reports her mood is \"pretty good\".  Reports mild anxiety and depression.  Denies SI/HI.  Denies any hallucinations.  Appetite and sleep remain " adequate.  Denies any side effects of the medication and is aware of the increase of Invega to 6 mg.  Offers no other concerns at this time.    Sleep: normal  Appetite: normal  Medication side effects: No  ROS: review of systems as noted above in HPI/Subjective report, all other systems are negative    Objective :  Temp:  [97.4 °F (36.3 °C)-99.4 °F (37.4 °C)] 97.4 °F (36.3 °C)  HR:  [] 79  BP: (118-120)/(85-87) 118/87  Resp:  [18] 18  SpO2:  [97 %-99 %] 97 %  O2 Device: None (Room air)    Mental Status Evaluation:    Appearance:  disheveled   Behavior:  cooperative, calm   Speech:  normal rate and volume   Mood:  Mildly anxious and depressed   Affect:  constricted   Thought Process:  logical, linear   Thought Content:  no overt delusions   Perceptual Disturbances: denies auditory or visual hallucinations when asked   Risk Potential: Suicidal Ideation -  None at present  Homicidal Ideation -  None  Potential for Aggression - Not at present   Sensorium:  oriented to person, place, and time/date   Memory:  recent and remote memory grossly intact   Consciousness:  alert and awake   Attention/Concentration: attention span and concentration appear shorter than expected for age   Insight:  fair   Judgment: fair   Gait/Station: in bed   Motor Activity: no abnormal movements       Lab Results: I have reviewed the following results:  Most Recent Labs:   Lab Results   Component Value Date    WBC 6.92 05/23/2025    RBC 4.49 05/23/2025    HGB 11.8 05/23/2025    HCT 38.9 05/23/2025     05/23/2025    RDW 16.2 (H) 05/23/2025    NEUTROABS 4.26 05/23/2025    SODIUM 137 05/23/2025    K 4.4 05/23/2025     05/23/2025    CO2 29 05/23/2025    BUN 25 05/23/2025    CREATININE 1.35 (H) 05/23/2025    GLUC 88 05/23/2025    CALCIUM 9.2 05/23/2025    AST 35 05/23/2025    ALT 23 05/23/2025    ALKPHOS 82 05/23/2025    TP 7.3 05/23/2025    ALB 3.7 05/23/2025    TBILI 0.44 05/23/2025    CHOLESTEROL 162 05/23/2025    HDL 46 (L)  "05/23/2025    TRIG 155 (H) 05/23/2025    LDLCALC 85 05/23/2025    NONHDLC 116 05/23/2025    AMMONIA 32 10/17/2022    [id number] 1.847 05/23/2025    FREET4 0.86 05/11/2018    PREGUR Negative 10/18/2022    PREGSERUM Negative 05/23/2025    HCGQUANT <2 08/07/2018    SYPHILISAB Non-reactive 11/14/2024    TREPONEMAPA Non-reactive 05/23/2025    HGBA1C 5.6 05/23/2025     05/23/2025       Administrative Statements     Counseling / Coordination of Care:   Patient's progress discussed with staff in treatment team meeting.  Medication changes reviewed with staff in treatment team meeting.    Portions of the record may have been created with voice recognition software. Occasional wrong word or \"sound a like\" substitutions may have occurred due to the inherent limitations of voice recognition software. Read the chart carefully and recognize, using context, where substitutions have occurred.    ODILON Banks 05/25/25  "

## 2025-05-25 NOTE — NURSING NOTE
"Pt is awake, alert, visible and attending group this morning, walks halls periodically. Physical assessment completed. Pt states Lunch was \"filling\", as the  Kitchen staff placed a cupcake on pt's tray for pt's birthday today. Pt currently denies SI, HI, AVH, anxiety and depression mild and manageable. Pt reports BM this morning. Reports no SE from increased dose of Invega po this morning.   "

## 2025-05-25 NOTE — PLAN OF CARE
Problem: SELF HARM/SUICIDALITY  Goal: Will have no self-injury during hospital stay  Description: INTERVENTIONS:  - Q 15 MINUTES: Routine safety checks  - Q WAKING SHIFT & PRN: Assess risk to determine if routine checks are adequate to maintain patient safety  - Encourage patient to participate actively in care by formulating a plan to combat response to suicidal ideation, identify supports and resources  Outcome: Progressing     Problem: DEPRESSION  Goal: Will be euthymic at discharge  Description: INTERVENTIONS:  - Administer medication as ordered  - Provide emotional support via 1:1 interaction with staff  - Encourage involvement in milieu/groups/activities  - Monitor for social isolation  Outcome: Progressing     Problem: PSYCHOSIS  Goal: Will report no hallucinations or delusions  Description: Interventions:  - Administer medication as  ordered  - Every waking shifts and PRN assess for the presence of hallucinations and or delusions  - Assist with reality testing to support increasing orientation  - Assess if patient's hallucinations or delusions are encouraging self-harm or harm to others and intervene as appropriate  Outcome: Progressing     Problem: ANXIETY  Goal: Will report anxiety at manageable levels  Description: INTERVENTIONS:  - Administer medication as ordered  - Teach and encourage coping skills  - Provide emotional support  - Assess patient/family for anxiety and ability to cope  Outcome: Progressing  Goal: By discharge: Patient will verbalize 2 strategies to deal with anxiety  Description: Interventions:  - Identify any obvious source/trigger to anxiety  - Staff will assist patient in applying identified coping technique/skills  - Encourage attendance of scheduled groups and activities  Outcome: Progressing     Problem: DISCHARGE PLANNING - CARE MANAGEMENT  Goal: Discharge to post-acute care or home with appropriate resources  Description: INTERVENTIONS:  - Conduct assessment to determine  patient/family and health care team treatment goals, and need for post-acute services based on payer coverage, community resources, and patient preferences, and barriers to discharge  - Address psychosocial, clinical, and financial barriers to discharge as identified in assessment in conjunction with the patient/family and health care team  - Arrange appropriate level of post-acute services according to patient’s   needs and preference and payer coverage in collaboration with the physician and health care team  - Communicate with and update the patient/family, physician, and health care team regarding progress on the discharge plan  - Arrange appropriate transportation to post-acute venues  Outcome: Progressing     Problem: Ineffective Coping  Goal: Cooperates with admission process  Description: Interventions:   - Complete admission process  Outcome: Completed  Goal: Identifies ineffective coping skills  Outcome: Progressing  Goal: Identifies healthy coping skills  Outcome: Progressing  Goal: Demonstrates healthy coping skills  Outcome: Progressing  Goal: Participates in unit activities  Description: Interventions:  - Provide therapeutic environment   - Provide required programming   - Redirect inappropriate behaviors   Outcome: Progressing     Problem: Risk for Self Injury/Neglect  Goal: Treatment Goal: Remain safe during length of stay, learn and adopt new coping skills, and be free of self-injurious ideation, impulses and acts at the time of discharge  Outcome: Progressing  Goal: Verbalize thoughts and feelings  Description: Interventions:  - Assess and re-assess patient's lethality and potential for self-injury  - Engage patient in 1:1 interactions, daily, for a minimum of 15 minutes  - Encourage patient to express feelings, fears, frustrations, hopes  - Establish rapport/trust with patient   Outcome: Progressing  Goal: Refrain from harming self  Description: Interventions:  - Monitor patient closely, per  order  - Develop a trusting relationship  - Supervise medication ingestion, monitor effects and side effects   Outcome: Progressing     Problem: Risk for Self Injury/Neglect  Goal: Treatment Goal: Remain safe during length of stay, learn and adopt new coping skills, and be free of self-injurious ideation, impulses and acts at the time of discharge  Outcome: Progressing  Goal: Verbalize thoughts and feelings  Description: Interventions:  - Assess and re-assess patient's lethality and potential for self-injury  - Engage patient in 1:1 interactions, daily, for a minimum of 15 minutes  - Encourage patient to express feelings, fears, frustrations, hopes  - Establish rapport/trust with patient   Outcome: Progressing  Goal: Refrain from harming self  Description: Interventions:  - Monitor patient closely, per order  - Develop a trusting relationship  - Supervise medication ingestion, monitor effects and side effects   Outcome: Progressing

## 2025-05-26 PROCEDURE — 99232 SBSQ HOSP IP/OBS MODERATE 35: CPT | Performed by: PSYCHIATRY & NEUROLOGY

## 2025-05-26 RX ADMIN — MIRTAZAPINE 15 MG: 15 TABLET, FILM COATED ORAL at 21:13

## 2025-05-26 RX ADMIN — PALIPERIDONE 6 MG: 3 TABLET, EXTENDED RELEASE ORAL at 09:06

## 2025-05-26 NOTE — PLAN OF CARE
Problem: SELF HARM/SUICIDALITY  Goal: Will have no self-injury during hospital stay  Description: INTERVENTIONS:  - Q 15 MINUTES: Routine safety checks  - Q WAKING SHIFT & PRN: Assess risk to determine if routine checks are adequate to maintain patient safety  - Encourage patient to participate actively in care by formulating a plan to combat response to suicidal ideation, identify supports and resources  Outcome: Progressing     Problem: DEPRESSION  Goal: Will be euthymic at discharge  Description: INTERVENTIONS:  - Administer medication as ordered  - Provide emotional support via 1:1 interaction with staff  - Encourage involvement in milieu/groups/activities  - Monitor for social isolation  Outcome: Progressing     Problem: PSYCHOSIS  Goal: Will report no hallucinations or delusions  Description: Interventions:  - Administer medication as  ordered  - Every waking shifts and PRN assess for the presence of hallucinations and or delusions  - Assist with reality testing to support increasing orientation  - Assess if patient's hallucinations or delusions are encouraging self-harm or harm to others and intervene as appropriate  Outcome: Progressing     Problem: ANXIETY  Goal: Will report anxiety at manageable levels  Description: INTERVENTIONS:  - Administer medication as ordered  - Teach and encourage coping skills  - Provide emotional support  - Assess patient/family for anxiety and ability to cope  Outcome: Progressing  Goal: By discharge: Patient will verbalize 2 strategies to deal with anxiety  Description: Interventions:  - Identify any obvious source/trigger to anxiety  - Staff will assist patient in applying identified coping technique/skills  - Encourage attendance of scheduled groups and activities  Outcome: Progressing     Problem: DISCHARGE PLANNING - CARE MANAGEMENT  Goal: Discharge to post-acute care or home with appropriate resources  Description: INTERVENTIONS:  - Conduct assessment to determine  patient/family and health care team treatment goals, and need for post-acute services based on payer coverage, community resources, and patient preferences, and barriers to discharge  - Address psychosocial, clinical, and financial barriers to discharge as identified in assessment in conjunction with the patient/family and health care team  - Arrange appropriate level of post-acute services according to patient’s   needs and preference and payer coverage in collaboration with the physician and health care team  - Communicate with and update the patient/family, physician, and health care team regarding progress on the discharge plan  - Arrange appropriate transportation to post-acute venues  Outcome: Progressing     Problem: Ineffective Coping  Goal: Identifies ineffective coping skills  Outcome: Progressing  Goal: Identifies healthy coping skills  Outcome: Progressing  Goal: Demonstrates healthy coping skills  Outcome: Progressing  Goal: Participates in unit activities  Description: Interventions:  - Provide therapeutic environment   - Provide required programming   - Redirect inappropriate behaviors   Outcome: Progressing     Problem: Risk for Self Injury/Neglect  Goal: Treatment Goal: Remain safe during length of stay, learn and adopt new coping skills, and be free of self-injurious ideation, impulses and acts at the time of discharge  Outcome: Progressing  Goal: Verbalize thoughts and feelings  Description: Interventions:  - Assess and re-assess patient's lethality and potential for self-injury  - Engage patient in 1:1 interactions, daily, for a minimum of 15 minutes  - Encourage patient to express feelings, fears, frustrations, hopes  - Establish rapport/trust with patient   Outcome: Progressing  Goal: Refrain from harming self  Description: Interventions:  - Monitor patient closely, per order  - Develop a trusting relationship  - Supervise medication ingestion, monitor effects and side effects   Outcome:  Progressing

## 2025-05-26 NOTE — PROGRESS NOTES
Progress Note - Behavioral Health   Name: Gabby Mares 35 y.o. female I MRN: [id number]  Unit/Bed#: U 204-02 I Date of Admission: 5/22/2025   Date of Service: 5/26/2025 I Hospital Day: 4     Assessment & Plan  Mood disorder (HCC)  Start Invega 3mg po QD with plan to transition to Invega Sustenna LORA prior to discharge  Invega increased to 6 mg daily- to start 5/25/2025 @ 0900  Start Remeron 15mg po HS for dual benefit of depression and associated neurovegetative symptoms  Drug abuse (HCC)    Agoraphobia    Psychosis (HCC)    Medical clearance for psychiatric admission    Morbid (severe) obesity due to excess calories ([facility])      Current Medications:    Current Facility-Administered Medications:     mirtazapine (REMERON) tablet 15 mg, Oral, HS    paliperidone (INVEGA) 24 hr tablet 6 mg, Oral, Daily    Current Facility-Administered Medications:     aluminum-magnesium hydroxide-simethicone (MAALOX) oral suspension 30 mL, Oral, Q4H PRN    artificial tear ophthalmic ointment, Both Eyes, 4x Daily PRN    haloperidol lactate (HALDOL) injection 2.5 mg, Intramuscular, Q6H PRN Max 4/day **AND** LORazepam (ATIVAN) injection 1 mg, Intramuscular, Q6H PRN Max 4/day **AND** benztropine (COGENTIN) injection 0.5 mg, Intramuscular, Q6H PRN Max 4/day    benztropine (COGENTIN) injection 1 mg, Intramuscular, BID PRN    haloperidol lactate (HALDOL) injection 5 mg, Intramuscular, Q4H PRN Max 4/day **AND** LORazepam (ATIVAN) injection 2 mg, Intramuscular, Q4H PRN Max 4/day **AND** benztropine (COGENTIN) injection 1 mg, Intramuscular, Q4H PRN Max 4/day    benztropine (COGENTIN) tablet 1 mg, Oral, BID PRN    bisacodyl (DULCOLAX) rectal suppository 10 mg, Rectal, Daily PRN    haloperidol (HALDOL) tablet 2 mg, Oral, Q4H PRN Max 6/day    haloperidol (HALDOL) tablet 5 mg, Oral, Q6H PRN Max 4/day    haloperidol (HALDOL) tablet 5 mg, Oral, Q4H PRN Max 4/day    hydrOXYzine HCL (ATARAX) tablet 25 mg, Oral, Q6H PRN Max 4/day    hydrOXYzine HCL  (ATARAX) tablet 50 mg, Oral, Q4H PRN Max 4/day **OR** LORazepam (ATIVAN) injection 1 mg, Intramuscular, Q4H PRN    LORazepam (ATIVAN) tablet 1 mg, Oral, Q4H PRN Max 6/day **OR** LORazepam (ATIVAN) injection 2 mg, Intramuscular, Q6H PRN Max 3/day    magnesium hydroxide (MILK OF MAGNESIA) oral suspension 30 mL, Oral, Daily PRN    propranolol (INDERAL) tablet 10 mg, Oral, Q8H PRN    senna-docusate sodium (SENOKOT S) 8.6-50 mg per tablet 1 tablet, Oral, Daily PRN    traZODone (DESYREL) tablet 50 mg, Oral, HS PRN    Risks/Benefits of Treatment:     Risks, benefits, and possible side effects of medications explained to patient. Patient has limited understanding of risks and benefits of treatment at this time, but agrees to take medications as prescribed.    Progress Toward Goals: continued AH    Treatment Planning:     All current active medications have been reviewed.  Continue to monitor response to treatment and assess for potential side effects of medications.  Encourage group therapy, milieu therapy and occupational therapy.  Collaboration with medical service for medical comorbidities as indicated.  Behavioral Health checks for safety monitoring.  Estimated Discharge Day: 6/1/2025     Subjective     Behavior over the last 24 hours: unchanged    Patient reports that she has AH mostly at night and they are always negative. Invega increased over the weekend and she is tolerating it well so far. PRN on 5/25 of propranolol, atarax. No thoughts of SI even though CAH to harm self are present.     Sleep: normal  Appetite: normal  Medication side effects: No  ROS: review of systems as noted above in HPI/Subjective report, all other systems are negative    Objective :  Temp:  [98.1 °F (36.7 °C)-99.3 °F (37.4 °C)] 98.1 °F (36.7 °C)  HR:  [] 80  BP: (125-134)/(76-99) 134/91  Resp:  [18] 18  SpO2:  [97 %-99 %] 99 %  O2 Device: None (Room air)    Mental Status Evaluation:    Appearance:  disheveled   Behavior:  cooperative,  "calm   Speech:  slow, scant   Mood:  depressed   Affect:  constricted   Thought Process:  linear   Thought Content:  no overt delusions   Perceptual Disturbances: Negative auditory hallucinations   Risk Potential: Suicidal Ideation -  None at present  Homicidal Ideation -  None at present  Potential for Aggression - Not at present   Sensorium:  oriented to person, place, and situation   Memory:  recent and remote memory grossly intact   Consciousness:  alert and awake   Attention/Concentration: attention span and concentration appear shorter than expected for age   Insight:  fair   Judgment: fair   Gait/Station: In bed   Motor Activity: no abnormal movements       Lab Results: I have reviewed the following results:  Results from the past 24 hours: No results found for this or any previous visit (from the past 24 hours).    Administrative Statements     Counseling / Coordination of Care:   I have spent a total time of 30 minutes in caring for this patient on the day of the visit/encounter including:  Patient's progress discussed with staff in treatment team meeting.  Medication changes reviewed with staff in treatment team meeting.    Portions of the record may have been created with voice recognition software. Occasional wrong word or \"sound a like\" substitutions may have occurred due to the inherent limitations of voice recognition software. Read the chart carefully and recognize, using context, where substitutions have occurred.    Sanjuanita Holland MD 05/26/25  "

## 2025-05-26 NOTE — TREATMENT TEAM
05/26/25 0700   Team Meeting   Meeting Type Daily Rounds   Team Members Present   Team Members Present Physician;Nurse   Physician Team Member Amalia / Vani / Brendan (CRNP)   Nursing Team Member Clauser   Patient/Family Present   Patient Present No   Patient's Family Present No     AM rounds - : 201,SI to jump out of a moving car. Off meds x 1 month and reports being evicted. Pt withdrawn to room, depressed. Denies SI. Received Inderal and Atarax for restlessness/anxiety yesterday.

## 2025-05-26 NOTE — NURSING NOTE
"Calm, cooperative, and pleasant. Denies SI/HI/VH. Minimal in conversation. Endorses \"a little\" AH, negative in nature. States AH were worse last night. Despite worsening HS AH, patient reports good sleep. Informed RN she had a good phone call with her sister yesterday. Plan of care ongoing. Denies unmet needs.  "

## 2025-05-26 NOTE — NURSING NOTE
"Pt watching [employer] movies in the day room. Reports feeling \"depressed\". Encouraged pt to continue to stay out of room and do activities. Pt denies SI/HI. Denies hallucination. States \"I don't hear any now but at night I do\". Anxious about living situation. Reports distracting self throughout the day. Denies any question or concern at this time.  "

## 2025-05-27 PROCEDURE — 99232 SBSQ HOSP IP/OBS MODERATE 35: CPT | Performed by: PSYCHIATRY & NEUROLOGY

## 2025-05-27 RX ORDER — PALIPERIDONE 9 MG/1
9 TABLET, EXTENDED RELEASE ORAL DAILY
Status: DISCONTINUED | OUTPATIENT
Start: 2025-05-28 | End: 2025-06-02

## 2025-05-27 RX ORDER — MIRTAZAPINE 15 MG/1
30 TABLET, FILM COATED ORAL
Status: DISCONTINUED | OUTPATIENT
Start: 2025-05-27 | End: 2025-06-20 | Stop reason: HOSPADM

## 2025-05-27 RX ADMIN — PALIPERIDONE 6 MG: 3 TABLET, EXTENDED RELEASE ORAL at 09:11

## 2025-05-27 RX ADMIN — MIRTAZAPINE 30 MG: 15 TABLET, FILM COATED ORAL at 21:44

## 2025-05-27 NOTE — ASSESSMENT & PLAN NOTE
Increase Invega to 9mg po QD with plan to transition to Invega Sustenna LORA prior to discharge  Increase Remeron to 30mg po HS for dual benefit of depression and associated neurovegetative symptoms

## 2025-05-27 NOTE — PLAN OF CARE
Problem: DEPRESSION  Goal: Will be euthymic at discharge  Description: INTERVENTIONS:  - Administer medication as ordered  - Provide emotional support via 1:1 interaction with staff  - Encourage involvement in milieu/groups/activities  - Monitor for social isolation  Outcome: Progressing     Problem: PSYCHOSIS  Goal: Will report no hallucinations or delusions  Description: Interventions:  - Administer medication as  ordered  - Every waking shifts and PRN assess for the presence of hallucinations and or delusions  - Assist with reality testing to support increasing orientation  - Assess if patient's hallucinations or delusions are encouraging self-harm or harm to others and intervene as appropriate  Outcome: Progressing     Problem: ANXIETY  Goal: Will report anxiety at manageable levels  Description: INTERVENTIONS:  - Administer medication as ordered  - Teach and encourage coping skills  - Provide emotional support  - Assess patient/family for anxiety and ability to cope  Outcome: Progressing  Goal: By discharge: Patient will verbalize 2 strategies to deal with anxiety  Description: Interventions:  - Identify any obvious source/trigger to anxiety  - Staff will assist patient in applying identified coping technique/skills  - Encourage attendance of scheduled groups and activities  Outcome: Progressing     Problem: Ineffective Coping  Goal: Identifies ineffective coping skills  Outcome: Progressing  Goal: Identifies healthy coping skills  Outcome: Progressing  Goal: Demonstrates healthy coping skills  Outcome: Progressing  Goal: Participates in unit activities  Description: Interventions:  - Provide therapeutic environment   - Provide required programming   - Redirect inappropriate behaviors   Outcome: Progressing     Problem: Risk for Self Injury/Neglect  Goal: Treatment Goal: Remain safe during length of stay, learn and adopt new coping skills, and be free of self-injurious ideation, impulses and acts at the time  of discharge  Outcome: Progressing  Goal: Verbalize thoughts and feelings  Description: Interventions:  - Assess and re-assess patient's lethality and potential for self-injury  - Engage patient in 1:1 interactions, daily, for a minimum of 15 minutes  - Encourage patient to express feelings, fears, frustrations, hopes  - Establish rapport/trust with patient   Outcome: Progressing  Goal: Refrain from harming self  Description: Interventions:  - Monitor patient closely, per order  - Develop a trusting relationship  - Supervise medication ingestion, monitor effects and side effects   Outcome: Progressing     Problem: SELF HARM/SUICIDALITY  Goal: Will have no self-injury during hospital stay  Description: INTERVENTIONS:  - Q 15 MINUTES: Routine safety checks  - Q WAKING SHIFT & PRN: Assess risk to determine if routine checks are adequate to maintain patient safety  - Encourage patient to participate actively in care by formulating a plan to combat response to suicidal ideation, identify supports and resources  Outcome: Completed

## 2025-05-27 NOTE — PROGRESS NOTES
Progress Note - Behavioral Health   Name: Gabby Mares 35 y.o. female I MRN: [id number]  Unit/Bed#: -02 I Date of Admission: 5/22/2025   Date of Service: 5/27/2025 I Hospital Day: 5        Assessment & Plan  Mood disorder (HCC)  Increase Invega to 9mg po QD with plan to transition to Invega Sustenna LORA prior to discharge  Increase Remeron to 30mg po HS for dual benefit of depression and associated neurovegetative symptoms  Drug abuse (HCC)    Agoraphobia    Psychosis (HCC)    Medical clearance for psychiatric admission    Morbid (severe) obesity due to excess calories (HCC)       Current medications:    Current Facility-Administered Medications:     aluminum-magnesium hydroxide-simethicone (MAALOX) oral suspension 30 mL, Q4H PRN    artificial tear ophthalmic ointment, 4x Daily PRN    haloperidol lactate (HALDOL) injection 2.5 mg, Q6H PRN Max 4/day **AND** LORazepam (ATIVAN) injection 1 mg, Q6H PRN Max 4/day **AND** benztropine (COGENTIN) injection 0.5 mg, Q6H PRN Max 4/day    benztropine (COGENTIN) injection 1 mg, BID PRN    haloperidol lactate (HALDOL) injection 5 mg, Q4H PRN Max 4/day **AND** LORazepam (ATIVAN) injection 2 mg, Q4H PRN Max 4/day **AND** benztropine (COGENTIN) injection 1 mg, Q4H PRN Max 4/day    benztropine (COGENTIN) tablet 1 mg, BID PRN    bisacodyl (DULCOLAX) rectal suppository 10 mg, Daily PRN    haloperidol (HALDOL) tablet 2 mg, Q4H PRN Max 6/day    haloperidol (HALDOL) tablet 5 mg, Q6H PRN Max 4/day    haloperidol (HALDOL) tablet 5 mg, Q4H PRN Max 4/day    hydrOXYzine HCL (ATARAX) tablet 25 mg, Q6H PRN Max 4/day    hydrOXYzine HCL (ATARAX) tablet 50 mg, Q4H PRN Max 4/day **OR** LORazepam (ATIVAN) injection 1 mg, Q4H PRN    LORazepam (ATIVAN) tablet 1 mg, Q4H PRN Max 6/day **OR** LORazepam (ATIVAN) injection 2 mg, Q6H PRN Max 3/day    magnesium hydroxide (MILK OF MAGNESIA) oral suspension 30 mL, Daily PRN    mirtazapine (REMERON) tablet 30 mg, HS    [START ON 5/28/2025]  paliperidone (INVEGA) 24 hr tablet 9 mg, Daily    propranolol (INDERAL) tablet 10 mg, Q8H PRN    senna-docusate sodium (SENOKOT S) 8.6-50 mg per tablet 1 tablet, Daily PRN    traZODone (DESYREL) tablet 50 mg, HS PRN    Risks / Benefits of Treatment:    Risks, benefits, and possible side effects of medications explained to patient and patient verbalizes understanding and agreement for treatment.    Progress Toward Goals: progressing    Treatment Planning:  All current active medications have been reviewed.  Continue to monitor response to treatment and assess for potential side effects of medications.  Encourage group therapy, milieu therapy and occupational therapy  Collaboration with medical service for medical comorbidities as indicated.  Behavioral Health checks for safety monitoring.  Estimated Discharge Day: 6/1/2025  Legal Status: 201    Subjective:    Behavior over the last 24 hours: unchanged    No acute behavioral events over the past 24 hours. Today, patient was seen and examined at bedside for continuation of care.     Over the past 24 hours, the patient continues to experience negative auditory hallucinations that are typically worse throughout the evening.  She is still experiencing moderate to severe fluctuating depression and anxiety.  Does not believe she can function in the community at this time. She denies SI, HI, or visual hallucinations.  She is tolerating recently restarted psychotropic medications well without any side effects.    Patient denied other new or worsening psychiatric symptoms/complaints at this time.    Sleep: unchanged  Appetite: unchanged  Medication side effects: none reported  ROS: review of systems as noted in HPI, all other systems are negative    Objective:    Vital signs in last 24 hours:    Temp:  [96.3 °F (35.7 °C)-97.6 °F (36.4 °C)] 96.3 °F (35.7 °C)  HR:  [91-98] 91  BP: ()/() 148/108  Resp:  [16] 16  SpO2:  [92 %] 92 %  O2 Device: None (Room air)    Mental  "Status Evaluation:    Appearance: casually dressed, consistent with stated age  Motor: +psychomotor retardation, no gait abnormalities  Behavior: cooperative, answers questions appropriately  Speech: soft, normal rhythm  Mood: \"pretty bad\"  Affect: constricted, depressed-appearing  Thought Process: linear and goal-oriented  Thought Content: + auditory hallucinations, denies visual hallucinations, denies delusions  Risk Potential: denies suicidal ideation, plan, or intent. Denies homicidal ideation  Sensorium: Oriented to person, place, time, and situation  Cognition: cognitive ability appears intact but was not quantitatively tested  Consciousness: alert and awake  Attention: intact, able to focus without difficulty  Insight: fair  Judgement: limited        Lab Results: I have reviewed the following results:  No results found for this or any previous visit (from the past 48 hours).     Administrative Statements    Counseling / Coordination of Care:   Patients progress discussed with staff in treatment team meeting. Medication changes reviewed with staff in treatment team meeting.    Puma Newby DO 05/27/25    This note has been constructed using a voice recognition system. There may be translation, syntax, or grammatical errors. If you have any questions, please contact the dictating provider.    "

## 2025-05-27 NOTE — NURSING NOTE
Briefly visible on unit, attended morning group then returned to room to nap. Denies SI/HI/VH. Endorses command AH telling her to kill herself, denies plan/intent to act on AH. Verbalizes ability to come to staff if thoughts worsen or patient feels unsafe. Endorses good sleep last night, self-reports approximately 7 hours. Patient, however, still reports feeling tired. Endorses ongoing depression and anxiety, with minor improvement since first day of admission. Plan of care ongoing.

## 2025-05-28 PROCEDURE — 99232 SBSQ HOSP IP/OBS MODERATE 35: CPT

## 2025-05-28 RX ADMIN — PALIPERIDONE 9 MG: 9 TABLET, EXTENDED RELEASE ORAL at 08:56

## 2025-05-28 RX ADMIN — MIRTAZAPINE 30 MG: 15 TABLET, FILM COATED ORAL at 21:13

## 2025-05-28 NOTE — PLAN OF CARE
Problem: DEPRESSION  Goal: Will be euthymic at discharge  Description: INTERVENTIONS:  - Administer medication as ordered  - Provide emotional support via 1:1 interaction with staff  - Encourage involvement in milieu/groups/activities  - Monitor for social isolation  Outcome: Progressing     Problem: PSYCHOSIS  Goal: Will report no hallucinations or delusions  Description: Interventions:  - Administer medication as  ordered  - Every waking shifts and PRN assess for the presence of hallucinations and or delusions  - Assist with reality testing to support increasing orientation  - Assess if patient's hallucinations or delusions are encouraging self-harm or harm to others and intervene as appropriate  Outcome: Progressing     Problem: ANXIETY  Goal: Will report anxiety at manageable levels  Description: INTERVENTIONS:  - Administer medication as ordered  - Teach and encourage coping skills  - Provide emotional support  - Assess patient/family for anxiety and ability to cope  Outcome: Progressing  Goal: By discharge: Patient will verbalize 2 strategies to deal with anxiety  Description: Interventions:  - Identify any obvious source/trigger to anxiety  - Staff will assist patient in applying identified coping technique/skills  - Encourage attendance of scheduled groups and activities  Outcome: Progressing     Problem: DISCHARGE PLANNING - CARE MANAGEMENT  Goal: Discharge to post-acute care or home with appropriate resources  Description: INTERVENTIONS:  - Conduct assessment to determine patient/family and health care team treatment goals, and need for post-acute services based on payer coverage, community resources, and patient preferences, and barriers to discharge  - Address psychosocial, clinical, and financial barriers to discharge as identified in assessment in conjunction with the patient/family and health care team  - Arrange appropriate level of post-acute services according to patient’s   needs and preference  and payer coverage in collaboration with the physician and health care team  - Communicate with and update the patient/family, physician, and health care team regarding progress on the discharge plan  - Arrange appropriate transportation to post-acute venues  Outcome: Progressing     Problem: Ineffective Coping  Goal: Identifies ineffective coping skills  Outcome: Progressing  Goal: Identifies healthy coping skills  Outcome: Progressing  Goal: Demonstrates healthy coping skills  Outcome: Progressing  Goal: Participates in unit activities  Description: Interventions:  - Provide therapeutic environment   - Provide required programming   - Redirect inappropriate behaviors   Outcome: Progressing     Problem: Risk for Self Injury/Neglect  Goal: Treatment Goal: Remain safe during length of stay, learn and adopt new coping skills, and be free of self-injurious ideation, impulses and acts at the time of discharge  Outcome: Progressing  Goal: Verbalize thoughts and feelings  Description: Interventions:  - Assess and re-assess patient's lethality and potential for self-injury  - Engage patient in 1:1 interactions, daily, for a minimum of 15 minutes  - Encourage patient to express feelings, fears, frustrations, hopes  - Establish rapport/trust with patient   Outcome: Progressing  Goal: Refrain from harming self  Description: Interventions:  - Monitor patient closely, per order  - Develop a trusting relationship  - Supervise medication ingestion, monitor effects and side effects   Outcome: Progressing

## 2025-05-28 NOTE — NURSING NOTE
Patient visible on the unit, observed wandering in the halls at times. Denies having any AH/VH at this time. Denies SI/HI. Compliant with meals and medication, and offers no questions or concerns at this time.

## 2025-05-28 NOTE — DISCHARGE INSTR - APPOINTMENTS
You are being discharged to [facility] - [address] [place] [address], [place] [address] - Phone: [phone]  You confirmed that your cell phone number is [phone]      You are being discharged with a 30 day supply of your medications.         Behavioral Health Nurse Navigator, Laureen or Mili will be calling you after your discharge, on the phone number that you provided.  They will be available as an additional support, if needed.   If you wish to speak with Laureen, you may contact her at [phone].

## 2025-05-28 NOTE — CASE MANAGEMENT
"CM spoke to [facility] MAROK Niurka Hernandez ([phone]) and provided her with an update on pt. She also provided CM with an update on pt. She reported since pt was referred to [facility] for CM during her last Q Admission from 4/11/25-4/17/25, Niurka was able to assist pt in completing MH Housing/CRR referral and sent out to various CRR's for review. She also reported she has pt scheduled for an assessment with Dr. Zhao at [facility]. She reported the [place] CRR's are in different areas such as [place] and [place]. She reported there are not any openings at this time but got the process started for pt. Niurka reported she has all the documents that she needs for that process.     CM explained that pt is getting back on her medications with the goal of getting a LORA and that could take another week or so. CM explained that pt most likely is not able to stay on unit the whole time she is waiting for CRR placement but CM will call pt's sister to see if she can stay with her until then knowing that pt will be back on her medications. Or CM can look into [facility] residence.     MARKO also spoke to Niurka about seeing if pt's sister would be willing to be her repayee/help her handle her money as pt has self reported \"wreckless spending\" and that she has issues with saving money. Niurka reported that pt's sister currently has her card and \"I think she would be willing to do that to help her.\"     MARKO informed Niurka that CM will continue to provide her with updates on pt's status and dc plan.       "

## 2025-05-28 NOTE — NURSING NOTE
"Pt reports sleeping well last night, acknowledges still struggling with depression but has no thoughts of self harm. Hears voices \"sometimes\" but not currently. Describes A/H as voices telling her to \"off myself\". Pt states will come to staff if A/H return. Hopeful for d/c to sister and then to an apartment. Pt dissheveled in appearance, ADL's encouraged. Pt presents with depressed mood and constricted affect. Educated on medications, no questions or concerns for staff at this time.  "

## 2025-05-28 NOTE — PROGRESS NOTES
Progress Note - Behavioral Health   Name: Gabby Ramosland 35 y.o. female I MRN: [id number]  Unit/Bed#: -02 I Date of Admission: 5/22/2025   Date of Service: 5/28/2025 I Hospital Day: 6     Assessment & Plan  Mood disorder (HCC)  Continue Invega to 9mg po QD with plan to transition to Invega Sustenna LORA prior to discharge  Continue Remeron to 30mg po HS for dual benefit of depression and associated neurovegetative symptoms  Drug abuse (HCC)    Agoraphobia    Psychosis (HCC)    Morbid (severe) obesity due to excess calories (HCC)      Current Medications:    Current Facility-Administered Medications:     mirtazapine (REMERON) tablet 30 mg, Oral, HS    paliperidone (INVEGA) 24 hr tablet 9 mg, Oral, Daily      Risks/Benefits of Treatment:     Risks, benefits, and possible side effects of medications explained to patient and patient verbalizes understanding and agreement for treatment.    Progress Toward Goals: progressing    Treatment Planning:     All current active medications have been reviewed.  Continue to monitor response to treatment and assess for potential side effects of medications.  Encourage group therapy, milieu therapy and occupational therapy.  Collaboration with medical service for medical comorbidities as indicated.  Behavioral Health checks for safety monitoring.  Estimated Discharge Day: 6/1/2025   Legal Status : Voluntary 201 commitment.      Subjective     Behavior over the last 24 hours: unchanged    Per staff, Gabby is visible at times on the unit.  She was observed yesterday evening wandering the halls.  She did attend a group yesterday.  She continues to endorse depression and anxiety.  She endorsed auditory hallucinations last night to kill herself but denied any intent or plan.  She has been compliant with meals and medications.    Gabby was seen privately for psychiatric follow-up today.  She presents as depressed and withdrawn.  She reports noncompliance with medications prior to  admission.  She states she was not able to afford them any longer, and once she stopped taking her medications her depression worsened.  She has been tolerating her medications at this time.  She does report that she was feeling somewhat internally restless last evening and paced the halls.  She is denying the symptoms this morning however.  She denies any auditory hallucinations today as well but notes they were present last evening.  She denies any visual hallucinations at this time.  He does not present as internally stimulated.  She denies any suicidal or homicidal ideation intent or plan at this time.  She does contract for safety on the unit.  She agrees to ongoing titration of medications as indicated.  She reports hypersomnia.    Sleep: hypersomnia  Appetite: normal  Medication side effects: No  ROS: review of systems as noted above in HPI/Subjective report, all other systems are negative    Objective :  Temp:  [96.9 °F (36.1 °C)-97.6 °F (36.4 °C)] 97.6 °F (36.4 °C)  HR:  [82-92] 82  BP: (116-118)/(65-90) 116/90  Resp:  [18] 18  SpO2:  [98 %-100 %] 98 %  O2 Device: None (Room air)    Mental Status Evaluation:    Appearance:  disheveled, poor hygiene   Behavior:  cooperative, calm   Speech:  normal volume, slow   Mood:  depressed   Affect:  constricted   Thought Process:  linear   Thought Content:  no overt delusions, negative thinking, intrusive thoughts, ruminating thoughts   Perceptual Disturbances: denies auditory or visual hallucinations when asked, does not appear responding to internal stimuli   Risk Potential: Suicidal Ideation -  None at present, able to seek out staff before acting on thoughts of harming self on the unit, would talk to staff if not feeling safe on the unit  Homicidal Ideation -  None  Potential for Aggression - No   Sensorium:  oriented to person, place, and time/date   Memory:  recent and remote memory grossly intact   Consciousness:  alert and awake   Attention/Concentration:  "attention span and concentration appear shorter than expected for age   Insight:  limited   Judgment: limited   Gait/Station: in bed   Motor Activity: no abnormal movements       Lab Results: I have reviewed the following results:  Results from the past 24 hours: No results found for this or any previous visit (from the past 24 hours).  Most Recent Labs:   Lab Results   Component Value Date    WBC 6.92 05/23/2025    RBC 4.49 05/23/2025    HGB 11.8 05/23/2025    HCT 38.9 05/23/2025     05/23/2025    RDW 16.2 (H) 05/23/2025    NEUTROABS 4.26 05/23/2025    SODIUM 137 05/23/2025    K 4.4 05/23/2025     05/23/2025    CO2 29 05/23/2025    BUN 25 05/23/2025    CREATININE 1.35 (H) 05/23/2025    GLUC 88 05/23/2025    CALCIUM 9.2 05/23/2025    AST 35 05/23/2025    ALT 23 05/23/2025    ALKPHOS 82 05/23/2025    TP 7.3 05/23/2025    ALB 3.7 05/23/2025    TBILI 0.44 05/23/2025    CHOLESTEROL 162 05/23/2025    HDL 46 (L) 05/23/2025    TRIG 155 (H) 05/23/2025    LDLCALC 85 05/23/2025    NONHDLC 116 05/23/2025    AMMONIA 32 10/17/2022    HSW6AGFIXBSC 1.847 05/23/2025    FREET4 0.86 05/11/2018    PREGUR Negative 10/18/2022    PREGSERUM Negative 05/23/2025    HCGQUANT <2 08/07/2018    SYPHILISAB Non-reactive 11/14/2024    TREPONEMAPA Non-reactive 05/23/2025    HGBA1C 5.6 05/23/2025     05/23/2025       Administrative Statements     Counseling / Coordination of Care:   I have spent a total time of 30 minutes in caring for this patient on the day of the visit/encounter including:  Patient's progress discussed with staff in treatment team meeting.  Medication changes reviewed with staff in treatment team meeting.    Portions of the record may have been created with voice recognition software. Occasional wrong word or \"sound a like\" substitutions may have occurred due to the inherent limitations of voice recognition software. Read the chart carefully and recognize, using context, where substitutions have " occurred.    ODILON Nunez 05/28/25

## 2025-05-28 NOTE — NURSING NOTE
Pt pleasant during assessment. Visible on the unit at this time walking. Pt reports feeling down and depressed today. Pt feels it could be because she is adjusting to medications. Pt reports mild anxiety at this time. Denies SI/HI. Denies hallucinations at this time. Reports hallucinations have improved. Reports sleeping well and a good appetite. Pt did not attend any groups yet today. Denies unmet needs at this time.

## 2025-05-29 PROCEDURE — 99232 SBSQ HOSP IP/OBS MODERATE 35: CPT | Performed by: PSYCHIATRY & NEUROLOGY

## 2025-05-29 RX ADMIN — MIRTAZAPINE 30 MG: 15 TABLET, FILM COATED ORAL at 21:05

## 2025-05-29 RX ADMIN — PALIPERIDONE 9 MG: 9 TABLET, EXTENDED RELEASE ORAL at 08:22

## 2025-05-29 RX ADMIN — HYDROXYZINE HYDROCHLORIDE 50 MG: 50 TABLET ORAL at 17:51

## 2025-05-29 NOTE — ASSESSMENT & PLAN NOTE
Continue Invega to 9mg po QD with plan to transition to Invega Sustenna LORA prior to discharge  Continue Remeron to 30mg po HS for dual benefit of depression and associated neurovegetative symptoms

## 2025-05-29 NOTE — PROGRESS NOTES
05/29/25 0754   Team Meeting   Meeting Type Daily Rounds   Team Members Present   Team Members Present Physician;Nurse;;Other (Discipline and Name)   Physician Team Member Dr. Ludwig / MARY JO Gonzales / PA Student / ODILON Cook   Nursing Team Member Aly / Guanakito   Care Management Team Member Serafin / Suly / Rajiv   Other (Discipline and Name) Sigmund - Group Facilitator   Patient/Family Present   Patient Present No   Patient's Family Present No     Pt ongoing, anxious, reported depression and seclusive to room yesterday. Reported passive SI.     DC: continue to monitor pt medications with goal of getting back on Invega [email or web address]  has submitted [facility] referrals, waiting for response on openings and acceptance.

## 2025-05-29 NOTE — PLAN OF CARE
Problem: DEPRESSION  Goal: Will be euthymic at discharge  Description: INTERVENTIONS:  - Administer medication as ordered  - Provide emotional support via 1:1 interaction with staff  - Encourage involvement in milieu/groups/activities  - Monitor for social isolation  Outcome: Progressing     Problem: PSYCHOSIS  Goal: Will report no hallucinations or delusions  Description: Interventions:  - Administer medication as  ordered  - Every waking shifts and PRN assess for the presence of hallucinations and or delusions  - Assist with reality testing to support increasing orientation  - Assess if patient's hallucinations or delusions are encouraging self-harm or harm to others and intervene as appropriate  Outcome: Progressing     Problem: ANXIETY  Goal: Will report anxiety at manageable levels  Description: INTERVENTIONS:  - Administer medication as ordered  - Teach and encourage coping skills  - Provide emotional support  - Assess patient/family for anxiety and ability to cope  Outcome: Progressing  Goal: By discharge: Patient will verbalize 2 strategies to deal with anxiety  Description: Interventions:  - Identify any obvious source/trigger to anxiety  - Staff will assist patient in applying identified coping technique/skills  - Encourage attendance of scheduled groups and activities  Outcome: Not Progressing     Problem: DISCHARGE PLANNING - CARE MANAGEMENT  Goal: Discharge to post-acute care or home with appropriate resources  Description: INTERVENTIONS:  - Conduct assessment to determine patient/family and health care team treatment goals, and need for post-acute services based on payer coverage, community resources, and patient preferences, and barriers to discharge  - Address psychosocial, clinical, and financial barriers to discharge as identified in assessment in conjunction with the patient/family and health care team  - Arrange appropriate level of post-acute services according to patient’s   needs and  preference and payer coverage in collaboration with the physician and health care team  - Communicate with and update the patient/family, physician, and health care team regarding progress on the discharge plan  - Arrange appropriate transportation to post-acute venues  Outcome: Progressing     Problem: Ineffective Coping  Goal: Identifies ineffective coping skills  Outcome: Progressing  Goal: Identifies healthy coping skills  Outcome: Progressing  Goal: Demonstrates healthy coping skills  Outcome: Not Progressing  Goal: Participates in unit activities  Description: Interventions:  - Provide therapeutic environment   - Provide required programming   - Redirect inappropriate behaviors   Outcome: Not Progressing     Problem: Risk for Self Injury/Neglect  Goal: Treatment Goal: Remain safe during length of stay, learn and adopt new coping skills, and be free of self-injurious ideation, impulses and acts at the time of discharge  Outcome: Progressing  Goal: Verbalize thoughts and feelings  Description: Interventions:  - Assess and re-assess patient's lethality and potential for self-injury  - Engage patient in 1:1 interactions, daily, for a minimum of 15 minutes  - Encourage patient to express feelings, fears, frustrations, hopes  - Establish rapport/trust with patient   Outcome: Progressing  Goal: Refrain from harming self  Description: Interventions:  - Monitor patient closely, per order  - Develop a trusting relationship  - Supervise medication ingestion, monitor effects and side effects   Outcome: Progressing

## 2025-05-29 NOTE — PROGRESS NOTES
05/27/25 0364   Team Meeting   Meeting Type Daily Rounds   Team Members Present   Team Members Present Physician;Nurse;;Other (Discipline and Name)   Physician Team Member Dr. Newby / Dr. Ludwig / MARY JO Gonzales / PA Student   Nursing Team Member Ole / Guanakito   Care Management Team Member Serafin / Suly / Rajiv / Miguel   Other (Discipline and Name) Sigmund - Group Facilitator   Patient/Family Present   Patient Present No   Patient's Family Present No     Pt reported ongoing AH. Reported it is worse at night. Social with select peers and staff. Reported depression. Wants to get back on Invega LORA.

## 2025-05-29 NOTE — PROGRESS NOTES
05/28/25 0753   Team Meeting   Meeting Type Daily Rounds   Team Members Present   Team Members Present Physician;Nurse;;Other (Discipline and Name)   Physician Team Member Dr. Ludwig / MARY JO Gonzales / ODILON Cook   Nursing Team Member Aly / Guanakito   Care Management Team Member Serafin / Suly / Rajiv   Other (Discipline and Name) Sigmund - Group Facilitator   Patient/Family Present   Patient Present No   Patient's Family Present No     Pt reported AH to kill self. Reported anxiety and depression.     DC: Continue to monitor medications with goal of Invega LORA.

## 2025-05-29 NOTE — NURSING NOTE
Pt approached in bedroom sitting up in bed. Pt calm and cooperative with a flat affect. Pt denies SI/HI/AVH at present time. Pt encouraged to come out for breakfast. Pt is disheveled, ADLs encouraged. Denies any unmet needs at this time. Will come to nurse with any concerns.

## 2025-05-29 NOTE — NURSING NOTE
Pt calm and cooperative. Intermittently seclusive to room. Observed reading a book in bed. Pt hopeful for the future. Denies SI/HI. Denies hallucinations at this time. Reports hearing AH at bedtime. Pt washing clothes in the laundry. Encouraged showered.

## 2025-05-29 NOTE — PROGRESS NOTES
Progress Note - Behavioral Health   Name: Gabby Ramosland 35 y.o. female I MRN: [id number]  Unit/Bed#: -02 I Date of Admission: 5/22/2025   Date of Service: 5/29/2025 I Hospital Day: 7     Assessment & Plan  Mood disorder (HCC)  Continue Invega to 9mg po QD with plan to transition to Invega Sustenna LORA prior to discharge  Continue Remeron to 30mg po HS for dual benefit of depression and associated neurovegetative symptoms  Drug abuse (HCC)  Encourage cessation   Agoraphobia  See plan for mood disorder  Psychosis (HCC)  See plan for mood disorder  Morbid (severe) obesity due to excess calories (HCC)  Lifestyle changes    Current Medications:    Current Facility-Administered Medications:     mirtazapine (REMERON) tablet 30 mg, Oral, HS    paliperidone (INVEGA) 24 hr tablet 9 mg, Oral, Daily      Risks/Benefits of Treatment:     Risks, benefits, and possible side effects of medications explained to patient and patient verbalizes understanding and agreement for treatment.    Progress Toward Goals: progressing, reporting improvement in mood, remains with depression and anxiety symptoms.  Noting improvement in AH, only noting AH in the evening.  She does not present as internally stimulated     Treatment Planning:     All current active medications have been reviewed.  Continue to monitor response to treatment and assess for potential side effects of medications.  Encourage group therapy, milieu therapy and occupational therapy.  Collaboration with medical service for medical comorbidities as indicated.  Behavioral Health checks for safety monitoring.  Estimated Discharge Day: 6/6/2025   Legal Status : Voluntary 201 commitment.    Subjective     Behavior over the last 24 hours: some improvement    Per staff, Gabby continues to report anxiety and depression symptoms.  She was seclusive to her room yesterday.  She reports passive SI to nursing staff.  She has been meal and medication compliant.    Gabby was seen  privately for psychiatric follow-up today.  She continues to present as depressed but she reports improvement since yesterday.  She notes improvement in depression and anxiety symptoms, noting slightly more energy this morning.  She denies any suicidal or homicidal ideation intent or plan this morning during the interview.  She also denies any auditory or visual hallucinations this morning.  She reports that the only time she is currently hallucinations is somewhere between 9 PM and 2 AM.  She is agreeable to further titration of her medications if indicated. She is denying any side effects from her the current regimen and none were observed on exam.  She does not present as overtly internally preoccupied or psychotic during the interview today.  When asked about barriers to obtaining medications at home, she states she is unable to afford them.  When asked about cost of medications, she states the only cost about $1 but notes she spends all her money before she gets her medications.  She is agreeable to her sister being more involved in her finances after discharge.    Sleep: hypersomnia  Appetite: normal  Medication side effects: No  ROS: review of systems as noted above in HPI/Subjective report, all other systems are negative    Objective :  Temp:  [97.7 °F (36.5 °C)-98.6 °F (37 °C)] 97.7 °F (36.5 °C)  HR:  [69-78] 69  BP: ()/(61-81) 112/81  Resp:  [18] 18  SpO2:  [98 %-100 %] 98 %  O2 Device: None (Room air)    Mental Status Evaluation:    Appearance:  disheveled, poor hygiene   Behavior:  cooperative, calm   Speech:  normal rate and volume   Mood:  depressed   Affect:  constricted   Thought Process:  goal directed, linear   Thought Content:  no overt delusions, negative thinking   Perceptual Disturbances: denies auditory or visual hallucinations when asked, does not appear responding to internal stimuli   Risk Potential: Suicidal Ideation -  None at present, able to seek out staff before acting on  thoughts of harming self on the unit, would talk to staff if not feeling safe on the unit  Homicidal Ideation -  None  Potential for Aggression - No   Sensorium:  oriented to person, place, and time/date   Memory:  recent and remote memory grossly intact   Consciousness:  alert and awake   Attention/Concentration: attention span and concentration appear shorter than expected for age   Insight:  fair   Judgment: limited   Gait/Station: normal gait/station   Motor Activity: no abnormal movements       Lab Results: I have reviewed the following results:  Results from the past 24 hours: No results found for this or any previous visit (from the past 24 hours).  Most Recent Labs:   Lab Results   Component Value Date    WBC 6.92 05/23/2025    RBC 4.49 05/23/2025    HGB 11.8 05/23/2025    HCT 38.9 05/23/2025     05/23/2025    RDW 16.2 (H) 05/23/2025    NEUTROABS 4.26 05/23/2025    SODIUM 137 05/23/2025    K 4.4 05/23/2025     05/23/2025    CO2 29 05/23/2025    BUN 25 05/23/2025    CREATININE 1.35 (H) 05/23/2025    GLUC 88 05/23/2025    CALCIUM 9.2 05/23/2025    AST 35 05/23/2025    ALT 23 05/23/2025    ALKPHOS 82 05/23/2025    TP 7.3 05/23/2025    ALB 3.7 05/23/2025    TBILI 0.44 05/23/2025    CHOLESTEROL 162 05/23/2025    HDL 46 (L) 05/23/2025    TRIG 155 (H) 05/23/2025    LDLCALC 85 05/23/2025    NONHDLC 116 05/23/2025    AMMONIA 32 10/17/2022    TUO5PFHAWWKI 1.847 05/23/2025    FREET4 0.86 05/11/2018    PREGUR Negative 10/18/2022    PREGSERUM Negative 05/23/2025    HCGQUANT <2 08/07/2018    SYPHILISAB Non-reactive 11/14/2024    TREPONEMAPA Non-reactive 05/23/2025    HGBA1C 5.6 05/23/2025     05/23/2025       Administrative Statements     Counseling / Coordination of Care:   I have spent a total time of 30 minutes in caring for this patient on the day of the visit/encounter including:  Patient's progress discussed with staff in treatment team meeting.  Medication changes reviewed with staff in  "treatment team meeting.    Portions of the record may have been created with voice recognition software. Occasional wrong word or \"sound a like\" substitutions may have occurred due to the inherent limitations of voice recognition software. Read the chart carefully and recognize, using context, where substitutions have occurred.    ODILON Nunez 05/29/25  "

## 2025-05-30 PROCEDURE — 99232 SBSQ HOSP IP/OBS MODERATE 35: CPT | Performed by: PSYCHIATRY & NEUROLOGY

## 2025-05-30 RX ORDER — PROPRANOLOL HYDROCHLORIDE 10 MG/1
10 TABLET ORAL 2 TIMES DAILY
Status: DISCONTINUED | OUTPATIENT
Start: 2025-05-30 | End: 2025-06-20 | Stop reason: HOSPADM

## 2025-05-30 RX ADMIN — PALIPERIDONE 9 MG: 9 TABLET, EXTENDED RELEASE ORAL at 08:14

## 2025-05-30 RX ADMIN — MIRTAZAPINE 30 MG: 15 TABLET, FILM COATED ORAL at 21:15

## 2025-05-30 RX ADMIN — PROPRANOLOL HYDROCHLORIDE 10 MG: 10 TABLET ORAL at 21:15

## 2025-05-30 RX ADMIN — PROPRANOLOL HYDROCHLORIDE 10 MG: 10 TABLET ORAL at 09:19

## 2025-05-30 NOTE — NURSING NOTE
Pt has been withdrawn to room throughout the day. Did shower and complete ADLs this afternoon. Denies SI, HI, AVH. Denies needs at this time.

## 2025-05-30 NOTE — PROGRESS NOTES
Progress Note - Behavioral Health   Name: Gabby Mares 35 y.o. female I MRN: [id number]  Unit/Bed#: U 204-02 I Date of Admission: 5/22/2025   Date of Service: 5/30/2025 I Hospital Day: 8        Assessment & Plan  Mood disorder (HCC)  Continue Invega 9mg po QD with plan to transition to Invega Sustenna LORA prior to discharge  Tentative plan for loading dose on Monday 6/2  Start Propanolol 10mg po BID for akathisia on 5/30  Continue Remeron 30mg po HS for dual benefit of depression and associated neurovegetative symptoms  Drug abuse (HCC)  Encourage cessation   Agoraphobia  See plan for mood disorder  Psychosis (HCC)  See plan for mood disorder  Morbid (severe) obesity due to excess calories (HCC)  Lifestyle changes     Current medications:    Current Facility-Administered Medications:     aluminum-magnesium hydroxide-simethicone (MAALOX) oral suspension 30 mL, Q4H PRN    artificial tear ophthalmic ointment, 4x Daily PRN    haloperidol lactate (HALDOL) injection 2.5 mg, Q6H PRN Max 4/day **AND** LORazepam (ATIVAN) injection 1 mg, Q6H PRN Max 4/day **AND** benztropine (COGENTIN) injection 0.5 mg, Q6H PRN Max 4/day    benztropine (COGENTIN) injection 1 mg, BID PRN    haloperidol lactate (HALDOL) injection 5 mg, Q4H PRN Max 4/day **AND** LORazepam (ATIVAN) injection 2 mg, Q4H PRN Max 4/day **AND** benztropine (COGENTIN) injection 1 mg, Q4H PRN Max 4/day    benztropine (COGENTIN) tablet 1 mg, BID PRN    bisacodyl (DULCOLAX) rectal suppository 10 mg, Daily PRN    haloperidol (HALDOL) tablet 2 mg, Q4H PRN Max 6/day    haloperidol (HALDOL) tablet 5 mg, Q6H PRN Max 4/day    haloperidol (HALDOL) tablet 5 mg, Q4H PRN Max 4/day    hydrOXYzine HCL (ATARAX) tablet 25 mg, Q6H PRN Max 4/day    hydrOXYzine HCL (ATARAX) tablet 50 mg, Q4H PRN Max 4/day **OR** LORazepam (ATIVAN) injection 1 mg, Q4H PRN    LORazepam (ATIVAN) tablet 1 mg, Q4H PRN Max 6/day **OR** LORazepam (ATIVAN) injection 2 mg, Q6H PRN Max 3/day    magnesium  hydroxide (MILK OF MAGNESIA) oral suspension 30 mL, Daily PRN    mirtazapine (REMERON) tablet 30 mg, HS    paliperidone (INVEGA) 24 hr tablet 9 mg, Daily    propranolol (INDERAL) tablet 10 mg, Q8H PRN    senna-docusate sodium (SENOKOT S) 8.6-50 mg per tablet 1 tablet, Daily PRN    traZODone (DESYREL) tablet 50 mg, HS PRN    Risks / Benefits of Treatment:    Risks, benefits, and possible side effects of medications explained to patient and patient verbalizes understanding and agreement for treatment.    Progress Toward Goals: progressing    Treatment Planning:  All current active medications have been reviewed.  Continue to monitor response to treatment and assess for potential side effects of medications.  Encourage group therapy, milieu therapy and occupational therapy  Collaboration with medical service for medical comorbidities as indicated.  Behavioral Health checks for safety monitoring.  Estimated Discharge Day: 6/6/2025  Legal Status: 201    Subjective:    Behavior over the last 24 hours: unchanged    No acute behavioral events over the past 24 hours. Today, patient was seen and examined at bedside for continuation of care.     Over the past 24 hours, the patient continues to report auditory hallucinations which predominantly occur at nighttime.  Today, the patient denies SI, HI, or visual hallucinations.  She states that she is still experiencing auditory hallucinations.  She is complaining of some restlessness and we discussed starting propranolol and she is agreeable with this plan.  Discussed plan to tentatively give her first loading dose of Invega Sustenna on Monday.    Patient denied other new or worsening psychiatric symptoms/complaints at this time.    Sleep: unchanged  Appetite: unchanged  Medication side effects: Akathisia   ROS: review of systems as noted in HPI, all other systems are negative    Objective:    Vital signs in last 24 hours:    Temp:  [97.6 °F (36.4 °C)-98.6 °F (37 °C)] 97.6 °F  "(36.4 °C)  HR:  [] 80  BP: (115-126)/(63-98) 126/98  Resp:  [18] 18  SpO2:  [99 %] 99 %  O2 Device: None (Room air)    Mental Status Evaluation:    Appearance: casually dressed, consistent with stated age  Motor: +psychomotor retardation, no gait abnormalities  Behavior: cooperative, answers questions appropriately  Speech: soft, normal rhythm  Mood: \"the same\"  Affect: constricted, depressed-appearing  Thought Process: linear and goal-oriented  Thought Content: + auditory hallucinations, denies visual hallucinations, denies delusions  Risk Potential: denies suicidal ideation, plan, or intent. Denies homicidal ideation  Sensorium: Oriented to person, place, time, and situation  Cognition: cognitive ability appears intact but was not quantitatively tested  Consciousness: alert and awake  Attention: intact, able to focus without difficulty  Insight: fair  Judgement: fair        Lab Results: I have reviewed the following results:  No results found for this or any previous visit (from the past 48 hours).     Administrative Statements    Counseling / Coordination of Care:   Patients progress discussed with staff in treatment team meeting. Medication changes reviewed with staff in treatment team meeting.    Puma Newby DO 05/30/25    This note has been constructed using a voice recognition system. There may be translation, syntax, or grammatical errors. If you have any questions, please contact the dictating provider.    "
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
## 2025-05-30 NOTE — NURSING NOTE
Pt was awake this morning, awaiting breakfast. Currently denies SI, HI, AVH. Reports increase in AH typically at night. Discussed financial and housing issues as pt's anxiety triggers. Discussed importance of budgeting funds, Disability, Food [place], making sure pt will have rent money. Pt did state difficulty with food, because $140 is not always enough to last the full month. Encouraged pt to try stores like [employer] or [employer] for cheaper groceries. Pt can also look for Food Pantries in their area.

## 2025-05-30 NOTE — ASSESSMENT & PLAN NOTE
Continue Invega 9mg po QD with plan to transition to Invega Sustenna LORA prior to discharge  Tentative plan for loading dose on Monday 6/2  Start Propanolol 10mg po BID for akathisia on 5/30  Continue Remeron 30mg po HS for dual benefit of depression and associated neurovegetative symptoms

## 2025-05-30 NOTE — PROGRESS NOTES
05/30/25 0756   Team Meeting   Meeting Type Daily Rounds   Team Members Present   Team Members Present Physician;Nurse;;Other (Discipline and Name)   Physician Team Member Dr. Newby / Dr. Ludwig / MARY JO Gonzales / PA Student   Nursing Team Member Ole   Care Management Team Member Serafin / Suly   Other (Discipline and Name) Sigmund - Group Facilitator   Patient/Family Present   Patient Present No   Patient's Family Present No     Pt continues to report ongoing anxiety and depression. Reported AH is worse at night. Withdrawn to room, reading a book.     DC: 6/9/25 (?) pt will be put back on Invega LORA

## 2025-05-31 RX ADMIN — MIRTAZAPINE 30 MG: 15 TABLET, FILM COATED ORAL at 21:14

## 2025-05-31 RX ADMIN — PALIPERIDONE 9 MG: 9 TABLET, EXTENDED RELEASE ORAL at 09:01

## 2025-05-31 RX ADMIN — PROPRANOLOL HYDROCHLORIDE 10 MG: 10 TABLET ORAL at 08:55

## 2025-05-31 RX ADMIN — PROPRANOLOL HYDROCHLORIDE 10 MG: 10 TABLET ORAL at 21:14
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
## 2025-05-31 NOTE — NURSING NOTE
"Pt awake and alert in room at time of assessment by RN. Pt denies SI/HI/VH. Pt reported some auditory hallucinations of self harm commands, states she has no intention to act on these and has approached staff anytime the hallucinations bother her. Pt rates her depression 6/10 and anxiety 5/10. Pt described her mood as \"pretty stable\" today. Pt reports completing ADLs and described her appetite as \"good.\" Pt stated that she slept okay last night. Pt denies any needs at this time and agreed to approach staff should anything arise throughout the shift.    "

## 2025-05-31 NOTE — ASSESSMENT & PLAN NOTE
Continue Invega 9mg po QD with plan to transition to Invega Sustenna LORA prior to discharge  Tentative plan for loading dose on Monday 6/2  Continue Propanolol 10mg po BID for akathisia on 5/30  Continue Remeron 30mg po HS for dual benefit of depression and associated neurovegetative symptoms

## 2025-05-31 NOTE — NURSING NOTE
"Pt reports adequate sleep overnight. States hearing \"a little AH\" before falling asleep. Verbalized feeling drowsy this morning. Denies SI/HI or hallucinations at this time. Plans to rest in bed. Denies any question or concern at this time.  "
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
## 2025-06-01 RX ADMIN — PROPRANOLOL HYDROCHLORIDE 10 MG: 10 TABLET ORAL at 08:54

## 2025-06-01 RX ADMIN — NICOTINE POLACRILEX 2 MG: 4 GUM, CHEWING BUCCAL at 19:25

## 2025-06-01 RX ADMIN — NICOTINE POLACRILEX 2 MG: 4 GUM, CHEWING BUCCAL at 17:20

## 2025-06-01 RX ADMIN — MIRTAZAPINE 30 MG: 15 TABLET, FILM COATED ORAL at 21:11

## 2025-06-01 RX ADMIN — PALIPERIDONE 9 MG: 9 TABLET, EXTENDED RELEASE ORAL at 08:54

## 2025-06-01 NOTE — TREATMENT TEAM
06/01/25 1000   Team Meeting   Meeting Type Daily Rounds   Team Members Present   Team Members Present Physician;Nurse   Physician Team Member Dany/Brendan NP   Nursing Team Member Ole   Patient/Family Present   Patient Present No   Patient's Family Present No     AM rounds- denies SI/HI, intermittent CAH. Reports feeling that her mood is more stable. Depression and anxiety 5/10. Slept well.

## 2025-06-01 NOTE — NURSING NOTE
Pt is awake and walking halls. Reports minimal AH at this time, again stating that they are worse in later evening, before bed. Pt denies SI, HI, VH. Showered and is currently washing clothes. Requested and received nicotine gum for mild nicotine cravings.

## 2025-06-01 NOTE — PROGRESS NOTES
Progress Note - Behavioral Health     Name: Gabby Mares 35 y.o. female I MRN: [id number]   Unit/Bed#: -01 I Date of Admission: 5/22/2025   Date of Service: 6/1/2025 I Hospital Day: 10         Assessment & Plan  Mood disorder (HCC)  Continue Invega 9mg po QD with plan to transition to Invega Sustenna LORA prior to discharge  Tentative plan for loading dose on Monday 6/2  Continue Propanolol 10mg po BID for akathisia on 5/30  Continue Remeron 30mg po HS for dual benefit of depression and associated neurovegetative symptoms  Drug abuse (HCC)  Encourage cessation   Agoraphobia  See plan for mood disorder  Psychosis (HCC)  See plan for mood disorder  Morbid (severe) obesity due to excess calories (HCC)  Lifestyle changes     Principal Problem:    Mood disorder (HCC)  Active Problems:    Medical clearance for psychiatric admission    Drug abuse (HCC)    Agoraphobia    Morbid (severe) obesity due to excess calories (HCC)    Psychosis (HCC)       Recommended Treatment:     Planned medication and treatment changes:    All current active medications have been reviewed.  Continue to monitor response to treatment and assess for potential side effects of medications.  Encourage group therapy, milieu therapy and occupational therapy  Collaboration with medical service for medical comorbidities as indicated.  Behavioral Health checks for safety monitoring.  Estimated Discharge Day: 6/6/2025  Legal Status: 201    Current medications:  Current Facility-Administered Medications   Medication Dose Route Frequency Provider Last Rate    aluminum-magnesium hydroxide-simethicone  30 mL Oral Q4H PRN Dariusz Gonzales PA-C      artificial tear   Both Eyes 4x Daily PRN Dariusz Gonzales PA-C      haloperidol lactate  2.5 mg Intramuscular Q6H PRN Max 4/day Dariusz Gonzales PA-C      And    LORazepam  1 mg Intramuscular Q6H PRN Max 4/day Dariusz Gonzales PA-C      And    benztropine  0.5 mg Intramuscular Q6H PRN Max 4/day Dariusz DING  "Christian, PA-HERNAN      benztropine  1 mg Intramuscular BID PRN [facility], [facility]-C      haloperidol lactate  5 mg Intramuscular Q4H PRN Max 4/day [facility], PA-HERNAN      And    LORazepam  2 mg Intramuscular Q4H PRN Max 4/day [facility] Gonzales, PA-HERNAN      And    benztropine  1 mg Intramuscular Q4H PRN Max 4/day [facility] Gonzales, PA-C      benztropine  1 mg Oral BID PRN [facility], PA-C      bisacodyl  10 mg Rectal Daily PRN [facility], PA-C      haloperidol  2 mg Oral Q4H PRN Max 6/day [facility], PA-C      haloperidol  5 mg Oral Q6H PRN Max 4/day [facility], PA-C      haloperidol  5 mg Oral Q4H PRN Max 4/day [facility], PA-C      hydrOXYzine HCL  25 mg Oral Q6H PRN Max 4/day [facility], PA-C      hydrOXYzine HCL  50 mg Oral Q4H PRN Max 4/day [facility] Christian, MARY JO      Or    LORazepam  1 mg Intramuscular Q4H PRN [facility], PA-C      LORazepam  1 mg Oral Q4H PRN Max 6/day [facility], PA-HERNAN      Or    LORazepam  2 mg Intramuscular Q6H PRN Max 3/day [facility], PA-HERNAN      magnesium hydroxide  30 mL Oral Daily PRN [facility], PA-C      mirtazapine  30 mg Oral HS [facility], DO      paliperidone  9 mg Oral Daily [facility], DO      propranolol  10 mg Oral Q8H PRN [facility], PA-C      propranolol  10 mg Oral BID Puma Newby, DO      senna-docusate sodium  1 tablet Oral Daily PRN [facility], PA-C      traZODone  50 mg Oral HS PRN [facility] Gonzales, MARY JO         Behavior over the last 24 hours: slowly improving.     No acute behavioral events over the past 24 hours. Today, patient was seen and examined at bedside for continuation of care.     Per nursing, patient mood has been more stable, Depression 5/10, Anxiety 5/10, compliant with medications and meals, no behavioral issues noted, reports CAH at night. Upon approach, patient was pacing the halls, was calm and cooperative. Patient reports she feels \"better than yesterday.\" Depression 4/10 and " "anxiety 4/10. Patient continues to have CAH at night telling her to kill herself. She denies VH. Sleep has improved but reports grogginess in the mornings with remeron. Appetite is normal. Patient reports decrease in tremors with propranolol. Patient currently denies si/hi and ah/vh.     Sleep: normal  Appetite: normal  Medication side effects: Yes - tiredness   ROS: no complaints, all other systems are negative    Mental Status Evaluation:    Appearance:  casually dressed, adequate grooming   Behavior:  cooperative, calm   Speech:  normal rate, soft   Mood:  \"Better than yesterday\"   Affect:  flat   Thought Process:  goal directed, linear   Associations: intact associations   Thought Content:  no overt delusions   Perceptual Disturbances: no visual hallucinations, auditory hallucinations with commands to kill self at night   Risk Potential: Suicidal ideation - None at present  Homicidal ideation - None at present  Potential for aggression - No   Sensorium:  oriented to person, place, and time/date   Memory:  recent and remote memory grossly intact   Consciousness:  alert and awake   Attention/Concentration: attention span and concentration are age appropriate   Insight:  fair   Judgment: fair   Gait/Station: in bed, unable to assess   Motor Activity: +psychomotor retardation     Vital signs in last 24 hours:    Temp:  [98.1 °F (36.7 °C)] 98.1 °F (36.7 °C)  HR:  [] 118  BP: (115-127)/(71-90) 115/90  Resp:  [20] 20  SpO2:  [98 %] 98 %  O2 Device: None (Room air)    Laboratory results: I have personally reviewed all pertinent laboratory/tests results    Results from the past 24 hours: No results found for this or any previous visit (from the past 24 hours).    Progress Toward Goals: progressing    Risks / Benefits of Treatment:    Risks, benefits, and possible side effects of medications explained to patient and patient verbalizes understanding and agreement for treatment.    Counseling / Coordination of " Care:    Patient's progress discussed with staff in treatment team meeting.  Medications, treatment progress and treatment plan reviewed with patient.    ODILON Tripp 06/01/25    This note was constructed with the assistance of network approved dictation software. Please excuse any minor errors of syntax or grammar as a result.

## 2025-06-01 NOTE — NURSING NOTE
Pt scant during interaction. Depressed affect. Denies SI/HI or hallucination at this time. Compliant with medication. Cooperative with physical assessment. Denies any question or concern at this time.

## 2025-06-02 PROCEDURE — 99232 SBSQ HOSP IP/OBS MODERATE 35: CPT | Performed by: PSYCHIATRY & NEUROLOGY

## 2025-06-02 RX ADMIN — NICOTINE POLACRILEX 2 MG: 4 GUM, CHEWING BUCCAL at 17:11

## 2025-06-02 RX ADMIN — NICOTINE POLACRILEX 2 MG: 4 GUM, CHEWING BUCCAL at 12:15

## 2025-06-02 RX ADMIN — PALIPERIDONE PALMITATE 156 MG: 156 INJECTION INTRAMUSCULAR at 15:30

## 2025-06-02 RX ADMIN — MIRTAZAPINE 30 MG: 15 TABLET, FILM COATED ORAL at 21:39

## 2025-06-02 RX ADMIN — PALIPERIDONE 9 MG: 9 TABLET, EXTENDED RELEASE ORAL at 08:49

## 2025-06-02 RX ADMIN — PROPRANOLOL HYDROCHLORIDE 10 MG: 10 TABLET ORAL at 08:49

## 2025-06-02 RX ADMIN — NICOTINE POLACRILEX 2 MG: 4 GUM, CHEWING BUCCAL at 08:57

## 2025-06-02 RX ADMIN — PROPRANOLOL HYDROCHLORIDE 10 MG: 10 TABLET ORAL at 21:39

## 2025-06-02 NOTE — NURSING NOTE
"Pt pleasant during assessment. Denies anxiety. Reports ongoing depression. And feeling \"down.\" Pt reports feeling tired today. Pt was given Invega Sustenna IM in right deltoid @ 1530. Pt reports sleeping well over night and a good appetite. OOB for meals and medication. Denies SI/HI/AVH. Pt did not attend any groups yet today. Denies unmet needs at this time.   "

## 2025-06-02 NOTE — ASSESSMENT & PLAN NOTE
Initiate Invega Sustenna 156 mg IM today loading dose (last maintenance dose received 4/14/25)  Can receive second booster in 4-7 days of 156 mg  From there, will continue previous maintenance dose of 234 mg Q28 days   Discussed with clinical pharmacy   Continue Propanolol 10 mg po BID for akathisia   Continue Remeron 30 mg po HS for dual benefit of depression and associated neurovegetative symptoms

## 2025-06-02 NOTE — PLAN OF CARE
Problem: DEPRESSION  Goal: Will be euthymic at discharge  Description: INTERVENTIONS:  - Administer medication as ordered  - Provide emotional support via 1:1 interaction with staff  - Encourage involvement in milieu/groups/activities  - Monitor for social isolation  Outcome: Progressing     Problem: PSYCHOSIS  Goal: Will report no hallucinations or delusions  Description: Interventions:  - Administer medication as  ordered  - Every waking shifts and PRN assess for the presence of hallucinations and or delusions  - Assist with reality testing to support increasing orientation  - Assess if patient's hallucinations or delusions are encouraging self-harm or harm to others and intervene as appropriate  Outcome: Not Progressing     Problem: ANXIETY  Goal: Will report anxiety at manageable levels  Description: INTERVENTIONS:  - Administer medication as ordered  - Teach and encourage coping skills  - Provide emotional support  - Assess patient/family for anxiety and ability to cope  Outcome: Progressing  Goal: By discharge: Patient will verbalize 2 strategies to deal with anxiety  Description: Interventions:  - Identify any obvious source/trigger to anxiety  - Staff will assist patient in applying identified coping technique/skills  - Encourage attendance of scheduled groups and activities  Outcome: Progressing     Problem: DISCHARGE PLANNING - CARE MANAGEMENT  Goal: Discharge to post-acute care or home with appropriate resources  Description: INTERVENTIONS:  - Conduct assessment to determine patient/family and health care team treatment goals, and need for post-acute services based on payer coverage, community resources, and patient preferences, and barriers to discharge  - Address psychosocial, clinical, and financial barriers to discharge as identified in assessment in conjunction with the patient/family and health care team  - Arrange appropriate level of post-acute services according to patient’s   needs and  preference and payer coverage in collaboration with the physician and health care team  - Communicate with and update the patient/family, physician, and health care team regarding progress on the discharge plan  - Arrange appropriate transportation to post-acute venues  Outcome: Progressing     Problem: Ineffective Coping  Goal: Identifies ineffective coping skills  Outcome: Progressing  Goal: Identifies healthy coping skills  Outcome: Progressing  Goal: Demonstrates healthy coping skills  Outcome: Not Progressing  Goal: Participates in unit activities  Description: Interventions:  - Provide therapeutic environment   - Provide required programming   - Redirect inappropriate behaviors   Outcome: Progressing     Problem: Risk for Self Injury/Neglect  Goal: Treatment Goal: Remain safe during length of stay, learn and adopt new coping skills, and be free of self-injurious ideation, impulses and acts at the time of discharge  Outcome: Progressing  Goal: Verbalize thoughts and feelings  Description: Interventions:  - Assess and re-assess patient's lethality and potential for self-injury  - Engage patient in 1:1 interactions, daily, for a minimum of 15 minutes  - Encourage patient to express feelings, fears, frustrations, hopes  - Establish rapport/trust with patient   Outcome: Progressing  Goal: Refrain from harming self  Description: Interventions:  - Monitor patient closely, per order  - Develop a trusting relationship  - Supervise medication ingestion, monitor effects and side effects   Outcome: Progressing

## 2025-06-02 NOTE — NURSING NOTE
Pt remains pleasant in conversation. Denies SI/HI, reports sleeping well overnight however continues to feel tired in the morning. Pt reports currently having no hallucinations however AH typically return in the evening/night time. Encouraged pt to attend groups throughout the day. Social with select peers. Reviewed scheduled medications with pt. Denies any concerns at this time.

## 2025-06-02 NOTE — PROGRESS NOTES
Progress Note - Behavioral Health   Name: Gabby ERWIN sarahland 35 y.o. female I MRN: [id number]  Unit/Bed#: -01 I Date of Admission: 5/22/2025   Date of Service: 6/2/2025 I Hospital Day: 11     Assessment & Plan  Mood disorder (HCC)  Initiate Invega Sustenna 156 mg IM today loading dose (last maintenance dose received 4/14/25)  Can receive second booster in 4-7 days of 156 mg  From there, will continue previous maintenance dose of 234 mg Q28 days   Discussed with clinical pharmacy   Continue Propanolol 10 mg po BID for akathisia   Continue Remeron 30 mg po HS for dual benefit of depression and associated neurovegetative symptoms  Drug abuse (HCC)  Encourage cessation   Agoraphobia  See plan for mood disorder  Psychosis (HCC)  See plan for mood disorder  Morbid (severe) obesity due to excess calories (HCC)  Lifestyle changes    Current Medications:    Current Facility-Administered Medications:     mirtazapine (REMERON) tablet 30 mg, Oral, HS    paliperidone palmitate ER (INVEGA SUSTENNA) IM- Deltoid injection 156 mg, Intramuscular (deltoid only), Once    propranolol (INDERAL) tablet 10 mg, Oral, BID      Risks/Benefits of Treatment:     Risks, benefits, and possible side effects of medications explained to patient and patient verbalizes understanding and agreement for treatment.    Progress Toward Goals: some improvement    Treatment Planning:     All current active medications have been reviewed.  Continue to monitor response to treatment and assess for potential side effects of medications.  Encourage group therapy, milieu therapy and occupational therapy.  Collaboration with medical service for medical comorbidities as indicated.  Behavioral Health checks for safety monitoring.  Estimated Discharge Day: 6/6/2025   Legal Status : Voluntary 201 commitment.    Subjective     Behavior over the last 24 hours: unchanged    Gabby is seen this morning observed in bed. She is guarded but cooperative throughout. Does  "appear depressed with PMR, ruminating on psychosocial stressors including recent eviction and financial strain. Admits to low motivation and energy. She does not appear overtly preoccupied or psychotic on presentation. Claims that she will often hear voices during certain times of the day, mostly at night. Reports previous history of Invega Calvin, however was unable to secure transport to her appointments, therefore did not follow-up with maintenance doses. Per staff, isolative to room and self and not participative in the milieu.     Sleep: hypersomnia  Appetite: normal  Medication side effects: No  ROS: review of systems as noted above in HPI/Subjective report, all other systems are negative    Objective :  Temp:  [97.6 °F (36.4 °C)-98.8 °F (37.1 °C)] 97.6 °F (36.4 °C)  HR:  [59-89] 59  BP: ()/(72-79) 112/79  Resp:  [18] 18  SpO2:  [98 %] 98 %  O2 Device: None (Room air)    Mental Status Evaluation:    Appearance:  disheveled, poor hygiene, laying in bed   Behavior:  cooperative, calm, guarded   Speech:  normal rate and volume   Mood:  depressed, \"tired\"   Affect:  blunted   Thought Process:  goal directed, linear   Thought Content:  no overt delusions, negative thinking, ruminations   Perceptual Disturbances: denies auditory or visual hallucinations when asked, does not appear responding to internal stimuli   Risk Potential: Suicidal Ideation -  None at present, able to seek out staff before acting on thoughts of harming self on the unit, would talk to staff if not feeling safe on the unit  Homicidal Ideation -  None  Potential for Aggression - No   Sensorium:  oriented to person, place, and time/date   Memory:  recent and remote memory grossly intact   Consciousness:  alert and awake   Attention/Concentration: attention span and concentration appear shorter than expected for age   Insight:  fair   Judgment: limited   Gait/Station: normal gait/station, in bed   Motor Activity: no abnormal movements " "      Lab Results: I have reviewed the following results:  Results from the past 24 hours: No results found for this or any previous visit (from the past 24 hours).  Most Recent Labs:   Lab Results   Component Value Date    WBC 6.92 05/23/2025    RBC 4.49 05/23/2025    HGB 11.8 05/23/2025    HCT 38.9 05/23/2025     05/23/2025    RDW 16.2 (H) 05/23/2025    NEUTROABS 4.26 05/23/2025    SODIUM 137 05/23/2025    K 4.4 05/23/2025     05/23/2025    CO2 29 05/23/2025    BUN 25 05/23/2025    CREATININE 1.35 (H) 05/23/2025    GLUC 88 05/23/2025    CALCIUM 9.2 05/23/2025    AST 35 05/23/2025    ALT 23 05/23/2025    ALKPHOS 82 05/23/2025    TP 7.3 05/23/2025    ALB 3.7 05/23/2025    TBILI 0.44 05/23/2025    CHOLESTEROL 162 05/23/2025    HDL 46 (L) 05/23/2025    TRIG 155 (H) 05/23/2025    LDLCALC 85 05/23/2025    NONHDLC 116 05/23/2025    AMMONIA 32 10/17/2022    KBU9NHQXRYXB 1.847 05/23/2025    FREET4 0.86 05/11/2018    PREGUR Negative 10/18/2022    PREGSERUM Negative 05/23/2025    HCGQUANT <2 08/07/2018    SYPHILISAB Non-reactive 11/14/2024    TREPONEMAPA Non-reactive 05/23/2025    HGBA1C 5.6 05/23/2025     05/23/2025       Administrative Statements     Counseling / Coordination of Care:   I have spent a total time of 30 minutes in caring for this patient on the day of the visit/encounter including:  Patient's progress discussed with staff in treatment team meeting.  Medication changes reviewed with staff in treatment team meeting.    Portions of the record may have been created with voice recognition software. Occasional wrong word or \"sound a like\" substitutions may have occurred due to the inherent limitations of voice recognition software. Read the chart carefully and recognize, using context, where substitutions have occurred.    Georgia Anna PA-C 06/02/25  "

## 2025-06-03 PROCEDURE — 99232 SBSQ HOSP IP/OBS MODERATE 35: CPT | Performed by: PSYCHIATRY & NEUROLOGY

## 2025-06-03 RX ADMIN — PROPRANOLOL HYDROCHLORIDE 10 MG: 10 TABLET ORAL at 21:07

## 2025-06-03 RX ADMIN — MIRTAZAPINE 30 MG: 15 TABLET, FILM COATED ORAL at 21:07

## 2025-06-03 RX ADMIN — NICOTINE POLACRILEX 2 MG: 4 GUM, CHEWING BUCCAL at 10:01

## 2025-06-03 RX ADMIN — HYDROXYZINE HYDROCHLORIDE 50 MG: 50 TABLET ORAL at 19:06

## 2025-06-03 RX ADMIN — PROPRANOLOL HYDROCHLORIDE 10 MG: 10 TABLET ORAL at 09:25

## 2025-06-03 RX ADMIN — NICOTINE POLACRILEX 2 MG: 4 GUM, CHEWING BUCCAL at 12:12

## 2025-06-03 NOTE — PROGRESS NOTES
06/03/25 0754   Team Meeting   Meeting Type Daily Rounds   Team Members Present   Team Members Present Physician;;Nurse;Other (Discipline and Name)   Physician Team Member Dr. Ramirez / Dr. Singh / MARY JO Gonzales / PA Student   Nursing Team Member Quincy   Care Management Team Member Serafin / Suly / Rajiv / Miguel   Other (Discipline and Name) Sigmund - Group Facilitator / Erma - Pharmacy   Patient/Family Present   Patient Present No   Patient's Family Present No     Pt withdrawn to room but pleasant. Continues to report AH that is worse in the evening. Continues to report depression related to housing. Given Invega Sustenna 156 mg LORA yesterday and will have second one within the next week.     DC: Pt will receive second Invega LORA end of this week/early next week. Pt has CM with [facility] and they have submitted CRR referrals to [facility].

## 2025-06-03 NOTE — PROGRESS NOTES
06/02/25 7263   Team Meeting   Meeting Type Daily Rounds   Team Members Present   Team Members Present Physician;Nurse;;Other (Discipline and Name)   Physician Team Member Dr. Ramirez / MARY JO Gonzales / MARY JO Anna   Nursing Team Member Ole   Care Management Team Member Serafin / Suly / Rajiv / Miguel   Other (Discipline and Name) Sigmund - Group Facilitator   Patient/Family Present   Patient Present No   Patient's Family Present No     Pt continues to report depression. Denies SI. Reported she feels some improvement in tremors. Reported she feels tired in the morning. Reported AH. Handled situation well with another peer.     DC: Pt will be given Invega Sustenna LORA today and second one within the next week

## 2025-06-03 NOTE — PLAN OF CARE
Problem: DEPRESSION  Goal: Will be euthymic at discharge  Description: INTERVENTIONS:  - Administer medication as ordered  - Provide emotional support via 1:1 interaction with staff  - Encourage involvement in milieu/groups/activities  - Monitor for social isolation  Outcome: Progressing     Problem: ANXIETY  Goal: Will report anxiety at manageable levels  Description: INTERVENTIONS:  - Administer medication as ordered  - Teach and encourage coping skills  - Provide emotional support  - Assess patient/family for anxiety and ability to cope  Outcome: Progressing  Goal: By discharge: Patient will verbalize 2 strategies to deal with anxiety  Description: Interventions:  - Identify any obvious source/trigger to anxiety  - Staff will assist patient in applying identified coping technique/skills  - Encourage attendance of scheduled groups and activities  Outcome: Progressing     Problem: DISCHARGE PLANNING - CARE MANAGEMENT  Goal: Discharge to post-acute care or home with appropriate resources  Description: INTERVENTIONS:  - Conduct assessment to determine patient/family and health care team treatment goals, and need for post-acute services based on payer coverage, community resources, and patient preferences, and barriers to discharge  - Address psychosocial, clinical, and financial barriers to discharge as identified in assessment in conjunction with the patient/family and health care team  - Arrange appropriate level of post-acute services according to patient’s   needs and preference and payer coverage in collaboration with the physician and health care team  - Communicate with and update the patient/family, physician, and health care team regarding progress on the discharge plan  - Arrange appropriate transportation to post-acute venues  Outcome: Progressing     Problem: Ineffective Coping  Goal: Identifies ineffective coping skills  Outcome: Progressing  Goal: Identifies healthy coping skills  Outcome:  Progressing  Goal: Participates in unit activities  Description: Interventions:  - Provide therapeutic environment   - Provide required programming   - Redirect inappropriate behaviors   Outcome: Progressing     Problem: Risk for Self Injury/Neglect  Goal: Treatment Goal: Remain safe during length of stay, learn and adopt new coping skills, and be free of self-injurious ideation, impulses and acts at the time of discharge  Outcome: Progressing  Goal: Verbalize thoughts and feelings  Description: Interventions:  - Assess and re-assess patient's lethality and potential for self-injury  - Engage patient in 1:1 interactions, daily, for a minimum of 15 minutes  - Encourage patient to express feelings, fears, frustrations, hopes  - Establish rapport/trust with patient   Outcome: Progressing  Goal: Refrain from harming self  Description: Interventions:  - Monitor patient closely, per order  - Develop a trusting relationship  - Supervise medication ingestion, monitor effects and side effects   Outcome: Progressing     Problem: PSYCHOSIS  Goal: Will report no hallucinations or delusions  Description: Interventions:  - Administer medication as  ordered  - Every waking shifts and PRN assess for the presence of hallucinations and or delusions  - Assist with reality testing to support increasing orientation  - Assess if patient's hallucinations or delusions are encouraging self-harm or harm to others and intervene as appropriate  Outcome: Not Progressing     Problem: Ineffective Coping  Goal: Demonstrates healthy coping skills  Outcome: Not Progressing

## 2025-06-03 NOTE — NURSING NOTE
@ 1906 pt requested and received atarax 50 mg PO for anxiety. Dorado 18. Upon follow up pt reports PRN was effective.

## 2025-06-03 NOTE — PROGRESS NOTES
Progress Note - Behavioral Health   Name: Gabby Mares 35 y.o. female I MRN: [id number]  Unit/Bed#: U 206-01 I Date of Admission: 5/22/2025   Date of Service: 6/3/2025 I Hospital Day: 12        Assessment & Plan  Mood disorder (HCC)  Initiate Invega Sustenna 156 mg IM given 6/2  Plan to give 2nd dose of 156mg IM on 6/9  Tentative discharge as early as 6/10  Continue Propanolol 10 mg po BID for akathisia   Continue Remeron 30 mg po HS for dual benefit of depression and associated neurovegetative symptoms  Drug abuse (HCC)  Encourage cessation   Agoraphobia  See plan for mood disorder  Psychosis (HCC)  See plan for mood disorder  Morbid (severe) obesity due to excess calories (HCC)  Lifestyle changes     Current medications:    Current Facility-Administered Medications:     aluminum-magnesium hydroxide-simethicone (MAALOX) oral suspension 30 mL, Q4H PRN    artificial tear ophthalmic ointment, 4x Daily PRN    haloperidol lactate (HALDOL) injection 2.5 mg, Q6H PRN Max 4/day **AND** LORazepam (ATIVAN) injection 1 mg, Q6H PRN Max 4/day **AND** benztropine (COGENTIN) injection 0.5 mg, Q6H PRN Max 4/day    benztropine (COGENTIN) injection 1 mg, BID PRN    haloperidol lactate (HALDOL) injection 5 mg, Q4H PRN Max 4/day **AND** LORazepam (ATIVAN) injection 2 mg, Q4H PRN Max 4/day **AND** benztropine (COGENTIN) injection 1 mg, Q4H PRN Max 4/day    benztropine (COGENTIN) tablet 1 mg, BID PRN    bisacodyl (DULCOLAX) rectal suppository 10 mg, Daily PRN    haloperidol (HALDOL) tablet 2 mg, Q4H PRN Max 6/day    haloperidol (HALDOL) tablet 5 mg, Q6H PRN Max 4/day    haloperidol (HALDOL) tablet 5 mg, Q4H PRN Max 4/day    hydrOXYzine HCL (ATARAX) tablet 25 mg, Q6H PRN Max 4/day    hydrOXYzine HCL (ATARAX) tablet 50 mg, Q4H PRN Max 4/day **OR** LORazepam (ATIVAN) injection 1 mg, Q4H PRN    LORazepam (ATIVAN) tablet 1 mg, Q4H PRN Max 6/day **OR** LORazepam (ATIVAN) injection 2 mg, Q6H PRN Max 3/day    magnesium hydroxide (MILK OF  MAGNESIA) oral suspension 30 mL, Daily PRN    mirtazapine (REMERON) tablet 30 mg, HS    nicotine polacrilex (NICORETTE) gum 2 mg, Q2H PRN    propranolol (INDERAL) tablet 10 mg, Q8H PRN    propranolol (INDERAL) tablet 10 mg, BID    senna-docusate sodium (SENOKOT S) 8.6-50 mg per tablet 1 tablet, Daily PRN    traZODone (DESYREL) tablet 50 mg, HS PRN    Risks / Benefits of Treatment:    Risks, benefits, and possible side effects of medications explained to patient and patient verbalizes understanding and agreement for treatment.    Progress Toward Goals: progressing    Treatment Planning:  All current active medications have been reviewed.  Continue to monitor response to treatment and assess for potential side effects of medications.  Encourage group therapy, milieu therapy and occupational therapy  Collaboration with medical service for medical comorbidities as indicated.  Behavioral Health checks for safety monitoring.  Estimated Discharge Day: 6/6/2025  Legal Status: 201    Subjective:    Behavior over the last 24 hours: unchanged    No acute behavioral events over the past 24 hours. Today, patient was seen and examined at bedside for continuation of care.     Over the past 24 hours, patient reported ongoing AH. I would note that she denied this to me today. Today, patient denies SI, HI, or AVH. She is tolerating LORA dose well without side effects. Still c/o fluctuating severe depression due to homelessness. Discussed tentative timeline to discharge and need for 2nd IM on Monday.    Patient denied other new or worsening psychiatric symptoms/complaints at this time.    Sleep: unchanged  Appetite: unchanged  Medication side effects: Akathisia   ROS: review of systems as noted in HPI, all other systems are negative    Objective:    Vital signs in last 24 hours:    Temp:  [97.5 °F (36.4 °C)-97.7 °F (36.5 °C)] 97.5 °F (36.4 °C)  HR:  [66-77] 66  BP: ()/(56-83) 114/83  Resp:  [18] 18  SpO2:  [98 %] 98 %  O2 Device:  "None (Room air)    Mental Status Evaluation:    Appearance: casually dressed, consistent with stated age  Motor: +psychomotor retardation, no gait abnormalities  Behavior: cooperative, answers questions appropriately  Speech: soft, normal rhythm  Mood: \"depressed\"  Affect: constricted, depressed-appearing  Thought Process: linear and goal-oriented  Thought Content: denies auditory hallucinations, denies visual hallucinations, denies delusions  Risk Potential: denies suicidal ideation, plan, or intent. Denies homicidal ideation  Sensorium: Oriented to person, place, time, and situation  Cognition: cognitive ability appears intact but was not quantitatively tested  Consciousness: alert and awake  Attention: intact, able to focus without difficulty  Insight: fair  Judgement: fair        Lab Results: I have reviewed the following results:  No results found for this or any previous visit (from the past 48 hours).     Administrative Statements    Counseling / Coordination of Care:   Patients progress discussed with staff in treatment team meeting. Medication changes reviewed with staff in treatment team meeting.    Puma Newby,  06/03/25    This note has been constructed using a voice recognition system. There may be translation, syntax, or grammatical errors. If you have any questions, please contact the dictating provider.    "

## 2025-06-03 NOTE — ASSESSMENT & PLAN NOTE
Initiate Invega Sustenna 156 mg IM given 6/2  Plan to give 2nd dose of 156mg IM on 6/9  Tentative discharge as early as 6/10  Continue Propanolol 10 mg po BID for akathisia   Continue Remeron 30 mg po HS for dual benefit of depression and associated neurovegetative symptoms

## 2025-06-03 NOTE — NURSING NOTE
"Pt reading book in room at times. Reports feeling \"bored\" and depressed. Social with select peers. Denies SI/HI or hallucination. Denies any question or concern at this time.  "

## 2025-06-03 NOTE — NURSING NOTE
Pt calm and cooperative during interaction. Reports adequate sleep overnight. Reports feeling tired this morning. Denies hearing AH last night. Denies SI/HI or hallucination at this time. Reports ongoing depression. Resting in bed after breakfast.

## 2025-06-04 PROCEDURE — 99232 SBSQ HOSP IP/OBS MODERATE 35: CPT | Performed by: PSYCHIATRY & NEUROLOGY

## 2025-06-04 RX ADMIN — PROPRANOLOL HYDROCHLORIDE 10 MG: 10 TABLET ORAL at 21:12

## 2025-06-04 RX ADMIN — NICOTINE POLACRILEX 2 MG: 4 GUM, CHEWING BUCCAL at 16:57

## 2025-06-04 RX ADMIN — NICOTINE POLACRILEX 2 MG: 4 GUM, CHEWING BUCCAL at 09:11

## 2025-06-04 RX ADMIN — HYDROXYZINE HYDROCHLORIDE 50 MG: 50 TABLET ORAL at 17:46

## 2025-06-04 RX ADMIN — MIRTAZAPINE 30 MG: 15 TABLET, FILM COATED ORAL at 21:12

## 2025-06-04 RX ADMIN — NICOTINE POLACRILEX 2 MG: 4 GUM, CHEWING BUCCAL at 12:02

## 2025-06-04 RX ADMIN — PROPRANOLOL HYDROCHLORIDE 10 MG: 10 TABLET ORAL at 08:39

## 2025-06-04 NOTE — NURSING NOTE
Pt in paper scrubs this morning d/t doing laundry. Reports adequate sleep. Denies SI/HI or hallucination. Fluctuating depression with situation. Flat affect. Scant and blunted.

## 2025-06-04 NOTE — NURSING NOTE
Met with patient in her room. She reports No Si/HI/AH/VH  at this time. Was focused on Dinner and how long it would be until she could go eat. She is very flat with little to no expression while talking.  She did report that she showered and was in her personal clothes.

## 2025-06-04 NOTE — PROGRESS NOTES
Progress Note - Behavioral Health   Name: Gabby ERWIN [facility] 35 y.o. female I MRN: [id number]  Unit/Bed#: -01 I Date of Admission: 5/22/2025   Date of Service: 6/4/2025 I Hospital Day: 13     Assessment & Plan  Mood disorder (HCC)  Initiate Invega Sustenna 156 mg IM given 6/2  Plan to give 2nd dose of 156mg IM on 6/9  Tentative discharge as early as 6/10  Continue Propanolol 10 mg po BID for akathisia   Continue Remeron 30 mg po HS for dual benefit of depression and associated neurovegetative symptoms  Drug abuse (HCC)  Encourage cessation   Agoraphobia  See plan for mood disorder  Psychosis (HCC)  See plan for mood disorder  Morbid (severe) obesity due to excess calories (HCC)  Lifestyle changes    Current Medications:    Current Facility-Administered Medications:     mirtazapine (REMERON) tablet 30 mg, Oral, HS    propranolol (INDERAL) tablet 10 mg, Oral, BID      Risks/Benefits of Treatment:     Risks, benefits, and possible side effects of medications explained to patient and patient verbalizes understanding and agreement for treatment.    Progress Toward Goals: some improvement    Treatment Planning:     All current active medications have been reviewed.  Continue to monitor response to treatment and assess for potential side effects of medications.  Encourage group therapy, milieu therapy and occupational therapy.  Collaboration with medical service for medical comorbidities as indicated.  Behavioral Health checks for safety monitoring.  Estimated Discharge Day: 6/10/2025   Legal Status : Voluntary 201 commitment.    Subjective     Behavior over the last 24 hours: ricky Pierre was observed this morning in her room sitting up in bed. She is with marginal hygiene dressed in pajamas. Remains very minimal in conversation, requires frequently prompting. Often one worded. Reports that her depression is worse in the morning but improves throughout the day. Does not appear internally preoccupied, admits  "this has not reoccurred for a few days. Reports decent sleep but admits to some bizarre dreams. Reviewed plan regarding subsequent invega injections, will discharge thereafter. Currently living with sister while awaiting potential CRR programs.     Sleep: normal  Appetite: normal  Medication side effects: No  ROS: review of systems as noted above in HPI/Subjective report, all other systems are negative    Objective :  Temp:  [96.4 °F (35.8 °C)-97.8 °F (36.6 °C)] 96.4 °F (35.8 °C)  HR:  [] 100  BP: (121-139)/(77-94) 121/77  Resp:  [16-18] 18  SpO2:  [99 %] 99 %  O2 Device: None (Room air)    Mental Status Evaluation:    Appearance:  marginal hygiene, poor hygiene, sitting on bed, poor dentition   Behavior:  cooperative, calm, limited   Speech:  normal rate and volume   Mood:  depressed, \"better\"   Affect:  constricted   Thought Process:  goal directed, linear, paucity of thought   Thought Content:  no overt delusions, negative thinking   Perceptual Disturbances: denies auditory or visual hallucinations when asked, does not appear responding to internal stimuli   Risk Potential: Suicidal Ideation -  None at present, able to seek out staff before acting on thoughts of harming self on the unit, would talk to staff if not feeling safe on the unit  Homicidal Ideation -  None  Potential for Aggression - No   Sensorium:  oriented to person, place, and time/date   Memory:  recent and remote memory grossly intact   Consciousness:  alert and awake   Attention/Concentration: attention span and concentration appear shorter than expected for age   Insight:  fair   Judgment: limited   Gait/Station: normal gait/station   Motor Activity: no abnormal movements       Lab Results: I have reviewed the following results:  Results from the past 24 hours: No results found for this or any previous visit (from the past 24 hours).  Most Recent Labs:   Lab Results   Component Value Date    WBC 6.92 05/23/2025    RBC 4.49 05/23/2025    " "HGB 11.8 05/23/2025    HCT 38.9 05/23/2025     05/23/2025    RDW 16.2 (H) 05/23/2025    NEUTROABS 4.26 05/23/2025    SODIUM 137 05/23/2025    K 4.4 05/23/2025     05/23/2025    CO2 29 05/23/2025    BUN 25 05/23/2025    CREATININE 1.35 (H) 05/23/2025    GLUC 88 05/23/2025    CALCIUM 9.2 05/23/2025    AST 35 05/23/2025    ALT 23 05/23/2025    ALKPHOS 82 05/23/2025    TP 7.3 05/23/2025    ALB 3.7 05/23/2025    TBILI 0.44 05/23/2025    CHOLESTEROL 162 05/23/2025    HDL 46 (L) 05/23/2025    TRIG 155 (H) 05/23/2025    LDLCALC 85 05/23/2025    NONHDLC 116 05/23/2025    AMMONIA 32 10/17/2022    [id number] 1.847 05/23/2025    FREET4 0.86 05/11/2018    PREGUR Negative 10/18/2022    PREGSERUM Negative 05/23/2025    HCGQUANT <2 08/07/2018    SYPHILISAB Non-reactive 11/14/2024    TREPONEMAPA Non-reactive 05/23/2025    HGBA1C 5.6 05/23/2025     05/23/2025       Administrative Statements     Counseling / Coordination of Care:   I have spent a total time of 30 minutes in caring for this patient on the day of the visit/encounter including:  Patient's progress discussed with staff in treatment team meeting.  Medication changes reviewed with staff in treatment team meeting.    Portions of the record may have been created with voice recognition software. Occasional wrong word or \"sound a like\" substitutions may have occurred due to the inherent limitations of voice recognition software. Read the chart carefully and recognize, using context, where substitutions have occurred.    Georgia Anna PA-C 06/04/25  "

## 2025-06-04 NOTE — PROGRESS NOTES
06/04/25 0754   Team Meeting   Meeting Type Daily Rounds   Team Members Present   Team Members Present Physician;Nurse;;Other (Discipline and Name)   Physician Team Member Dr. Ramirez / MARY JO Gonzales / MARY JO Anna / PA Student   Nursing Team Member Aly / Guanakito   Care Management Team Member Serafin / Suly / Rajiv / Miguel   Other (Discipline and Name) Sigmund - Group Facilitator   Patient/Family Present   Patient Present No   Patient's Family Present No     Pt denies SI/HI. Withdrawn to room. Disheveled.     DC: Tuesday/Wednesday (?) pt given Invega LORA and will receive the second dose early next week

## 2025-06-04 NOTE — CASE MANAGEMENT
CM called pt's sister Rosemary ([phone]) to provide her with an update on pt. CM left  asking for a call back.

## 2025-06-05 PROCEDURE — 99232 SBSQ HOSP IP/OBS MODERATE 35: CPT | Performed by: PSYCHIATRY & NEUROLOGY

## 2025-06-05 RX ADMIN — PROPRANOLOL HYDROCHLORIDE 10 MG: 10 TABLET ORAL at 21:21

## 2025-06-05 RX ADMIN — NICOTINE POLACRILEX 2 MG: 4 GUM, CHEWING BUCCAL at 10:06

## 2025-06-05 RX ADMIN — MIRTAZAPINE 30 MG: 15 TABLET, FILM COATED ORAL at 21:21

## 2025-06-05 RX ADMIN — PROPRANOLOL HYDROCHLORIDE 10 MG: 10 TABLET ORAL at 08:25

## 2025-06-05 RX ADMIN — NICOTINE POLACRILEX 2 MG: 4 GUM, CHEWING BUCCAL at 18:15

## 2025-06-05 NOTE — PROGRESS NOTES
"Progress Note - Behavioral Health   Name: Gabby Ramosland 35 y.o. female I MRN: [id number]  Unit/Bed#: -01 I Date of Admission: 5/22/2025   Date of Service: 6/5/2025 I Hospital Day: 14     Assessment & Plan  Mood disorder (HCC)  Initiate Invega Sustenna 156 mg IM given 6/2  Plan to give 2nd dose of 156mg IM on 6/9  Tentative discharge as early as 6/10  Possible crisis residency placement, sisters thereafter. CM currently working diligently to get into contact with patients sister.   Continue Propanolol 10 mg po BID for akathisia   Continue Remeron 30 mg po HS for dual benefit of depression and associated neurovegetative symptoms  Drug abuse (HCC)  Encourage cessation   Agoraphobia  See plan for mood disorder  Psychosis (HCC)  See plan for mood disorder  Morbid (severe) obesity due to excess calories (HCC)  Lifestyle changes    Current Medications:    Current Facility-Administered Medications:     mirtazapine (REMERON) tablet 30 mg, Oral, HS    propranolol (INDERAL) tablet 10 mg, Oral, BID      Risks/Benefits of Treatment:     Risks, benefits, and possible side effects of medications explained to patient and patient verbalizes understanding and agreement for treatment.    Progress Toward Goals: some improvement    Treatment Planning:     All current active medications have been reviewed.  Continue to monitor response to treatment and assess for potential side effects of medications.  Encourage group therapy, milieu therapy and occupational therapy.  Collaboration with medical service for medical comorbidities as indicated.  Behavioral Health checks for safety monitoring.  Estimated Discharge Day: 6/10/2025   Legal Status : Voluntary 201 commitment.    Subjective     Behavior over the last 24 hours: ricky Pierre is seen this morning in bed after group. Reports to feeling, \"good\" and denies any acute complaints. Has been reporting some increased anxiety as her sister has not returned her phone calls " "(sister was supposed to take vacation) and she is not allowed in home while her sister is gone. Remains in agreement with further observation and discharge after Invega booster. Discussed ongoing importance of compliance to ensure ongoing mood stability. She is currently denying suicidal thoughts. Does not appear to be overtly psychotic. Self-care still limited. Consider discharge to crisis residency program.     Sleep: normal  Appetite: normal  Medication side effects: No  ROS: review of systems as noted above in HPI/Subjective report, all other systems are negative    Objective :  Temp:  [97.3 °F (36.3 °C)-97.6 °F (36.4 °C)] 97.6 °F (36.4 °C)  HR:  [] 67  BP: (110-127)/() 113/82  Resp:  [18] 18  SpO2:  [100 %] 100 %  O2 Device: None (Room air)    Mental Status Evaluation:    Appearance:  marginal hygiene, sitting in edge of bed, poor dentition   Behavior:  cooperative, calm   Speech:  normal rate and volume   Mood:  depressed and anxious, \"okay\"   Affect:  constricted   Thought Process:  goal directed, linear, paucity of thought   Thought Content:  no overt delusions, negative thinking, ruminations   Perceptual Disturbances: denies auditory or visual hallucinations when asked, does not appear responding to internal stimuli   Risk Potential: Suicidal Ideation -  None at present, able to seek out staff before acting on thoughts of harming self on the unit, would talk to staff if not feeling safe on the unit  Homicidal Ideation -  None  Potential for Aggression - No   Sensorium:  oriented to person, place, and time/date   Memory:  recent and remote memory grossly intact   Consciousness:  alert and awake   Attention/Concentration: attention span and concentration appear shorter than expected for age   Insight:  fair   Judgment: limited   Gait/Station: normal gait/station   Motor Activity: no abnormal movements       Lab Results: I have reviewed the following results:  Results from the past 24 hours: No " "results found for this or any previous visit (from the past 24 hours).  Most Recent Labs:   Lab Results   Component Value Date    WBC 6.92 05/23/2025    RBC 4.49 05/23/2025    HGB 11.8 05/23/2025    HCT 38.9 05/23/2025     05/23/2025    RDW 16.2 (H) 05/23/2025    NEUTROABS 4.26 05/23/2025    SODIUM 137 05/23/2025    K 4.4 05/23/2025     05/23/2025    CO2 29 05/23/2025    BUN 25 05/23/2025    CREATININE 1.35 (H) 05/23/2025    GLUC 88 05/23/2025    CALCIUM 9.2 05/23/2025    AST 35 05/23/2025    ALT 23 05/23/2025    ALKPHOS 82 05/23/2025    TP 7.3 05/23/2025    ALB 3.7 05/23/2025    TBILI 0.44 05/23/2025    CHOLESTEROL 162 05/23/2025    HDL 46 (L) 05/23/2025    TRIG 155 (H) 05/23/2025    LDLCALC 85 05/23/2025    NONHDLC 116 05/23/2025    AMMONIA 32 10/17/2022    [id number] 1.847 05/23/2025    FREET4 0.86 05/11/2018    PREGUR Negative 10/18/2022    PREGSERUM Negative 05/23/2025    HCGQUANT <2 08/07/2018    SYPHILISAB Non-reactive 11/14/2024    TREPONEMAPA Non-reactive 05/23/2025    HGBA1C 5.6 05/23/2025     05/23/2025       Administrative Statements     Counseling / Coordination of Care:   I have spent a total time of 30 minutes in caring for this patient on the day of the visit/encounter including:  Patient's progress discussed with staff in treatment team meeting.  Medication changes reviewed with staff in treatment team meeting.    Portions of the record may have been created with voice recognition software. Occasional wrong word or \"sound a like\" substitutions may have occurred due to the inherent limitations of voice recognition software. Read the chart carefully and recognize, using context, where substitutions have occurred.    Georgia Anna PA-C 06/05/25  "

## 2025-06-05 NOTE — NURSING NOTE
Pt scant and pleasant during interaction. Brightens on approach. Denies SI/HI or hallucinations. Denies any question or concern at this time.

## 2025-06-05 NOTE — NURSING NOTE
Pt calm and cooperative during interaction. Denies SI/HI or hallucination. Compliant with medication. Utilizing nicotine gum for cravings. Reports feeling bored. Plans to read book. Denies any question or concern at this time.

## 2025-06-05 NOTE — ASSESSMENT & PLAN NOTE
Initiate Invega Sustenna 156 mg IM given 6/2  Plan to give 2nd dose of 156mg IM on 6/9  Tentative discharge as early as 6/10  Possible crisis residency placement, sisters thereafter. CM currently working diligently to get into contact with patients sister.   Continue Propanolol 10 mg po BID for akathisia   Continue Remeron 30 mg po HS for dual benefit of depression and associated neurovegetative symptoms

## 2025-06-05 NOTE — PROGRESS NOTES
06/05/25 0926   Team Meeting   Meeting Type Daily Rounds   Team Members Present   Team Members Present Physician;Nurse;;Other (Discipline and Name)   Physician Team Member Dr. Ramirez / MARY JO Gonzales / MARY JO Anna / PA Student   Nursing Team Member Ole / Guanakito   Care Management Team Member Serafin / Suly / Rajiv   Other (Discipline and Name) Sigmund - Group Facilitator   Patient/Family Present   Patient Present No   Patient's Family Present No     Pt denies all symptoms. But reports ongoing depression. Pt tearful.     DC: Pt getting second dose of Invega next week then plan to dc back to sister's home. CM left sister VM on 6/4/25 but CM has not heard back yet. Pt reported sister on vacation but not sure when she will return. CM will call sister again.

## 2025-06-06 ENCOUNTER — HOSPITAL ENCOUNTER (OUTPATIENT)
Dept: INFUSION CENTER | Facility: HOSPITAL | Age: 35
Discharge: HOME | End: 2025-06-06

## 2025-06-06 PROCEDURE — 99232 SBSQ HOSP IP/OBS MODERATE 35: CPT | Performed by: PSYCHIATRY & NEUROLOGY

## 2025-06-06 RX ADMIN — HYDROXYZINE HYDROCHLORIDE 50 MG: 50 TABLET ORAL at 17:12

## 2025-06-06 RX ADMIN — PROPRANOLOL HYDROCHLORIDE 10 MG: 10 TABLET ORAL at 08:22

## 2025-06-06 RX ADMIN — NICOTINE POLACRILEX 2 MG: 4 GUM, CHEWING BUCCAL at 15:43

## 2025-06-06 RX ADMIN — PROPRANOLOL HYDROCHLORIDE 10 MG: 10 TABLET ORAL at 22:02

## 2025-06-06 RX ADMIN — NICOTINE POLACRILEX 2 MG: 4 GUM, CHEWING BUCCAL at 12:40

## 2025-06-06 RX ADMIN — MIRTAZAPINE 30 MG: 15 TABLET, FILM COATED ORAL at 22:02

## 2025-06-06 NOTE — NURSING NOTE
Pt is awake, OOB for breakfast and meds. Pt denies SI, HI, AVH. Reports good appetite and sleep. No anxiety or depression. Pt states tentative discharge early next week, however pt's sister will be away until 6/15/25, so pt states that they will probably go to a shelter until sister returns from trip. Pt reports continuing to wait for housing program, but reports being excited to learn life skills.

## 2025-06-06 NOTE — PROGRESS NOTES
Progress Note - Behavioral Health   Name: Gabby Mares 35 y.o. female I MRN: [id number]  Unit/Bed#: -01 I Date of Admission: 5/22/2025   Date of Service: 6/6/2025 I Hospital Day: 15        Assessment & Plan  Mood disorder (HCC)  Invega Sustenna 156 mg IM given 6/2  Plan to give 2nd dose of 156mg IM on 6/9  Tentative discharge as early as 6/10  Possible crisis residency placement, sisters thereafter. CM currently working diligently to get into contact with patients sister.   Continue Propanolol 10 mg po BID for akathisia   Continue Remeron 30 mg po HS for dual benefit of depression and associated neurovegetative symptoms  No changes anticipated for this weekend  Drug abuse (HCC)  Encourage cessation   Agoraphobia  See plan for mood disorder  Psychosis (HCC)  See plan for mood disorder  Morbid (severe) obesity due to excess calories ([facility])  Lifestyle changes     Current medications:    Current Facility-Administered Medications:     aluminum-magnesium hydroxide-simethicone (MAALOX) oral suspension 30 mL, Q4H PRN    artificial tear ophthalmic ointment, 4x Daily PRN    haloperidol lactate (HALDOL) injection 2.5 mg, Q6H PRN Max 4/day **AND** LORazepam (ATIVAN) injection 1 mg, Q6H PRN Max 4/day **AND** benztropine (COGENTIN) injection 0.5 mg, Q6H PRN Max 4/day    benztropine (COGENTIN) injection 1 mg, BID PRN    haloperidol lactate (HALDOL) injection 5 mg, Q4H PRN Max 4/day **AND** LORazepam (ATIVAN) injection 2 mg, Q4H PRN Max 4/day **AND** benztropine (COGENTIN) injection 1 mg, Q4H PRN Max 4/day    benztropine (COGENTIN) tablet 1 mg, BID PRN    bisacodyl (DULCOLAX) rectal suppository 10 mg, Daily PRN    haloperidol (HALDOL) tablet 2 mg, Q4H PRN Max 6/day    haloperidol (HALDOL) tablet 5 mg, Q6H PRN Max 4/day    haloperidol (HALDOL) tablet 5 mg, Q4H PRN Max 4/day    hydrOXYzine HCL (ATARAX) tablet 25 mg, Q6H PRN Max 4/day    hydrOXYzine HCL (ATARAX) tablet 50 mg, Q4H PRN Max 4/day **OR** LORazepam (ATIVAN)  "injection 1 mg, Q4H PRN    LORazepam (ATIVAN) tablet 1 mg, Q4H PRN Max 6/day **OR** LORazepam (ATIVAN) injection 2 mg, Q6H PRN Max 3/day    magnesium hydroxide (MILK OF MAGNESIA) oral suspension 30 mL, Daily PRN    mirtazapine (REMERON) tablet 30 mg, HS    nicotine polacrilex (NICORETTE) gum 2 mg, Q2H PRN    [START ON 6/9/2025] paliperidone palmitate ER (INVEGA SUSTENNA) IM- Deltoid injection 156 mg, Once    propranolol (INDERAL) tablet 10 mg, Q8H PRN    propranolol (INDERAL) tablet 10 mg, BID    senna-docusate sodium (SENOKOT S) 8.6-50 mg per tablet 1 tablet, Daily PRN    traZODone (DESYREL) tablet 50 mg, HS PRN    Risks / Benefits of Treatment:    Risks, benefits, and possible side effects of medications explained to patient and patient verbalizes understanding and agreement for treatment.    Progress Toward Goals: progressing    Treatment Planning:  All current active medications have been reviewed.  Continue to monitor response to treatment and assess for potential side effects of medications.  Encourage group therapy, milieu therapy and occupational therapy  Collaboration with medical service for medical comorbidities as indicated.  Behavioral Health checks for safety monitoring.  Estimated Discharge Day: 6/10/2025  Legal Status: 201    Subjective:    Behavior over the last 24 hours: unchanged    No acute behavioral events over the past 24 hours. Today, patient was seen and examined at bedside for continuation of care.     Today, patient denies SI, HI, or AVH.  She reports feeling \"bored\" and is hopeful that case management will be able to determine if she is able to go to her sister's house or a crisis residential.  The patient is unable to be discharged prior to this, because if she is discharged but unable to securely get into a supportive living arrangement, would likely decompensate and present back to the hospital immediately.  She is tolerating medications well.  She is aware of plan to get the next dose " "of Invega on Monday.    Patient denied other new or worsening psychiatric symptoms/complaints at this time.    Sleep: unchanged  Appetite: unchanged  Medication side effects: none   ROS: review of systems as noted in HPI, all other systems are negative    Objective:    Vital signs in last 24 hours:    Temp:  [98.2 °F (36.8 °C)] 98.2 °F (36.8 °C)  HR:  [77-98] 98  BP: (118-148)/(71-77) 148/73  Resp:  [18] 18  SpO2:  [100 %] 100 %  O2 Device: None (Room air)    Mental Status Evaluation:    Appearance: casually dressed, consistent with stated age  Motor: +psychomotor retardation, no gait abnormalities  Behavior: cooperative, answers questions appropriately  Speech: soft, normal rhythm  Mood: \"bored\"  Affect: constricted, depressed-appearing  Thought Process: linear and goal-oriented  Thought Content: denies auditory hallucinations, denies visual hallucinations, denies delusions  Risk Potential: denies suicidal ideation, plan, or intent. Denies homicidal ideation  Sensorium: Oriented to person, place, time, and situation  Cognition: cognitive ability appears intact but was not quantitatively tested  Consciousness: alert and awake  Attention: intact, able to focus without difficulty  Insight: fair  Judgement: fair        Lab Results: I have reviewed the following results:  No results found for this or any previous visit (from the past 48 hours).     Administrative Statements    Counseling / Coordination of Care:   Patients progress discussed with staff in treatment team meeting. Medication changes reviewed with staff in treatment team meeting.    Puma Newby DO 06/06/25    This note has been constructed using a voice recognition system. There may be translation, syntax, or grammatical errors. If you have any questions, please contact the dictating provider.    "

## 2025-06-06 NOTE — CASE MANAGEMENT
"CM met with pt to check in. Pt calm and cooperative. CM informed pt that CM spoke to her MARKO Bah and she confirmed that her CRR housing referrals have been submitted, just waiting to hear back with any openings in [facility].     CM spoke to pt about her tentative dc for next week, after getting her next Invega Sustenna LORA on 6/10/25.     CM asked pt about her sister as CM left her a VM but she didn't call back yet. Pt reported \"she has been working a lot.\" Then pt reported \"she is going on vacation and coming back on the 15th and I can't be in their house when they're not home.\"     CM spoke to pt about possible referral to Crisis Residence for her to go until her sister returns home. Pt in agreement and CM will check into this for her next week.     3:15pm  CM received VM from MARKO Bah at [facility] ([phone]) and she was asking for an update on pt. CM called her back and left VM update about pt LORA and possible referral to Crisis Residence next week. CM asked for a call back regarding if she has heard anything about the CRR referrals she sent in for pt.     MARKO called pt's sister Rosemary ([phone]) and left VM to provide her with an update on pt and asking for a call back to discuss her progress and dc plan.       "

## 2025-06-06 NOTE — NURSING NOTE
Pt awake and walking halls, selectively social. Pt reports no SI, HI, AVH. Denies anxiety or depression at this time.

## 2025-06-06 NOTE — ASSESSMENT & PLAN NOTE
Invega Sustenna 156 mg IM given 6/2  Plan to give 2nd dose of 156mg IM on 6/9  Tentative discharge as early as 6/10  Possible crisis residency placement, sisters thereafter. CM currently working diligently to get into contact with patients sister.   Continue Propanolol 10 mg po BID for akathisia   Continue Remeron 30 mg po HS for dual benefit of depression and associated neurovegetative symptoms  No changes anticipated for this weekend

## 2025-06-07 PROCEDURE — 99232 SBSQ HOSP IP/OBS MODERATE 35: CPT

## 2025-06-07 RX ADMIN — MIRTAZAPINE 30 MG: 15 TABLET, FILM COATED ORAL at 21:58

## 2025-06-07 RX ADMIN — NICOTINE POLACRILEX 2 MG: 4 GUM, CHEWING BUCCAL at 14:18

## 2025-06-07 RX ADMIN — NICOTINE POLACRILEX 2 MG: 4 GUM, CHEWING BUCCAL at 11:53

## 2025-06-07 RX ADMIN — NICOTINE POLACRILEX 2 MG: 4 GUM, CHEWING BUCCAL at 09:35

## 2025-06-07 RX ADMIN — PROPRANOLOL HYDROCHLORIDE 10 MG: 10 TABLET ORAL at 21:58

## 2025-06-07 RX ADMIN — PROPRANOLOL HYDROCHLORIDE 10 MG: 10 TABLET ORAL at 08:21

## 2025-06-07 RX ADMIN — HYDROXYZINE HYDROCHLORIDE 50 MG: 50 TABLET ORAL at 17:06

## 2025-06-07 NOTE — NURSING NOTE
Calm, cooperative, and intermittently visible on unit. Endorses overall mood improvement, expresses eagerness for discharge. Demonstrates future-oriented thinking, verbalizes long-term goal to obtain her own apartment. States she will have a payee who will help prevent her from unnecessary spending. Endorses good sleep last night. Medication compliant. Plan of care ongoing. Denies unmet needs.

## 2025-06-07 NOTE — NURSING NOTE
"OOB for dinner, then made phone call in hallway. After phone call, patient approached RN station visibly anxious as evidenced by tear-filled eyes. Requested PRN anxiolytic medication but was guarded and scant. Denied anxiety triggers. Upon further questioning, patient stated, \"I'm nervous about where I am gonna go after here\". RN provided active listening and support. At 17:06 administered Atarax 50 mg PO for moderate anxiety. Upon follow-up, patient endorses medication effectiveness. Denies SI/HI/AVH. Plan of care ongoing.  "

## 2025-06-07 NOTE — PROGRESS NOTES
Progress Note - Behavioral Health   Name: Gabby Mares 35 y.o. female I MRN: [id number]  Unit/Bed#: -01 I Date of Admission: 5/22/2025   Date of Service: 6/7/2025 I Hospital Day: 16     Assessment & Plan  Mood disorder (HCC)  Invega Sustenna 156 mg IM given 6/2  Plan to give 2nd dose of 156mg IM on 6/9  Tentative discharge as early as 6/10  Possible crisis residency placement, sisters thereafter. CM currently working diligently to get into contact with patients sister.   Continue Propanolol 10 mg po BID for akathisia   Continue Remeron 30 mg po HS for dual benefit of depression and associated neurovegetative symptoms  No changes anticipated for this weekend  Drug abuse (HCC)  Encourage cessation   Agoraphobia  See plan for mood disorder  Psychosis (formerly [facility])  See plan for mood disorder  Morbid (severe) obesity due to excess calories (formerly [facility])  Lifestyle changes    Current Medications:    Current Facility-Administered Medications:     mirtazapine (REMERON) tablet 30 mg, Oral, HS    [START ON 6/9/2025] paliperidone palmitate ER (INVEGA SUSTENNA) IM- Deltoid injection 156 mg, Intramuscular (deltoid only), Once    propranolol (INDERAL) tablet 10 mg, Oral, BID    Current Facility-Administered Medications:     aluminum-magnesium hydroxide-simethicone (MAALOX) oral suspension 30 mL, Oral, Q4H PRN    artificial tear ophthalmic ointment, Both Eyes, 4x Daily PRN    haloperidol lactate (HALDOL) injection 2.5 mg, Intramuscular, Q6H PRN Max 4/day **AND** LORazepam (ATIVAN) injection 1 mg, Intramuscular, Q6H PRN Max 4/day **AND** benztropine (COGENTIN) injection 0.5 mg, Intramuscular, Q6H PRN Max 4/day    benztropine (COGENTIN) injection 1 mg, Intramuscular, BID PRN    haloperidol lactate (HALDOL) injection 5 mg, Intramuscular, Q4H PRN Max 4/day **AND** LORazepam (ATIVAN) injection 2 mg, Intramuscular, Q4H PRN Max 4/day **AND** benztropine (COGENTIN) injection 1 mg, Intramuscular, Q4H PRN Max 4/day    benztropine (COGENTIN)  tablet 1 mg, Oral, BID PRN    bisacodyl (DULCOLAX) rectal suppository 10 mg, Rectal, Daily PRN    haloperidol (HALDOL) tablet 2 mg, Oral, Q4H PRN Max 6/day    haloperidol (HALDOL) tablet 5 mg, Oral, Q6H PRN Max 4/day    haloperidol (HALDOL) tablet 5 mg, Oral, Q4H PRN Max 4/day    hydrOXYzine HCL (ATARAX) tablet 25 mg, Oral, Q6H PRN Max 4/day    hydrOXYzine HCL (ATARAX) tablet 50 mg, Oral, Q4H PRN Max 4/day **OR** LORazepam (ATIVAN) injection 1 mg, Intramuscular, Q4H PRN    LORazepam (ATIVAN) tablet 1 mg, Oral, Q4H PRN Max 6/day **OR** LORazepam (ATIVAN) injection 2 mg, Intramuscular, Q6H PRN Max 3/day    magnesium hydroxide (MILK OF MAGNESIA) oral suspension 30 mL, Oral, Daily PRN    nicotine polacrilex (NICORETTE) gum 2 mg, Oral, Q2H PRN    propranolol (INDERAL) tablet 10 mg, Oral, Q8H PRN    senna-docusate sodium (SENOKOT S) 8.6-50 mg per tablet 1 tablet, Oral, Daily PRN    traZODone (DESYREL) tablet 50 mg, Oral, HS PRN    Risks/Benefits of Treatment:     Risks, benefits, and possible side effects of medications explained to patient and patient verbalizes understanding and agreement for treatment.    Progress Toward Goals: progressing    Treatment Planning:     All current active medications have been reviewed.  Continue to monitor response to treatment and assess for potential side effects of medications.  Encourage group therapy, milieu therapy and occupational therapy.  Collaboration with medical service for medical comorbidities as indicated.  Behavioral Health checks for safety monitoring.  Estimated Discharge Day: 6/16/2025   Legal Status : Voluntary 201 commitment.  Long Stay Certification : Not Applicable    Subjective     Behavior over the last 24 hours: improving    Patient was seen today for continuation of care, records reviewed and patient was discussed with the morning case review team. Per nursing report, patient is on a 201 commitment status. She is scheduled for her Invega injection on 6/9.  "Patient has been medication/meal compliant. She remains visible and attends groups regularly. No major concerns or acute events in the past 24 hours.    Gabby was seen today for psychiatric follow-up. On assessment today, Gabby was approached while sitting and drinking coffee. Patient reports \"okay\" mood. She states that sleep has been adequate. Appetite remains good. She states that energy levels have been low but she is able to push through the days. She has no concerns. Gabby denies acute suicidal/self-harm ideation/intent/plan upon direct inquiry at this time. Gabby remains behaviorally appropriate, no agitation or aggression noted on exam or in report. Gabby also denies HI/AH/VH, and does not appear overtly manic. No overt delusions or paranoia are verbalized. Gabby remains adherent to her current psychotropic medication regimen and denies any side effects from medications, as well as none noted on exam.    Sleep: normal  Appetite: normal  Medication side effects: No  ROS: review of systems as noted above in HPI/Subjective report, all other systems are negative    Objective :  Temp:  [97.9 °F (36.6 °C)-99 °F (37.2 °C)] 97.9 °F (36.6 °C)  HR:  [63-88] 63  BP: (115-129)/(75-89) 115/75  Resp:  [18] 18  SpO2:  [98 %] 98 %  O2 Device: None (Room air)    Mental Status Evaluation:    Appearance:  casually dressed, dressed appropriately   Behavior:  cooperative, calm   Speech:  normal rate, soft   Mood:  \"Okay\"   Affect:  constricted   Thought Process:  goal directed, linear   Thought Content:  no overt delusions   Perceptual Disturbances: no auditory hallucinations, no visual hallucinations   Risk Potential: Suicidal Ideation -  None at present  Homicidal Ideation -  None at present  Potential for Aggression - Not at present   Sensorium:  oriented to person, place, time/date, and situation   Memory:  recent and remote memory grossly intact   Consciousness:  alert and awake   Attention/Concentration: attention span and " "concentration appear shorter than expected for age   Insight:  fair   Judgment: fair   Gait/Station: normal gait/station   Motor Activity: no abnormal movements       Lab Results: I have reviewed the following results:  Results from the past 24 hours: No results found for this or any previous visit (from the past 24 hours).    Administrative Statements     Counseling / Coordination of Care:   Patient's progress discussed with staff in treatment team meeting.  Medication changes reviewed with staff in treatment team meeting.    Portions of the record may have been created with voice recognition software. Occasional wrong word or \"sound a like\" substitutions may have occurred due to the inherent limitations of voice recognition software. Read the chart carefully and recognize, using context, where substitutions have occurred.    This note was not shared with the patient due to reasonable likelihood of causing patient harm    Kylie Null PA-C 06/07/25  "

## 2025-06-08 PROCEDURE — 99232 SBSQ HOSP IP/OBS MODERATE 35: CPT

## 2025-06-08 RX ADMIN — NICOTINE POLACRILEX 2 MG: 4 GUM, CHEWING BUCCAL at 15:10

## 2025-06-08 RX ADMIN — PROPRANOLOL HYDROCHLORIDE 10 MG: 10 TABLET ORAL at 08:58

## 2025-06-08 RX ADMIN — MIRTAZAPINE 30 MG: 15 TABLET, FILM COATED ORAL at 21:57

## 2025-06-08 RX ADMIN — NICOTINE POLACRILEX 2 MG: 4 GUM, CHEWING BUCCAL at 12:17

## 2025-06-08 RX ADMIN — PROPRANOLOL HYDROCHLORIDE 10 MG: 10 TABLET ORAL at 21:57

## 2025-06-08 RX ADMIN — HYDROXYZINE HYDROCHLORIDE 50 MG: 50 TABLET ORAL at 16:54

## 2025-06-08 NOTE — NURSING NOTE
Intermittently visible on unit, scant, and guarded. Reports ongoing anxiety related to uncertainty of discharge placement. Patient reports she has never been to a shelter before. Reports feeling tired due to difficulties with staying asleep. Patient contributes sleep difficulties to heightened anxiety. RN provided active listening and support. Discussed utilization of coping skills to assist with anxiety reduction. Patient cites one coping skill as reading. Denies SI/HI/AVH. Medication compliant. Physical assessment WNL, self-reports last BM yesterday, 6/7/25. Plan of care ongoing. Denies unmet needs.

## 2025-06-08 NOTE — NURSING NOTE
At 16:54 RN administered Atarax 50 mg PO for moderate anxiety related to potential discharge placement. Upon follow-up, patient endorses medication effectiveness.

## 2025-06-08 NOTE — PROGRESS NOTES
Progress Note - Behavioral Health   Name: Gabby Mares 35 y.o. female I MRN: [id number]  Unit/Bed#: -01 I Date of Admission: 5/22/2025   Date of Service: 6/8/2025 I Hospital Day: 17     Assessment & Plan  Mood disorder (HCC)  Invega Sustenna 156 mg IM given 6/2  Plan to give 2nd dose of 156mg IM on 6/9  Tentative discharge as early as 6/10  Possible crisis residency placement, sisters thereafter. CM currently working diligently to get into contact with patients sister.   Continue Propanolol 10 mg po BID for akathisia   Continue Remeron 30 mg po HS for dual benefit of depression and associated neurovegetative symptoms  No changes anticipated for this weekend  Drug abuse (HCC)  Encourage cessation   Agoraphobia  See plan for mood disorder  Psychosis ([facility])  See plan for mood disorder  Morbid (severe) obesity due to excess calories ([facility])  Lifestyle changes    Current Medications:    Current Facility-Administered Medications:     mirtazapine (REMERON) tablet 30 mg, Oral, HS    [START ON 6/9/2025] paliperidone palmitate ER (INVEGA SUSTENNA) IM- Deltoid injection 156 mg, Intramuscular (deltoid only), Once    propranolol (INDERAL) tablet 10 mg, Oral, BID    Current Facility-Administered Medications:     aluminum-magnesium hydroxide-simethicone (MAALOX) oral suspension 30 mL, Oral, Q4H PRN    artificial tear ophthalmic ointment, Both Eyes, 4x Daily PRN    haloperidol lactate (HALDOL) injection 2.5 mg, Intramuscular, Q6H PRN Max 4/day **AND** LORazepam (ATIVAN) injection 1 mg, Intramuscular, Q6H PRN Max 4/day **AND** benztropine (COGENTIN) injection 0.5 mg, Intramuscular, Q6H PRN Max 4/day    benztropine (COGENTIN) injection 1 mg, Intramuscular, BID PRN    haloperidol lactate (HALDOL) injection 5 mg, Intramuscular, Q4H PRN Max 4/day **AND** LORazepam (ATIVAN) injection 2 mg, Intramuscular, Q4H PRN Max 4/day **AND** benztropine (COGENTIN) injection 1 mg, Intramuscular, Q4H PRN Max 4/day    benztropine (COGENTIN)  tablet 1 mg, Oral, BID PRN    bisacodyl (DULCOLAX) rectal suppository 10 mg, Rectal, Daily PRN    haloperidol (HALDOL) tablet 2 mg, Oral, Q4H PRN Max 6/day    haloperidol (HALDOL) tablet 5 mg, Oral, Q6H PRN Max 4/day    haloperidol (HALDOL) tablet 5 mg, Oral, Q4H PRN Max 4/day    hydrOXYzine HCL (ATARAX) tablet 25 mg, Oral, Q6H PRN Max 4/day    hydrOXYzine HCL (ATARAX) tablet 50 mg, Oral, Q4H PRN Max 4/day **OR** LORazepam (ATIVAN) injection 1 mg, Intramuscular, Q4H PRN    LORazepam (ATIVAN) tablet 1 mg, Oral, Q4H PRN Max 6/day **OR** LORazepam (ATIVAN) injection 2 mg, Intramuscular, Q6H PRN Max 3/day    magnesium hydroxide (MILK OF MAGNESIA) oral suspension 30 mL, Oral, Daily PRN    nicotine polacrilex (NICORETTE) gum 2 mg, Oral, Q2H PRN    propranolol (INDERAL) tablet 10 mg, Oral, Q8H PRN    senna-docusate sodium (SENOKOT S) 8.6-50 mg per tablet 1 tablet, Oral, Daily PRN    traZODone (DESYREL) tablet 50 mg, Oral, HS PRN    Risks/Benefits of Treatment:     Risks, benefits, and possible side effects of medications explained to patient and patient verbalizes understanding and agreement for treatment.    Progress Toward Goals: progressing    Treatment Planning:     All current active medications have been reviewed.  Continue to monitor response to treatment and assess for potential side effects of medications.  Encourage group therapy, milieu therapy and occupational therapy.  Collaboration with medical service for medical comorbidities as indicated.  Behavioral Health checks for safety monitoring.  Estimated Discharge Day: 6/16/2025   Legal Status : Voluntary 201 commitment.  Long Stay Certification : Not Applicable    Subjective     Behavior over the last 24 hours: slowly improving    Patient was seen today for continuation of care, records reviewed and patient was discussed with the morning case review team. Per nursing report, patient has been medication/meal compliant. She reports anxiety at times and has been  "receiving PRN medications which are helpful. She is due for her Invega injection on Monday. No acute events or major concerns in the past 24 hours.     Gabby was seen today for psychiatric follow-up. On assessment today, Gabby was approached while laying in bed. Patient was willing to engage in conversation with this writer. She reports that she is having \"good\" mood today. Reports that she slept okay last night but her energy levels remain low. She has been trying to attend groups daily. Appetite remains good. She has no major concerns this time. Gabby denies acute suicidal/self-harm ideation/intent/plan upon direct inquiry at this time. Gabby remains behaviorally appropriate, no agitation or aggression noted on exam or in report. Gabby also denies HI/AH/VH, and does not appear overtly manic. No overt delusions or paranoia are verbalized. Gabby remains adherent to her current psychotropic medication regimen and denies any side effects from medications, as well as none noted on exam.    Sleep: normal  Appetite: normal  Medication side effects: No  ROS: review of systems as noted above in HPI/Subjective report, all other systems are negative    Objective :  Temp:  [97.5 °F (36.4 °C)-98.3 °F (36.8 °C)] 97.5 °F (36.4 °C)  HR:  [] 58  BP: (110-133)/(70-89) 110/70  Resp:  [17-18] 17  SpO2:  [98 %-99 %] 99 %  O2 Device: None (Room air)    Mental Status Evaluation:    Appearance:  age appropriate, dressed appropriately   Behavior:  cooperative, calm   Speech:  normal rate and volume   Mood:  \"Good\"   Affect:  appropriate, mood-congruent   Thought Process:  goal directed, logical, coherent   Thought Content:  no overt delusions   Perceptual Disturbances: no auditory hallucinations, no visual hallucinations   Risk Potential: Suicidal Ideation -  None at present  Homicidal Ideation -  None at present  Potential for Aggression - Not at present   Sensorium:  oriented to person, place, time/date, and situation   Memory:  " "recent and remote memory grossly intact   Consciousness:  alert and awake   Attention/Concentration: attention span and concentration appear shorter than expected for age   Insight:  fair   Judgment: fair   Gait/Station: normal gait/station   Motor Activity: no abnormal movements       Lab Results: I have reviewed the following results:  Results from the past 24 hours: No results found for this or any previous visit (from the past 24 hours).    Administrative Statements     Counseling / Coordination of Care:   Patient's progress discussed with staff in treatment team meeting.  Medication changes reviewed with staff in treatment team meeting.    Portions of the record may have been created with voice recognition software. Occasional wrong word or \"sound a like\" substitutions may have occurred due to the inherent limitations of voice recognition software. Read the chart carefully and recognize, using context, where substitutions have occurred.    This note was not shared with the patient due to reasonable likelihood of causing patient harm    Kylie Null PA-C 06/08/25  "

## 2025-06-08 NOTE — PLAN OF CARE
Problem: DEPRESSION  Goal: Will be euthymic at discharge  Description: INTERVENTIONS:  - Administer medication as ordered  - Provide emotional support via 1:1 interaction with staff  - Encourage involvement in milieu/groups/activities  - Monitor for social isolation  Outcome: Progressing     Problem: DEPRESSION  Goal: Will be euthymic at discharge  Description: INTERVENTIONS:  - Administer medication as ordered  - Provide emotional support via 1:1 interaction with staff  - Encourage involvement in milieu/groups/activities  - Monitor for social isolation  Outcome: Progressing     Problem: PSYCHOSIS  Goal: Will report no hallucinations or delusions  Description: Interventions:  - Administer medication as  ordered  - Every waking shifts and PRN assess for the presence of hallucinations and or delusions  - Assist with reality testing to support increasing orientation  - Assess if patient's hallucinations or delusions are encouraging self-harm or harm to others and intervene as appropriate  Outcome: Progressing     Problem: ANXIETY  Goal: Will report anxiety at manageable levels  Description: INTERVENTIONS:  - Administer medication as ordered  - Teach and encourage coping skills  - Provide emotional support  - Assess patient/family for anxiety and ability to cope  Outcome: Progressing  Goal: By discharge: Patient will verbalize 2 strategies to deal with anxiety  Description: Interventions:  - Identify any obvious source/trigger to anxiety  - Staff will assist patient in applying identified coping technique/skills  - Encourage attendance of scheduled groups and activities  Outcome: Progressing     Problem: DISCHARGE PLANNING - CARE MANAGEMENT  Goal: Discharge to post-acute care or home with appropriate resources  Description: INTERVENTIONS:  - Conduct assessment to determine patient/family and health care team treatment goals, and need for post-acute services based on payer coverage, community resources, and patient  preferences, and barriers to discharge  - Address psychosocial, clinical, and financial barriers to discharge as identified in assessment in conjunction with the patient/family and health care team  - Arrange appropriate level of post-acute services according to patient’s   needs and preference and payer coverage in collaboration with the physician and health care team  - Communicate with and update the patient/family, physician, and health care team regarding progress on the discharge plan  - Arrange appropriate transportation to post-acute venues  Outcome: Progressing     Problem: Ineffective Coping  Goal: Identifies ineffective coping skills  Outcome: Progressing  Goal: Identifies healthy coping skills  Outcome: Progressing  Goal: Demonstrates healthy coping skills  Outcome: Progressing  Goal: Participates in unit activities  Description: Interventions:  - Provide therapeutic environment   - Provide required programming   - Redirect inappropriate behaviors   Outcome: Progressing     Problem: Risk for Self Injury/Neglect  Goal: Treatment Goal: Remain safe during length of stay, learn and adopt new coping skills, and be free of self-injurious ideation, impulses and acts at the time of discharge  Outcome: Progressing  Goal: Verbalize thoughts and feelings  Description: Interventions:  - Assess and re-assess patient's lethality and potential for self-injury  - Engage patient in 1:1 interactions, daily, for a minimum of 15 minutes  - Encourage patient to express feelings, fears, frustrations, hopes  - Establish rapport/trust with patient   Outcome: Progressing  Goal: Refrain from harming self  Description: Interventions:  - Monitor patient closely, per order  - Develop a trusting relationship  - Supervise medication ingestion, monitor effects and side effects   Outcome: Progressing

## 2025-06-08 NOTE — NURSING NOTE
At start of shift, pt was expressing anxiety, requested PRN Atarax. Pt remains worried about discharging to a shelter. Pt encouraged to discuss plans with CM, if any alternatives to a shelter. Pt states sister will be home from [place] on June 15th. Pt denies SI, HI, AVH. Has been withdrawn to self, flat affect.

## 2025-06-09 PROCEDURE — 99232 SBSQ HOSP IP/OBS MODERATE 35: CPT | Performed by: PSYCHIATRY & NEUROLOGY

## 2025-06-09 RX ORDER — MIRTAZAPINE 30 MG/1
30 TABLET, FILM COATED ORAL
Qty: 30 TABLET | Refills: 0 | Status: SHIPPED | OUTPATIENT
Start: 2025-06-09 | End: 2025-07-09

## 2025-06-09 RX ORDER — PROPRANOLOL HYDROCHLORIDE 10 MG/1
10 TABLET ORAL 2 TIMES DAILY
Qty: 60 TABLET | Refills: 0 | Status: SHIPPED | OUTPATIENT
Start: 2025-06-09 | End: 2025-07-09

## 2025-06-09 RX ADMIN — HYDROXYZINE HYDROCHLORIDE 50 MG: 50 TABLET ORAL at 17:44

## 2025-06-09 RX ADMIN — PROPRANOLOL HYDROCHLORIDE 10 MG: 10 TABLET ORAL at 09:00

## 2025-06-09 RX ADMIN — PALIPERIDONE PALMITATE 156 MG: 156 INJECTION INTRAMUSCULAR at 09:23

## 2025-06-09 RX ADMIN — MIRTAZAPINE 30 MG: 15 TABLET, FILM COATED ORAL at 22:04

## 2025-06-09 RX ADMIN — NICOTINE POLACRILEX 2 MG: 4 GUM, CHEWING BUCCAL at 12:06

## 2025-06-09 NOTE — DISCHARGE SUMMARY
Discharge Summary - Behavioral Health   Name: Gabby Mares 35 y.o. female I MRN: [id number]  Unit/Bed#: -01 I Date of Admission: 5/22/2025   Date of Service: 6/9/2025 I Hospital Day: 18  { ?Quick Links I Problem List I PORCH I Billing Tip:80512}  Assessment & Plan  Mood disorder (HCC)  Invega Sustenna 156 mg IM given 6/2  Received 2nd dose of 156mg IM today 6/9  Next 234 mg IM monthly injection due 7/7/25  Anticipated discharge for tomorrow  Possible crisis residency placement, sisters thereafter. CM currently working diligently to get into contact with patients sister.   Continue Propanolol 10 mg po BID for akathisia   Continue Remeron 30 mg po HS for dual benefit of depression and associated neurovegetative symptoms  Drug abuse (HCC)  Encourage cessation   Agoraphobia  See plan for mood disorder  Psychosis (HCC)  See plan for mood disorder  Morbid (severe) obesity due to excess calories (HCC)  Lifestyle changes    Admission Date: 5/22/2025       Discharge Date: 6/10/25  Attending Psychiatrist: Puma Newby, DO    Admission Diagnosis:  Principal Problem:    Mood disorder (HCC)  Active Problems:    Medical clearance for psychiatric admission    Drug abuse (HCC)    Agoraphobia    Morbid (severe) obesity due to excess calories (HCC)    Psychosis (HCC)    Discharge Diagnosis:   Principal Problem:    Mood disorder (HCC)  Active Problems:    Medical clearance for psychiatric admission    Drug abuse (HCC)    Agoraphobia    Morbid (severe) obesity due to excess calories (HCC)    Psychosis (HCC)  Resolved Problems:    * No resolved hospital problems. *    Medical Problems       Resolved Problems  Date Reviewed: 6/9/2025   None         Reason for Admission/HPI:     Per initial H&P by Dr. Newby:    Per ED provider note:  Patient is a 35 y/o F with h/o schizophrenia presents to the ED with worsening depression and SI.  Patient states she was just evicted from her apartment and has been staying with her  "sister.  She has not been taking her meds for about 3 weeks and states she even missed her last injectable med.  She states her sister told her she needs to come in because she has kids at home and patient has not been acting right.  Patient states she is hearing voices to harm herself.  She has a plan to jump out of a moving car.  She states \"everything is changing,\" and she does not currently have a psychiatrist.      Per Crisis worker note:  Pt was brought to the ED via sister driving her. Pt met with provider and was medically cleared. Pt met with this writer, Clinical Coordinator of Crisis Intervention, who completed crisis assessment and safety risk assessment.     Pt reported suicidal ideation with plan to jump out of moving vehicle. Pt reported being on Invega but last injection was 2 months ago. Pt reported no current medication adherence. Pt reported currently being evicted and living with sister. Pt reported no access to firearms and pt is unemployed. Pt denies acute medical concerns. Pt reported poor sleep but good appetite. Pt reported no drug and alcohol use. Pt denies criminal charges. Pt denies nicotine use. Pt reported suicide attempt 2 years ago via Ambien. Pt denies self injurious behaviors. Pt denies HI. Pt denies psychosis. Pt denies trauma.      Pt signed 201 and understood rights and restrictions. 72 hour notice explained as well     On admission to Inpatient Psychiatric Unit:  Patient is a 34-year-old white female with an unclear past psychiatric history (documented as bipolar disorder with psychotic features although I doubt this is her correct diagnosis based on the natural history of her symptomatology, and suspect more unipolar depression +/- underlying personality pathology with parapsychotic symptoms), and a history of Benadryl abuse and agoraphobia, who presents voluntarily to inpatient U complaining of suicidal ideation and auditory hallucinations.     Symptoms prior to " hospitalization include: Suicidal ideation to jump out of a moving vehicle, command auditory hallucinations telling her to kill herself, depressed mood, decreased sleep, generalized anxiety, and Benadryl abuse.  Symptom severity is rated as severe.  Timeline is progressively worsening over the past few days.  Mitigating factors are sisters support.  Exacerbating stressors are Benadryl abuse and pending eviction.     On initial psychiatric evaluation today, the patient states that she has financial stressors and was unable to pay her rent so she is being evicted.  As a result, she was having suicidal ideation.  She says that she typically does not hear any voices but she has been hearing voices telling her to kill herself over the past few days because she has been so stressed out.  She has not taken any psychotropic medications in approximately 2 months and previously was on Invega Sustenna long-acting injectable.  Patient feels depressed and anxious.  She admits to abusing Benadryl up to 700 mg daily which she states she last used approximately 2 weeks ago.  Her description of her symptomatology does not fit the natural history of true bipolar spectrum disease.  On exam, she is certainly not psychotic appearing nor manic appearing.  She states that she has a  through [email or web address] and is currently being lined up with housing.  She has a psychiatrist but states that she might need a new one.  She denies homicidal ideation, current manic symptoms, or visual hallucinations.  She is agreeable with plan to restart Invega with plan to convert to long-acting injectable as well as starting remeron.    Past Medical History[1]  Past Surgical History[2]  Allergies   Allergen Reactions    Adhesive [Medical Tape]      rash    Benazepril Cough       Objective :{?Quick Links I ICU Summary I Vitals I I/Os I LDAs I Mobility (PT/OT) I Code Status / ACP   ?Quick Links I Active Meds I Pain Meds I Antibiotics I  Anticoagulants:97468}  Temp:  [98.8 °F (37.1 °C)] 98.8 °F (37.1 °C)  HR:  [] 90  BP: ()/(64-87) 112/79  Resp:  [18] 18  SpO2:  [100 %] 100 %  O2 Device: None (Room air)    Hospital Course: No notes on file    Gabby was admitted to the inpatient psychiatric unit and started on Behavioral Health checks for safety monitoring. During the hospitalization she was attending individual therapy, group therapy, milieu therapy and occupational therapy..     Psychiatric medications were adjusted over the hospital stay. To address depressive symptoms, mood swings, psychotic symptoms, anxiety symptoms, and insomnia, Gabby was treated with antidepressant Remeron and antipsychotic medication Invega Sustenna. Medication doses were adjusted during the hospital course. Patient was previously non-compliant with previous Invega Sustenna monthly injectables and required to restart series. As she was previously maintained on 234 mg IM, she received two separate doses of 156 mg IM prior to discharge. She will be maintained on 234 mg IM thereafter starting on 7/7/25. Prior to beginning of treatment medications risks and benefits and possible side effects including risk of parkinsonian symptoms, Tardive Dyskinesia and metabolic syndrome related to treatment with antipsychotic medications and risk of suicidality and serotonin syndrome related to treatment with antidepressants were reviewed with Gabby. She verbalized understanding and agreement for treatment. Upon admission Gabby was seen by medical service for medical clearance for inpatient treatment and medical follow up.     Gabby's symptoms improved over the hospital course. Initially after admission she was still feeling depressed, anxious, frustrated, and overwhelmed. With adjustment of medications and therapeutic milieu her symptoms slowly improved. At the end of treatment Gabby was doing much better. Her mood was doing much better at the time of discharge. Gabby denied  suicidal ideation, intent or plan at the time of discharge and denied homicidal ideation, intent or plan at the time of discharge. There was no overt psychosis at the time of discharge. Auditory hallucinations were resolved. Visual hallucinations were resolved. Gabby was participating appropriately in milieu at the time of discharge. Behavior was appropriate on the unit at the time of discharge. Sleep and appetite were improved. Gabby was tolerating medications and was not reporting any significant side effects at the time of discharge.     Since Gabby was doing well at the end of the hospitalization, treatment team felt that Gabby could be safely discharged to outpatient care.     The outpatient follow up with [facility] was arranged by the unit  upon discharge.     Gabby was discharged on a following medication regimen:  Remeron 30 mg nightly for augmentation of mood   Invega Sustenna 234 mg IM every 28 days for bipolar affective disorder  Next dose due 7/7/25    Mental Status at Time of Discharge:     Appearance:  { Appearance:73702}   Behavior:  { Behavior:15762}   Speech:  { Speech:18351}   Mood:  { Mood:56808}   Affect:  { Affect:92625}   Thought Process:  { Thought Process:45925}   Thought Content:  { Thought Content:08755}   Perceptual Disturbances: { Perceptual Disturbances:44092}   Risk Potential: Suicidal Ideation -  { Suicidal Ideation:48815}  Homicidal Ideation -  { Homicidal Ideation:50631}  Potential for Aggression - { Potential for Aggression:85917}   Sensorium:  { Sensorium:61798}   Memory:  { Memory:84358}   Consciousness:  { Consciousness:51657}   Attention/Concentration: { Attention/Concentration:08892}   Insight:  { Insight/Judgement:76813}   Judgment: { Insight/Judgement:20667}   Gait/Station: { Gait/Station:66302}   Motor Activity: { Motor Activity:69475}     Suicide/Homicide Risk Assessment:    Risk of Harm to  Self:   The following ratings are based on assessment at the time of the interview  Nursing Suicide Risk Assessment Last 24 hours: C-SSRS Risk (Since Last Contact)  Calculated C-SSRS Risk Score (Since Last Contact): No Risk Indicated  Demographic Risk Factors include: , never   Historical Risk Factors include: history of mood disorder  Current Specific Risk Factors include: recent inpatient psychiatric admission - being discharged today  Protective Factors: stable mood, no current psychotic symptoms, improved mood, improved impulse control, ability to adapt to change, ability to make plans for the future, no current suicidal plan or intent, family support established, good health, good self-esteem, personal beliefs, resiliency, ability to contract for safety with staff, ability to communicate with staff  Weapons/Firearms: none. The following steps have been taken to ensure weapons are properly secured: not applicable  Based on today's assessment, Gabby presents the following risk of harm to self: minimal    Risk of Harm to Others:  The following ratings are based on  assessment at the time of the interview  Nursing Homicide Risk Assessment: Violence Risk to Others: Denies within past 6 months  Demographic Risk Factors include: unemployed  Historical Risk Factors include: none  Current Specific Risk Factors include: multiple stressors  Protective Factors: good impulse control, willing to remain free from substance use, ability to adapt to change, good support system, good self-esteem, moral system, Yazdanism beliefs, resilience, restricted access to lethal means, sense of personal control  Weapons/Firearms: none. The following steps have been taken to ensure weapons are properly secured: not applicable  Based on today's assessment, Gabby presents the following risk of harm to others: none    The following interventions are recommended: outpatient follow up with a psychiatrist, outpatient follow up  with a therapist, outpatient follow up with Intensive     {?Quick Links I Lab Review I Micro Results I Radiology I Cardiology:68778}  Lab Results: I have reviewed the following results:  Results from the past 24 hours: No results found for this or any previous visit (from the past 24 hours).    Discharge Medications:    Medication List    Scheduled   mirtazapine (REMERON) 15 mg tablet, Take 2 tablets (30 mg total) by mouth daily at bedtime   paliperidone palmitate ER (INVEGA) 234 mg/1.5 mL IM injection, 1.5 mL (234 mg total) by Intramuscular (deltoid only) route every 28 days For IM Deltoid use only, do NOT administer IV. Do not start before July 7, 2025. Start: 07/07/25   propranolol (INDERAL) 10 mg tablet, Take 1 tablet (10 mg total) by mouth in the morning and 1 tablet (10 mg total) before bedtime. Hold for heart rate less than 50 beats per minute.    PRN   aluminum-magnesium hydroxide-simethicone (MAALOX) 4982-4446-373 mg/30 mL suspension, Take 30 mL by mouth every 4 (four) hours as needed for indigestion or heartburn   benztropine (COGENTIN) 1 mg tablet, Take 1 tablet (1 mg total) by mouth as needed in the morning and 1 tablet (1 mg total) as needed in the evening for extrapyramidal symptoms (mild/mod EPS or muscle rigidity).   benztropine (COGENTIN) 1 mg/mL, Inject 1 mL (1 mg total) into a muscle as needed in the morning and 1 mL (1 mg total) as needed in the evening for extrapyramidal symptoms (severe EPS or muscle rigidity).   bisacodyl (DULCOLAX) 10 mg suppository, Insert 1 suppository (10 mg total) into the rectum daily as needed for constipation (if Senokot-S and milk of mag ineffective, or if PO route unavailable)   haloperidol (HALDOL) 2 mg tablet, Take 1 tablet (2 mg total) by mouth Every 4 hours as needed, not to exceed 6 doses per 24 hours (mild agitation) Do not re-administer antipsychotics for 30 minutes to assess response   haloperidol (HALDOL) 5 mg tablet, Take 1 tablet (5 mg total)  by mouth Every 6 hours as needed, not to exceed 4 doses per 24 hours (moderate agitation) Do not re-administer antipsychotics for 30 minutes to assess response   haloperidol (HALDOL) 5 mg tablet, Take 1 tablet (5 mg total) by mouth Every 4 hours as needed, not to exceed 4 doses per 24 hours (severe agitation) Do not exceed 4 doses in 24 hours.  Do not re-administer antipsychotics for 30 minutes to assess response   haloperidol lactate (HALDOL) 5 mg/mL, Inject 0.5 mL (2.5 mg total) into a muscle Every 6 hours as needed, not to exceed 4 doses per 24 hours (moderate agitation if oral route is unavailable) Do not exceed 8 doses in 24 hours. Do not re-administer antipsychotics for 30 minutes to assess response **AND** LORazepam (ATIVAN) 2 mg/mL, Inject 0.5 mL (1 mg total) into a muscle Every 6 hours as needed, not to exceed 4 doses per 24 hours for other (give with IM haloperidol for moderate agitation) **AND** benztropine (COGENTIN) 1 mg/mL, Inject 0.5 mL (0.5 mg total) into a muscle Every 6 hours as needed, not to exceed 4 doses per 24 hours for other (give with IM haloperidol for moderate agitation)   haloperidol lactate (HALDOL) 5 mg/mL, Inject 1 mL (5 mg total) into a muscle Every 4 hours as needed, not to exceed 4 doses per 24 hours for agitation (severe agitation if oral route is unavailable) Do not exceed 4 doses in 24 hours. Do not re-administer antipsychotics for 30 minutes to assess response **AND** LORazepam (ATIVAN) 2 mg/mL, Inject 1 mL (2 mg total) into a muscle Every 4 hours as needed, not to exceed 4 doses per 24 hours for other (give with IM haloperidol for severe agitation) **AND** benztropine (COGENTIN) 1 mg/mL, Inject 1 mL (1 mg total) into a muscle Every 4 hours as needed, not to exceed 4 doses per 24 hours for other (give with IM haloperidol for severe agitation)   hydrOXYzine HCL (ATARAX) 25 mg tablet, Take 1 tablet (25 mg total) by mouth Every 6 hours as needed, not to exceed 4 doses per 24 hours  (mild anxiety) Do not exceed 4 doses in 24 hours   hydrOXYzine HCL (ATARAX) 50 mg tablet, Take 1 tablet (50 mg total) by mouth Every 4 hours as needed, not to exceed 4 doses per 24 hours (moderate anxiety) Do not exceed 4 doses in 24 hours **OR** LORazepam (ATIVAN) 2 mg/mL, Inject 0.5 mL (1 mg total) into a muscle every 4 (four) hours as needed for other (moderate anxiety if oral route is not available)   LORazepam (ATIVAN) 1 mg tablet, Take 1 tablet (1 mg total) by mouth Every 4 hours as needed, not to exceed 6 doses per 24 hours (severe anxiety) **OR** LORazepam (ATIVAN) 2 mg/mL, Inject 1 mL (2 mg total) into a muscle Every 6 hours as needed, not to exceed 3 doses per 24 hours for other (severe anxiety if oral route is not available) Do not exceed 3 doses in 24 hours   magnesium hydroxide (MILK OF MAGNESIA) 400 mg/5 mL oral suspension, Take 30 mL by mouth daily as needed for constipation (if Senokot-S ineffective)   nicotine polacrilex (NICORETTE) 2 mg gum, Take 1 each (2 mg total) by mouth every 2 (two) hours as needed for nicotine cravings   propranolol (INDERAL) 10 mg tablet, Take 1 tablet (10 mg total) by mouth every 8 (eight) hours as needed (akathisia/restlessness)   senna-docusate sodium (SENOKOT S) 8.6-50 mg per tablet, Take 1 tablet by mouth daily as needed for constipation   traZODone (DESYREL) 50 mg tablet, Take 1 tablet (50 mg total) by mouth daily at bedtime as needed for insomnia   White Petrolatum-Mineral Oil (artificial tear) 83-15 % ophthalmic ointment, Administer to both eyes as needed in the morning and as needed at noon and as needed in the evening and as needed before bedtime for dry eyes.    Discharge instructions/Information to patient and family:   See After Visit Summary for information provided to patient and family.      Provisions for Follow-Up Care:  See after visit summary for information related to follow-up care and any pertinent home health orders.      Discharge Statement:    I  "have spent a total time of 45 minutes in caring for this patient on the day of the visit/encounter.   >30 minutes of time was spent on: Diagnostic results, Prognosis, Risks and benefits of tx options, Instructions for management, Patient and family education, Importance of tx compliance, Risk factor reductions, Counseling / Coordination of care, Documenting in the medical record, Reviewing / ordering tests, medicine, procedures  , and Communicating with other healthcare professionals .  I reviewed with Gabby importance of compliance with medications and outpatient treatment after discharge.  I discussed the medication regimen and possible side effects of the medications with Gabby prior to discharge. At the time of discharge she was tolerating psychiatric medications.  I discussed outpatient follow up with Gabby.  I reviewed with Gabby crisis plan and safety plan upon discharge.  Gabby agreed to abstain from drug and alcohol use after discharge.  Gabby was competent to understand risks and benefits of withholding information and risks and benefits of her actions.  Gabby is currently homeless and agrees for a discharge to a shelter.    Discharge on Two Antipsychotic Medications: No    Portions of the record may have been created with voice recognition software. Occasional wrong word or \"sound a like\" substitutions may have occurred due to the inherent limitations of voice recognition software. Read the chart carefully and recognize, using context, where substitutions have occurred.    Georgia Anna PA-C 06/09/25          [1]   Past Medical History:  Diagnosis Date    Acne     Agoraphobia     Anxiety     Bipolar disorder, current episode of casandra with psychotic features 2/27/2025    Depression     Drug abuse (HCC) 11/14/2024    Diphenhydramine abuse    Eating disorder     Heart disease     Hypertension     Impulse control disorder     Obesity     Panic attack     Panic disorder     Psychiatric disorder     " depression, bipolar, schizophrenia    Psychiatric illness     Psychosis (HCC)     Schizoaffective disorder (HCC) 2/27/2025    Schizophrenia (HCC)     Schizophrenia (HCC)     Schizophrenia (HCC)     Seizures (HCC)     Self-injurious behavior     Sleep difficulties     Suicide attempt ([facility])    [2]   Past Surgical History:  Procedure Laterality Date    CYSTOSCOPY      INCISION AND DRAINAGE OF WOUND N/A 2/1/2019    Procedure: INCISION AND DRAINAGE (I&D) TRUNK;  Surgeon: Mathew Fatima DO;  Location:  MAIN OR;  Service: General    WISDOM TOOTH EXTRACTION

## 2025-06-09 NOTE — PLAN OF CARE
Problem: DEPRESSION  Goal: Will be euthymic at discharge  Description: INTERVENTIONS:  - Administer medication as ordered  - Provide emotional support via 1:1 interaction with staff  - Encourage involvement in milieu/groups/activities  - Monitor for social isolation  Outcome: Progressing     Problem: PSYCHOSIS  Goal: Will report no hallucinations or delusions  Description: Interventions:  - Administer medication as  ordered  - Every waking shifts and PRN assess for the presence of hallucinations and or delusions  - Assist with reality testing to support increasing orientation  - Assess if patient's hallucinations or delusions are encouraging self-harm or harm to others and intervene as appropriate  Outcome: Progressing     Problem: ANXIETY  Goal: Will report anxiety at manageable levels  Description: INTERVENTIONS:  - Administer medication as ordered  - Teach and encourage coping skills  - Provide emotional support  - Assess patient/family for anxiety and ability to cope  Outcome: Progressing  Goal: By discharge: Patient will verbalize 2 strategies to deal with anxiety  Description: Interventions:  - Identify any obvious source/trigger to anxiety  - Staff will assist patient in applying identified coping technique/skills  - Encourage attendance of scheduled groups and activities  Outcome: Progressing     Problem: Risk for Self Injury/Neglect  Goal: Treatment Goal: Remain safe during length of stay, learn and adopt new coping skills, and be free of self-injurious ideation, impulses and acts at the time of discharge  Outcome: Progressing  Goal: Verbalize thoughts and feelings  Description: Interventions:  - Assess and re-assess patient's lethality and potential for self-injury  - Engage patient in 1:1 interactions, daily, for a minimum of 15 minutes  - Encourage patient to express feelings, fears, frustrations, hopes  - Establish rapport/trust with patient   Outcome: Progressing  Goal: Refrain from harming  self  Description: Interventions:  - Monitor patient closely, per order  - Develop a trusting relationship  - Supervise medication ingestion, monitor effects and side effects   Outcome: Progressing

## 2025-06-09 NOTE — NURSING NOTE
Patient visible in milieu with peers and cooperative. Patient has mild anxiety and depression, denies SI/HI and hallucinations. Patient attends group therapies and interacts appropriately with peers. Patient has been writing in her journal, walking and going to group therapies as coping skills. Continued care and safety rounds in progress.

## 2025-06-09 NOTE — NURSING NOTE
"Pt depressed with a flat affect. Reports feeling \"okay\". Denies SI/HI or hallucination. Educated on booster Invega injection, pt will be receiving today. Denies any question or concern at this time.  "

## 2025-06-09 NOTE — CASE MANAGEMENT
CM completed and Faxed Crisis Residence referral to [facility] for review. (Fax# [phone])      3:15pm   CM called [facility] ([phone]) to confirm they received referral and to see if they had a chance to review. They reported they did receive it but they are still reviewing it. They are not sure if they have a bed available but advised CM to call back tomorrow to check in.       CM called pt's [facility] ([phone]) and provided her with an update on pt's dc plan and that crisis residence referral was made and waiting to hear back. She confirmed that [facility] is the only Crisis Res for [place]. CM informed her that if pt does not get accepted, CM will talk to pt about seeing if she has funds to stay in a hotel until her sister returns home on 6/15. CM will provide Niurka with an update tomorrow.

## 2025-06-09 NOTE — NURSING NOTE
Patient had PRN Atarax 50 mg at 1744 for moderate anxiety and Dorado of 30.   Upon re-assessment, PRN Anxiety medication was effective and patient is calmer, less anxious. Continued care and safety rounds in progress.

## 2025-06-09 NOTE — ASSESSMENT & PLAN NOTE
Invega Sustenna 156 mg IM given 6/2  Received 2nd dose of 156mg IM today 6/9  Next 234 mg IM monthly injection due 7/7/25  Anticipated discharge for tomorrow  Possible crisis residency placement, sisters thereafter. CM currently working diligently to get into contact with patients sister.   Continue Propanolol 10 mg po BID for akathisia   Continue Remeron 30 mg po HS for dual benefit of depression and associated neurovegetative symptoms

## 2025-06-09 NOTE — PROGRESS NOTES
Progress Note - Behavioral Health   Name: Gabby Ramosland 35 y.o. female I MRN: [id number]  Unit/Bed#: -01 I Date of Admission: 5/22/2025   Date of Service: 6/9/2025 I Hospital Day: 18     Assessment & Plan  Mood disorder (HCC)  Invega Sustenna 156 mg IM given 6/2  Received 2nd dose of 156mg IM today 6/9  Next 234 mg IM monthly injection due 7/7/25  Anticipated discharge for tomorrow  Possible crisis residency placement, sisters thereafter. CM currently working diligently to get into contact with patients sister.   Continue Propanolol 10 mg po BID for akathisia   Continue Remeron 30 mg po HS for dual benefit of depression and associated neurovegetative symptoms  Drug abuse (HCC)  Encourage cessation   Agoraphobia  See plan for mood disorder  Psychosis (HCC)  See plan for mood disorder  Morbid (severe) obesity due to excess calories (HCC)  Lifestyle changes    Current Medications:    Current Facility-Administered Medications:     mirtazapine (REMERON) tablet 30 mg, Oral, HS    paliperidone palmitate ER (INVEGA SUSTENNA) IM- Deltoid injection 156 mg, Intramuscular (deltoid only), Once    propranolol (INDERAL) tablet 10 mg, Oral, BID    Current Facility-Administered Medications:     aluminum-magnesium hydroxide-simethicone (MAALOX) oral suspension 30 mL, Oral, Q4H PRN    artificial tear ophthalmic ointment, Both Eyes, 4x Daily PRN    haloperidol lactate (HALDOL) injection 2.5 mg, Intramuscular, Q6H PRN Max 4/day **AND** LORazepam (ATIVAN) injection 1 mg, Intramuscular, Q6H PRN Max 4/day **AND** benztropine (COGENTIN) injection 0.5 mg, Intramuscular, Q6H PRN Max 4/day    benztropine (COGENTIN) injection 1 mg, Intramuscular, BID PRN    haloperidol lactate (HALDOL) injection 5 mg, Intramuscular, Q4H PRN Max 4/day **AND** LORazepam (ATIVAN) injection 2 mg, Intramuscular, Q4H PRN Max 4/day **AND** benztropine (COGENTIN) injection 1 mg, Intramuscular, Q4H PRN Max 4/day    benztropine (COGENTIN) tablet 1 mg, Oral,  "BID PRN    bisacodyl (DULCOLAX) rectal suppository 10 mg, Rectal, Daily PRN    haloperidol (HALDOL) tablet 2 mg, Oral, Q4H PRN Max 6/day    haloperidol (HALDOL) tablet 5 mg, Oral, Q6H PRN Max 4/day    haloperidol (HALDOL) tablet 5 mg, Oral, Q4H PRN Max 4/day    hydrOXYzine HCL (ATARAX) tablet 25 mg, Oral, Q6H PRN Max 4/day    hydrOXYzine HCL (ATARAX) tablet 50 mg, Oral, Q4H PRN Max 4/day **OR** LORazepam (ATIVAN) injection 1 mg, Intramuscular, Q4H PRN    LORazepam (ATIVAN) tablet 1 mg, Oral, Q4H PRN Max 6/day **OR** LORazepam (ATIVAN) injection 2 mg, Intramuscular, Q6H PRN Max 3/day    magnesium hydroxide (MILK OF MAGNESIA) oral suspension 30 mL, Oral, Daily PRN    nicotine polacrilex (NICORETTE) gum 2 mg, Oral, Q2H PRN    propranolol (INDERAL) tablet 10 mg, Oral, Q8H PRN    senna-docusate sodium (SENOKOT S) 8.6-50 mg per tablet 1 tablet, Oral, Daily PRN    traZODone (DESYREL) tablet 50 mg, Oral, HS PRN    Risks/Benefits of Treatment:     Risks, benefits, and possible side effects of medications explained to patient and patient verbalizes understanding and agreement for treatment.    Progress Toward Goals: progressing    Treatment Planning:     All current active medications have been reviewed.  Continue to monitor response to treatment and assess for potential side effects of medications.  Encourage group therapy, milieu therapy and occupational therapy.  Collaboration with medical service for medical comorbidities as indicated.  Behavioral Health checks for safety monitoring.  Estimated Discharge Day: 6/16/2025   Legal Status : Voluntary 201 commitment.  Long Stay Certification : Not Applicable    Subjective     Behavior over the last 24 hours: unchanged    Gabby is observed this morning laying in bed. Patient is cooperative with the interview today. Reports that she is \"doing okay\" today. Overall reports progressive improvement in terms of her depressive symptoms. Feels to have approached her baseline. Optimistic " "and more goal directed throughout. Appetite/sleep improved. Agrees she will need to continue to remain complaint with medications. Per nursing report, Gabby expressed anxiety regarding discharge to a shelter. Was able to speak with sister yesterday, who will take her home this upcoming Sunday. Therefore will discharge to shelter tomorrow. Adamantly denies suicidal thoughts or AH/VH. Received the Invega shot today and tolerating well.     Sleep: normal  Appetite: normal  Medication side effects: No  ROS: review of systems as noted above in HPI/Subjective report, all other systems are negative    Objective :  Temp:  [98.8 °F (37.1 °C)] 98.8 °F (37.1 °C)  HR:  [] 120  BP: (120-133)/(84-87) 120/87  Resp:  [18] 18  SpO2:  [100 %] 100 %  O2 Device: None (Room air)    Mental Status Evaluation:    Appearance:  age appropriate, dressed appropriately, wearing pajamas, marginal hygiene   Behavior:  cooperative, calm, pleasant   Speech:  normal rate and volume   Mood:  \"Good\"   Affect:  appropriate, mood-congruent, brighter   Thought Process:  goal directed, logical, coherent   Thought Content:  no overt delusions   Perceptual Disturbances: no auditory hallucinations, no visual hallucinations   Risk Potential: Suicidal Ideation -  None at present  Homicidal Ideation -  None at present  Potential for Aggression - Not at present   Sensorium:  oriented to person, place, time/date, and situation   Memory:  recent and remote memory grossly intact   Consciousness:  alert and awake   Attention/Concentration: attention span and concentration appear shorter than expected for age   Insight:  fair   Judgment: fair   Gait/Station: normal gait/station   Motor Activity: no abnormal movements       Lab Results: I have reviewed the following results:  Results from the past 24 hours: No results found for this or any previous visit (from the past 24 hours).    Administrative Statements     Counseling / Coordination of Care:   Patient's " "progress discussed with staff in treatment team meeting.  Medication changes reviewed with staff in treatment team meeting.    Portions of the record may have been created with voice recognition software. Occasional wrong word or \"sound a like\" substitutions may have occurred due to the inherent limitations of voice recognition software. Read the chart carefully and recognize, using context, where substitutions have occurred.    This note was not shared with the patient due to reasonable likelihood of causing patient harm    Georgia Anna PA-C 06/09/25  "

## 2025-06-10 PROCEDURE — 99233 SBSQ HOSP IP/OBS HIGH 50: CPT | Performed by: PSYCHIATRY & NEUROLOGY

## 2025-06-10 RX ADMIN — NICOTINE POLACRILEX 2 MG: 4 GUM, CHEWING BUCCAL at 17:39

## 2025-06-10 RX ADMIN — MIRTAZAPINE 30 MG: 15 TABLET, FILM COATED ORAL at 21:16

## 2025-06-10 RX ADMIN — HYDROXYZINE HYDROCHLORIDE 25 MG: 25 TABLET, FILM COATED ORAL at 14:58

## 2025-06-10 NOTE — PROGRESS NOTES
06/09/25 0834   Team Meeting   Meeting Type Daily Rounds   Team Members Present   Team Members Present Physician;Nurse;;Other (Discipline and Name)   Physician Team Member Dr. Ramirez / MARY JO Gonzales / MARY JO Anna / PA Student   Nursing Team Member Ole / Guanakito   Care Management Team Member Serafin / Rajiv / Miguel   Other (Discipline and Name) Sigmund - Group Facilitator   Patient/Family Present   Patient Present No   Patient's Family Present No     Pt withdrawn. Guarded. Denies SI but continue to reported anxiety. Reported struggling with sleep.     DC: Tuesday (?) CM will refer to [facility].

## 2025-06-10 NOTE — NURSING NOTE
Gabby is calm in conversation. Withdrawn to her room a majority of the morning. Denies SI/HI/AVH. Reports readiness for discharge. States she will continue to journal after discharge. Denies unmet needs or questions at this time.

## 2025-06-10 NOTE — ASSESSMENT & PLAN NOTE
Invega Sustenna 156 mg IM given 6/2  Received 2nd dose of 156mg IM 6/9  Next 234 mg IM monthly injection due 7/7/25  Case management to continue working on dispo re: discharge. Patient requires further inpatient hospitalization until placement can be secured, otherwise patient has a significant risk of rehospitalization within the next 30 days due to decompensation due to psychosocial stress related to housing situation.  Continue Propanolol 10 mg po BID for akathisia   Continue Remeron 30 mg po HS for dual benefit of depression and associated neurovegetative symptoms

## 2025-06-10 NOTE — NURSING NOTE
Pt visible on the unit at this time. Reports ongoing moderate depression and anxiety. Reports feeling better since admission. Pt denies SI/HI/AVH. Reports sleeping well and eating well. Attends select groups. Denies unmet needs at this time.

## 2025-06-10 NOTE — PROGRESS NOTES
06/10/25 0753   Team Meeting   Meeting Type Daily Rounds   Team Members Present   Team Members Present Physician;Nurse;   Physician Team Member Dr. Ramirez / Dr. Newby / MARY JO Gonzales / PA Student   Nursing Team Member Ole / Guanakito   Care Management Team Member Serafin / Rajiv / Miguel   Patient/Family Present   Patient Present No   Patient's Family Present No     Pt continues to be flat and reporting depression. Denies SI.     DC: CM submitted referral to [facility] for their [facility] program for her to possibly go to for a few days to continue treatment. If accepted they need to request [facility] Funding as pt only has Medicare. CM will follow up with them today.

## 2025-06-10 NOTE — PROGRESS NOTES
Progress Note - Behavioral Health   Name: Gabby Mares 35 y.o. female I MRN: [id number]  Unit/Bed#: -01 I Date of Admission: 5/22/2025   Date of Service: 6/10/2025 I Hospital Day: 19        Assessment & Plan  Mood disorder (HCC)  Invega Sustenna 156 mg IM given 6/2  Received 2nd dose of 156mg IM 6/9  Next 234 mg IM monthly injection due 7/7/25  Case management to continue working on dispo re: discharge. Patient requires further inpatient hospitalization until placement can be secured, otherwise patient has a significant risk of rehospitalization within the next 30 days due to decompensation due to psychosocial stress related to housing situation.  Continue Propanolol 10 mg po BID for akathisia   Continue Remeron 30 mg po HS for dual benefit of depression and associated neurovegetative symptoms  Drug abuse (HCC)  Encourage cessation   Agoraphobia  See plan for mood disorder  Psychosis (HCC)  See plan for mood disorder  Morbid (severe) obesity due to excess calories (HCC)  Lifestyle changes     Current medications:    Current Facility-Administered Medications:     aluminum-magnesium hydroxide-simethicone (MAALOX) oral suspension 30 mL, Q4H PRN    artificial tear ophthalmic ointment, 4x Daily PRN    haloperidol lactate (HALDOL) injection 2.5 mg, Q6H PRN Max 4/day **AND** LORazepam (ATIVAN) injection 1 mg, Q6H PRN Max 4/day **AND** benztropine (COGENTIN) injection 0.5 mg, Q6H PRN Max 4/day    benztropine (COGENTIN) injection 1 mg, BID PRN    haloperidol lactate (HALDOL) injection 5 mg, Q4H PRN Max 4/day **AND** LORazepam (ATIVAN) injection 2 mg, Q4H PRN Max 4/day **AND** benztropine (COGENTIN) injection 1 mg, Q4H PRN Max 4/day    benztropine (COGENTIN) tablet 1 mg, BID PRN    bisacodyl (DULCOLAX) rectal suppository 10 mg, Daily PRN    haloperidol (HALDOL) tablet 2 mg, Q4H PRN Max 6/day    haloperidol (HALDOL) tablet 5 mg, Q6H PRN Max 4/day    haloperidol (HALDOL) tablet 5 mg, Q4H PRN Max 4/day    hydrOXYzine  HCL (ATARAX) tablet 25 mg, Q6H PRN Max 4/day    hydrOXYzine HCL (ATARAX) tablet 50 mg, Q4H PRN Max 4/day **OR** LORazepam (ATIVAN) injection 1 mg, Q4H PRN    LORazepam (ATIVAN) tablet 1 mg, Q4H PRN Max 6/day **OR** LORazepam (ATIVAN) injection 2 mg, Q6H PRN Max 3/day    magnesium hydroxide (MILK OF MAGNESIA) oral suspension 30 mL, Daily PRN    mirtazapine (REMERON) tablet 30 mg, HS    nicotine polacrilex (NICORETTE) gum 2 mg, Q2H PRN    [START ON 7/7/2025] paliperidone palmitate ER (INVEGA) IM- Deltoid injection 234 mg, Q28 Days    propranolol (INDERAL) tablet 10 mg, Q8H PRN    propranolol (INDERAL) tablet 10 mg, BID    senna-docusate sodium (SENOKOT S) 8.6-50 mg per tablet 1 tablet, Daily PRN    traZODone (DESYREL) tablet 50 mg, HS PRN    Risks / Benefits of Treatment:    Risks, benefits, and possible side effects of medications explained to patient and patient verbalizes understanding and agreement for treatment.    Progress Toward Goals: progressing    Treatment Planning:  All current active medications have been reviewed.  Continue to monitor response to treatment and assess for potential side effects of medications.  Encourage group therapy, milieu therapy and occupational therapy  Collaboration with medical service for medical comorbidities as indicated.  Behavioral Health checks for safety monitoring.  Estimated Discharge Day: TBD  Legal Status: 201    Subjective:    Behavior over the last 24 hours: unchanged    No acute behavioral events over the past 24 hours. Today, patient was seen and examined at bedside for continuation of care.     Today, patient denies SI, Hi, or AVH. She is tolerating her LORA's without any evidence of side effects. Continues to be at times withdrawn to self but visible in the milieu and much more spontaneous in conversation with wider range of affect. Patient was originally scheduled for discharge today but disposition could unfortunately not be adequately arranged in time for crisis  "residential. Discussed therapy plan at [facility] for next LORA due on 7/7.    Patient denied other new or worsening psychiatric symptoms/complaints at this time.    Sleep: unchanged  Appetite: unchanged  Medication side effects: none reported  ROS: review of systems as noted in HPI, all other systems are negative    Objective:    Vital signs in last 24 hours:    Temp:  [97 °F (36.1 °C)-98 °F (36.7 °C)] 97 °F (36.1 °C)  HR:  [77-87] 87  BP: ()/(55-93) 108/93  Resp:  [18] 18  SpO2:  [98 %] 98 %  O2 Device: None (Room air)    Mental Status Evaluation:    Appearance: casually dressed, appears consistent with stated age  Motor: no psychomotor disturbances, no gait abnormalities  Behavior: cooperative, interacts with this writer appropriately  Speech: normal rate, rhythm, and volume  Mood: \"good\"  Affect: euthymic, normal range and intensity  Thought Process: organized, linear, and goal-oriented  Thought Content: denies auditory hallucinations, denies visual hallucinations, denies delusions  Risk Potential: denies suicidal ideation, plan, or intent. Denies homicidal ideation  Sensorium: Oriented to person, place, time, and situation  Cognition: cognitive ability appears intact but was not quantitatively tested  Consciousness: alert and awake  Attention: able to focus without difficulty  Insight: improved  Judgement: improved        Lab Results: I have reviewed the following results:  No results found for this or any previous visit (from the past 48 hours).     Administrative Statements    Counseling / Coordination of Care:   Patients progress discussed with staff in treatment team meeting. Medication changes reviewed with staff in treatment team meeting.    Puma Newby DO 06/10/25    This note has been constructed using a voice recognition system. There may be translation, syntax, or grammatical errors. If you have any questions, please contact the dictating provider.    "

## 2025-06-10 NOTE — CASE MANAGEMENT
CM called [facility] ([phone]) to check on status of referral that CM made yesterday. They reported that supervisor has the referral but is in a meeting. They reported he is the only one that can make a decision on acceptance. Pt would also need [facility] funding due to only having medicare.     3:20pm   CM called again to check in and it was a different staff member that answered and they said they are not involved in the referrals. CM provided phone number for them to provide to Director and to ask for him to call CM back.

## 2025-06-11 PROCEDURE — 99232 SBSQ HOSP IP/OBS MODERATE 35: CPT | Performed by: PSYCHIATRY & NEUROLOGY

## 2025-06-11 RX ADMIN — MIRTAZAPINE 30 MG: 15 TABLET, FILM COATED ORAL at 21:04

## 2025-06-11 RX ADMIN — PROPRANOLOL HYDROCHLORIDE 10 MG: 10 TABLET ORAL at 08:21

## 2025-06-11 RX ADMIN — PROPRANOLOL HYDROCHLORIDE 10 MG: 10 TABLET ORAL at 21:04

## 2025-06-11 RX ADMIN — NICOTINE POLACRILEX 2 MG: 4 GUM, CHEWING BUCCAL at 17:17

## 2025-06-11 RX ADMIN — NICOTINE POLACRILEX 2 MG: 4 GUM, CHEWING BUCCAL at 14:13

## 2025-06-11 NOTE — PROGRESS NOTES
06/11/25 0753   Team Meeting   Meeting Type Daily Rounds   Team Members Present   Team Members Present Physician;Nurse;;Other (Discipline and Name)   Physician Team Member Dr. Ramirez / MARY JO Gonzales / MARY JO Anna / PA Student   Nursing Team Member Ole   Care Management Team Member Serafin / Suly / Rajiv   Other (Discipline and Name) Sigmund - Group Facilitator   Patient/Family Present   Patient Present No   Patient's Family Present No     Pt reported anxiety related to dc planning.     DC: CM has referral into [facility] (made on 6/9/25) and waiting to response whether pt can transition there for a few days. CM calling back today to follow up.

## 2025-06-11 NOTE — NURSING NOTE
Pt remains pleasant and cooperative. Pt reports mild anxiety. Denies depression. Denies SI/HI/AVH. Pt is looking forward to discharge and feels ready to leave. Pt attends select groups. Reports sleeping well and a good appetite. Denies unmet needs at this time.

## 2025-06-11 NOTE — CASE MANAGEMENT
CM called [facility] ([phone]) to check in about referral sent on Monday 6/9/25. CM has been calling daily. They reported they still do not have an answer and the director who approves the referrals will not but in until the afternoon. They advised CM to call back later to check in.

## 2025-06-11 NOTE — PROGRESS NOTES
Progress Note - Behavioral Health   Name: Gabby Mares 35 y.o. female I MRN: [id number]  Unit/Bed#: -01 I Date of Admission: 5/22/2025   Date of Service: 6/11/2025 I Hospital Day: 20     Assessment & Plan  Mood disorder (HCC)  Invega Sustenna 156 mg IM given 6/2  Received 2nd dose of 156mg IM 6/9  Next 234 mg IM monthly injection due 7/7/25  Case management to continue working on dispo re: discharge. Patient requires further inpatient hospitalization until placement can be secured, otherwise patient has a significant risk of rehospitalization within the next 30 days due to decompensation due to psychosocial stress related to housing situation.  Continue Propanolol 10 mg po BID for akathisia   Continue Remeron 30 mg po HS for dual benefit of depression and associated neurovegetative symptoms  Drug abuse (HCC)  Encourage cessation   Agoraphobia  See plan for mood disorder  Psychosis (HCC)  See plan for mood disorder  Morbid (severe) obesity due to excess calories (HCC)  Lifestyle changes    Current Medications:    Current Facility-Administered Medications:     mirtazapine (REMERON) tablet 30 mg, Oral, HS    [START ON 7/7/2025] paliperidone palmitate ER (INVEGA) IM- Deltoid injection 234 mg, Intramuscular (deltoid only), Q28 Days    propranolol (INDERAL) tablet 10 mg, Oral, BID    Current Facility-Administered Medications:     aluminum-magnesium hydroxide-simethicone (MAALOX) oral suspension 30 mL, Oral, Q4H PRN    artificial tear ophthalmic ointment, Both Eyes, 4x Daily PRN    haloperidol lactate (HALDOL) injection 2.5 mg, Intramuscular, Q6H PRN Max 4/day **AND** LORazepam (ATIVAN) injection 1 mg, Intramuscular, Q6H PRN Max 4/day **AND** benztropine (COGENTIN) injection 0.5 mg, Intramuscular, Q6H PRN Max 4/day    benztropine (COGENTIN) injection 1 mg, Intramuscular, BID PRN    haloperidol lactate (HALDOL) injection 5 mg, Intramuscular, Q4H PRN Max 4/day **AND** LORazepam (ATIVAN) injection 2 mg,  Intramuscular, Q4H PRN Max 4/day **AND** benztropine (COGENTIN) injection 1 mg, Intramuscular, Q4H PRN Max 4/day    benztropine (COGENTIN) tablet 1 mg, Oral, BID PRN    bisacodyl (DULCOLAX) rectal suppository 10 mg, Rectal, Daily PRN    haloperidol (HALDOL) tablet 2 mg, Oral, Q4H PRN Max 6/day    haloperidol (HALDOL) tablet 5 mg, Oral, Q6H PRN Max 4/day    haloperidol (HALDOL) tablet 5 mg, Oral, Q4H PRN Max 4/day    hydrOXYzine HCL (ATARAX) tablet 25 mg, Oral, Q6H PRN Max 4/day    hydrOXYzine HCL (ATARAX) tablet 50 mg, Oral, Q4H PRN Max 4/day **OR** LORazepam (ATIVAN) injection 1 mg, Intramuscular, Q4H PRN    LORazepam (ATIVAN) tablet 1 mg, Oral, Q4H PRN Max 6/day **OR** LORazepam (ATIVAN) injection 2 mg, Intramuscular, Q6H PRN Max 3/day    magnesium hydroxide (MILK OF MAGNESIA) oral suspension 30 mL, Oral, Daily PRN    nicotine polacrilex (NICORETTE) gum 2 mg, Oral, Q2H PRN    propranolol (INDERAL) tablet 10 mg, Oral, Q8H PRN    senna-docusate sodium (SENOKOT S) 8.6-50 mg per tablet 1 tablet, Oral, Daily PRN    traZODone (DESYREL) tablet 50 mg, Oral, HS PRN    Risks/Benefits of Treatment:     Risks, benefits, and possible side effects of medications explained to patient and patient verbalizes understanding and agreement for treatment.    Progress Toward Goals: progressing    Treatment Planning:     All current active medications have been reviewed.  Continue to monitor response to treatment and assess for potential side effects of medications.  Encourage group therapy, milieu therapy and occupational therapy.  Collaboration with medical service for medical comorbidities as indicated.  Behavioral Health checks for safety monitoring.  Estimated Discharge Day: 6/10/2025   Legal Status : Voluntary 201 commitment.  Long Stay Certification: The patient's mood symptoms has stabilized, and the patient is currently awaiting placement in a Crisis Residence facility. The patient's ability to recognize safety hazards, reality  "test, take medications and prepare meals are significantly impaired by their ongoing mood symptoms, limited insight and impaired insight and judgement. Without the structure and support of a Crisis Residence facility setting, the patient's ability to maintain stability and safety remains compromised.    Subjective     Behavior over the last 24 hours: ricky Pierre was observed this morning laying in bed. Remains scant/guarded but cooperative throughout. Understands disposition is still being arranged for crisis residential. She reports that she still feels \"okay\" denies worsening depression or anxiety symptoms. Has been gradually more present in the milieu and attending some groups. Denies suicidal thoughts or AH/VH. Denies any side effects with Invega injection.      Sleep: normal  Appetite: normal  Medication side effects: No  ROS: review of systems as noted above in HPI/Subjective report, all other systems are negative    Objective :  Temp:  [97.6 °F (36.4 °C)-98.8 °F (37.1 °C)] 97.6 °F (36.4 °C)  HR:  [74-81] 81  BP: (111-129)/(68-73) 129/73  Resp:  [18] 18  SpO2:  [98 %-99 %] 99 %  O2 Device: None (Room air)    Mental Status Evaluation:    Appearance:  age appropriate, dressed appropriately, hospital attire, marginal hygiene   Behavior:  cooperative, calm, guarded   Speech:  normal rate and volume   Mood:  \"Good\"   Affect:  appropriate, mood-congruent   Thought Process:  goal directed, logical, coherent   Thought Content:  no overt delusions   Perceptual Disturbances: no auditory hallucinations, no visual hallucinations   Risk Potential: Suicidal Ideation -  None at present  Homicidal Ideation -  None at present  Potential for Aggression - Not at present   Sensorium:  oriented to person, place, time/date, and situation   Memory:  recent and remote memory grossly intact   Consciousness:  alert and awake   Attention/Concentration: attention span and concentration appear shorter than expected for age " "  Insight:  limited   Judgment: fair   Gait/Station: Laying in bed   Motor Activity: no abnormal movements       Lab Results: I have reviewed the following results:  Results from the past 24 hours: No results found for this or any previous visit (from the past 24 hours).    Administrative Statements     Counseling / Coordination of Care:   Patient's progress discussed with staff in treatment team meeting.  Medication changes reviewed with staff in treatment team meeting.    Portions of the record may have been created with voice recognition software. Occasional wrong word or \"sound a like\" substitutions may have occurred due to the inherent limitations of voice recognition software. Read the chart carefully and recognize, using context, where substitutions have occurred.    This note was not shared with the patient due to reasonable likelihood of causing patient harm    Georgia Anna PA-C 06/11/25  "

## 2025-06-11 NOTE — NURSING NOTE
Pt remains calm and cooperative. Denies SI/HI, AVH. Mild anxiety. Denies depression. Pt resting in room, reports sleeping well overnight. Denies any questions/concerns regarding treatment.

## 2025-06-12 PROCEDURE — 99232 SBSQ HOSP IP/OBS MODERATE 35: CPT | Performed by: PSYCHIATRY & NEUROLOGY

## 2025-06-12 RX ADMIN — NICOTINE POLACRILEX 2 MG: 4 GUM, CHEWING BUCCAL at 16:50

## 2025-06-12 RX ADMIN — NICOTINE POLACRILEX 2 MG: 4 GUM, CHEWING BUCCAL at 12:14

## 2025-06-12 RX ADMIN — MIRTAZAPINE 30 MG: 15 TABLET, FILM COATED ORAL at 21:36

## 2025-06-12 RX ADMIN — PROPRANOLOL HYDROCHLORIDE 10 MG: 10 TABLET ORAL at 21:36

## 2025-06-12 RX ADMIN — HYDROXYZINE HYDROCHLORIDE 50 MG: 50 TABLET ORAL at 13:22

## 2025-06-12 NOTE — PROGRESS NOTES
06/12/25 0833   Team Meeting   Meeting Type Daily Rounds   Team Members Present   Team Members Present Physician;Nurse;;Other (Discipline and Name)   Physician Team Member Dr. Ramirez / MARY JO Gonzales / [place] Student / ODILON Cook   Nursing Team Member Chip / Guanakito   Care Management Team Member Serafin / Suly / Rajiv   Other (Discipline and Name) Sigmund - Group Facilitator   Patient/Family Present   Patient Present No   Patient's Family Present No   Pt withdrawn to room. Flat and depressed.     DC: CM waiting to hear from [facility] in [place]. CM sent referral on 6/9/25. CM will follow up again today.

## 2025-06-12 NOTE — CASE MANAGEMENT
"CM called [facility] ([phone]) to check on status of crisis residence referral that CM made on Monday 6/9/25. MARKO has been calling daily to get a response and CM has been unable to obtain one.    MARKO spoke to Crisis staff to again ask for an update and they reported \"Yes, I do have an answer. He asked me to relay a message to you that she is currently on our wait list.\" (MARKO had been told that Supervisor of the program makes decisions on Crisis Residence acceptance and he has been in and out of the office)     MARKO updated pt and treatment team of the above.     MARKO called pt's [facility] ([phone]) and left VM to provide her with an update on Crisis Residence and the plan to dc on Monday 6/16/25 back to her sister's home.     MARKO called [facility] ([phone]) CM left VM asking for a call back to schedule.   Pt Due for her next Invega Calvin on Monday 7/7/25     [address],   [place] [address]   Phone: [phone]      "

## 2025-06-12 NOTE — PROGRESS NOTES
Progress Note - Behavioral Health   Name: Gabby Mares 35 y.o. female I MRN: [id number]  Unit/Bed#: -01 I Date of Admission: 5/22/2025   Date of Service: 6/12/2025 I Hospital Day: 21     Assessment & Plan  Mood disorder (HCC)  Invega Sustenna 156 mg IM given 6/2  Received 2nd dose of 156mg IM 6/9  Next 234 mg IM monthly injection due 7/7/25  Case management to continue working on dispo re: discharge. Patient requires further inpatient hospitalization until placement can be secured, otherwise patient has a significant risk of rehospitalization within the next 30 days due to decompensation due to psychosocial stress related to housing situation.  Continue Propanolol 10 mg po BID for akathisia   Continue Remeron 30 mg po HS for dual benefit of depression and associated neurovegetative symptoms  Drug abuse (HCC)  Encourage cessation   Agoraphobia  See plan for mood disorder  Psychosis (HCC)  See plan for mood disorder  Morbid (severe) obesity due to excess calories (HCC)  Lifestyle changes    Current Medications:    Current Facility-Administered Medications:     mirtazapine (REMERON) tablet 30 mg, Oral, HS    [START ON 7/7/2025] paliperidone palmitate ER (INVEGA) IM- Deltoid injection 234 mg, Intramuscular (deltoid only), Q28 Days    propranolol (INDERAL) tablet 10 mg, Oral, BID    Risks/Benefits of Treatment:     Risks, benefits, and possible side effects of medications explained to patient and patient verbalizes understanding and agreement for treatment.    Progress Toward Goals: progressing    Treatment Planning:     All current active medications have been reviewed.  Continue to monitor response to treatment and assess for potential side effects of medications.  Encourage group therapy, milieu therapy and occupational therapy.  Collaboration with medical service for medical comorbidities as indicated.  Behavioral Health checks for safety monitoring.  Estimated Discharge Day: 4 days-6/15/25  Legal Status  ": Voluntary 201 commitment.  Long Stay Certification: Discharge to an unsupervised setting will represent a significant deterioration in ability to function and present a severe risk to the patient's physical and mental health, due to the patient's chronic psychiatric illness (psychotic illness and mood disorder) and ongoing psychotic symptoms, depressive symptoms. The patient cannot be safety treated at a lower level of care and requires further psychiatric evaluation, medication treatment and other treatment which can be reasonably be provided only in a psychiatric hospital    Subjective     Behavior over the last 24 hours: unchanged    Per staff, Gabby remains mostly isolative to her room.  She is calm and cooperative with staff.  This has been compliant with her meals and medications.  She is awaiting placement at a crisis residence until her sister returns home.    Gabby was seen privately for psychiatric follow-up today.  She was calm but scant during the interview today.  She reports that her mood is \"good\".  She presents as generally euthymic today.  She does understand her discharge disposition at this time as we await a response from the crisis residence.  Gabby reports that she has been sleeping well and has a good appetite.  She reports having adequate daytime energy.  She is visible occasionally on the unit.  She has attended groups intermittently.  She denies any adverse effects from her Invega injections.  She denies any side effects from her current medications.  She denies any suicidal or homicidal ideation intent or plan today.  He denies any auditory or visual hallucinations and does not present as internally stimulated today.    Sleep: normal  Appetite: normal  Medication side effects: No  ROS: review of systems as noted above in HPI/Subjective report, all other systems are negative    Objective :  Temp:  [96.6 °F (35.9 °C)-97.3 °F (36.3 °C)] 96.6 °F (35.9 °C)  HR:  [] 72  BP: " (100-120)/(55-88) 100/55  Resp:  [16-18] 18  SpO2:  [95 %-99 %] 95 %  O2 Device: None (Room air)    Mental Status Evaluation:    Appearance:  casually dressed, marginal hygiene   Behavior:  cooperative, calm   Speech:  scant, soft   Mood:  euthymic   Affect:  constricted   Thought Process:  concrete   Thought Content:  poverty of thought   Perceptual Disturbances: denies auditory or visual hallucinations when asked, does not appear responding to internal stimuli   Risk Potential: Suicidal Ideation -  None  Homicidal Ideation -  None  Potential for Aggression - No   Sensorium:  oriented to person, place, and time/date   Memory:  recent memory intact   Consciousness:  alert and awake   Attention/Concentration: attention span and concentration appear shorter than expected for age   Insight:  limited   Judgment: limited   Gait/Station: normal gait/station   Motor Activity: no abnormal movements       Lab Results: I have reviewed the following results:  Results from the past 24 hours: No results found for this or any previous visit (from the past 24 hours).  Most Recent Labs:   Lab Results   Component Value Date    WBC 6.92 05/23/2025    RBC 4.49 05/23/2025    HGB 11.8 05/23/2025    HCT 38.9 05/23/2025     05/23/2025    RDW 16.2 (H) 05/23/2025    NEUTROABS 4.26 05/23/2025    SODIUM 137 05/23/2025    K 4.4 05/23/2025     05/23/2025    CO2 29 05/23/2025    BUN 25 05/23/2025    CREATININE 1.35 (H) 05/23/2025    GLUC 88 05/23/2025    CALCIUM 9.2 05/23/2025    AST 35 05/23/2025    ALT 23 05/23/2025    ALKPHOS 82 05/23/2025    TP 7.3 05/23/2025    ALB 3.7 05/23/2025    TBILI 0.44 05/23/2025    CHOLESTEROL 162 05/23/2025    HDL 46 (L) 05/23/2025    TRIG 155 (H) 05/23/2025    LDLCALC 85 05/23/2025    NONHDLC 116 05/23/2025    AMMONIA 32 10/17/2022    KDH0JZPSMJGR 1.847 05/23/2025    FREET4 0.86 05/11/2018    PREGUR Negative 10/18/2022    PREGSERUM Negative 05/23/2025    HCGQUANT <2 08/07/2018    SYPHILISAB  "Non-reactive 11/14/2024    TREPONEMAPA Non-reactive 05/23/2025    HGBA1C 5.6 05/23/2025     05/23/2025       Administrative Statements     Counseling / Coordination of Care:   I have spent a total time of 30 minutes in caring for this patient on the day of the visit/encounter including:  Patient's progress discussed with staff in treatment team meeting.  Medication changes reviewed with staff in treatment team meeting.    Portions of the record may have been created with voice recognition software. Occasional wrong word or \"sound a like\" substitutions may have occurred due to the inherent limitations of voice recognition software. Read the chart carefully and recognize, using context, where substitutions have occurred.    ODILON Nunez 06/12/25  "

## 2025-06-12 NOTE — NURSING NOTE
"Pt reports feeling \"really anxious\" regarding discharge planning and situation. Received Atarax 50 mg po. Upon follow up, pt reports feeling improved. Visibly calmer.  "

## 2025-06-12 NOTE — NURSING NOTE
Pt calm and cooperative during interaction. Denies SI/HI or hallucination. Withdrawn to room. Scant during interaction.

## 2025-06-12 NOTE — NURSING NOTE
Pt showered this evening. Provided with paper scrubs so pt can wash clothing. Pt denies SI/HI or hallucination. Fluctuating anxiety. Denies any question or concern at this time.

## 2025-06-13 PROCEDURE — 99232 SBSQ HOSP IP/OBS MODERATE 35: CPT | Performed by: PSYCHIATRY & NEUROLOGY

## 2025-06-13 RX ADMIN — MIRTAZAPINE 30 MG: 15 TABLET, FILM COATED ORAL at 21:07

## 2025-06-13 RX ADMIN — NICOTINE POLACRILEX 2 MG: 4 GUM, CHEWING BUCCAL at 12:14

## 2025-06-13 RX ADMIN — PROPRANOLOL HYDROCHLORIDE 10 MG: 10 TABLET ORAL at 21:07

## 2025-06-13 RX ADMIN — PROPRANOLOL HYDROCHLORIDE 10 MG: 10 TABLET ORAL at 09:10

## 2025-06-13 RX ADMIN — HYDROXYZINE HYDROCHLORIDE 50 MG: 50 TABLET ORAL at 17:06

## 2025-06-13 RX ADMIN — NICOTINE POLACRILEX 2 MG: 4 GUM, CHEWING BUCCAL at 17:47

## 2025-06-13 RX ADMIN — NICOTINE POLACRILEX 2 MG: 4 GUM, CHEWING BUCCAL at 09:10

## 2025-06-13 NOTE — NURSING NOTE
Pt is awake, resting in bed. Scant in conversation, but pleasant. Blunted affect. Pt reports some anxiety and depression, a lot related to discharge plans. Pt states tentative discharge for Monday, once sister is home from vacation. Pt denies any concerns or SE from medications. Reports sleeping well, napping at times throughout day, attends some groups.

## 2025-06-13 NOTE — CASE MANAGEMENT
CM contacted [employer] transport to see if the van is available to transport pt home on Monday 6/16/25. They reported they are available. CM will discuss with pt to see if she needs a ride or if her sister can pick her up.     12:30pm   CM met with pt to check in. Pt was participating in group. Pt calm and cooperative in conversation. CM informed pt that MARKO had been calling Crisis residence all week to see if she could go there but they did not give response until yesterday that she is on a waiting list. Pt verbalized understanding that she will dc on Monday 6/16/25. Pt reported that her sister will be back from vacation on Sunday. She reported she has not talked to her today yet but will call her this weekend. Pt reported she works on Monday but she could pick her up after.     CM reviewed that MARKO has been in contact with her ICM Niurka and CM working on getting her an appt with [facility] [employer] [facility] for medication management. CM spoke to her about her appt at [facility] on 7/7/25 @ 11:30am for her monthly LORA. Pt reported she knows where that campus is and maybe her ICM can take her.

## 2025-06-13 NOTE — ASSESSMENT & PLAN NOTE
Invega Sustenna 156 mg IM given 6/2  Received 2nd dose of 156mg IM 6/9  Next 234 mg IM monthly injection due 7/7/25  Discharge Monday to crisis residential  Continue Propanolol 10 mg po BID for akathisia   Continue Remeron 30 mg po HS for dual benefit of depression and associated neurovegetative symptoms

## 2025-06-13 NOTE — CASE MANAGEMENT
"MARKO called [facility] ([phone])   Pt Due for her next Invega Sustenna on Monday 7/7/25     MARKO spoke to Linda and she reported \"I see that they put part of the order in but they need to put in the appointment request.\"      [address],   [place] [address]   Phone: [phone]    11:55 am   Provider fixed LORA order in [facility]. MARKO called back and spoke to Linda and she found the order and scheduled pt for next Invega Sustenna LORA for:   Monday July 7, 2025 at 11:30 am   Monday August 4, 2025 at 11am       MARKO contacted [employer] ([email or web address]) to inform them of hospitalization and dc and asked for pt to be scheduled for follow up appt.   "

## 2025-06-13 NOTE — NURSING NOTE
Pt approached writer following dinner with reports of elevated anxiety. Pt denies SI, HI, AVH, just anxiety. Dorado: 19. Pt received PRN Atarax 50mg po at 1706 for same. Upon reassessment, pt reports medication to be effective. Pt remains withdrawn to self/room throughout the day/evening.

## 2025-06-13 NOTE — PROGRESS NOTES
06/13/25 0752   Team Meeting   Meeting Type Daily Rounds   Team Members Present   Team Members Present Physician;Nurse;;Other (Discipline and Name)   Physician Team Member Dr. Ramirez / Dr. Newby / MARY JO Gonzales / PA Student   Nursing Team Member Ole / Guanakito   Care Management Team Member Serafin / Suly / Rajiv   Other (Discipline and Name) Sigmund - Group Facilitator   Patient/Family Present   Patient Present No   Patient's Family Present No   Pt withdrawn to room, Denies SI/HI. Took Atarax.     DC: Monday 6/16/25

## 2025-06-13 NOTE — PROGRESS NOTES
Progress Note - Behavioral Health   Name: Gabby Mares 35 y.o. female I MRN: [id number]  Unit/Bed#: -01 I Date of Admission: 5/22/2025   Date of Service: 6/13/2025 I Hospital Day: 22        Assessment & Plan  Mood disorder (HCC)  Invega Sustenna 156 mg IM given 6/2  Received 2nd dose of 156mg IM 6/9  Next 234 mg IM monthly injection due 7/7/25  Discharge Monday to crisis residential  Continue Propanolol 10 mg po BID for akathisia   Continue Remeron 30 mg po HS for dual benefit of depression and associated neurovegetative symptoms  Drug abuse (HCC)  Encourage cessation   Agoraphobia  See plan for mood disorder  Psychosis (HCC)  See plan for mood disorder  Morbid (severe) obesity due to excess calories (HCC)  Lifestyle changes     Current medications:    Current Facility-Administered Medications:     aluminum-magnesium hydroxide-simethicone (MAALOX) oral suspension 30 mL, Q4H PRN    artificial tear ophthalmic ointment, 4x Daily PRN    haloperidol lactate (HALDOL) injection 2.5 mg, Q6H PRN Max 4/day **AND** LORazepam (ATIVAN) injection 1 mg, Q6H PRN Max 4/day **AND** benztropine (COGENTIN) injection 0.5 mg, Q6H PRN Max 4/day    benztropine (COGENTIN) injection 1 mg, BID PRN    haloperidol lactate (HALDOL) injection 5 mg, Q4H PRN Max 4/day **AND** LORazepam (ATIVAN) injection 2 mg, Q4H PRN Max 4/day **AND** benztropine (COGENTIN) injection 1 mg, Q4H PRN Max 4/day    benztropine (COGENTIN) tablet 1 mg, BID PRN    bisacodyl (DULCOLAX) rectal suppository 10 mg, Daily PRN    haloperidol (HALDOL) tablet 2 mg, Q4H PRN Max 6/day    haloperidol (HALDOL) tablet 5 mg, Q6H PRN Max 4/day    haloperidol (HALDOL) tablet 5 mg, Q4H PRN Max 4/day    hydrOXYzine HCL (ATARAX) tablet 25 mg, Q6H PRN Max 4/day    hydrOXYzine HCL (ATARAX) tablet 50 mg, Q4H PRN Max 4/day **OR** LORazepam (ATIVAN) injection 1 mg, Q4H PRN    LORazepam (ATIVAN) tablet 1 mg, Q4H PRN Max 6/day **OR** LORazepam (ATIVAN) injection 2 mg, Q6H PRN Max  3/day    magnesium hydroxide (MILK OF MAGNESIA) oral suspension 30 mL, Daily PRN    mirtazapine (REMERON) tablet 30 mg, HS    nicotine polacrilex (NICORETTE) gum 2 mg, Q2H PRN    [START ON 7/7/2025] paliperidone palmitate ER (INVEGA) IM- Deltoid injection 234 mg, Q28 Days    propranolol (INDERAL) tablet 10 mg, Q8H PRN    propranolol (INDERAL) tablet 10 mg, BID    senna-docusate sodium (SENOKOT S) 8.6-50 mg per tablet 1 tablet, Daily PRN    traZODone (DESYREL) tablet 50 mg, HS PRN    Risks / Benefits of Treatment:    Risks, benefits, and possible side effects of medications explained to patient and patient verbalizes understanding and agreement for treatment.    Progress Toward Goals: progressing    Treatment Planning:  All current active medications have been reviewed.  Continue to monitor response to treatment and assess for potential side effects of medications.  Encourage group therapy, milieu therapy and occupational therapy  Collaboration with medical service for medical comorbidities as indicated.  Behavioral Health checks for safety monitoring.  Estimated Discharge Day: 6/16  Legal Status: 201    Subjective:    Behavior over the last 24 hours: unchanged    No acute behavioral events over the past 24 hours. Today, patient was seen and examined at bedside for continuation of care.     Today, patient denies SI, HI, or AVH. She is visible and social in the milieu. Aware of plan for discharge Monday to [facility]. Was slightly anxious re discharge planning but overall doing well.     Patient denied other new or worsening psychiatric symptoms/complaints at this time.    Sleep: unchanged  Appetite: unchanged  Medication side effects: none reported  ROS: review of systems as noted in HPI, all other systems are negative    Objective:    Vital signs in last 24 hours:    Temp:  [97 °F (36.1 °C)-98.1 °F (36.7 °C)] 98.1 °F (36.7 °C)  HR:  [] 66  BP: (112-126)/(83-95) 112/83  Resp:  [18] 18  SpO2:  [97 %] 97 %  O2 Device:  "None (Room air)    Mental Status Evaluation:    Appearance: casually dressed, appears consistent with stated age  Motor: no psychomotor disturbances, no gait abnormalities  Behavior: cooperative, interacts with this writer appropriately  Speech: normal rate, rhythm, and volume  Mood: \"good\"  Affect: euthymic, normal range and intensity  Thought Process: organized, linear, and goal-oriented  Thought Content: denies auditory hallucinations, denies visual hallucinations, denies delusions  Risk Potential: denies suicidal ideation, plan, or intent. Denies homicidal ideation  Sensorium: Oriented to person, place, time, and situation  Cognition: cognitive ability appears intact but was not quantitatively tested  Consciousness: alert and awake  Attention: able to focus without difficulty  Insight: improved  Judgement: improved        Lab Results: I have reviewed the following results:  No results found for this or any previous visit (from the past 48 hours).     Administrative Statements    Counseling / Coordination of Care:   Patients progress discussed with staff in treatment team meeting. Medication changes reviewed with staff in treatment team meeting.    Puma Newby,  06/13/25    This note has been constructed using a voice recognition system. There may be translation, syntax, or grammatical errors. If you have any questions, please contact the dictating provider.    "

## 2025-06-14 PROCEDURE — 99232 SBSQ HOSP IP/OBS MODERATE 35: CPT | Performed by: STUDENT IN AN ORGANIZED HEALTH CARE EDUCATION/TRAINING PROGRAM

## 2025-06-14 RX ADMIN — HYDROXYZINE HYDROCHLORIDE 50 MG: 50 TABLET ORAL at 13:51

## 2025-06-14 RX ADMIN — NICOTINE POLACRILEX 2 MG: 4 GUM, CHEWING BUCCAL at 08:25

## 2025-06-14 RX ADMIN — NICOTINE POLACRILEX 2 MG: 4 GUM, CHEWING BUCCAL at 16:48

## 2025-06-14 RX ADMIN — PROPRANOLOL HYDROCHLORIDE 10 MG: 10 TABLET ORAL at 08:24

## 2025-06-14 RX ADMIN — MIRTAZAPINE 30 MG: 15 TABLET, FILM COATED ORAL at 21:36

## 2025-06-14 RX ADMIN — PROPRANOLOL HYDROCHLORIDE 10 MG: 10 TABLET ORAL at 21:35

## 2025-06-14 RX ADMIN — NICOTINE POLACRILEX 2 MG: 4 GUM, CHEWING BUCCAL at 12:07

## 2025-06-14 NOTE — PROGRESS NOTES
Progress Note - Behavioral Health   Name: Gabby Mares 35 y.o. female I MRN: [id number]  Unit/Bed#: -01 I Date of Admission: 5/22/2025   Date of Service: 6/14/2025 I Hospital Day: 23    Assessment & Plan  Mood disorder (HCC)  Invega Sustenna 156 mg IM given 6/2  Received 2nd dose of 156mg IM 6/9  Next 234 mg IM monthly injection due 7/7/25  Discharge Monday to crisis residential  Continue Propanolol 10 mg po BID for akathisia   Continue Remeron 30 mg po HS for dual benefit of depression and associated neurovegetative symptoms  Drug abuse (HCC)  Encourage cessation   Agoraphobia  See plan for mood disorder  Psychosis (HCC)  See plan for mood disorder  Morbid (severe) obesity due to excess calories ([facility])  Lifestyle changes    Current Medications:    Current Facility-Administered Medications:     mirtazapine (REMERON) tablet 30 mg, Oral, HS    [START ON 7/7/2025] paliperidone palmitate ER (INVEGA) IM- Deltoid injection 234 mg, Intramuscular (deltoid only), Q28 Days    propranolol (INDERAL) tablet 10 mg, Oral, BID    Current Facility-Administered Medications:     aluminum-magnesium hydroxide-simethicone (MAALOX) oral suspension 30 mL, Oral, Q4H PRN    artificial tear ophthalmic ointment, Both Eyes, 4x Daily PRN    haloperidol lactate (HALDOL) injection 2.5 mg, Intramuscular, Q6H PRN Max 4/day **AND** LORazepam (ATIVAN) injection 1 mg, Intramuscular, Q6H PRN Max 4/day **AND** benztropine (COGENTIN) injection 0.5 mg, Intramuscular, Q6H PRN Max 4/day    benztropine (COGENTIN) injection 1 mg, Intramuscular, BID PRN    haloperidol lactate (HALDOL) injection 5 mg, Intramuscular, Q4H PRN Max 4/day **AND** LORazepam (ATIVAN) injection 2 mg, Intramuscular, Q4H PRN Max 4/day **AND** benztropine (COGENTIN) injection 1 mg, Intramuscular, Q4H PRN Max 4/day    benztropine (COGENTIN) tablet 1 mg, Oral, BID PRN    bisacodyl (DULCOLAX) rectal suppository 10 mg, Rectal, Daily PRN    haloperidol (HALDOL) tablet 2 mg, Oral, Q4H  "PRN Max 6/day    haloperidol (HALDOL) tablet 5 mg, Oral, Q6H PRN Max 4/day    haloperidol (HALDOL) tablet 5 mg, Oral, Q4H PRN Max 4/day    hydrOXYzine HCL (ATARAX) tablet 25 mg, Oral, Q6H PRN Max 4/day    hydrOXYzine HCL (ATARAX) tablet 50 mg, Oral, Q4H PRN Max 4/day **OR** LORazepam (ATIVAN) injection 1 mg, Intramuscular, Q4H PRN    LORazepam (ATIVAN) tablet 1 mg, Oral, Q4H PRN Max 6/day **OR** LORazepam (ATIVAN) injection 2 mg, Intramuscular, Q6H PRN Max 3/day    magnesium hydroxide (MILK OF MAGNESIA) oral suspension 30 mL, Oral, Daily PRN    nicotine polacrilex (NICORETTE) gum 2 mg, Oral, Q2H PRN    propranolol (INDERAL) tablet 10 mg, Oral, Q8H PRN    senna-docusate sodium (SENOKOT S) 8.6-50 mg per tablet 1 tablet, Oral, Daily PRN    traZODone (DESYREL) tablet 50 mg, Oral, HS PRN    Risks/Benefits of Treatment:     Risks, benefits, and possible side effects of medications explained to patient and patient verbalizes understanding and agreement for treatment.    Progress Toward Goals: progressing    Treatment Planning:     All current active medications have been reviewed.  Continue to monitor response to treatment and assess for potential side effects of medications.  Encourage group therapy, milieu therapy and occupational therapy.  Collaboration with medical service for medical comorbidities as indicated.  Behavioral Health checks for safety monitoring.  Estimated Discharge Day: 6/16/2025   Legal Status : 201      Subjective     Behavior over the last 24 hours: unchanged    Gabby was today for coordination or care. According to Nursing she has been calm and cooperative on the unit. She has reported anxiety at times and received Atarax 50 mg PRN at 1706 last evening. Today she was sitting up in her bed upon approach and was agreeable to encounter. She reports feeling \"good\" today. She shares that she was \"very tired\" yesterday and today her energy is improved. She confirms that she slept throughout the night. She " "has had an adequate appetite. She describes some mild anxiety today. She feels that she is able to manage this with coping skills. She is looking forward to upcoming discharge. She denies any auditory or visual hallucinations. No overt symptoms of casandra. She denies any suicidal or homicidal thought, plan, or intent.     Sleep: normal  Appetite: normal  Medication side effects: No  ROS: review of systems as noted above in HPI/Subjective report, all other systems are negative    Objective :  Temp:  [97.2 °F (36.2 °C)-97.5 °F (36.4 °C)] 97.2 °F (36.2 °C)  HR:  [] 72  BP: ()/() 132/100  Resp:  [18-20] 20  SpO2:  [99 %-100 %] 99 %  O2 Device: None (Room air)    Mental Status Evaluation:    Appearance:  casually dressed, dressed appropriately   Behavior:  cooperative   Speech:  normal rate, normal volume, normal pitch   Mood:  \"Good\"   Affect:  normal range and intensity   Thought Process:  organized, goal directed   Thought Content:  no overt delusions   Perceptual Disturbances: denies auditory or visual hallucinations when asked   Risk Potential: Suicidal Ideation -  None at present  Homicidal Ideation -  None at present  Potential for Aggression - No   Sensorium:  oriented to person, place, and time/date   Memory:  recent and remote memory grossly intact   Consciousness:  alert and awake   Attention/Concentration: attention span and concentration are age appropriate   Insight:  intact   Judgment: intact   Gait/Station: in bed   Motor Activity: no abnormal movements       Lab Results: I have reviewed the following results:  Most Recent Labs:   Lab Results   Component Value Date    WBC 6.92 05/23/2025    RBC 4.49 05/23/2025    HGB 11.8 05/23/2025    HCT 38.9 05/23/2025     05/23/2025    RDW 16.2 (H) 05/23/2025    NEUTROABS 4.26 05/23/2025    SODIUM 137 05/23/2025    K 4.4 05/23/2025     05/23/2025    CO2 29 05/23/2025    BUN 25 05/23/2025    CREATININE 1.35 (H) 05/23/2025    GLUC 88 " "05/23/2025    CALCIUM 9.2 05/23/2025    AST 35 05/23/2025    ALT 23 05/23/2025    ALKPHOS 82 05/23/2025    TP 7.3 05/23/2025    ALB 3.7 05/23/2025    TBILI 0.44 05/23/2025    CHOLESTEROL 162 05/23/2025    HDL 46 (L) 05/23/2025    TRIG 155 (H) 05/23/2025    LDLCALC 85 05/23/2025    NONHDLC 116 05/23/2025    AMMONIA 32 10/17/2022    [id number] 1.847 05/23/2025    FREET4 0.86 05/11/2018    PREGUR Negative 10/18/2022    PREGSERUM Negative 05/23/2025    HCGQUANT <2 08/07/2018    SYPHILISAB Non-reactive 11/14/2024    TREPONEMAPA Non-reactive 05/23/2025    HGBA1C 5.6 05/23/2025     05/23/2025       Administrative Statements     Counseling / Coordination of Care:   Patient's progress reviewed with nursing staff.  Group attendance encouraged.  Crisis/safety plan discussed with patient.    Portions of the record may have been created with voice recognition software. Occasional wrong word or \"sound a like\" substitutions may have occurred due to the inherent limitations of voice recognition software. Read the chart carefully and recognize, using context, where substitutions have occurred.    Aleksandra Cevallos PA-C 06/14/25  "

## 2025-06-14 NOTE — NURSING NOTE
"Withdrawn to room, did not attend groups. OOB briefly for dinner. Informed RN she felt tired due to rainy weather. Continues with ongoing anxiety, although \"a little better\" in comparison to first day of admission. Denies SI/HI/AVH. Utilizing PRN nicotine gum for nicotine cravings. Plan of care ongoing. Denies unmet needs.   "

## 2025-06-14 NOTE — NURSING NOTE
Pt awake and OOB for breakfast and meds, made a phone call and returned to room. Pt anxiously awaiting sister's return home from [place] to discharge to sister's home. Denies SI, HI, AVH at this time. Drowsy and bored so continues to rest in bed.

## 2025-06-14 NOTE — NURSING NOTE
At 13:51 RN administered Atarax 50 mg PO for moderate anxiety related to life circumstances (christensen= 24). Upon follow-up, patient endorses medication effectiveness.

## 2025-06-15 PROCEDURE — 99232 SBSQ HOSP IP/OBS MODERATE 35: CPT | Performed by: STUDENT IN AN ORGANIZED HEALTH CARE EDUCATION/TRAINING PROGRAM

## 2025-06-15 RX ADMIN — PROPRANOLOL HYDROCHLORIDE 10 MG: 10 TABLET ORAL at 08:27

## 2025-06-15 RX ADMIN — MIRTAZAPINE 30 MG: 15 TABLET, FILM COATED ORAL at 21:05

## 2025-06-15 RX ADMIN — PROPRANOLOL HYDROCHLORIDE 10 MG: 10 TABLET ORAL at 21:05

## 2025-06-15 RX ADMIN — HYDROXYZINE HYDROCHLORIDE 50 MG: 50 TABLET ORAL at 14:10

## 2025-06-15 RX ADMIN — NICOTINE POLACRILEX 2 MG: 4 GUM, CHEWING BUCCAL at 08:42

## 2025-06-15 RX ADMIN — HYDROXYZINE HYDROCHLORIDE 50 MG: 50 TABLET ORAL at 08:27

## 2025-06-15 NOTE — NURSING NOTE
At 14:10 RN administered Atarax 50 mg PO for moderate anxiety (Dorado= 18). Patient contributes heightened anxiety to sister not answering her phone calls. Upon follow-up, patient endorses medication effectiveness.

## 2025-06-15 NOTE — TREATMENT TEAM
06/15/25 0700   Team Meeting   Meeting Type Daily Rounds   Team Members Present   Team Members Present Physician;Nurse   Physician Team Member Dr. Ludwig/Ban CAMARGO   Nursing Team Member Clauser   Patient/Family Present   Patient Present No   Patient's Family Present No     AM rounds - 201, SI to jump out of a moving car. Remains withdrawn. Denies SI, HI, AVH. Denies depression reports ongoing anxiety. Plans to discharge tomorrow.

## 2025-06-15 NOTE — PROGRESS NOTES
Progress Note - Behavioral Health   Name: Gabby Mares 35 y.o. female I MRN: [id number]  Unit/Bed#: -01 I Date of Admission: 5/22/2025   Date of Service: 6/15/2025 I Hospital Day: 24    Assessment & Plan  Mood disorder (HCC)  Invega Sustenna 156 mg IM given 6/2  Received 2nd dose of 156mg IM 6/9  Next 234 mg IM monthly injection due 7/7/25  Discharge Monday to crisis residential  Continue Propanolol 10 mg po BID for akathisia   Continue Remeron 30 mg po HS for dual benefit of depression and associated neurovegetative symptoms  Drug abuse (HCC)  Encourage cessation   Agoraphobia  See plan for mood disorder  Psychosis (HCC)  See plan for mood disorder  Morbid (severe) obesity due to excess calories ([facility])  Lifestyle changes    Current Medications:    Current Facility-Administered Medications:     mirtazapine (REMERON) tablet 30 mg, Oral, HS    [START ON 7/7/2025] paliperidone palmitate ER (INVEGA) IM- Deltoid injection 234 mg, Intramuscular (deltoid only), Q28 Days    propranolol (INDERAL) tablet 10 mg, Oral, BID    Current Facility-Administered Medications:     aluminum-magnesium hydroxide-simethicone (MAALOX) oral suspension 30 mL, Oral, Q4H PRN    artificial tear ophthalmic ointment, Both Eyes, 4x Daily PRN    haloperidol lactate (HALDOL) injection 2.5 mg, Intramuscular, Q6H PRN Max 4/day **AND** LORazepam (ATIVAN) injection 1 mg, Intramuscular, Q6H PRN Max 4/day **AND** benztropine (COGENTIN) injection 0.5 mg, Intramuscular, Q6H PRN Max 4/day    benztropine (COGENTIN) injection 1 mg, Intramuscular, BID PRN    haloperidol lactate (HALDOL) injection 5 mg, Intramuscular, Q4H PRN Max 4/day **AND** LORazepam (ATIVAN) injection 2 mg, Intramuscular, Q4H PRN Max 4/day **AND** benztropine (COGENTIN) injection 1 mg, Intramuscular, Q4H PRN Max 4/day    benztropine (COGENTIN) tablet 1 mg, Oral, BID PRN    bisacodyl (DULCOLAX) rectal suppository 10 mg, Rectal, Daily PRN    haloperidol (HALDOL) tablet 2 mg, Oral, Q4H  "PRN Max 6/day    haloperidol (HALDOL) tablet 5 mg, Oral, Q6H PRN Max 4/day    haloperidol (HALDOL) tablet 5 mg, Oral, Q4H PRN Max 4/day    hydrOXYzine HCL (ATARAX) tablet 25 mg, Oral, Q6H PRN Max 4/day    hydrOXYzine HCL (ATARAX) tablet 50 mg, Oral, Q4H PRN Max 4/day **OR** LORazepam (ATIVAN) injection 1 mg, Intramuscular, Q4H PRN    LORazepam (ATIVAN) tablet 1 mg, Oral, Q4H PRN Max 6/day **OR** LORazepam (ATIVAN) injection 2 mg, Intramuscular, Q6H PRN Max 3/day    magnesium hydroxide (MILK OF MAGNESIA) oral suspension 30 mL, Oral, Daily PRN    nicotine polacrilex (NICORETTE) gum 2 mg, Oral, Q2H PRN    propranolol (INDERAL) tablet 10 mg, Oral, Q8H PRN    senna-docusate sodium (SENOKOT S) 8.6-50 mg per tablet 1 tablet, Oral, Daily PRN    traZODone (DESYREL) tablet 50 mg, Oral, HS PRN    Risks/Benefits of Treatment:     Risks, benefits, and possible side effects of medications explained to patient and patient verbalizes understanding and agreement for treatment.    Progress Toward Goals: progressing    Treatment Planning:     All current active medications have been reviewed.  Continue to monitor response to treatment and assess for potential side effects of medications.  Encourage group therapy, milieu therapy and occupational therapy.  Collaboration with medical service for medical comorbidities as indicated.  Behavioral Health checks for safety monitoring.  Estimated Discharge Day: 6/16/2025   Legal Status : 201      Subjective     Behavior over the last 24 hours: unchanged    Gabby was seen today for coordination of care.  According to nursing she has had intermittent anxiety.  Today she is found laying in bed.  She was agreeable to encounter.  She reports feeling \"good.\"  She reports an adequate appetite and that she slept well last evening.  She denies any medication side effects and feels her regimen has been helping for her symptoms.  She denies any safety concerns today.  No suicidal or homicidal thought, " "plan, or intent.  She denies any auditory or visual hallucinations.  No overt symptoms of casandra.    Sleep: normal  Appetite: normal  Medication side effects: No  ROS: review of systems as noted above in HPI/Subjective report, all other systems are negative    Objective :  Temp:  [97.3 °F (36.3 °C)-97.6 °F (36.4 °C)] 97.6 °F (36.4 °C)  HR:  [] 108  BP: (103-127)/(67-87) 127/79  Resp:  [16-18] 16  SpO2:  [100 %] 100 %  O2 Device: None (Room air)    Mental Status Evaluation:    Appearance:  age appropriate, casually dressed, adequate grooming   Behavior:  pleasant, cooperative   Speech:  normal rate and volume   Mood:  \"Pretty good\"   Affect:  normal range and intensity   Thought Process:  organized, goal directed   Thought Content:  no overt delusions   Perceptual Disturbances: denies auditory or visual hallucinations when asked   Risk Potential: Suicidal Ideation -  None at present  Homicidal Ideation -  None at present  Potential for Aggression - No   Sensorium:  oriented to person, place, and time/date   Memory:  recent and remote memory grossly intact   Consciousness:  alert and awake   Attention/Concentration: attention span and concentration are age appropriate   Insight:  intact   Judgment: intact   Gait/Station: Laying in bed   Motor Activity: no abnormal movements       Lab Results: I have reviewed the following results:  Most Recent Labs:   Lab Results   Component Value Date    WBC 6.92 05/23/2025    RBC 4.49 05/23/2025    HGB 11.8 05/23/2025    HCT 38.9 05/23/2025     05/23/2025    RDW 16.2 (H) 05/23/2025    NEUTROABS 4.26 05/23/2025    SODIUM 137 05/23/2025    K 4.4 05/23/2025     05/23/2025    CO2 29 05/23/2025    BUN 25 05/23/2025    CREATININE 1.35 (H) 05/23/2025    GLUC 88 05/23/2025    CALCIUM 9.2 05/23/2025    AST 35 05/23/2025    ALT 23 05/23/2025    ALKPHOS 82 05/23/2025    TP 7.3 05/23/2025    ALB 3.7 05/23/2025    TBILI 0.44 05/23/2025    CHOLESTEROL 162 05/23/2025    HDL 46 " "(L) 05/23/2025    TRIG 155 (H) 05/23/2025    LDLCALC 85 05/23/2025    NONHDLC 116 05/23/2025    AMMONIA 32 10/17/2022    [id number] 1.847 05/23/2025    FREET4 0.86 05/11/2018    PREGUR Negative 10/18/2022    PREGSERUM Negative 05/23/2025    HCGQUANT <2 08/07/2018    SYPHILISAB Non-reactive 11/14/2024    TREPONEMAPA Non-reactive 05/23/2025    HGBA1C 5.6 05/23/2025     05/23/2025       Administrative Statements     Counseling / Coordination of Care:   Patient's progress reviewed with nursing staff.  Supportive therapy provided to patient.  Encouraged participation in milieu and group therapy on the unit.  Crisis/safety plan discussed with patient.  Discharge plan discussed with patient.    Portions of the record may have been created with voice recognition software. Occasional wrong word or \"sound a like\" substitutions may have occurred due to the inherent limitations of voice recognition software. Read the chart carefully and recognize, using context, where substitutions have occurred.    Aleksandra Cevallos PA-C 06/15/25  "

## 2025-06-15 NOTE — NURSING NOTE
Withdrawn to room, OOB briefly walking hallways. Endorses ongoing anxiety related to sister not answering her phone calls. States it is possible her sister is still driving back from [place]. Patient plans to call sister again later tonight as she is scheduled to discharge to her sister's tomorrow. Denies SI/HI/AVH. Plan of care ongoing. Denies unmet needs.

## 2025-06-15 NOTE — NURSING NOTE
Pt walking the halls this morning. Attempted to call sister but she did not answer the phones. Reports sister is coming home from vacation today. Pt having fluctuating anxiety about discharge planning. Denies SI/HI or hallucination. Cooperative with physical assessment.

## 2025-06-15 NOTE — PLAN OF CARE
Problem: DEPRESSION  Goal: Will be euthymic at discharge  Description: INTERVENTIONS:  - Administer medication as ordered  - Provide emotional support via 1:1 interaction with staff  - Encourage involvement in milieu/groups/activities  - Monitor for social isolation  Outcome: Progressing     Problem: PSYCHOSIS  Goal: Will report no hallucinations or delusions  Description: Interventions:  - Administer medication as  ordered  - Every waking shifts and PRN assess for the presence of hallucinations and or delusions  - Assist with reality testing to support increasing orientation  - Assess if patient's hallucinations or delusions are encouraging self-harm or harm to others and intervene as appropriate  Outcome: Progressing     Problem: ANXIETY  Goal: Will report anxiety at manageable levels  Description: INTERVENTIONS:  - Administer medication as ordered  - Teach and encourage coping skills  - Provide emotional support  - Assess patient/family for anxiety and ability to cope  Outcome: Progressing  Goal: By discharge: Patient will verbalize 2 strategies to deal with anxiety  Description: Interventions:  - Identify any obvious source/trigger to anxiety  - Staff will assist patient in applying identified coping technique/skills  - Encourage attendance of scheduled groups and activities  Outcome: Progressing     Problem: DISCHARGE PLANNING - CARE MANAGEMENT  Goal: Discharge to post-acute care or home with appropriate resources  Description: INTERVENTIONS:  - Conduct assessment to determine patient/family and health care team treatment goals, and need for post-acute services based on payer coverage, community resources, and patient preferences, and barriers to discharge  - Address psychosocial, clinical, and financial barriers to discharge as identified in assessment in conjunction with the patient/family and health care team  - Arrange appropriate level of post-acute services according to patient’s   needs and preference  and payer coverage in collaboration with the physician and health care team  - Communicate with and update the patient/family, physician, and health care team regarding progress on the discharge plan  - Arrange appropriate transportation to post-acute venues  Outcome: Progressing     Problem: Ineffective Coping  Goal: Identifies ineffective coping skills  Outcome: Progressing  Goal: Identifies healthy coping skills  Outcome: Progressing  Goal: Demonstrates healthy coping skills  Outcome: Progressing  Goal: Participates in unit activities  Description: Interventions:  - Provide therapeutic environment   - Provide required programming   - Redirect inappropriate behaviors   Outcome: Not Progressing     Problem: Risk for Self Injury/Neglect  Goal: Treatment Goal: Remain safe during length of stay, learn and adopt new coping skills, and be free of self-injurious ideation, impulses and acts at the time of discharge  Outcome: Progressing  Goal: Verbalize thoughts and feelings  Description: Interventions:  - Assess and re-assess patient's lethality and potential for self-injury  - Engage patient in 1:1 interactions, daily, for a minimum of 15 minutes  - Encourage patient to express feelings, fears, frustrations, hopes  - Establish rapport/trust with patient   Outcome: Progressing  Goal: Refrain from harming self  Description: Interventions:  - Monitor patient closely, per order  - Develop a trusting relationship  - Supervise medication ingestion, monitor effects and side effects   Outcome: Progressing

## 2025-06-15 NOTE — NURSING NOTE
At 08:27 RN administered Atarax 50 mg PO for moderate anxiety. Upon follow-up, patient endorses medication effectiveness.    Erythromycin Counseling:  I discussed with the patient the risks of erythromycin including but not limited to GI upset, allergic reaction, drug rash, diarrhea, increase in liver enzymes, and yeast infections.

## 2025-06-16 PROCEDURE — 99232 SBSQ HOSP IP/OBS MODERATE 35: CPT | Performed by: STUDENT IN AN ORGANIZED HEALTH CARE EDUCATION/TRAINING PROGRAM

## 2025-06-16 RX ADMIN — MIRTAZAPINE 30 MG: 15 TABLET, FILM COATED ORAL at 21:58

## 2025-06-16 RX ADMIN — NICOTINE POLACRILEX 2 MG: 4 GUM, CHEWING BUCCAL at 12:19

## 2025-06-16 RX ADMIN — PROPRANOLOL HYDROCHLORIDE 10 MG: 10 TABLET ORAL at 09:19

## 2025-06-16 RX ADMIN — HYDROXYZINE HYDROCHLORIDE 50 MG: 50 TABLET ORAL at 16:56

## 2025-06-16 NOTE — NURSING NOTE
Pt pleasant during assessment. Reports improvement. Reports 5/10 depression and anxiety. Denies SI/HI/AVH. Pt is looking forward to discharge. Reports good sleep and appetite. Denies unmet needs at this time.

## 2025-06-16 NOTE — PROGRESS NOTES
06/16/25 0752   Team Meeting   Meeting Type Daily Rounds   Team Members Present   Team Members Present Physician;Nurse;;Other (Discipline and Name)   Physician Team Member Dr. Ramirez / ODILON Cook / MARY JO Cardona   Nursing Team Member Aly / Guanakito   Care Management Team Member Serafin / Suly / Rajiv / Miguel   Patient/Family Present   Patient Present No   Patient's Family Present No   Pt withdrawn to room. Denies SI. Pt's sister was supposed to be home from vacation on John 6/15 with plan to dc today 6/16/25. Pt reported having difficulty getting in touch with sister yesterday and is now reporting sister is still on the way home from [place] and will be back on Wednesday 6/18.     DC: CM will call [facility] residence back today to see if pt can be accepted there from their wait list until pt's sister returns.

## 2025-06-16 NOTE — ASSESSMENT & PLAN NOTE
Invega Sustenna 156 mg IM given 6/2  Received 2nd dose of 156mg IM 6/9  Next 234 mg IM monthly injection due 7/7/25  Discharge this week to crisis residential or home with sister  Continue Propanolol 10 mg po BID for akathisia   Continue Remeron 30 mg po HS for dual benefit of depression and associated neurovegetative symptoms

## 2025-06-16 NOTE — CASE MANAGEMENT
"CM called [facility] ([phone]) to get an update if pt is still on their wait list and if it is possible.   MARKO spoke to  staff and they reported \"we have some people leaving early this week and my Director will be in after 11am then have a meeting to discuss new admissions.\" CM will call back later today.        CM received VM back from [facility] ([phone]) with response regarding CRR status. She reported \"we're working on setting up an interview with CRR and we will work on that next week and I will keep in touch with her and her sister.\"     1:05pm   CM called [facility] back ([phone]) to check in again on the referral. CM spoke to staff and they reported \"I am Not sure how long the wait list is and who is in front of her. I will let Anayeli (Director) know you called.     4:10pm   CM called pt's sister Rosemary ([phone]) and left  asking for a call back regarding her return date for CM to arrange for pt to return to her home. Pt reported sister is now coming back on Wednesday 6/18 and could pick pt up on 6/19. (pt previously reported sister was to come home on John 6/15 and pt would dc today 6/16) CM requested a call back from Rosemary to confirm as pt will not be able to stay on unit longer than Thursday (6/19/25) and CM attempting to ensure pt has safe place to return to which was plan for her to return to sisters home until her CRR placement becomes available.   "

## 2025-06-16 NOTE — DISCHARGE SUMMARY
Discharge Summary - Behavioral Health   Name: Gabby Ramosland 35 y.o. female I MRN: [id number]  Unit/Bed#: -01 I Date of Admission: 5/22/2025   Date of Service: 6/20/2025 I Hospital Day: 29    Assessment & Plan  Mood disorder (HCC)  Invega Sustenna 156 mg IM given 6/2  Received 2nd dose of 156mg IM 6/9  Next 234 mg IM monthly injection due 7/7/25  Propanolol 10 mg po BID for akathisia   Remeron 30 mg po HS for dual benefit of depression and associated neurovegetative symptoms  Drug abuse (HCC)  Encourage cessation   Agoraphobia  See plan for mood disorder  Psychosis (HCC)  See plan for mood disorder  Morbid (severe) obesity due to excess calories (HCC)  Lifestyle changes    Admission Date: 5/22/2025       Discharge Date:  06/20/2025  Attending Psychiatrist: Puma Newby, DO    Admission Diagnosis:  Principal Problem:    Mood disorder (HCC)  Active Problems:    Medical clearance for psychiatric admission    Drug abuse (HCC)    Agoraphobia    Morbid (severe) obesity due to excess calories (HCC)    Psychosis (HCC)    Discharge Diagnosis:   Principal Problem:    Mood disorder (HCC)  Active Problems:    Medical clearance for psychiatric admission    Drug abuse (HCC)    Agoraphobia    Morbid (severe) obesity due to excess calories (HCC)    Psychosis (HCC)  Resolved Problems:    * No resolved hospital problems. *    Medical Problems       Resolved Problems  Date Reviewed: 6/14/2025   None         Reason for Admission/HPI:     Per H & P     Past Medical History[1]  Past Surgical History[2]  Allergies   Allergen Reactions    Adhesive [Medical Tape]      rash    Benazepril Cough       Objective :  Temp:  [97.1 °F (36.2 °C)-97.4 °F (36.3 °C)] 97.1 °F (36.2 °C)  HR:  [68-88] 74  BP: ()/(57-88) 142/88  Resp:  [18] 18  SpO2:  [98 %-100 %] 98 %  O2 Device: None (Room air)    Hospital Course:     Gabby was admitted to the inpatient psychiatric unit and started on Behavioral Health checks for safety monitoring.  During the hospitalization she was attending individual therapy, group therapy, milieu therapy and occupational therapy..     Psychiatric medications were titrated over the hospital stay. To address depressive symptoms, Gabby was treated with antidepressant Remeron, antipsychotic medication Invega and Invega Sustenna, and anxiolytic medication Propanolol. Medication doses were adjusted during the hospital course. Remeron was added and titrated to 30mg PO daily at bedtime for depression and neurovegetative symptoms. Invega was added and titrated to 9 mg PO daily for psychotic symptoms and mood stabilization. Invega Sustenna was initiated, she received Invega Sustenna 156mg IM on 06/02/2025 and then a booster dose of 156mg IM on 06/09/2025.  Going forward she will receive maintenance dose of 234mg IM every 28 days, the next being due on 07/07/2025 . Propanolol was added at the dose of 10mg PO two times a day for restlessness and anxiety symptoms. Prior to beginning of treatment medications risks and benefits and possible side effects including risk of parkinsonian symptoms, Tardive Dyskinesia and metabolic syndrome related to treatment with antipsychotic medications, risk of suicidality and serotonin syndrome related to treatment with antidepressants, and low heart rate and hypotension with Propanolol were reviewed with Gabby. She verbalized understanding and agreement for treatment. Upon admission Gabby was seen by medical service for medical clearance for inpatient treatment and medical follow up.     Gabby's symptoms gradually improved over the hospital course. Initially after admission she was still feeling depressed, anxious, and psychotic. With adjustment of medications and therapeutic milieu her symptoms gradually improved. At the end of treatment Gabby was more stable. Her mood was more stable at the time of discharge. Gabby denied suicidal ideation, intent or plan at the time of discharge and denied homicidal  ideation, intent or plan at the time of discharge. There was no overt psychosis at the time of discharge. Auditory hallucinations were resolved. Gabby was participating appropriately in milieu at the time of discharge. Behavior was appropriate on the unit at the time of discharge. Sleep and appetite were improved.     Since Gabby was doing well at the end of the hospitalization, treatment team felt that she could be safely discharged to outpatient care.  Gabby was discharged to [facility] until she stay with her sister and then eventually transfer to [facility].      The outpatient follow up with a psychiatrist at [facility], Intensive  with [facility], and the infusion center at [facility] for [facility]A appointment was arranged by the unit  upon discharge.       Mental Status at Time of Discharge:     Appearance:  casually dressed, adequate grooming   Behavior:  pleasant, cooperative, calm   Speech:  normal rate and volume   Mood:  euthymic   Affect:  constricted   Thought Process:  goal directed, linear, concrete   Thought Content:  no overt delusions   Perceptual Disturbances: none   Risk Potential: Suicidal Ideation -  None  Homicidal Ideation -  None  Potential for Aggression - No   Sensorium:  oriented to person, place, and time/date   Memory:  recent and remote memory grossly intact   Consciousness:  alert and awake   Attention/Concentration: attention span and concentration appear shorter than expected for age   Insight:  limited   Judgment: partial   Gait/Station: normal gait/station   Motor Activity: no abnormal movements     Suicide/Homicide Risk Assessment:    Risk of Harm to Self:   The following ratings are based on assessment at the time of the interview  Nursing Suicide Risk Assessment Last 24 hours: C-SSRS Risk (Since Last Contact)  Calculated C-SSRS Risk Score (Since Last Contact): No Risk Indicated  Demographic Risk  Factors include: , never   Historical Risk Factors include: chronic psychiatric problems  Current Specific Risk Factors include: recent inpatient psychiatric admission - being discharged today  Protective Factors: improved mood, improved depressive symptoms, improved psychotic symptoms, ability to adapt to change, ability to manage anger well, ability to make plans for the future, no current suicidal plan or intent, outpatient psychiatric follow up established  Weapons/Firearms: none. The following steps have been taken to ensure weapons are properly secured: not applicable  Based on today's assessment, Gabby presents the following risk of harm to self: low    Risk of Harm to Others:  The following ratings are based on  assessment at the time of the interview  Nursing Homicide Risk Assessment: Violence Risk to Others: Denies within past 6 months  Demographic Risk Factors include: none  Historical Risk Factors include: none  Current Specific Risk Factors include: social difficulties  Protective Factors: no current homicidal ideation, no current psychotic symptoms, compliant with medications, compliant with mental health treatment, willing to continue psychiatric treatment, willing to remain free from substance use, outpatient psychiatric follow up established  Weapons/Firearms: none. The following steps have been taken to ensure weapons are properly secured: not applicable  Based on today's assessment, Gabby presents the following risk of harm to others: minimal    The following interventions are recommended: outpatient follow up with a psychiatrist, outpatient follow up with a therapist      Lab Results: I have reviewed the following results:  Results from the past 24 hours: No results found for this or any previous visit (from the past 24 hours).  Most Recent Labs:   Lab Results   Component Value Date    WBC 6.92 05/23/2025    RBC 4.49 05/23/2025    HGB 11.8 05/23/2025    HCT 38.9 05/23/2025    PLT  310 05/23/2025    RDW 16.2 (H) 05/23/2025    NEUTROABS 4.26 05/23/2025    SODIUM 137 05/23/2025    K 4.4 05/23/2025     05/23/2025    CO2 29 05/23/2025    BUN 25 05/23/2025    CREATININE 1.35 (H) 05/23/2025    GLUC 88 05/23/2025    CALCIUM 9.2 05/23/2025    AST 35 05/23/2025    ALT 23 05/23/2025    ALKPHOS 82 05/23/2025    TP 7.3 05/23/2025    ALB 3.7 05/23/2025    TBILI 0.44 05/23/2025    CHOLESTEROL 162 05/23/2025    HDL 46 (L) 05/23/2025    TRIG 155 (H) 05/23/2025    LDLCALC 85 05/23/2025    NONHDLC 116 05/23/2025    AMMONIA 32 10/17/2022    JVL0XUKJIMOM 1.847 05/23/2025    FREET4 0.86 05/11/2018    PREGUR Negative 10/18/2022    PREGSERUM Negative 05/23/2025    HCGQUANT <2 08/07/2018    SYPHILISAB Non-reactive 11/14/2024    TREPONEMAPA Non-reactive 05/23/2025    HGBA1C 5.6 05/23/2025     05/23/2025       Discharge Medications:    Home Medications After Discharge    Scheduled   mirtazapine (REMERON) 30 mg tablet, Take 1 tablet (30 mg total) by mouth daily at bedtime   paliperidone palmitate ER (INVEGA) 234 mg/1.5 mL IM injection, 1.5 mL (234 mg total) by Intramuscular (deltoid only) route every 28 days Do not start before July 7, 2025. Start: 07/07/25   propranolol (INDERAL) 10 mg tablet, Take 1 tablet (10 mg total) by mouth in the morning and 1 tablet (10 mg total) before bedtime.    Discharge instructions/Information to patient and family:   See After Visit Summary for information provided to patient and family.      Provisions for Follow-Up Care:  See after visit summary for information related to follow-up care and any pertinent home health orders.      Discharge Statement:    I have spent a total time of 45 minutes in caring for this patient on the day of the visit/encounter.   >30 minutes of time was spent on: Diagnostic results, Risks and benefits of tx options, Importance of tx compliance, Counseling / Coordination of care, Documenting in the medical record, Reviewing / ordering tests,  "medicine, procedures  , and Communicating with other healthcare professionals .  I reviewed with Gabby importance of compliance with medications and outpatient treatment after discharge.  I discussed the medication regimen and possible side effects of the medications with Gabby prior to discharge. At the time of discharge she was tolerating psychiatric medications.  I discussed outpatient follow up with Gabby.  I reviewed with Gabby crisis plan and safety plan upon discharge.  Gabby agreed to abstain from drug and alcohol use after discharge.  Gabby was competent to understand risks and benefits of withholding information and risks and benefits of her actions.    Discharge on Two Antipsychotic Medications: No    Portions of the record may have been created with voice recognition software. Occasional wrong word or \"sound a like\" substitutions may have occurred due to the inherent limitations of voice recognition software. Read the chart carefully and recognize, using context, where substitutions have occurred.    ODILON Nunez 06/20/25          [1]   Past Medical History:  Diagnosis Date    Acne     Agoraphobia     Anxiety     Bipolar disorder, current episode of casandra with psychotic features 2/27/2025    Depression     Drug abuse (HCC) 11/14/2024    Diphenhydramine abuse    Eating disorder     Heart disease     Hypertension     Impulse control disorder     Obesity     Panic attack     Panic disorder     Psychiatric disorder     depression, bipolar, schizophrenia    Psychiatric illness     Psychosis (HCC)     Schizoaffective disorder (HCC) 2/27/2025    Schizophrenia ([facility])     Schizophrenia (HCC)     Schizophrenia (HCC)     Seizures ([facility])     Self-injurious behavior     Sleep difficulties     Suicide attempt ([facility])    [2]   Past Surgical History:  Procedure Laterality Date    CYSTOSCOPY      INCISION AND DRAINAGE OF WOUND N/A 2/1/2019    Procedure: INCISION AND DRAINAGE (I&D) TRUNK;  Surgeon: Mathew Fatima, " DO;  Location:  MAIN OR;  Service: General    WISDOM TOOTH EXTRACTION

## 2025-06-16 NOTE — NURSING NOTE
Pt remains calm and cooperative. Resting in room, reports sleeping well overnight however still feeling tired. Denies SI/HI, AVH. Reports anxiety and depression has improved and feels readiness for discharge. Pt hopeful to discharge to sisters later today. Denies any questions regarding treatment at this time.

## 2025-06-16 NOTE — ASSESSMENT & PLAN NOTE
Invega Sustenna 156 mg IM given 6/2  Received 2nd dose of 156mg IM 6/9  Next 234 mg IM monthly injection due 7/7/25  Propanolol 10 mg po BID for akathisia   Remeron 30 mg po HS for dual benefit of depression and associated neurovegetative symptoms

## 2025-06-16 NOTE — PLAN OF CARE
Problem: DEPRESSION  Goal: Will be euthymic at discharge  Description: INTERVENTIONS:  - Administer medication as ordered  - Provide emotional support via 1:1 interaction with staff  - Encourage involvement in milieu/groups/activities  - Monitor for social isolation  Outcome: Progressing     Problem: PSYCHOSIS  Goal: Will report no hallucinations or delusions  Description: Interventions:  - Administer medication as  ordered  - Every waking shifts and PRN assess for the presence of hallucinations and or delusions  - Assist with reality testing to support increasing orientation  - Assess if patient's hallucinations or delusions are encouraging self-harm or harm to others and intervene as appropriate  Outcome: Progressing     Problem: ANXIETY  Goal: Will report anxiety at manageable levels  Description: INTERVENTIONS:  - Administer medication as ordered  - Teach and encourage coping skills  - Provide emotional support  - Assess patient/family for anxiety and ability to cope  Outcome: Progressing  Goal: By discharge: Patient will verbalize 2 strategies to deal with anxiety  Description: Interventions:  - Identify any obvious source/trigger to anxiety  - Staff will assist patient in applying identified coping technique/skills  - Encourage attendance of scheduled groups and activities  Outcome: Progressing     Problem: DISCHARGE PLANNING - CARE MANAGEMENT  Goal: Discharge to post-acute care or home with appropriate resources  Description: INTERVENTIONS:  - Conduct assessment to determine patient/family and health care team treatment goals, and need for post-acute services based on payer coverage, community resources, and patient preferences, and barriers to discharge  - Address psychosocial, clinical, and financial barriers to discharge as identified in assessment in conjunction with the patient/family and health care team  - Arrange appropriate level of post-acute services according to patient’s   needs and preference  and payer coverage in collaboration with the physician and health care team  - Communicate with and update the patient/family, physician, and health care team regarding progress on the discharge plan  - Arrange appropriate transportation to post-acute venues  Outcome: Progressing     Problem: Ineffective Coping  Goal: Identifies ineffective coping skills  Outcome: Progressing  Goal: Identifies healthy coping skills  Outcome: Progressing  Goal: Demonstrates healthy coping skills  Outcome: Not Progressing  Goal: Participates in unit activities  Description: Interventions:  - Provide therapeutic environment   - Provide required programming   - Redirect inappropriate behaviors   Outcome: Not Progressing     Problem: Risk for Self Injury/Neglect  Goal: Treatment Goal: Remain safe during length of stay, learn and adopt new coping skills, and be free of self-injurious ideation, impulses and acts at the time of discharge  Outcome: Progressing  Goal: Verbalize thoughts and feelings  Description: Interventions:  - Assess and re-assess patient's lethality and potential for self-injury  - Engage patient in 1:1 interactions, daily, for a minimum of 15 minutes  - Encourage patient to express feelings, fears, frustrations, hopes  - Establish rapport/trust with patient   Outcome: Progressing  Goal: Refrain from harming self  Description: Interventions:  - Monitor patient closely, per order  - Develop a trusting relationship  - Supervise medication ingestion, monitor effects and side effects   Outcome: Progressing

## 2025-06-16 NOTE — PROGRESS NOTES
Progress Note - Behavioral Health   Name: Gabby ERWIN sarahland 35 y.o. female I MRN: [id number]  Unit/Bed#: -01 I Date of Admission: 5/22/2025   Date of Service: 6/16/2025 I Hospital Day: 25     Assessment & Plan  Mood disorder (HCC)  Invega Sustenna 156 mg IM given 6/2  Received 2nd dose of 156mg IM 6/9  Next 234 mg IM monthly injection due 7/7/25  Discharge this week to crisis residential or home with sister  Continue Propanolol 10 mg po BID for akathisia   Continue Remeron 30 mg po HS for dual benefit of depression and associated neurovegetative symptoms  Drug abuse (HCC)  Encourage cessation   Agoraphobia  See plan for mood disorder  Psychosis (HCC)  See plan for mood disorder  Morbid (severe) obesity due to excess calories (HCC)  Lifestyle changes    Current Medications:    Current Facility-Administered Medications:     mirtazapine (REMERON) tablet 30 mg, Oral, HS    [START ON 7/7/2025] paliperidone palmitate ER (INVEGA) IM- Deltoid injection 234 mg, Intramuscular (deltoid only), Q28 Days    propranolol (INDERAL) tablet 10 mg, Oral, BID    Risks/Benefits of Treatment:     Risks, benefits, and possible side effects of medications explained to patient and patient verbalizes understanding and agreement for treatment.    Progress Toward Goals: progressing, taking medications, withdrawn but pleasant and cooperative    Treatment Planning:     All current active medications have been reviewed.  Continue to monitor response to treatment and assess for potential side effects of medications.  Encourage group therapy, milieu therapy and occupational therapy.  Collaboration with medical service for medical comorbidities as indicated.  Behavioral Health checks for safety monitoring.  Estimated Discharge Day: 1 day (6/17/2025)  Legal Status : Voluntary 201 commitment.  Long Stay Certification : Not Applicable    Subjective     Behavior over the last 24 hours: unchanged    Per staff, Gabby has been calm and cooperative on  the unit.  She remains mostly withdrawn to her room.  She is compliant with meals and medications.    Gabby was seen privately for psychiatric follow-up today.  She was calm and cooperative throughout the interview today.  She reports her mood continues to improve.  Affect remains mostly blunted however.  She reports she has been eating and sleeping well.  She reports increased anxiety yesterday when she was unable to get in touch with her sister.  She was able to get in touch with her however, Gabby states that her sister is now not coming home until Wednesday and would not be able to pick her up until Thursday.  Case management was made aware.  Gabby continues to deny suicidal and homicidal ideation intent or plan today.  She has no further complaints of auditory or visual hallucinations and does not present as internally stimulated this morning.  Case management will reach out to crisis residence today to see if she can be taken prior to her sister returning.    Sleep: normal  Appetite: normal  Medication side effects: No  ROS: review of systems as noted above in HPI/Subjective report, all other systems are negative    Objective :  Temp:  [97.6 °F (36.4 °C)-97.8 °F (36.6 °C)] 97.8 °F (36.6 °C)  HR:  [] 69  BP: (115-149)/(86-98) 115/86  Resp:  [18] 18  SpO2:  [97 %-98 %] 98 %  O2 Device: None (Room air)    Mental Status Evaluation:    Appearance:  disheveled, marginal hygiene   Behavior:  pleasant, cooperative, calm   Speech:  normal rate and volume   Mood:  euthymic   Affect:  blunted   Thought Process:  concrete   Thought Content:  no overt delusions   Perceptual Disturbances: none   Risk Potential: Suicidal Ideation -  None  Homicidal Ideation -  None  Potential for Aggression - No   Sensorium:  oriented to person, place, and time/date   Memory:  recent memory intact   Consciousness:  alert and awake   Attention/Concentration: attention span and concentration are age appropriate   Insight:  limited  "  Judgment: limited   Gait/Station: normal gait/station   Motor Activity: no abnormal movements       Lab Results: I have reviewed the following results:  Results from the past 24 hours: No results found for this or any previous visit (from the past 24 hours).  Most Recent Labs:   Lab Results   Component Value Date    WBC 6.92 05/23/2025    RBC 4.49 05/23/2025    HGB 11.8 05/23/2025    HCT 38.9 05/23/2025     05/23/2025    RDW 16.2 (H) 05/23/2025    NEUTROABS 4.26 05/23/2025    SODIUM 137 05/23/2025    K 4.4 05/23/2025     05/23/2025    CO2 29 05/23/2025    BUN 25 05/23/2025    CREATININE 1.35 (H) 05/23/2025    GLUC 88 05/23/2025    CALCIUM 9.2 05/23/2025    AST 35 05/23/2025    ALT 23 05/23/2025    ALKPHOS 82 05/23/2025    TP 7.3 05/23/2025    ALB 3.7 05/23/2025    TBILI 0.44 05/23/2025    CHOLESTEROL 162 05/23/2025    HDL 46 (L) 05/23/2025    TRIG 155 (H) 05/23/2025    LDLCALC 85 05/23/2025    NONHDLC 116 05/23/2025    AMMONIA 32 10/17/2022    [id number] 1.847 05/23/2025    FREET4 0.86 05/11/2018    PREGUR Negative 10/18/2022    PREGSERUM Negative 05/23/2025    HCGQUANT <2 08/07/2018    SYPHILISAB Non-reactive 11/14/2024    TREPONEMAPA Non-reactive 05/23/2025    HGBA1C 5.6 05/23/2025     05/23/2025       Administrative Statements     Counseling / Coordination of Care:   I have spent a total time of 30 minutes in caring for this patient on the day of the visit/encounter including:  Patient's progress discussed with staff in treatment team meeting.  Medication changes reviewed with staff in treatment team meeting.    Portions of the record may have been created with voice recognition software. Occasional wrong word or \"sound a like\" substitutions may have occurred due to the inherent limitations of voice recognition software. Read the chart carefully and recognize, using context, where substitutions have occurred.    ODILON Nunez 06/16/25  "

## 2025-06-17 PROCEDURE — 99232 SBSQ HOSP IP/OBS MODERATE 35: CPT | Performed by: STUDENT IN AN ORGANIZED HEALTH CARE EDUCATION/TRAINING PROGRAM

## 2025-06-17 RX ADMIN — MIRTAZAPINE 30 MG: 15 TABLET, FILM COATED ORAL at 21:43

## 2025-06-17 RX ADMIN — HYDROXYZINE HYDROCHLORIDE 50 MG: 50 TABLET ORAL at 16:53

## 2025-06-17 RX ADMIN — NICOTINE POLACRILEX 2 MG: 4 GUM, CHEWING BUCCAL at 14:24

## 2025-06-17 RX ADMIN — PROPRANOLOL HYDROCHLORIDE 10 MG: 10 TABLET ORAL at 21:43

## 2025-06-17 NOTE — NURSING NOTE
Pt approached nurses' station with c/o anxiety (H=20). Pt requested PRN medication; Atarax 50 mg administered as ordered. Upon follow up, pt observed resting calmly in bed, respirations even and unlabored, and showing not outward s/s of distress; PRN medication appears effective.

## 2025-06-17 NOTE — NURSING NOTE
"Pt calm and cooperative during interaction. Denies SI/HI or hallucinations. Reports feeling \"good:.  "

## 2025-06-17 NOTE — PROGRESS NOTES
Progress Note - Behavioral Health   Name: Gabby Mares 35 y.o. female I MRN: [id number]  Unit/Bed#: -01 I Date of Admission: 5/22/2025   Date of Service: 6/17/2025 I Hospital Day: 26     Assessment & Plan  Mood disorder (HCC)  Invega Sustenna 156 mg IM given 6/2  Received 2nd dose of 156mg IM 6/9  Next 234 mg IM monthly injection due 7/7/25  Discharge this week to crisis residential or home with sister  Continue Propanolol 10 mg po BID for akathisia   Continue Remeron 30 mg po HS for dual benefit of depression and associated neurovegetative symptoms  Drug abuse (HCC)  Encourage cessation   Agoraphobia  See plan for mood disorder  Psychosis (HCC)  See plan for mood disorder  Morbid (severe) obesity due to excess calories (HCC)  Lifestyle changes    Current Medications:    Current Facility-Administered Medications:     mirtazapine (REMERON) tablet 30 mg, Oral, HS    [START ON 7/7/2025] paliperidone palmitate ER (INVEGA) IM- Deltoid injection 234 mg, Intramuscular (deltoid only), Q28 Days    propranolol (INDERAL) tablet 10 mg, Oral, BID    Risks/Benefits of Treatment:     Risks, benefits, and possible side effects of medications explained to patient and patient verbalizes understanding and agreement for treatment.    Progress Toward Goals: progressing    Treatment Planning:     All current active medications have been reviewed.  Continue to monitor response to treatment and assess for potential side effects of medications.  Encourage group therapy, milieu therapy and occupational therapy.  Collaboration with medical service for medical comorbidities as indicated.  Behavioral Health checks for safety monitoring.  Estimated Discharge Day: 6/19/2025   Legal Status : Voluntary 201 commitment.  Long Stay Certification: The patient is currently assessed as being at their baseline with continued need for medication management, supervision for safety and ADLs. These services are not currently available in a less  "restrictive environment necessitating continued hospital stay.  The patient should remain on the unit until these services are available, due to likelihood of mental decompensation, readmission and becoming a risk of danger to self/others if discharged to an unsupervised setting. Assertive discharge planning and collaboration with multiple providers (inpatient and community based) remains ongoing. The patient is currently awaiting for Crisis Residence facility.    Subjective     Behavior over the last 24 hours: unchanged    Per staff, Gabby continues to report some depression and anxiety however manageable at this time.  She continues to have a good appetite and sleeps well.  She remains withdrawn to herself but is pleasant and cooperative during assessments.    Gabby was seen privately for psychiatric follow-up today.  She reports that her mood is \"okay\".  She reports some anxiety and depression however feels it is manageable with coping skills at this time.  Affect continues to be generally blunted.  She is forward thinking looking, hoping to discharge shortly to either a crisis residence or with her sister.  She reports she is not able to manage her money well on her own.  She denies any current suicidal or homicidal ideation intent or plan.  She denies any auditory or visual hallucinations and does not present as internally stimulated today.  She denies any side effects from her current medications, none are observed on exam, and she is agreeable to continue her current regimen.    Sleep: normal  Appetite: normal  Medication side effects: No  ROS: review of systems as noted above in HPI/Subjective report, all other systems are negative    Objective :  Temp:  [97.6 °F (36.4 °C)-98 °F (36.7 °C)] 97.6 °F (36.4 °C)  HR:  [63-75] 71  BP: (106-112)/(71-86) 108/76  Resp:  [16-18] 16  SpO2:  [99 %] 99 %  O2 Device: None (Room air)    Mental Status Evaluation:    Appearance:  casually dressed, marginal hygiene "   Behavior:  cooperative, calm   Speech:  normal rate and volume   Mood:  euthymic   Affect:  blunted   Thought Process:  concrete   Thought Content:  no overt delusions   Perceptual Disturbances: none   Risk Potential: Suicidal Ideation -  None  Homicidal Ideation -  None  Potential for Aggression - No   Sensorium:  oriented to person, place, and time/date   Memory:  recent and remote memory grossly intact   Consciousness:  alert and awake   Attention/Concentration: attention span and concentration appear shorter than expected for age   Insight:  limited   Judgment: limited   Gait/Station: normal gait/station   Motor Activity: no abnormal movements       Lab Results: I have reviewed the following results:  Results from the past 24 hours: No results found for this or any previous visit (from the past 24 hours).  Most Recent Labs:   Lab Results   Component Value Date    WBC 6.92 05/23/2025    RBC 4.49 05/23/2025    HGB 11.8 05/23/2025    HCT 38.9 05/23/2025     05/23/2025    RDW 16.2 (H) 05/23/2025    NEUTROABS 4.26 05/23/2025    SODIUM 137 05/23/2025    K 4.4 05/23/2025     05/23/2025    CO2 29 05/23/2025    BUN 25 05/23/2025    CREATININE 1.35 (H) 05/23/2025    GLUC 88 05/23/2025    CALCIUM 9.2 05/23/2025    AST 35 05/23/2025    ALT 23 05/23/2025    ALKPHOS 82 05/23/2025    TP 7.3 05/23/2025    ALB 3.7 05/23/2025    TBILI 0.44 05/23/2025    CHOLESTEROL 162 05/23/2025    HDL 46 (L) 05/23/2025    TRIG 155 (H) 05/23/2025    LDLCALC 85 05/23/2025    NONHDLC 116 05/23/2025    AMMONIA 32 10/17/2022    [id number] 1.847 05/23/2025    FREET4 0.86 05/11/2018    PREGUR Negative 10/18/2022    PREGSERUM Negative 05/23/2025    HCGQUANT <2 08/07/2018    SYPHILISAB Non-reactive 11/14/2024    TREPONEMAPA Non-reactive 05/23/2025    HGBA1C 5.6 05/23/2025     05/23/2025       Administrative Statements     Counseling / Coordination of Care:   I have spent a total time of 30 minutes in caring for this patient on  "the day of the visit/encounter including:  Patient's progress discussed with staff in treatment team meeting.  Medication changes reviewed with staff in treatment team meeting.    Portions of the record may have been created with voice recognition software. Occasional wrong word or \"sound a like\" substitutions may have occurred due to the inherent limitations of voice recognition software. Read the chart carefully and recognize, using context, where substitutions have occurred.    ODILON Nunez 06/17/25  "

## 2025-06-17 NOTE — NURSING NOTE
"Pt reports feeling \"bored\" and \"anxious\" at times. Denies SI/HI or hallucination. Pt withdrawn to room. Not interested in groups.  "

## 2025-06-17 NOTE — CASE MANAGEMENT
"CM called pt's [employer] [facility] Niurka ([phone]) to provide her with an update on pt. CM left VM letting her know of the updated dc plan for Thursday and asked for call back if she is in contact with pt's sister as CM has not heard back from her regarding [facility]. CM informed her that pt will need to dc Thursday as she does not meet criteria for inpatient and if she is unable to stay with sister, pt will need to look into a hotel or shelter. CM asked for a call back.     CM emailed [address] ([email or web address]) again to follow up on pt being scheduled for Medication Management after dc.   They reported:   \"Scheduled 7/15 at 820am with Dr Bennett\"     "

## 2025-06-18 PROCEDURE — 99232 SBSQ HOSP IP/OBS MODERATE 35: CPT | Performed by: STUDENT IN AN ORGANIZED HEALTH CARE EDUCATION/TRAINING PROGRAM

## 2025-06-18 RX ADMIN — HYDROXYZINE HYDROCHLORIDE 50 MG: 50 TABLET ORAL at 12:37

## 2025-06-18 RX ADMIN — PROPRANOLOL HYDROCHLORIDE 10 MG: 10 TABLET ORAL at 21:19

## 2025-06-18 RX ADMIN — HYDROXYZINE HYDROCHLORIDE 50 MG: 50 TABLET ORAL at 16:43

## 2025-06-18 RX ADMIN — NICOTINE POLACRILEX 2 MG: 4 GUM, CHEWING BUCCAL at 12:37

## 2025-06-18 RX ADMIN — MIRTAZAPINE 30 MG: 15 TABLET, FILM COATED ORAL at 21:19

## 2025-06-18 RX ADMIN — PROPRANOLOL HYDROCHLORIDE 10 MG: 10 TABLET ORAL at 09:23

## 2025-06-18 NOTE — CASE MANAGEMENT
"CM received call back from pt's [employer] [facility] Niurka ([phone]) CM explained that pt will dc tomorrow as pt has been given additional time on unit for sister to return home. Niurka reported she spoke to sister yesterday and that she is home. CM will continue to provide Niurka with updates as they occur for pt's dc plan so she can support pt in the community.       2:50pm   CM met with pt to check in about dc plans for tomorrow. Pt was participating in group. CM informed her that she will dc tomorrow and that CM spoke to her [facility] Niurka. CM asked pt if she has talked to her sister and pt reported \"I haven't been able to get a hold of her today.\" CM informed pt that CM has left sister multiple voicemails' asking for a call back and CM has not received one. CM encouraged pt to call her sister today/tonight and discuss her dc tomorrow.   CM informed her that CM can arrange a ride for her, that CM needs an address. Pt reported \"I think it is [address] [place] [address]\" Pt reported \"I think in [place].\" Pt said \"I just don't want to go there during the day and no one be there.\" Pt reported \"I think she would be able to come get me but in the evening after work. CM asked pt to call her sister again tonight to discuss dc plans for tomorrow. Pt verbalized understanding. CM spoke to pt about the idea of her renting a hotel room and she reported she does not have her Debit card as her sister does as she was assisting her with her bills and payments as pt had been overspending her SSI and led her to be evicted from her apartment.     CM informed pt that [facility] Niurka reported they are trying to schedule her CRR interview next week but they are not sure when they will have an opening.     3pm   CM called pt's sister Rosemary ([phone]) and call went right to VM. CM left detailed VM that pt will dc tomorrow regardless and asked her to call back even to leave CM a VM regarding if she can pick pt up on CM can arrange ride if she " "provides an address. MARKO explained that pt will need to dc to a shelter if sister does not call back and pt does not have alternate place to dc to. CM asked for a call back to confirm.   (CM has left VM's for pt's sister on: 6/4/25, 6/6/25, 6/16/25 with no response)      3:10pm   CM called [facility] ([phone]) again and was finally able to speak to Director Anayeli. MARKO explained that CM has called every day since referral initially made on 6/9/25 and spoke to different staff members who said he was out of the office or that they didn't have a bed available. MARKO also reminded him that pt would need [facility] funding and recommended that CM Reach out to the [facility] directly, to ask for [facility] funding. He requested that CM email him to discuss referral and then he would add county to the email to discuss [facility] funding. He then said they do not have any openings at this time but \"maybe Next week there may be some openings. But we are crisis so that can change quickly.\"    [email or web address]       "

## 2025-06-18 NOTE — NURSING NOTE
Pt awake and walking halls this morning, attended breakfast and received AM meds. Pt reports anxiety this morning r/t discharging tomorrow, but unable to get ahold of sister. Pt states that they are discharging tomorrow regardless, but anxious about having to go to a shelter. Denies SI, HI, AVH. Remains withdrawn to self and room, not attending groups d/t them being repetitive.

## 2025-06-18 NOTE — PROGRESS NOTES
06/18/25 0753   Team Meeting   Meeting Type Daily Rounds   Team Members Present   Team Members Present Physician;Nurse;   Physician Team Member Dr. Ramirez / ODILON Cook / MARY JO Cardona   Nursing Team Member Aly   Care Management Team Member Serafin / Suly / Rajiv   Patient/Family Present   Patient Present No   Patient's Family Present No   Pt denies SI/HI, denies anxiety. Reported feeling ready for dc.     DC: Thursday - pt to return to [place] home until she can be placed into [facility].

## 2025-06-18 NOTE — NURSING NOTE
Patient ambulating in hallway, restless. Denies SI/HI/AVH, reports anxiety related to d/c tomorrow. States she has not been able to get a hold of her sister, is unsure of where she will go. Reports eating and sleeping well, attended two groups. Denies any other questions or concerns.  Denies any unmet needs at this time.

## 2025-06-18 NOTE — PROGRESS NOTES
Progress Note - Behavioral Health   Name: Gabby ERWIN sarahland 35 y.o. female I MRN: [id number]  Unit/Bed#: -01 I Date of Admission: 5/22/2025   Date of Service: 6/18/2025 I Hospital Day: 27     Assessment & Plan  Mood disorder (HCC)  Invega Sustenna 156 mg IM given 6/2  Received 2nd dose of 156mg IM 6/9  Next 234 mg IM monthly injection due 7/7/25  Discharge this week to crisis residential or home with sister  Continue Propanolol 10 mg po BID for akathisia   Continue Remeron 30 mg po HS for dual benefit of depression and associated neurovegetative symptoms  Drug abuse (HCC)  Encourage cessation   Agoraphobia  See plan for mood disorder  Psychosis (HCC)  See plan for mood disorder  Morbid (severe) obesity due to excess calories (HCC)  Lifestyle changes    Current Medications:    Current Facility-Administered Medications:     mirtazapine (REMERON) tablet 30 mg, Oral, HS    [START ON 7/7/2025] paliperidone palmitate ER (INVEGA) IM- Deltoid injection 234 mg, Intramuscular (deltoid only), Q28 Days    propranolol (INDERAL) tablet 10 mg, Oral, BID    Risks/Benefits of Treatment:     Risks, benefits, and possible side effects of medications explained to patient and patient verbalizes understanding and agreement for treatment.    Progress Toward Goals: progressing    Treatment Planning:     All current active medications have been reviewed.  Continue to monitor response to treatment and assess for potential side effects of medications.  Encourage group therapy, milieu therapy and occupational therapy.  Collaboration with medical service for medical comorbidities as indicated.  Behavioral Health checks for safety monitoring.  Estimated Discharge Day: 6/19/2025   Legal Status : Voluntary 201 commitment.  Long Stay Certification : Not Applicable    Subjective     Behavior over the last 24 hours: unchanged    Per staff, Gabby reports being bored and anxious on the unit.  She denies any suicidal or homicidal ideation.  She  remains generally withdrawn to her room and does not attend groups.  She is calm and cooperative however.    Gabby was seen privately for psychiatric follow-up today.  She presents as generally euthymic today.  She reports that she is hopeful for discharge tomorrow to her sisters.  She denies any suicidal or homicidal ideation intent or plan today.  She reports that she has been eating and sleeping well.  She reports that she has adequate daytime energy however is just bored on the unit.  She denies any auditory or visual hallucinations and does not present as internally stimulated this morning.  She appears to be goal oriented and future thinking.  She denies any side effects from her current medications and none are observed on exam.  He is agreeable to continue her current medication regimen.    Sleep: normal  Appetite: normal  Medication side effects: No  ROS: review of systems as noted above in HPI/Subjective report, all other systems are negative    Objective :  Temp:  [97.2 °F (36.2 °C)-97.9 °F (36.6 °C)] 97.2 °F (36.2 °C)  HR:  [67-90] 81  BP: (108-135)/(71-87) 119/76  Resp:  [17-18] 18  SpO2:  [99 %-100 %] 100 %  O2 Device: None (Room air)    Mental Status Evaluation:    Appearance:  casually dressed, marginal hygiene   Behavior:  pleasant, cooperative, calm   Speech:  scant, soft   Mood:  euthymic   Affect:  blunted   Thought Process:  concrete   Thought Content:  no overt delusions   Perceptual Disturbances: none   Risk Potential: Suicidal Ideation -  None  Homicidal Ideation -  None  Potential for Aggression - No   Sensorium:  oriented to person, place, time/date, and situation   Memory:  recent memory intact   Consciousness:  alert and awake   Attention/Concentration: attention span and concentration appear shorter than expected for age   Insight:  limited   Judgment: limited   Gait/Station: normal gait/station   Motor Activity: no abnormal movements       Lab Results: I have reviewed the following  "results:  Results from the past 24 hours: No results found for this or any previous visit (from the past 24 hours).  Most Recent Labs:   Lab Results   Component Value Date    WBC 6.92 05/23/2025    RBC 4.49 05/23/2025    HGB 11.8 05/23/2025    HCT 38.9 05/23/2025     05/23/2025    RDW 16.2 (H) 05/23/2025    NEUTROABS 4.26 05/23/2025    SODIUM 137 05/23/2025    K 4.4 05/23/2025     05/23/2025    CO2 29 05/23/2025    BUN 25 05/23/2025    CREATININE 1.35 (H) 05/23/2025    GLUC 88 05/23/2025    CALCIUM 9.2 05/23/2025    AST 35 05/23/2025    ALT 23 05/23/2025    ALKPHOS 82 05/23/2025    TP 7.3 05/23/2025    ALB 3.7 05/23/2025    TBILI 0.44 05/23/2025    CHOLESTEROL 162 05/23/2025    HDL 46 (L) 05/23/2025    TRIG 155 (H) 05/23/2025    LDLCALC 85 05/23/2025    NONHDLC 116 05/23/2025    AMMONIA 32 10/17/2022    [id number] 1.847 05/23/2025    FREET4 0.86 05/11/2018    PREGUR Negative 10/18/2022    PREGSERUM Negative 05/23/2025    HCGQUANT <2 08/07/2018    SYPHILISAB Non-reactive 11/14/2024    TREPONEMAPA Non-reactive 05/23/2025    HGBA1C 5.6 05/23/2025     05/23/2025       Administrative Statements     Counseling / Coordination of Care:   I have spent a total time of 30 minutes in caring for this patient on the day of the visit/encounter including:  Patient's progress discussed with staff in treatment team meeting.  Medication changes reviewed with staff in treatment team meeting.    Portions of the record may have been created with voice recognition software. Occasional wrong word or \"sound a like\" substitutions may have occurred due to the inherent limitations of voice recognition software. Read the chart carefully and recognize, using context, where substitutions have occurred.    Shira Cook, ODILON 06/18/25  "

## 2025-06-18 NOTE — BH TRANSITION RECORD
Contact Information: If you have any questions, concerns, pended studies, tests and/or procedures, or emergencies regarding your inpatient behavioral health visit. Please contact [place] [facility] [phone] and ask to speak to a , nurse or physician. A contact is available 24 hours/ 7 days a week at this number.     Summary of Procedures Performed During your Stay:  Below is a list of major procedures performed during your hospital stay and a summary of results:  - Cardiac Procedures/Studies: ECG.  Sinus bradycardia with marked sinus arrhythmia  Low voltage QRS  Borderline ECG    Pending Studies (From admission, onward)      None          Please follow up on the above pending studies with your PCP and/or referring provider.

## 2025-06-18 NOTE — PROGRESS NOTES
06/17/25 0753   Team Meeting   Meeting Type Daily Rounds   Team Members Present   Team Members Present Physician;Nurse;   Physician Team Member Dr. Haddad / ODILON Cook / MARY JO Anna   Nursing Team Member Aly / Guanakito   Care Management Team Member Serafin / Suly / Rajiv   Patient/Family Present   Patient Present No   Patient's Family Present No   No change. Continues to deny SI/HI. Seclusive to self.     DC: Thursday

## 2025-06-19 PROCEDURE — 99232 SBSQ HOSP IP/OBS MODERATE 35: CPT | Performed by: STUDENT IN AN ORGANIZED HEALTH CARE EDUCATION/TRAINING PROGRAM

## 2025-06-19 RX ADMIN — NICOTINE POLACRILEX 2 MG: 4 GUM, CHEWING BUCCAL at 14:36

## 2025-06-19 RX ADMIN — PROPRANOLOL HYDROCHLORIDE 10 MG: 10 TABLET ORAL at 21:39

## 2025-06-19 RX ADMIN — MIRTAZAPINE 30 MG: 15 TABLET, FILM COATED ORAL at 21:39

## 2025-06-19 RX ADMIN — PROPRANOLOL HYDROCHLORIDE 10 MG: 10 TABLET ORAL at 09:38

## 2025-06-19 RX ADMIN — HYDROXYZINE HYDROCHLORIDE 50 MG: 50 TABLET ORAL at 14:36

## 2025-06-19 NOTE — ASSESSMENT & PLAN NOTE
Invega Sustenna 156 mg IM given 6/2  Received 2nd dose of 156mg IM 6/9  Next 234 mg IM monthly injection due 7/7/25  Discharge this week to crisis residential/shelter/home with sister  Continue Propanolol 10 mg po BID for akathisia   Continue Remeron 30 mg po HS for dual benefit of depression and associated neurovegetative symptoms

## 2025-06-19 NOTE — NURSING NOTE
At start of shift, pt resting in bed, easy to arouse. Pt reports anxiety related to discharge planning. Pt states that sister's cell phone speaker is not working and that is why pt could not get through via phone, however was able to text with sister today. Denies SI, HI, AVH, no questions regarding medications. Denies needs at this time.

## 2025-06-19 NOTE — PROGRESS NOTES
Progress Note - Behavioral Health   Name: Gabby ERWIN sarahland 35 y.o. female I MRN: [id number]  Unit/Bed#: -01 I Date of Admission: 5/22/2025   Date of Service: 6/19/2025 I Hospital Day: 28     Assessment & Plan  Mood disorder (HCC)  Invega Sustenna 156 mg IM given 6/2  Received 2nd dose of 156mg IM 6/9  Next 234 mg IM monthly injection due 7/7/25  Discharge this week to crisis residential/shelter/home with sister  Continue Propanolol 10 mg po BID for akathisia   Continue Remeron 30 mg po HS for dual benefit of depression and associated neurovegetative symptoms  Drug abuse (HCC)  Encourage cessation   Agoraphobia  See plan for mood disorder  Psychosis (HCC)  See plan for mood disorder  Morbid (severe) obesity due to excess calories (HCC)  Lifestyle changes    Current Medications:    Current Facility-Administered Medications:     mirtazapine (REMERON) tablet 30 mg, Oral, HS    [START ON 7/7/2025] paliperidone palmitate ER (INVEGA) IM- Deltoid injection 234 mg, Intramuscular (deltoid only), Q28 Days    propranolol (INDERAL) tablet 10 mg, Oral, BID    Risks/Benefits of Treatment:     Risks, benefits, and possible side effects of medications explained to patient and patient verbalizes understanding and agreement for treatment.    Progress Toward Goals: progressing    Treatment Planning:     All current active medications have been reviewed.  Continue to monitor response to treatment and assess for potential side effects of medications.  Encourage group therapy, milieu therapy and occupational therapy.  Collaboration with medical service for medical comorbidities as indicated.  Behavioral Health checks for safety monitoring.  Estimated Discharge Day: 6/20/2025   Legal Status : Voluntary 201 commitment.  Long Stay Certification : Not Applicable    Subjective     Behavior over the last 24 hours: unchanged    Per staff, Gabby continues to be calm and cooperative on the unit.  She is compliant with her medications and  meals.  There have been no acute behavioral issues.  She is mostly isolated to her self.    Gabby was seen privately for psychiatric follow-up today.  She reports that her mood continues to improve slowly.  She is reporting only mild anxiety today related to discharge disposition.  She reports that she has not spoken to her sister recently and is unsure where she is.  Case management is currently looking into alternate shelters for Gabby to stay while she waits for her sister.  There is potentially a local shelter available tomorrow.  Gabby continues to deny any suicidal or homicidal ideation intent or plan.  Her thought process remains generally concrete but it is goal oriented.  She denies any auditory or visual hallucinations and does not present as internally stimulated today.    Sleep: normal  Appetite: normal  Medication side effects: No  ROS: review of systems as noted above in HPI/Subjective report, all other systems are negative    Objective :  Temp:  [98.6 °F (37 °C)-98.9 °F (37.2 °C)] 98.9 °F (37.2 °C)  HR:  [] 86  BP: (119-129)/() 129/54  Resp:  [18] 18  SpO2:  [97 %] 97 %  O2 Device: None (Room air)    Mental Status Evaluation:    Appearance:  casually dressed, marginal hygiene   Behavior:  cooperative, calm   Speech:  normal rate and volume   Mood:  euthymic   Affect:  blunted   Thought Process:  concrete   Thought Content:  no overt delusions   Perceptual Disturbances: none   Risk Potential: Suicidal Ideation -  None  Homicidal Ideation -  None  Potential for Aggression - No   Sensorium:  oriented to person, place, and time/date   Memory:  recent memory intact   Consciousness:  alert and awake   Attention/Concentration: attention span and concentration appear shorter than expected for age   Insight:  partial   Judgment: partial   Gait/Station: normal gait/station   Motor Activity: no abnormal movements       Lab Results: I have reviewed the following results:  Results from the past 24  "hours: No results found for this or any previous visit (from the past 24 hours).  Most Recent Labs:   Lab Results   Component Value Date    WBC 6.92 05/23/2025    RBC 4.49 05/23/2025    HGB 11.8 05/23/2025    HCT 38.9 05/23/2025     05/23/2025    RDW 16.2 (H) 05/23/2025    NEUTROABS 4.26 05/23/2025    SODIUM 137 05/23/2025    K 4.4 05/23/2025     05/23/2025    CO2 29 05/23/2025    BUN 25 05/23/2025    CREATININE 1.35 (H) 05/23/2025    GLUC 88 05/23/2025    CALCIUM 9.2 05/23/2025    AST 35 05/23/2025    ALT 23 05/23/2025    ALKPHOS 82 05/23/2025    TP 7.3 05/23/2025    ALB 3.7 05/23/2025    TBILI 0.44 05/23/2025    CHOLESTEROL 162 05/23/2025    HDL 46 (L) 05/23/2025    TRIG 155 (H) 05/23/2025    LDLCALC 85 05/23/2025    NONHDLC 116 05/23/2025    AMMONIA 32 10/17/2022    WKI3QBTVEPDS 1.847 05/23/2025    FREET4 0.86 05/11/2018    PREGUR Negative 10/18/2022    PREGSERUM Negative 05/23/2025    HCGQUANT <2 08/07/2018    SYPHILISAB Non-reactive 11/14/2024    TREPONEMAPA Non-reactive 05/23/2025    HGBA1C 5.6 05/23/2025     05/23/2025       Administrative Statements     Counseling / Coordination of Care:   I have spent a total time of 30 minutes in caring for this patient on the day of the visit/encounter including:  Patient's progress discussed with staff in treatment team meeting.  Medication changes reviewed with staff in treatment team meeting.    Portions of the record may have been created with voice recognition software. Occasional wrong word or \"sound a like\" substitutions may have occurred due to the inherent limitations of voice recognition software. Read the chart carefully and recognize, using context, where substitutions have occurred.    ODILON Nunez 06/19/25  "

## 2025-06-19 NOTE — PLAN OF CARE
Problem: DEPRESSION  Goal: Will be euthymic at discharge  Description: INTERVENTIONS:  - Administer medication as ordered  - Provide emotional support via 1:1 interaction with staff  - Encourage involvement in milieu/groups/activities  - Monitor for social isolation  Outcome: Adequate for Discharge     Problem: PSYCHOSIS  Goal: Will report no hallucinations or delusions  Description: Interventions:  - Administer medication as  ordered  - Every waking shifts and PRN assess for the presence of hallucinations and or delusions  - Assist with reality testing to support increasing orientation  - Assess if patient's hallucinations or delusions are encouraging self-harm or harm to others and intervene as appropriate  Outcome: Adequate for Discharge     Problem: ANXIETY  Goal: Will report anxiety at manageable levels  Description: INTERVENTIONS:  - Administer medication as ordered  - Teach and encourage coping skills  - Provide emotional support  - Assess patient/family for anxiety and ability to cope  Outcome: Adequate for Discharge  Goal: By discharge: Patient will verbalize 2 strategies to deal with anxiety  Description: Interventions:  - Identify any obvious source/trigger to anxiety  - Staff will assist patient in applying identified coping technique/skills  - Encourage attendance of scheduled groups and activities  Outcome: Adequate for Discharge     Problem: DISCHARGE PLANNING - CARE MANAGEMENT  Goal: Discharge to post-acute care or home with appropriate resources  Description: INTERVENTIONS:  - Conduct assessment to determine patient/family and health care team treatment goals, and need for post-acute services based on payer coverage, community resources, and patient preferences, and barriers to discharge  - Address psychosocial, clinical, and financial barriers to discharge as identified in assessment in conjunction with the patient/family and health care team  - Arrange appropriate level of post-acute services  according to patient’s   needs and preference and payer coverage in collaboration with the physician and health care team  - Communicate with and update the patient/family, physician, and health care team regarding progress on the discharge plan  - Arrange appropriate transportation to post-acute venues  Outcome: Adequate for Discharge     Problem: Ineffective Coping  Goal: Identifies ineffective coping skills  Outcome: Adequate for Discharge  Goal: Identifies healthy coping skills  Outcome: Adequate for Discharge  Goal: Demonstrates healthy coping skills  Outcome: Adequate for Discharge  Goal: Participates in unit activities  Description: Interventions:  - Provide therapeutic environment   - Provide required programming   - Redirect inappropriate behaviors   Outcome: Adequate for Discharge     Problem: Risk for Self Injury/Neglect  Goal: Treatment Goal: Remain safe during length of stay, learn and adopt new coping skills, and be free of self-injurious ideation, impulses and acts at the time of discharge  Outcome: Adequate for Discharge  Goal: Verbalize thoughts and feelings  Description: Interventions:  - Assess and re-assess patient's lethality and potential for self-injury  - Engage patient in 1:1 interactions, daily, for a minimum of 15 minutes  - Encourage patient to express feelings, fears, frustrations, hopes  - Establish rapport/trust with patient   Outcome: Adequate for Discharge  Goal: Refrain from harming self  Description: Interventions:  - Monitor patient closely, per order  - Develop a trusting relationship  - Supervise medication ingestion, monitor effects and side effects   Outcome: Adequate for Discharge

## 2025-06-19 NOTE — PLAN OF CARE
Pt has attended 2 groups in the past 3 days    Problem: Ineffective Coping  Goal: Participates in unit activities  Description: Interventions:  - Provide therapeutic environment   - Provide required programming   - Redirect inappropriate behaviors   Outcome: Not Progressing

## 2025-06-19 NOTE — CASE MANAGEMENT
"CM called sister Rosemary ([phone]) again as she has not returned voicemails and she did not answer.     CM called pt's [facility] Niurka ([phone]) back again to provide update. CM left a VM asking for a call back to see if she has an address for pt. CM explained that crisis residence does not have openings and that if pt does not have address for her sisters home, she may need to go to a Shelter in [place] due to lack of shelters in [facility] for women.   (ICM off 6/19/25 for Juneteenth Holiday)      CM met with pt and provided her with her cell phone so she can contact her sister.       CM called [facility] in [place].   Call Friday at 9:30   Sister       12:45pm   CM met with pt again to inform her that CM can try to get her into [facility] in [place] tomorrow. CM explained they do not take people on Thursdays but advised pt and CM to call tomorrow morning. CM explained that this shelter is at a [facility] and smaller than the shelters in [place]. Pt verbalized understanding and thanked CM.    CM also provided her with her cell phone to check to see if her sister texted her back about getting her a hotel room and pt reported \"she did, she said she can't do it and doesn't have my card with her.\"     CM will follow up with pt in the morning to see if [facility] shelter is an option.   " "tomorrow. CM explained they do not take people on Thursdays but advised pt and CM to call tomorrow morning. CM explained that this shelter is at a [facility] and smaller than the shelters in [place]. Pt verbalized understanding and thanked CM.    CM also provided her with her cell phone to check to see if her sister texted her back about getting her a hotel room and pt reported \"she did, she said she can't do it and doesn't have my card with her.\" Pt reported she does not have anyone else that would possibly be able to get her card from her sister's home. Pt reported she does not have any family or friends to help either. Pt reported \"we're the only ones left in my family.\"     CM will follow up with pt in the morning to see if [facility] shelter is an option. Pt verbalized understanding.     "

## 2025-06-19 NOTE — NURSING NOTE
Pt reports feeling ready for discharge today. Denies SI/HI or hallucination. Pt excited to use cell phone and go home.

## 2025-06-20 VITALS
DIASTOLIC BLOOD PRESSURE: 88 MMHG | HEART RATE: 74 BPM | OXYGEN SATURATION: 98 % | WEIGHT: 293 LBS | TEMPERATURE: 97.1 F | HEIGHT: 64 IN | BODY MASS INDEX: 50.02 KG/M2 | RESPIRATION RATE: 18 BRPM | SYSTOLIC BLOOD PRESSURE: 142 MMHG

## 2025-06-20 PROCEDURE — 99239 HOSP IP/OBS DSCHRG MGMT >30: CPT | Performed by: STUDENT IN AN ORGANIZED HEALTH CARE EDUCATION/TRAINING PROGRAM

## 2025-06-20 RX ADMIN — PROPRANOLOL HYDROCHLORIDE 10 MG: 10 TABLET ORAL at 09:27

## 2025-06-20 NOTE — CASE MANAGEMENT
CM called [facility] ([phone]) to see if they have openings today for pt to go there until she can stay with her sister. CM spoke to them and they reported they would like to talk to pt directly.     [place] [address]   Phone: [phone]    CM met with pt to check in and assist her with calling shelter to see if she can go there today. They asked pt a few questions and reviewed their rules with her. Pt verbalized understanding and pt would like to go there today. They reported that pt can arrive at 3:30pm today. CM will arrange transportation for later today.     10:15am   CM called pt's [facility] Ni[facility]ka ([phone]) and left detailed VM with pt's dc plan.       1:30pm   CM met with pt to check in and review her dc plan. CM provided her with a print out of the address for the [facility] that pt can go during the week as the shelter is only open from 4pm to 7:30am. Pt verbalized understanding. CM advised pt to keep in touch with DOUGLAS Bah to see how she can help her once at the shelter.     CM spoke to DOUGLAS Bah to update her on pt's dc. She also reported that she has not heard from pt's sister either. MARKO explained all the measures that were taken for pt to return with sister or to attempt to ask sister if she can help pt get into a hotel for the time being. Niurka spoke about maybe helping pt get a new debit card so she can get into a hotel.     She reported that she has scheduled pt's CRR Interview on Monday 6/23 and she moved it to later in the day. She reported that she can pick pt up in [place] around 9:30am and take her to the interview. She reported she will try her best to see if she can get pt connected to a hotel closer to [facility] and so she does not have to stay in the shelter. MARKO will send her dc summary with addresses and phone number of where pt is going.     LYFT [facility] 2:10pm [facility] [address] [place] [address], [place] [address]     3:30pm   CM emailed pt's discharge summary to DOUGLAS Bah ([email or web address]) for her records so she can support pt upon dc. She also has the shelter information that pt was discharged to.

## 2025-06-20 NOTE — NURSING NOTE
Pt awake and withdrawn to room, briefly walks halls. Compliant with meds. Reports anxiety related to going to a shelter in [place]. Denies SI, HI, AVH. Phone is charging and met with CM this morning to text sister.

## 2025-06-20 NOTE — NURSING NOTE
AVS reviewed with pt. Medications filled and sent with pt. Expresses readiness for discharge. Transport provided by [facility].

## 2025-06-20 NOTE — BH TRANSITION RECORD
Contact Information: If you have any questions, concerns, pended studies, tests and/or procedures, or emergencies regarding your inpatient behavioral health visit. Please contact [place] [facility] [phone] and ask to speak to a , nurse or physician. A contact is available 24 hours/ 7 days a week at this number.     Summary of Procedures Performed During your Stay:  Below is a list of major procedures performed during your hospital stay and a summary of results:  - Cardiac Procedures/Studies: ECG.  Sinus bradycardia with marked sinus arrhythmia  Low voltage QRS  Borderline ECG       Pending Studies (From admission, onward)      None          Please follow up on the above pending studies with your PCP and/or referring provider.

## 2025-07-31 DIAGNOSIS — F31.9 BIPOLAR AFFECTIVE DISORDER, REMISSION STATUS UNSPECIFIED (HCC): Primary | ICD-10-CM

## 2025-08-10 ENCOUNTER — HOSPITAL ENCOUNTER (INPATIENT)
Facility: HOSPITAL | Age: 35
LOS: 2 days | DRG: 642 | End: 2025-08-12
Attending: EMERGENCY MEDICINE | Admitting: INTERNAL MEDICINE
Payer: MEDICARE

## 2025-08-10 ENCOUNTER — APPOINTMENT (EMERGENCY)
Dept: RADIOLOGY | Facility: HOSPITAL | Age: 35
DRG: 642 | End: 2025-08-10
Payer: MEDICARE

## 2025-08-10 ENCOUNTER — APPOINTMENT (EMERGENCY)
Dept: CT IMAGING | Facility: HOSPITAL | Age: 35
DRG: 642 | End: 2025-08-10
Payer: MEDICARE

## 2025-08-10 PROBLEM — T07.XXXA MULTIPLE ABRASIONS: Status: ACTIVE | Noted: 2025-08-10

## 2025-08-10 PROBLEM — N17.9 AKI (ACUTE KIDNEY INJURY) (HCC): Status: ACTIVE | Noted: 2025-08-10

## 2025-08-10 PROBLEM — E72.20 HYPERAMMONEMIA (HCC): Status: ACTIVE | Noted: 2025-08-10

## 2025-08-10 PROBLEM — E87.29 HIGH ANION GAP METABOLIC ACIDOSIS: Status: ACTIVE | Noted: 2025-08-10

## 2025-08-10 PROBLEM — R94.31 QT PROLONGATION: Status: ACTIVE | Noted: 2025-08-10

## 2025-08-11 ENCOUNTER — TELEPHONE (OUTPATIENT)
Age: 35
End: 2025-08-11

## 2025-08-11 ENCOUNTER — TELEPHONE (OUTPATIENT)
Dept: PSYCHIATRY | Facility: CLINIC | Age: 35
End: 2025-08-11

## 2025-08-12 PROBLEM — E72.20 HYPERAMMONEMIA (HCC): Status: RESOLVED | Noted: 2025-08-10 | Resolved: 2025-08-12

## (undated) DEVICE — INTENDED FOR TISSUE SEPARATION, AND OTHER PROCEDURES THAT REQUIRE A SHARP SURGICAL BLADE TO PUNCTURE OR CUT.: Brand: BARD-PARKER SAFETY BLADES SIZE 15, STERILE

## (undated) DEVICE — SYRINGE 10ML LL CONTROL TOP

## (undated) DEVICE — STERILE POLYISOPRENE POWDER-FREE SURGICAL GLOVES: Brand: PROTEXIS

## (undated) DEVICE — SUT ETHILON 3-0 FSLX 30 IN 1673H

## (undated) DEVICE — POOLE SUCTION HANDLE: Brand: CARDINAL HEALTH

## (undated) DEVICE — SPONGE STICK WITH PVP-I: Brand: KENDALL

## (undated) DEVICE — GAUZE SPONGES,16 PLY: Brand: CURITY

## (undated) DEVICE — OCCLUSIVE GAUZE STRIP,3% BISMUTH TRIBROMOPHENATE IN PETROLATUM BLEND: Brand: XEROFORM

## (undated) DEVICE — PROXIMATE PLUS MD MULTI-DIRECTIONAL RELEASE SKIN STAPLERS CONTAINS 35 STAINLESS STEEL STAPLES APPROXIMATE CLOSED DIMENSIONS: 6.9MM X 3.9MM WIDE: Brand: PROXIMATE

## (undated) DEVICE — SKIN MARKER DUAL TIP WITH RULER CAP, FLEXIBLE RULER AND LABELS: Brand: DEVON

## (undated) DEVICE — PAD GROUNDING ADULT

## (undated) DEVICE — NEEDLE 25G X 1 1/2

## (undated) DEVICE — PENCIL ELECTROSURG E-Z CLEAN -0035H

## (undated) DEVICE — BASIC PACK: Brand: CONVERTORS

## (undated) DEVICE — BULB SYRINGE,IRRIGATION WITH PROTECTIVE CAP: Brand: DOVER

## (undated) DEVICE — MINOR PROCEDURE DRAPE: Brand: CONVERTORS

## (undated) DEVICE — NDL CNTR 20CT FM MAG: Brand: MEDLINE INDUSTRIES, INC.

## (undated) DEVICE — PACKING VAGINAL 2 IN

## (undated) DEVICE — SPECIMEN CONTAINER STERILE PEEL PACK

## (undated) DEVICE — INTENDED FOR TISSUE SEPARATION, AND OTHER PROCEDURES THAT REQUIRE A SHARP SURGICAL BLADE TO PUNCTURE OR CUT.: Brand: BARD-PARKER SAFETY BLADES SIZE 10, STERILE

## (undated) DEVICE — SPONGE LAP STERILE 18X18